# Patient Record
Sex: FEMALE | Race: WHITE | NOT HISPANIC OR LATINO | Employment: FULL TIME | URBAN - NONMETROPOLITAN AREA
[De-identification: names, ages, dates, MRNs, and addresses within clinical notes are randomized per-mention and may not be internally consistent; named-entity substitution may affect disease eponyms.]

---

## 2017-02-02 PROBLEM — R74.01 TRANSAMINITIS: Status: ACTIVE | Noted: 2017-02-02

## 2017-03-01 PROBLEM — R10.13 EPIGASTRIC PAIN: Status: ACTIVE | Noted: 2017-03-01

## 2017-03-31 PROBLEM — R10.9 ABDOMINAL PAIN: Status: ACTIVE | Noted: 2017-03-31

## 2018-03-20 PROBLEM — K62.5 BRBPR (BRIGHT RED BLOOD PER RECTUM): Status: ACTIVE | Noted: 2018-03-20

## 2020-09-24 PROBLEM — Z23 NEED FOR PROPHYLACTIC VACCINATION AND INOCULATION AGAINST INFLUENZA: Status: ACTIVE | Noted: 2020-09-24

## 2020-10-02 ENCOUNTER — HOSPITAL ENCOUNTER (OUTPATIENT)
Dept: RADIOLOGY | Facility: HOSPITAL | Age: 40
Discharge: HOME | End: 2020-10-02
Attending: NURSE PRACTITIONER
Payer: MEDICAID

## 2020-10-02 DIAGNOSIS — R05.9 COUGH: Primary | ICD-10-CM

## 2020-10-02 DIAGNOSIS — Z11.59 SCREENING EXAMINATION FOR POLIOMYELITIS: ICD-10-CM

## 2020-10-02 DIAGNOSIS — R05.9 COUGH: ICD-10-CM

## 2020-10-02 PROCEDURE — 71046 X-RAY EXAM CHEST 2 VIEWS: CPT | Mod: TC

## 2020-10-14 PROBLEM — R12 HEARTBURN: Status: ACTIVE | Noted: 2020-10-14

## 2021-02-15 DIAGNOSIS — Z12.31 ENCOUNTER FOR SCREENING MAMMOGRAM FOR BREAST CANCER: Primary | ICD-10-CM

## 2021-03-17 ENCOUNTER — HOSPITAL ENCOUNTER (OUTPATIENT)
Dept: RADIOLOGY | Facility: HOSPITAL | Age: 41
Discharge: HOME | End: 2021-03-17
Attending: NURSE PRACTITIONER
Payer: MEDICAID

## 2021-03-17 VITALS — WEIGHT: 175 LBS | BODY MASS INDEX: 29.16 KG/M2 | HEIGHT: 65 IN

## 2021-03-17 DIAGNOSIS — Z12.31 ENCOUNTER FOR SCREENING MAMMOGRAM FOR BREAST CANCER: ICD-10-CM

## 2021-03-17 PROCEDURE — 77067 SCR MAMMO BI INCL CAD: CPT | Mod: TC

## 2021-03-18 DIAGNOSIS — R60.0 LOCALIZED EDEMA: Primary | ICD-10-CM

## 2021-03-29 ENCOUNTER — HOSPITAL ENCOUNTER (OUTPATIENT)
Dept: RADIOLOGY | Facility: HOSPITAL | Age: 41
Discharge: HOME | End: 2021-03-29
Attending: NURSE PRACTITIONER
Payer: MEDICAID

## 2021-03-29 DIAGNOSIS — R60.0 LOCALIZED EDEMA: ICD-10-CM

## 2021-03-29 PROCEDURE — 93970 EXTREMITY STUDY: CPT | Mod: TC

## 2021-04-09 ENCOUNTER — HOSPITAL ENCOUNTER (EMERGENCY)
Facility: HOSPITAL | Age: 41
Discharge: HOME | End: 2021-04-10
Attending: FAMILY MEDICINE
Payer: MEDICAID

## 2021-04-09 DIAGNOSIS — D64.9 ANEMIA, UNSPECIFIED TYPE: Primary | ICD-10-CM

## 2021-04-09 LAB
ABO + RH BLD: NORMAL
ALBUMIN SERPL BCP-MCNC: 3.2 G/DL (ref 3.5–5.2)
ALP SERPL-CCNC: 207 U/L (ref 55–135)
ALT SERPL W/O P-5'-P-CCNC: 139 U/L (ref 10–44)
AMYLASE SERPL-CCNC: 40 U/L (ref 20–110)
ANION GAP SERPL CALC-SCNC: 11 MMOL/L (ref 8–16)
AST SERPL-CCNC: 337 U/L (ref 10–40)
BASOPHILS # BLD AUTO: 0.05 K/UL (ref 0–0.2)
BASOPHILS NFR BLD: 0.7 % (ref 0–1.9)
BILIRUB SERPL-MCNC: 0.2 MG/DL (ref 0.1–1)
BLD GP AB SCN CELLS X3 SERPL QL: NORMAL
BLD PROD TYP BPU: NORMAL
BLOOD UNIT EXPIRATION DATE: NORMAL
BLOOD UNIT TYPE CODE: 6200
BLOOD UNIT TYPE: NORMAL
BUN SERPL-MCNC: 8 MG/DL (ref 6–20)
CALCIUM SERPL-MCNC: 8.7 MG/DL (ref 8.7–10.5)
CHLORIDE SERPL-SCNC: 98 MMOL/L (ref 95–110)
CO2 SERPL-SCNC: 26 MMOL/L (ref 23–29)
CODING SYSTEM: NORMAL
CREAT SERPL-MCNC: 0.7 MG/DL (ref 0.5–1.4)
DIFFERENTIAL METHOD: ABNORMAL
DISPENSE STATUS: NORMAL
EOSINOPHIL # BLD AUTO: 0.2 K/UL (ref 0–0.5)
EOSINOPHIL NFR BLD: 3.2 % (ref 0–8)
ERYTHROCYTE [DISTWIDTH] IN BLOOD BY AUTOMATED COUNT: 17.8 % (ref 11.5–14.5)
EST. GFR  (AFRICAN AMERICAN): >60 ML/MIN/1.73 M^2
EST. GFR  (NON AFRICAN AMERICAN): >60 ML/MIN/1.73 M^2
GLUCOSE SERPL-MCNC: 98 MG/DL (ref 70–110)
HCT VFR BLD AUTO: 26.6 % (ref 37–48.5)
HGB BLD-MCNC: 7.8 G/DL (ref 12–16)
IMM GRANULOCYTES # BLD AUTO: 0.04 K/UL (ref 0–0.04)
IMM GRANULOCYTES NFR BLD AUTO: 0.5 % (ref 0–0.5)
LIPASE SERPL-CCNC: 123 U/L (ref 23–300)
LYMPHOCYTES # BLD AUTO: 2.5 K/UL (ref 1–4.8)
LYMPHOCYTES NFR BLD: 34.2 % (ref 18–48)
MCH RBC QN AUTO: 21 PG (ref 27–31)
MCHC RBC AUTO-ENTMCNC: 29.3 G/DL (ref 32–36)
MCV RBC AUTO: 72 FL (ref 82–98)
MONOCYTES # BLD AUTO: 0.9 K/UL (ref 0.3–1)
MONOCYTES NFR BLD: 12.2 % (ref 4–15)
NEUTROPHILS # BLD AUTO: 3.6 K/UL (ref 1.8–7.7)
NEUTROPHILS NFR BLD: 49.2 % (ref 38–73)
NRBC BLD-RTO: 0 /100 WBC
NUM UNITS TRANS PACKED RBC: NORMAL
OB PNL STL: NEGATIVE
PLATELET # BLD AUTO: 244 K/UL (ref 150–450)
PMV BLD AUTO: 10.8 FL (ref 9.2–12.9)
POTASSIUM SERPL-SCNC: 3.9 MMOL/L (ref 3.5–5.1)
PROT SERPL-MCNC: 7.8 G/DL (ref 6–8.4)
RBC # BLD AUTO: 3.71 M/UL (ref 4–5.4)
SODIUM SERPL-SCNC: 135 MMOL/L (ref 136–145)
WBC # BLD AUTO: 7.28 K/UL (ref 3.9–12.7)

## 2021-04-09 PROCEDURE — P9016 RBC LEUKOCYTES REDUCED: HCPCS | Performed by: NURSE PRACTITIONER

## 2021-04-09 PROCEDURE — 83690 ASSAY OF LIPASE: CPT | Performed by: NURSE PRACTITIONER

## 2021-04-09 PROCEDURE — 96374 THER/PROPH/DIAG INJ IV PUSH: CPT

## 2021-04-09 PROCEDURE — 36415 COLL VENOUS BLD VENIPUNCTURE: CPT | Performed by: NURSE PRACTITIONER

## 2021-04-09 PROCEDURE — C9113 INJ PANTOPRAZOLE SODIUM, VIA: HCPCS | Performed by: NURSE PRACTITIONER

## 2021-04-09 PROCEDURE — 96361 HYDRATE IV INFUSION ADD-ON: CPT

## 2021-04-09 PROCEDURE — 86920 COMPATIBILITY TEST SPIN: CPT | Performed by: NURSE PRACTITIONER

## 2021-04-09 PROCEDURE — 86900 BLOOD TYPING SEROLOGIC ABO: CPT | Performed by: NURSE PRACTITIONER

## 2021-04-09 PROCEDURE — 99291 CRITICAL CARE FIRST HOUR: CPT | Mod: 25

## 2021-04-09 PROCEDURE — 82150 ASSAY OF AMYLASE: CPT | Performed by: NURSE PRACTITIONER

## 2021-04-09 PROCEDURE — 80053 COMPREHEN METABOLIC PANEL: CPT | Performed by: NURSE PRACTITIONER

## 2021-04-09 PROCEDURE — 63600175 PHARM REV CODE 636 W HCPCS: Performed by: NURSE PRACTITIONER

## 2021-04-09 PROCEDURE — 85025 COMPLETE CBC W/AUTO DIFF WBC: CPT | Performed by: NURSE PRACTITIONER

## 2021-04-09 PROCEDURE — 25000003 PHARM REV CODE 250: Performed by: NURSE PRACTITIONER

## 2021-04-09 PROCEDURE — 36430 TRANSFUSION BLD/BLD COMPNT: CPT

## 2021-04-09 PROCEDURE — 82272 OCCULT BLD FECES 1-3 TESTS: CPT | Performed by: NURSE PRACTITIONER

## 2021-04-09 RX ORDER — HYDROCODONE BITARTRATE AND ACETAMINOPHEN 500; 5 MG/1; MG/1
TABLET ORAL
Status: DISCONTINUED | OUTPATIENT
Start: 2021-04-09 | End: 2021-04-10 | Stop reason: HOSPADM

## 2021-04-09 RX ORDER — PANTOPRAZOLE SODIUM 40 MG/10ML
40 INJECTION, POWDER, LYOPHILIZED, FOR SOLUTION INTRAVENOUS
Status: COMPLETED | OUTPATIENT
Start: 2021-04-09 | End: 2021-04-09

## 2021-04-09 RX ADMIN — PANTOPRAZOLE SODIUM 40 MG: 40 INJECTION, POWDER, LYOPHILIZED, FOR SOLUTION INTRAVENOUS at 04:04

## 2021-04-09 RX ADMIN — SODIUM CHLORIDE 1000 ML: 0.9 INJECTION, SOLUTION INTRAVENOUS at 04:04

## 2021-04-10 VITALS
DIASTOLIC BLOOD PRESSURE: 80 MMHG | WEIGHT: 180 LBS | BODY MASS INDEX: 29.99 KG/M2 | OXYGEN SATURATION: 100 % | SYSTOLIC BLOOD PRESSURE: 140 MMHG | HEIGHT: 65 IN | HEART RATE: 104 BPM | RESPIRATION RATE: 18 BRPM | TEMPERATURE: 98 F

## 2021-04-10 RX ORDER — PANTOPRAZOLE SODIUM 40 MG/1
40 TABLET, DELAYED RELEASE ORAL DAILY
Qty: 30 TABLET | Refills: 11 | Status: SHIPPED | OUTPATIENT
Start: 2021-04-10 | End: 2022-06-03

## 2021-04-10 RX ORDER — ONDANSETRON 4 MG/1
4 TABLET, ORALLY DISINTEGRATING ORAL EVERY 6 HOURS PRN
Qty: 20 TABLET | Refills: 0 | Status: SHIPPED | OUTPATIENT
Start: 2021-04-10 | End: 2021-10-26 | Stop reason: ALTCHOICE

## 2021-05-04 ENCOUNTER — PATIENT MESSAGE (OUTPATIENT)
Dept: RESEARCH | Facility: HOSPITAL | Age: 41
End: 2021-05-04

## 2021-05-11 PROBLEM — Z12.11 ENCOUNTER FOR COLORECTAL CANCER SCREENING: Status: ACTIVE | Noted: 2021-05-11

## 2021-05-11 PROBLEM — Z12.12 ENCOUNTER FOR COLORECTAL CANCER SCREENING: Status: ACTIVE | Noted: 2021-05-11

## 2021-08-09 ENCOUNTER — HOSPITAL ENCOUNTER (OUTPATIENT)
Dept: RADIOLOGY | Facility: HOSPITAL | Age: 41
Discharge: HOME | End: 2021-08-09
Attending: NURSE PRACTITIONER
Payer: MEDICAID

## 2021-08-09 DIAGNOSIS — R05.9 COUGH: ICD-10-CM

## 2021-08-09 DIAGNOSIS — R05.9 COUGH: Primary | ICD-10-CM

## 2021-08-09 PROCEDURE — 71046 X-RAY EXAM CHEST 2 VIEWS: CPT | Mod: TC

## 2021-08-23 ENCOUNTER — LAB VISIT (OUTPATIENT)
Dept: LAB | Facility: HOSPITAL | Age: 41
End: 2021-08-23
Attending: NURSE PRACTITIONER
Payer: MEDICAID

## 2021-08-23 DIAGNOSIS — R53.83 FATIGUE, UNSPECIFIED TYPE: ICD-10-CM

## 2021-08-23 LAB — TSH SERPL DL<=0.005 MIU/L-ACNC: 3.27 UIU/ML (ref 0.4–4)

## 2021-08-23 PROCEDURE — 36415 COLL VENOUS BLD VENIPUNCTURE: CPT | Performed by: NURSE PRACTITIONER

## 2021-08-23 PROCEDURE — 84443 ASSAY THYROID STIM HORMONE: CPT | Performed by: NURSE PRACTITIONER

## 2021-11-02 ENCOUNTER — LAB VISIT (OUTPATIENT)
Dept: LAB | Facility: HOSPITAL | Age: 41
End: 2021-11-02
Attending: INTERNAL MEDICINE
Payer: MEDICAID

## 2021-11-02 DIAGNOSIS — R74.01 TRANSAMINITIS: ICD-10-CM

## 2021-11-02 LAB
ALBUMIN SERPL BCP-MCNC: 3.3 G/DL (ref 3.5–5.2)
ALP SERPL-CCNC: 203 U/L (ref 55–135)
ALT SERPL W/O P-5'-P-CCNC: 119 U/L (ref 10–44)
AST SERPL-CCNC: 148 U/L (ref 10–40)
BILIRUB DIRECT SERPL-MCNC: 0.1 MG/DL (ref 0.1–0.3)
BILIRUB SERPL-MCNC: 0.3 MG/DL (ref 0.1–1)
PROT SERPL-MCNC: 8.4 G/DL (ref 6–8.4)

## 2021-11-02 PROCEDURE — 36415 COLL VENOUS BLD VENIPUNCTURE: CPT | Performed by: INTERNAL MEDICINE

## 2021-11-02 PROCEDURE — 80076 HEPATIC FUNCTION PANEL: CPT | Performed by: INTERNAL MEDICINE

## 2021-12-31 ENCOUNTER — HOSPITAL ENCOUNTER (EMERGENCY)
Facility: HOSPITAL | Age: 41
Discharge: HOME | End: 2021-12-31
Attending: EMERGENCY MEDICINE
Payer: MEDICAID

## 2021-12-31 VITALS
RESPIRATION RATE: 16 BRPM | OXYGEN SATURATION: 98 % | WEIGHT: 180 LBS | SYSTOLIC BLOOD PRESSURE: 141 MMHG | DIASTOLIC BLOOD PRESSURE: 105 MMHG | TEMPERATURE: 98 F | HEIGHT: 65 IN | HEART RATE: 84 BPM | BODY MASS INDEX: 29.99 KG/M2

## 2021-12-31 DIAGNOSIS — R06.02 SOB (SHORTNESS OF BREATH): ICD-10-CM

## 2021-12-31 DIAGNOSIS — Z11.52 ENCOUNTER FOR SCREENING FOR COVID-19: Primary | ICD-10-CM

## 2021-12-31 LAB — SARS-COV-2 RNA RESP QL NAA+PROBE: NOT DETECTED

## 2021-12-31 PROCEDURE — U0002 COVID-19 LAB TEST NON-CDC: HCPCS | Performed by: CLINICAL NURSE SPECIALIST

## 2021-12-31 PROCEDURE — 99284 EMERGENCY DEPT VISIT MOD MDM: CPT | Mod: 25

## 2021-12-31 RX ORDER — BENZONATATE 100 MG/1
100 CAPSULE ORAL 3 TIMES DAILY PRN
Qty: 20 CAPSULE | Refills: 0 | Status: SHIPPED | OUTPATIENT
Start: 2021-12-31 | End: 2022-01-10

## 2021-12-31 RX ORDER — ALBUTEROL SULFATE 90 UG/1
1-2 AEROSOL, METERED RESPIRATORY (INHALATION) EVERY 6 HOURS PRN
Qty: 18 G | Refills: 0 | Status: SHIPPED | OUTPATIENT
Start: 2021-12-31 | End: 2023-07-28

## 2021-12-31 NOTE — Clinical Note
"Rohini "Mariya Dang was seen and treated in our emergency department on 12/31/2021.     COVID-19 is present in our communities across the state. There is limited testing for COVID at this time, so not all patients can be tested. In this situation, your employee meets the following criteria:    Rohini Dang has met the criteria for COVID-19 testing based upon symptoms, travel, and/or potential exposure. The test has been completed and is pending results at this time. During this time the employee is not able to work and should be quarantined per the Centers for Disease Control timelines.     If you have any questions or concerns, or if I can be of further assistance, please do not hesitate to contact me.    Sincerely,             Trent Singh MD"

## 2021-12-31 NOTE — ED PROVIDER NOTES
Encounter Date: 12/31/2021       History     Chief Complaint   Patient presents with    Headache     Pt stated that she had the COVID booster 2 days ago and she is c/o headache, fever, and SOB. Pt stated she can not take OTC medications due to liver problems.      Rohini Dang is an 41 y.o. female who complains of headache, shortness of breath, fever for the last few days.  Had a booster shot 2 days ago.  Patient states her work is concerned that she might have COVID needs a COVID test.  Patient states she cannot take any over-the-counter medication for her symptoms.        Review of patient's allergies indicates:   Allergen Reactions    Amoxicillin Hives    Avelox [moxifloxacin] Hives    Clarinex [desloratadine] Hives    Pcn [penicillins] Hives    Claritin [loratadine] Palpitations    Latex, natural rubber Rash     Past Medical History:   Diagnosis Date    Anemia     Borderline personality disorder     Chronic bilateral low back pain without sciatica     Depression with anxiety     Diabetes mellitus     Elevated LFTs     Esophageal stenosis     Fatty liver     Fibromyalgia     Fibromyalgia     High cholesterol     Hypoglycemia     Intermittent explosive disorder     Liver disease     JAMESON (nonalcoholic steatohepatitis)      Past Surgical History:   Procedure Laterality Date    COLONOSCOPY N/A 3/20/2018    Procedure: COLONOSCOPY;  Surgeon: Janelle Meade MD;  Location: [facility];  Service: Endoscopy;  Laterality: N/A;    COLONOSCOPY N/A 5/11/2021    Procedure: COLONOSCOPY;  Surgeon: Janelle Meade MD;  Location: [facility];  Service: Endoscopy;  Laterality: N/A;    ESOPHAGEAL DILATION      x2    ESOPHAGOGASTRODUODENOSCOPY N/A 5/7/2019    Procedure: EGD (ESOPHAGOGASTRODUODENOSCOPY);  Surgeon: Janelle Meade MD;  Location: [facility];  Service: Endoscopy;  Laterality: N/A;    ESOPHAGOGASTRODUODENOSCOPY N/A 5/11/2021    Procedure: EGD  "(ESOPHAGOGASTRODUODENOSCOPY);  Surgeon: Janelle Meade MD;  Location: [facility];  Service: Endoscopy;  Laterality: N/A;  Rapid on arrival    HIATAL HERNIA REPAIR      HYSTERECTOMY      LIVER BIOPSY  2018    fibrosis stage 3 JAMESON    OOPHORECTOMY      TUBAL LIGATION      UPPER GASTROINTESTINAL ENDOSCOPY      2013? unable to obtain records     Family History   Problem Relation Age of Onset    Hypertension Mother     Clotting disorder Father     Ulcers Father     Clotting disorder Sister     Cancer Maternal Grandmother         "female Cancer"    Lung cancer Paternal Grandmother     Diabetes Maternal Aunt     No Known Problems Daughter     No Known Problems Maternal Uncle     No Known Problems Paternal Aunt     No Known Problems Paternal Uncle     No Known Problems Maternal Grandfather     No Known Problems Paternal Grandfather     Breast cancer Neg Hx     Ovarian cancer Neg Hx     BRCA 1/2 Neg Hx      Social History     Tobacco Use    Smoking status: Never Smoker    Smokeless tobacco: Never Used   Substance Use Topics    Alcohol use: No     Alcohol/week: 0.0 standard drinks    Drug use: No     Review of Systems   Constitutional: Positive for fever.   HENT: Negative for sore throat.    Respiratory: Positive for shortness of breath.    Cardiovascular: Negative for chest pain.   Gastrointestinal: Negative for nausea.   Genitourinary: Negative for dysuria.   Musculoskeletal: Negative for back pain.   Skin: Negative for rash.   Neurological: Positive for headaches. Negative for weakness.   Hematological: Does not bruise/bleed easily.   All other systems reviewed and are negative.      Physical Exam     Initial Vitals [12/31/21 1236]   BP Pulse Resp Temp SpO2   (!) 141/105 84 16 98.4 °F (36.9 °C) 98 %      MAP       --         Physical Exam    Nursing note and vitals reviewed.  Constitutional: She appears well-developed and well-nourished.   HENT:   Head: Normocephalic and atraumatic.   Eyes: " Pupils are equal, round, and reactive to light.   Neck:   Normal range of motion.  Cardiovascular: Normal rate and regular rhythm.   Pulmonary/Chest: Breath sounds normal.   Abdominal: Abdomen is soft. Bowel sounds are normal.   Musculoskeletal:         General: Normal range of motion.      Cervical back: Normal range of motion.     Neurological: She is alert and oriented to person, place, and time.   Skin: Skin is warm and dry.   Psychiatric: She has a normal mood and affect.         ED Course   Procedures  Labs Reviewed   SARS-COV-2 (COVID-19) QUALITATIVE PCR    Narrative:     Is the patient symptomatic?->Yes  Is testing needed for patient travel?->No  Is this needed for pre-procedure or pre-op testing?->No          Imaging Results          X-Ray Chest AP Portable (Final result)  Result time 12/31/21 13:09:19    Final result by Ubaldo Pardo MD (12/31/21 13:09:19)                 Impression:      No acute findings.      Electronically signed by: Ubaldo Pardo MD  Date:    12/31/2021  Time:    13:09             Narrative:    EXAMINATION:  XR CHEST AP PORTABLE    CLINICAL HISTORY:  Shortness of breath    COMPARISON:  Chest x-ray 08/09/2021.    FINDINGS:  Unremarkable AP cardiac silhouette.  No focal airspace disease.  No pleural fluid.                                 Medications - No data to display  Medical Decision Making:   Differential Diagnosis:   COVID, medical screening  Clinical Tests:   Radiological Study: Ordered and Reviewed                      Clinical Impression:   Final diagnoses:  [R06.02] SOB (shortness of breath)  [Z11.52] Encounter for screening for COVID-19 (Primary)          ED Disposition Condition    Discharge Stable        ED Prescriptions     Medication Sig Dispense Start Date End Date Auth. Provider    benzonatate (TESSALON) 100 MG capsule Take 1 capsule (100 mg total) by mouth 3 (three) times daily as needed. 20 capsule 12/31/2021 1/10/2022 Ana Yo NP    albuterol  (PROVENTIL/VENTOLIN HFA) 90 mcg/actuation inhaler Inhale 1-2 puffs into the lungs every 6 (six) hours as needed. Rescue 18 g 12/31/2021 12/31/2022 Ana Yo NP        Follow-up Information     Follow up With Specialties Details Why Contact Info    Skip Celestin NP Emergency Medicine  As needed [address] [facility] [address] [facility] [address]  [phone]             Ana Yo NP  12/31/21 6916

## 2022-01-26 ENCOUNTER — HOSPITAL ENCOUNTER (EMERGENCY)
Facility: HOSPITAL | Age: 42
Discharge: HOME | End: 2022-01-26
Attending: EMERGENCY MEDICINE
Payer: MEDICAID

## 2022-01-26 VITALS
HEART RATE: 78 BPM | OXYGEN SATURATION: 98 % | SYSTOLIC BLOOD PRESSURE: 103 MMHG | TEMPERATURE: 99 F | WEIGHT: 180.88 LBS | BODY MASS INDEX: 30.1 KG/M2 | DIASTOLIC BLOOD PRESSURE: 81 MMHG | RESPIRATION RATE: 18 BRPM

## 2022-01-26 DIAGNOSIS — R10.10 PAIN OF UPPER ABDOMEN: Primary | ICD-10-CM

## 2022-01-26 LAB
ALBUMIN SERPL BCP-MCNC: 3.5 G/DL (ref 3.5–5.2)
ALP SERPL-CCNC: 232 U/L (ref 55–135)
ALT SERPL W/O P-5'-P-CCNC: 107 U/L (ref 10–44)
ANION GAP SERPL CALC-SCNC: 5 MMOL/L (ref 8–16)
AST SERPL-CCNC: 147 U/L (ref 10–40)
BASOPHILS # BLD AUTO: 0.05 K/UL (ref 0–0.2)
BASOPHILS NFR BLD: 0.5 % (ref 0–1.9)
BILIRUB SERPL-MCNC: 0.3 MG/DL (ref 0.1–1)
BILIRUB UR QL STRIP: NEGATIVE
BUN SERPL-MCNC: 11 MG/DL (ref 6–20)
CALCIUM SERPL-MCNC: 9.1 MG/DL (ref 8.7–10.5)
CHLORIDE SERPL-SCNC: 108 MMOL/L (ref 95–110)
CLARITY UR: CLEAR
CO2 SERPL-SCNC: 27 MMOL/L (ref 23–29)
COLOR UR: YELLOW
CREAT SERPL-MCNC: 0.7 MG/DL (ref 0.5–1.4)
DIFFERENTIAL METHOD: ABNORMAL
EOSINOPHIL # BLD AUTO: 0.2 K/UL (ref 0–0.5)
EOSINOPHIL NFR BLD: 1.9 % (ref 0–8)
ERYTHROCYTE [DISTWIDTH] IN BLOOD BY AUTOMATED COUNT: 16 % (ref 11.5–14.5)
EST. GFR  (AFRICAN AMERICAN): >60 ML/MIN/1.73 M^2
EST. GFR  (NON AFRICAN AMERICAN): >60 ML/MIN/1.73 M^2
GLUCOSE SERPL-MCNC: 122 MG/DL (ref 70–110)
GLUCOSE UR QL STRIP: NEGATIVE
HCT VFR BLD AUTO: 35.3 % (ref 37–48.5)
HGB BLD-MCNC: 11.2 G/DL (ref 12–16)
HGB UR QL STRIP: NEGATIVE
IMM GRANULOCYTES # BLD AUTO: 0.05 K/UL (ref 0–0.04)
IMM GRANULOCYTES NFR BLD AUTO: 0.5 % (ref 0–0.5)
KETONES UR QL STRIP: NEGATIVE
LEUKOCYTE ESTERASE UR QL STRIP: NEGATIVE
LIPASE SERPL-CCNC: 214 U/L (ref 23–300)
LYMPHOCYTES # BLD AUTO: 4.1 K/UL (ref 1–4.8)
LYMPHOCYTES NFR BLD: 37.2 % (ref 18–48)
MCH RBC QN AUTO: 25.7 PG (ref 27–31)
MCHC RBC AUTO-ENTMCNC: 31.7 G/DL (ref 32–36)
MCV RBC AUTO: 81 FL (ref 82–98)
MONOCYTES # BLD AUTO: 1.2 K/UL (ref 0.3–1)
MONOCYTES NFR BLD: 11.2 % (ref 4–15)
NEUTROPHILS # BLD AUTO: 5.4 K/UL (ref 1.8–7.7)
NEUTROPHILS NFR BLD: 48.7 % (ref 38–73)
NITRITE UR QL STRIP: NEGATIVE
NRBC BLD-RTO: 0 /100 WBC
PH UR STRIP: 6 [PH] (ref 5–8)
PLATELET # BLD AUTO: 211 K/UL (ref 150–450)
PMV BLD AUTO: 10.9 FL (ref 9.2–12.9)
POTASSIUM SERPL-SCNC: 3.8 MMOL/L (ref 3.5–5.1)
PROT SERPL-MCNC: 8.4 G/DL (ref 6–8.4)
PROT UR QL STRIP: NEGATIVE
RBC # BLD AUTO: 4.36 M/UL (ref 4–5.4)
SODIUM SERPL-SCNC: 140 MMOL/L (ref 136–145)
SP GR UR STRIP: <=1.005 (ref 1–1.03)
URN SPEC COLLECT METH UR: ABNORMAL
UROBILINOGEN UR STRIP-ACNC: NEGATIVE EU/DL
WBC # BLD AUTO: 11.03 K/UL (ref 3.9–12.7)

## 2022-01-26 PROCEDURE — 99283 EMERGENCY DEPT VISIT LOW MDM: CPT

## 2022-01-26 PROCEDURE — 83690 ASSAY OF LIPASE: CPT | Performed by: NURSE PRACTITIONER

## 2022-01-26 PROCEDURE — 81003 URINALYSIS AUTO W/O SCOPE: CPT | Performed by: NURSE PRACTITIONER

## 2022-01-26 PROCEDURE — 85025 COMPLETE CBC W/AUTO DIFF WBC: CPT | Performed by: NURSE PRACTITIONER

## 2022-01-26 PROCEDURE — 80053 COMPREHEN METABOLIC PANEL: CPT | Performed by: NURSE PRACTITIONER

## 2022-01-26 PROCEDURE — 36415 COLL VENOUS BLD VENIPUNCTURE: CPT | Performed by: NURSE PRACTITIONER

## 2022-01-26 PROCEDURE — 25000003 PHARM REV CODE 250: Performed by: NURSE PRACTITIONER

## 2022-01-26 RX ORDER — SUCRALFATE 1 G/1
1 TABLET ORAL
Qty: 20 TABLET | Refills: 0 | Status: SHIPPED | OUTPATIENT
Start: 2022-01-26 | End: 2022-01-31

## 2022-01-26 RX ADMIN — LIDOCAINE HYDROCHLORIDE: 20 SOLUTION ORAL; TOPICAL at 04:01

## 2022-01-26 NOTE — DISCHARGE INSTRUCTIONS
It is important that you take medication as directed and follow-up with your gastroenterologist as scheduled.  Return to the emergency room for worsening condition.  Be sure to follow-up bland diet while experiencing symptoms of abdominal pain.

## 2022-01-26 NOTE — Clinical Note
"Rohini"Mariya Dang was seen and treated in our emergency department on 1/26/2022.  She may return to work on 01/27/2022.       If you have any questions or concerns, please don't hesitate to call.      Krista Vences NP"

## 2022-01-26 NOTE — ED PROVIDER NOTES
Encounter Date: 1/26/2022       History     Chief Complaint   Patient presents with    Abdominal Pain     Abdominal pain x 2 weeks, states worse anytime she eats or drinks. Denies nausea and vomiting. Reports diarrhea approx 1 x per day. Has upcoming appointment with GI.     This is a 41-year-old white female with a history of anemia, JAMESON, AND TYPE 2 DIABETES MELLITUS WHO PRESENTS TO THE EMERGENCY DEPARTMENT WITH COMPLAINTS OF UPPER ABDOMINAL PAIN FOR 2 WEEKS.  PATIENT REPORTS CONSTANT ACHING PAIN TO UPPER ABDOMEN EXACERBATED BY EATING AND DRINKING.  SHE ALSO ENDORSES EPISODES OF DIARRHEA APPROXIMATELY ONCE DAILY.  THE PATIENT RELATES THAT SHE IS UNABLE TO SEE HER GASTROENTEROLOGIST UNTIL ANOTHER 2 WEEKS.  THE PATIENT DENIES FEVER, CHILLS, URI SIGNS AND SYMPTOMS, CHEST PAIN, SHORTNESS OF BREATH, COUGH, URINARY DYSFUNCTION, OR BLACK/BLOODY BOWEL MOVEMENTS.        Review of patient's allergies indicates:   Allergen Reactions    Amoxicillin Hives    Avelox [moxifloxacin] Hives    Clarinex [desloratadine] Hives    Pcn [penicillins] Hives    Claritin [loratadine] Palpitations    Latex, natural rubber Rash     Past Medical History:   Diagnosis Date    Anemia     Borderline personality disorder     Chronic bilateral low back pain without sciatica     Depression with anxiety     Diabetes mellitus     Elevated LFTs     Esophageal stenosis     Fatty liver     Fibromyalgia     Fibromyalgia     High cholesterol     Hypoglycemia     Intermittent explosive disorder     Liver disease     JAMESON (nonalcoholic steatohepatitis)      Past Surgical History:   Procedure Laterality Date    COLONOSCOPY N/A 3/20/2018    Procedure: COLONOSCOPY;  Surgeon: Janelle Meade MD;  Location: [facility];  Service: Endoscopy;  Laterality: N/A;    COLONOSCOPY N/A 5/11/2021    Procedure: COLONOSCOPY;  Surgeon: Janelle Meade MD;  Location: [facility];  Service: Endoscopy;  Laterality: N/A;    ESOPHAGEAL DILATION    "   x2    ESOPHAGOGASTRODUODENOSCOPY N/A 5/7/2019    Procedure: EGD (ESOPHAGOGASTRODUODENOSCOPY);  Surgeon: Janelle Meade MD;  Location: [facility];  Service: Endoscopy;  Laterality: N/A;    ESOPHAGOGASTRODUODENOSCOPY N/A 5/11/2021    Procedure: EGD (ESOPHAGOGASTRODUODENOSCOPY);  Surgeon: Janelle Meade MD;  Location: [facility];  Service: Endoscopy;  Laterality: N/A;  Rapid on arrival    HIATAL HERNIA REPAIR      HYSTERECTOMY      LIVER BIOPSY  2018    fibrosis stage 3 JAMESON    OOPHORECTOMY      TUBAL LIGATION      UPPER GASTROINTESTINAL ENDOSCOPY      2013? unable to obtain records     Family History   Problem Relation Age of Onset    Hypertension Mother     Clotting disorder Father     Ulcers Father     Clotting disorder Sister     Cancer Maternal Grandmother         "female Cancer"    Lung cancer Paternal Grandmother     Diabetes Maternal Aunt     No Known Problems Daughter     No Known Problems Maternal Uncle     No Known Problems Paternal Aunt     No Known Problems Paternal Uncle     No Known Problems Maternal Grandfather     No Known Problems Paternal Grandfather     Breast cancer Neg Hx     Ovarian cancer Neg Hx     BRCA 1/2 Neg Hx      Social History     Tobacco Use    Smoking status: Never Smoker    Smokeless tobacco: Never Used   Substance Use Topics    Alcohol use: No     Alcohol/week: 0.0 standard drinks    Drug use: No     Review of Systems   Constitutional: Negative.    HENT: Negative.    Respiratory: Negative.    Cardiovascular: Negative.    Gastrointestinal: Positive for abdominal distention, abdominal pain and diarrhea. Negative for blood in stool, constipation, nausea and vomiting.   Genitourinary: Negative.    Musculoskeletal: Negative.    Neurological: Negative.        Physical Exam     Initial Vitals [01/26/22 1537]   BP Pulse Resp Temp SpO2   103/81 78 18 98.5 °F (36.9 °C) 98 %      MAP       --         Physical Exam    Nursing note and vitals " reviewed.  Constitutional: She appears well-developed and well-nourished. She is active. No distress.   HENT:   Head: Normocephalic and atraumatic.   Mouth/Throat: Oropharynx is clear and moist. No oropharyngeal exudate.   Eyes: EOM are normal. Pupils are equal, round, and reactive to light.   Neck: Neck supple.   Normal range of motion.  Cardiovascular: Normal rate, regular rhythm and normal heart sounds.   Pulmonary/Chest: Breath sounds normal. No respiratory distress.   Abdominal: Abdomen is soft. Bowel sounds are normal. She exhibits distension (Mild). She exhibits no mass. There is abdominal tenderness ( all quadrants). There is no rebound and no guarding.   Musculoskeletal:         General: Normal range of motion.      Cervical back: Normal range of motion and neck supple.     Neurological: She is alert and oriented to person, place, and time. GCS score is 15. GCS eye subscore is 4. GCS verbal subscore is 5. GCS motor subscore is 6.   Skin: Skin is warm and dry. Capillary refill takes less than 2 seconds.   Psychiatric: She has a normal mood and affect. Her behavior is normal. Thought content normal.         ED Course   Procedures  Labs Reviewed   CBC W/ AUTO DIFFERENTIAL - Abnormal; Notable for the following components:       Result Value    Hemoglobin 11.2 (*)     Hematocrit 35.3 (*)     MCV 81 (*)     MCH 25.7 (*)     MCHC 31.7 (*)     RDW 16.0 (*)     Immature Grans (Abs) 0.05 (*)     Mono # 1.2 (*)     All other components within normal limits   COMPREHENSIVE METABOLIC PANEL - Abnormal; Notable for the following components:    Glucose 122 (*)     Alkaline Phosphatase 232 (*)      (*)      (*)     Anion Gap 5 (*)     All other components within normal limits   URINALYSIS, REFLEX TO URINE CULTURE - Abnormal; Notable for the following components:    Specific Gravity, UA <=1.005 (*)     All other components within normal limits    Narrative:     Preferred Collection Type->Urine, Clean  Catch  Specimen Source->Urine   LIPASE          Imaging Results    None          Medications   (pyxis) gi cocktail (mylanta 30 mL, LIDOcaine 2 % viscous 10 mL, dicyclomine 10 mL) 50 mL ( Oral Given 1/26/22 1625)                 ED Course as of 01/26/22 1650   Wed Jan 26, 2022   1647 White blood cells normal, H&H stable, urinalysis normal, CMP reveals liver enzymes stable and consistent with previous.  Will prescribe Carafate for patient to take until follow-up with GI.  Patient understands to return for worsening condition. [CB]      ED Course User Index  [CB] Krista Vences NP             Clinical Impression:   Final diagnoses:  [R10.10] Pain of upper abdomen (Primary)          ED Disposition Condition    Discharge Stable        ED Prescriptions     Medication Sig Dispense Start Date End Date Auth. Provider    sucralfate (CARAFATE) 1 gram tablet Take 1 tablet (1 g total) by mouth 4 (four) times daily before meals and nightly. for 5 days 20 tablet 1/26/2022 1/31/2022 Krista Vences NP        Follow-up Information     Follow up With Specialties Details Why Contact Info    PCP Follow UP  Call in 2 days for follow-up, for re-evaluation of today's complaint            Krista Vences NP  01/26/22 1650

## 2022-02-10 ENCOUNTER — HOSPITAL ENCOUNTER (EMERGENCY)
Facility: HOSPITAL | Age: 42
Discharge: HOME | End: 2022-02-10
Attending: EMERGENCY MEDICINE
Payer: MEDICAID

## 2022-02-10 VITALS
TEMPERATURE: 99 F | DIASTOLIC BLOOD PRESSURE: 84 MMHG | OXYGEN SATURATION: 99 % | HEART RATE: 85 BPM | SYSTOLIC BLOOD PRESSURE: 127 MMHG | RESPIRATION RATE: 18 BRPM | WEIGHT: 182 LBS | BODY MASS INDEX: 30.32 KG/M2 | HEIGHT: 65 IN

## 2022-02-10 DIAGNOSIS — R20.2 PARESTHESIA AND PAIN OF LEFT EXTREMITY: ICD-10-CM

## 2022-02-10 DIAGNOSIS — R07.89 CHEST WALL PAIN: Primary | ICD-10-CM

## 2022-02-10 DIAGNOSIS — M79.609 PARESTHESIA AND PAIN OF LEFT EXTREMITY: ICD-10-CM

## 2022-02-10 LAB
CTP QC/QA: YES
SARS-COV-2 RDRP RESP QL NAA+PROBE: NEGATIVE

## 2022-02-10 PROCEDURE — 93010 ELECTROCARDIOGRAM REPORT: CPT | Mod: ,,, | Performed by: INTERNAL MEDICINE

## 2022-02-10 PROCEDURE — 93005 ELECTROCARDIOGRAM TRACING: CPT

## 2022-02-10 PROCEDURE — 63600175 PHARM REV CODE 636 W HCPCS: Performed by: EMERGENCY MEDICINE

## 2022-02-10 PROCEDURE — 96372 THER/PROPH/DIAG INJ SC/IM: CPT

## 2022-02-10 PROCEDURE — U0002 COVID-19 LAB TEST NON-CDC: HCPCS | Performed by: EMERGENCY MEDICINE

## 2022-02-10 PROCEDURE — 93010 EKG 12-LEAD: ICD-10-PCS | Mod: ,,, | Performed by: INTERNAL MEDICINE

## 2022-02-10 PROCEDURE — 99284 EMERGENCY DEPT VISIT MOD MDM: CPT | Mod: 25

## 2022-02-10 RX ORDER — KETOROLAC TROMETHAMINE 30 MG/ML
30 INJECTION, SOLUTION INTRAMUSCULAR; INTRAVENOUS
Status: COMPLETED | OUTPATIENT
Start: 2022-02-10 | End: 2022-02-10

## 2022-02-10 RX ORDER — DICLOFENAC SODIUM 75 MG/1
75 TABLET, DELAYED RELEASE ORAL 2 TIMES DAILY PRN
Qty: 10 TABLET | Refills: 0 | Status: SHIPPED | OUTPATIENT
Start: 2022-02-10 | End: 2022-06-03

## 2022-02-10 RX ADMIN — KETOROLAC TROMETHAMINE 30 MG: 30 INJECTION, SOLUTION INTRAMUSCULAR at 01:02

## 2022-02-10 NOTE — ED PROVIDER NOTES
Encounter Date: 2/10/2022       History     Chief Complaint   Patient presents with    Shortness of Breath     Pt c/o numbness to left arm, midsternal and left anterior chest pain, and sob x2days. Pt states its worsening today.     41 yo female with fibromyalgia here via POV with midchest wall pain x 2 days. Associated with tingling and pain to LUE in a stocking/glove distribution to the entire arm. No trauma. No weakness. No fever. Pain is replicated exactly by palpation. No alleviating factors. Similar to previous. Seen at another ED yesterday for same.         Review of patient's allergies indicates:   Allergen Reactions    Amoxicillin Hives    Avelox [moxifloxacin] Hives    Clarinex [desloratadine] Hives    Pcn [penicillins] Hives    Claritin [loratadine] Palpitations    Latex, natural rubber Rash     Past Medical History:   Diagnosis Date    Anemia     Borderline personality disorder     Chronic bilateral low back pain without sciatica     Depression with anxiety     Diabetes mellitus     Elevated LFTs     Esophageal stenosis     Fatty liver     Fibromyalgia     Fibromyalgia     High cholesterol     Hypoglycemia     Intermittent explosive disorder     Liver disease     JAMESON (nonalcoholic steatohepatitis)      Past Surgical History:   Procedure Laterality Date    COLONOSCOPY N/A 3/20/2018    Procedure: COLONOSCOPY;  Surgeon: Janelle Meade MD;  Location: [facility];  Service: Endoscopy;  Laterality: N/A;    COLONOSCOPY N/A 5/11/2021    Procedure: COLONOSCOPY;  Surgeon: Janelle Meade MD;  Location: [facility];  Service: Endoscopy;  Laterality: N/A;    ESOPHAGEAL DILATION      x2    ESOPHAGOGASTRODUODENOSCOPY N/A 5/7/2019    Procedure: EGD (ESOPHAGOGASTRODUODENOSCOPY);  Surgeon: Janelle Meade MD;  Location: [facility];  Service: Endoscopy;  Laterality: N/A;    ESOPHAGOGASTRODUODENOSCOPY N/A 5/11/2021    Procedure: EGD (ESOPHAGOGASTRODUODENOSCOPY);  Surgeon:  "Janelle Meade MD;  Location: [facility];  Service: Endoscopy;  Laterality: N/A;  Rapid on arrival    HIATAL HERNIA REPAIR      HYSTERECTOMY      LIVER BIOPSY  2018    fibrosis stage 3 JAMESON    OOPHORECTOMY      TUBAL LIGATION      UPPER GASTROINTESTINAL ENDOSCOPY      2013? unable to obtain records     Family History   Problem Relation Age of Onset    Hypertension Mother     Clotting disorder Father     Ulcers Father     Clotting disorder Sister     Cancer Maternal Grandmother         "female Cancer"    Lung cancer Paternal Grandmother     Diabetes Maternal Aunt     No Known Problems Daughter     No Known Problems Maternal Uncle     No Known Problems Paternal Aunt     No Known Problems Paternal Uncle     No Known Problems Maternal Grandfather     No Known Problems Paternal Grandfather     Breast cancer Neg Hx     Ovarian cancer Neg Hx     BRCA 1/2 Neg Hx      Social History     Tobacco Use    Smoking status: Never Smoker    Smokeless tobacco: Never Used   Substance Use Topics    Alcohol use: No     Alcohol/week: 0.0 standard drinks    Drug use: No     Review of Systems   Constitutional: Negative.    Respiratory: Negative.    Cardiovascular: Positive for chest pain.   All other systems reviewed and are negative.      Physical Exam     Initial Vitals [02/10/22 1244]   BP Pulse Resp Temp SpO2   (!) 171/107 97 20 99.4 °F (37.4 °C) 100 %      MAP       --         Physical Exam    Nursing note and vitals reviewed.  Constitutional: She appears well-developed and well-nourished. She is not diaphoretic. No distress.   HENT:   Head: Normocephalic and atraumatic.   Eyes: EOM are normal. Pupils are equal, round, and reactive to light.   Neck: Neck supple.   Normal range of motion.  Cardiovascular: Normal rate, regular rhythm and normal heart sounds.   Pulmonary/Chest: Breath sounds normal. No respiratory distress. She has no wheezes. She has no rales. She exhibits tenderness.   Abdominal: " Abdomen is soft. Bowel sounds are normal. She exhibits no distension. There is no abdominal tenderness. There is no rebound.   Musculoskeletal:         General: No tenderness or edema. Normal range of motion.      Cervical back: Normal range of motion and neck supple.     Neurological: She is alert and oriented to person, place, and time. She has normal strength and normal reflexes.   Skin: Skin is warm and dry. Capillary refill takes less than 2 seconds. No rash noted.         ED Course   Procedures  Labs Reviewed   SARS-COV-2 RDRP GENE    Narrative:     .This test utilizes isothermal nucleic acid amplification   technology to detect the SARS-CoV-2 RdRp nucleic acid segment.   The analytical sensitivity (limit of detection) is 125 genome   equivalents/mL.   A POSITIVE result implies infection with the SARS-CoV-2 virus;   the patient is presumed to be contagious.     A NEGATIVE result means that SARS-CoV-2 nucleic acids are not   present above the limit of detection. A NEGATIVE result should be   treated as presumptive. It does not rule out the possibility of   COVID-19 and should not be the sole basis for treatment decisions.   If COVID-19 is strongly suspected based on clinical and exposure   history, re-testing using an alternate molecular assay should be   considered.   This test is only for use under the Food and Drug   Administration s Emergency Use Authorization (EUA).   Commercial kits are provided by [employer].   Performance characteristics of the EUA have been independently   verified by [facility] Department of   Pathology and Laboratory Medicine.   _________________________________________________________________   The authorized Fact Sheet for Healthcare Providers and the authorized Fact   Sheet for Patients of the ID NOW COVID-19 are available on the FDA   website:     https://www.fda.gov/media/153072/download  https://www.fda.gov/media/198761/download         EKG Readings:  (Independently Interpreted)   Initial Reading: No STEMI. Rhythm: Normal Sinus Rhythm. Heart Rate: 95. Ectopy: No Ectopy. Clinical Impression: Normal Sinus Rhythm       Imaging Results          X-Ray Chest AP Portable (Final result)  Result time 02/10/22 13:48:27    Final result by Werner Lopez MD (02/10/22 13:48:27)                 Impression:      No acute finding detected.      Electronically signed by: Werner Lopez MD  Date:    02/10/2022  Time:    13:48             Narrative:    EXAMINATION:  XR CHEST AP PORTABLE    CLINICAL HISTORY:  Chest pain, unspecified    TECHNIQUE:  Portable AP chest    COMPARISON:  12/31/2021    FINDINGS:  No cardiac silhouette enlargement. Pulmonary vasculature unremarkable. No consolidation or evidence of pneumothorax. No acute osseous change.                                 Medications   ketorolac injection 30 mg (30 mg Intramuscular Given 2/10/22 1322)     Medical Decision Making:   Clinical Tests:   Radiological Study: Ordered and Reviewed  Medical Tests: Reviewed and Ordered                      Clinical Impression:   Final diagnoses:  [R07.89] Chest wall pain (Primary)  [M79.609, R20.2] Paresthesia and pain of left extremity          ED Disposition Condition    Discharge Stable        ED Prescriptions     Medication Sig Dispense Start Date End Date Auth. Provider    diclofenac (VOLTAREN) 75 MG EC tablet Take 1 tablet (75 mg total) by mouth 2 (two) times daily as needed (pain). 10 tablet 2/10/2022  Kale Rodrgiuez MD        Follow-up Information     Follow up With Specialties Details Why Contact Info    Skip Celestin NP Emergency Medicine Schedule an appointment as soon as possible for a visit   [address] [place] [address]  [place]TO[place] [address]  [phone]             Kale Rodriguez MD  02/10/22 8646

## 2022-02-10 NOTE — Clinical Note
"Rohini Mcnally"Laurence was seen and treated in our emergency department on 2/10/2022.  She may return to work on 02/12/2022.       If you have any questions or concerns, please don't hesitate to call.      Mary DUNN    "

## 2022-02-14 ENCOUNTER — LAB VISIT (OUTPATIENT)
Dept: LAB | Facility: HOSPITAL | Age: 42
End: 2022-02-14
Attending: NURSE PRACTITIONER
Payer: MEDICAID

## 2022-02-14 DIAGNOSIS — Z79.899 LONG TERM CURRENT USE OF THERAPEUTIC DRUG: ICD-10-CM

## 2022-02-14 DIAGNOSIS — E11.42 TYPE 2 DIABETES, CONTROLLED, WITH PERIPHERAL NEUROPATHY: ICD-10-CM

## 2022-02-14 DIAGNOSIS — R53.83 FATIGUE, UNSPECIFIED TYPE: ICD-10-CM

## 2022-02-14 DIAGNOSIS — E88.810 METABOLIC SYNDROME: ICD-10-CM

## 2022-02-14 LAB
ALBUMIN SERPL BCP-MCNC: 3.5 G/DL (ref 3.5–5.2)
ALP SERPL-CCNC: 217 U/L (ref 55–135)
ALT SERPL W/O P-5'-P-CCNC: 103 U/L (ref 10–44)
ANION GAP SERPL CALC-SCNC: 5 MMOL/L (ref 8–16)
AST SERPL-CCNC: 106 U/L (ref 10–40)
BILIRUB SERPL-MCNC: 0.2 MG/DL (ref 0.1–1)
BUN SERPL-MCNC: 16 MG/DL (ref 6–20)
CALCIUM SERPL-MCNC: 9.4 MG/DL (ref 8.7–10.5)
CHLORIDE SERPL-SCNC: 108 MMOL/L (ref 95–110)
CO2 SERPL-SCNC: 25 MMOL/L (ref 23–29)
CREAT SERPL-MCNC: 0.8 MG/DL (ref 0.5–1.4)
EST. GFR  (AFRICAN AMERICAN): >60 ML/MIN/1.73 M^2
EST. GFR  (NON AFRICAN AMERICAN): >60 ML/MIN/1.73 M^2
ESTIMATED AVG GLUCOSE: 160 MG/DL (ref 68–131)
GLUCOSE SERPL-MCNC: 179 MG/DL (ref 70–110)
HBA1C MFR BLD: 7.2 % (ref 4–5.6)
POTASSIUM SERPL-SCNC: 4.2 MMOL/L (ref 3.5–5.1)
PROT SERPL-MCNC: 8.2 G/DL (ref 6–8.4)
SODIUM SERPL-SCNC: 138 MMOL/L (ref 136–145)
TSH SERPL DL<=0.005 MIU/L-ACNC: 2.71 UIU/ML (ref 0.4–4)
VIT B12 SERPL-MCNC: 509 PG/ML (ref 210–950)

## 2022-02-14 PROCEDURE — 82607 VITAMIN B-12: CPT | Performed by: NURSE PRACTITIONER

## 2022-02-14 PROCEDURE — 80053 COMPREHEN METABOLIC PANEL: CPT | Performed by: NURSE PRACTITIONER

## 2022-02-14 PROCEDURE — 36415 COLL VENOUS BLD VENIPUNCTURE: CPT | Performed by: NURSE PRACTITIONER

## 2022-02-14 PROCEDURE — 84443 ASSAY THYROID STIM HORMONE: CPT | Performed by: NURSE PRACTITIONER

## 2022-02-14 PROCEDURE — 83036 HEMOGLOBIN GLYCOSYLATED A1C: CPT | Performed by: NURSE PRACTITIONER

## 2022-02-16 ENCOUNTER — PATIENT OUTREACH (OUTPATIENT)
Dept: EMERGENCY MEDICINE | Facility: HOSPITAL | Age: 42
End: 2022-02-16
Payer: MEDICAID

## 2022-02-21 NOTE — PROGRESS NOTES
Patient stated she already made appointments and seen the doctors. Patient declined ED navigation assessment and denied any needs/resources at this time.     Bertha Ly  ED Navigator- [place]

## 2022-03-16 DIAGNOSIS — Z12.31 ENCOUNTER FOR SCREENING MAMMOGRAM FOR MALIGNANT NEOPLASM OF BREAST: Primary | ICD-10-CM

## 2022-03-21 ENCOUNTER — HOSPITAL ENCOUNTER (OUTPATIENT)
Dept: RADIOLOGY | Facility: HOSPITAL | Age: 42
Discharge: HOME | End: 2022-03-21
Attending: NURSE PRACTITIONER
Payer: MEDICAID

## 2022-03-21 VITALS — HEIGHT: 65 IN | WEIGHT: 178 LBS | BODY MASS INDEX: 29.66 KG/M2

## 2022-03-21 DIAGNOSIS — Z12.31 ENCOUNTER FOR SCREENING MAMMOGRAM FOR MALIGNANT NEOPLASM OF BREAST: ICD-10-CM

## 2022-03-21 PROCEDURE — 77067 SCR MAMMO BI INCL CAD: CPT | Mod: TC

## 2022-04-18 DIAGNOSIS — M54.2 CERVICALGIA: Primary | ICD-10-CM

## 2022-04-18 DIAGNOSIS — M60.9 MYOSITIS: ICD-10-CM

## 2022-05-12 ENCOUNTER — CLINICAL SUPPORT (OUTPATIENT)
Dept: REHABILITATION | Facility: HOSPITAL | Age: 42
End: 2022-05-12
Payer: MEDICAID

## 2022-05-12 DIAGNOSIS — M60.9 MYOSITIS: Primary | ICD-10-CM

## 2022-05-12 DIAGNOSIS — M54.50 LUMBAR PAIN: ICD-10-CM

## 2022-05-12 DIAGNOSIS — M54.2 CERVICALGIA: ICD-10-CM

## 2022-05-12 DIAGNOSIS — M60.9 MYOSITIS, UNSPECIFIED MYOSITIS TYPE, UNSPECIFIED SITE: ICD-10-CM

## 2022-05-12 PROCEDURE — 97162 PT EVAL MOD COMPLEX 30 MIN: CPT
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
## 2022-05-16 ENCOUNTER — TELEPHONE (OUTPATIENT)
Dept: REHABILITATION | Facility: HOSPITAL | Age: 42
End: 2022-05-16
Payer: MEDICAID

## 2022-05-24 ENCOUNTER — TELEPHONE (OUTPATIENT)
Dept: REHABILITATION | Facility: HOSPITAL | Age: 42
End: 2022-05-24
Payer: MEDICAID

## 2022-05-25 ENCOUNTER — CLINICAL SUPPORT (OUTPATIENT)
Dept: REHABILITATION | Facility: HOSPITAL | Age: 42
End: 2022-05-25
Payer: MEDICAID

## 2022-05-25 DIAGNOSIS — M60.9 MYOSITIS, UNSPECIFIED MYOSITIS TYPE, UNSPECIFIED SITE: Primary | ICD-10-CM

## 2022-05-25 DIAGNOSIS — M54.2 CERVICALGIA: ICD-10-CM

## 2022-05-25 DIAGNOSIS — M54.50 LUMBAR PAIN: ICD-10-CM

## 2022-05-25 PROCEDURE — 97110 THERAPEUTIC EXERCISES: CPT

## 2022-05-25 NOTE — PROGRESS NOTES
"                                                    Physical Therapy Daily Note     Name: Rohini Dang  Clinic Number: [id number]  Diagnosis:   Encounter Diagnoses   Name Primary?    Myositis, unspecified myositis type, unspecified site Yes    Lumbar pain     Cervicalgia      Physician: Gi Freeman FNP  Precautions: body mechanics  Visit #: 1 of 12  PTA Visit #: 0  Time In: 1025  Time Out: 1125    Subjective     Pt reports: Patient complains of neck/upper back pain and low back pain. 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 Decreased neck pain to 4/10 after treatment and no change in back pain.     Objective     Rohini received individual therapeutic exercises to develop strength, endurance, ROM, flexibility, posture and core stabilization for 45 minutes including:  2x10 TA bracing with 5" holds, LTR, supine marching, SKTC 3x15", HSS 3x15", seated exercises 2x10  cervical flexion/extension (cues to stop before point of dizziness), cervical rotation, lateral flexion, scapular retraction, posterior shoulder rolls.    The patient received the following supervised modalities after being cleared for contradictions: MHP to neck and back with patient supine and knees over bolster x15 minutes for pain relief.     Written Home Exercises Provided: will be provided at future appt.   Education provided re: exercise technique, modalities  Rohini verbalized good understanding of education provided.   No spiritual or educational barriers to learning provided    Assessment     Patient tolerated treatment fair. Will continue to progress per POC as appropriate.   This is a 42 y.o. female referred to outpatient physical therapy and presents with a medical diagnosis of   Encounter Diagnoses   Name Primary?    Myositis, unspecified myositis type, unspecified site Yes    Lumbar pain     Cervicalgia     " and demonstrates limitations as described in the problem list. Pt prognosis is Good. Pt will continue to benefit from skilled outpatient physical therapy to address the deficits listed in the problem list, provide pt/family education and to maximize pt's level of independence in the home and community environment.     Goals as follows:  Short Term GOALS: 3 weeks. Pt agrees with goals set.  1. Patient will be independent with HEP.  2. Patient will demonstrate postural awareness with all activities.  3. Patient will perform daily activities with fewer complaints.      Long Term GOALS: 6  weeks. Pt agrees with goals set.  1. Patient will demonstrate LEFS to 50 or greater.  2. Patient will demonstrate modified oswestry score 25% or less.   3. Patient will demonstrate neck disability index 25% or less.   4. Patient will reports decreased neck pain to 2/10 or less.  5. Patient will report decreased LBP to 2/10 or less.  6. Patient will demonstrate improvement in lumbar ROM.  7. Patient will demonstrate improvement in cervical ROM.     Plan     Continue with established Plan of Care towards PT goals.    Therapist: Rohini Zhou, PT  5/25/2022

## 2022-05-27 ENCOUNTER — CLINICAL SUPPORT (OUTPATIENT)
Dept: REHABILITATION | Facility: HOSPITAL | Age: 42
End: 2022-05-27
Payer: MEDICAID

## 2022-05-27 DIAGNOSIS — M54.50 LUMBAR PAIN: ICD-10-CM

## 2022-05-27 DIAGNOSIS — M54.2 CERVICALGIA: ICD-10-CM

## 2022-05-27 DIAGNOSIS — M60.9 MYOSITIS, UNSPECIFIED MYOSITIS TYPE, UNSPECIFIED SITE: Primary | ICD-10-CM

## 2022-05-27 PROCEDURE — 97110 THERAPEUTIC EXERCISES: CPT

## 2022-05-27 NOTE — PROGRESS NOTES
"                                                    Physical Therapy Daily Note     Name: Rohini Dang  Clinic Number: [id number]  Diagnosis:   Encounter Diagnoses   Name Primary?    Myositis, unspecified myositis type, unspecified site Yes    Lumbar pain     Cervicalgia      Physician: Gi Freeman FNP  Precautions: body mechanics  Visit #: 2 of 12  PTA Visit #: 0  Time In: 0818  Time Out: 0909    Subjective     Pt reports: Patient complains of neck/upper back pain and low back pain.   Pain Scale: Rohini rates pain on a scale of 0-10 to be 5-6 currently low back and 6-7 neck and upper back. After treatment LBP 7/10 and neck and upper back pain 4/10.     Objective     Rohini received individual therapeutic exercises to develop strength, endurance, ROM, flexibility, posture and core stabilization for 51 minutes including:  2x10 TA bracing with 5" holds, LTR, supine marching, SKTC 3x15", HSS 3x15", seated dowel raises with TA bracing 2x10,  seated exercises 2x10 scapular retraction, posterior shoulder rolls 2x10.    Written Home Exercises Provided: will be provided at future appt.   Education provided re: exercise technique, modalities  Rohini verbalized good understanding of education provided.   No spiritual or educational barriers to learning provided    Assessment     Patient did not receive complete treatment due to being late for appt. Patient tolerated treatment fair. Will continue to progress per POC as appropriate.     This is a 42 y.o. female referred to outpatient physical therapy and presents with a medical diagnosis of   Encounter Diagnoses   Name Primary?    Myositis, unspecified myositis type, unspecified site Yes    Lumbar pain     Cervicalgia     and demonstrates limitations as described in the problem list. Pt prognosis is Good. Pt will continue to benefit from skilled outpatient physical therapy to address the deficits listed in the problem list, provide pt/family education " and to maximize pt's level of independence in the home and community environment.     Goals as follows:  Short Term GOALS: 3 weeks. Pt agrees with goals set.  1. Patient will be independent with HEP.  2. Patient will demonstrate postural awareness with all activities.  3. Patient will perform daily activities with fewer complaints.      Long Term GOALS: 6  weeks. Pt agrees with goals set.  1. Patient will demonstrate LEFS to 50 or greater.  2. Patient will demonstrate modified oswestry score 25% or less.   3. Patient will demonstrate neck disability index 25% or less.   4. Patient will reports decreased neck pain to 2/10 or less.  5. Patient will report decreased LBP to 2/10 or less.  6. Patient will demonstrate improvement in lumbar ROM.  7. Patient will demonstrate improvement in cervical ROM.     Plan     Continue with established Plan of Care towards PT goals.    Therapist: Rohini Zhou, PT  5/27/2022

## 2022-05-30 ENCOUNTER — CLINICAL SUPPORT (OUTPATIENT)
Dept: REHABILITATION | Facility: HOSPITAL | Age: 42
End: 2022-05-30
Payer: MEDICAID

## 2022-05-30 DIAGNOSIS — M54.2 CERVICALGIA: ICD-10-CM

## 2022-05-30 DIAGNOSIS — M60.9 MYOSITIS, UNSPECIFIED MYOSITIS TYPE, UNSPECIFIED SITE: Primary | ICD-10-CM

## 2022-05-30 DIAGNOSIS — M54.50 LUMBAR PAIN: ICD-10-CM

## 2022-05-30 PROCEDURE — 97110 THERAPEUTIC EXERCISES: CPT

## 2022-05-30 NOTE — PROGRESS NOTES
"                                                    Physical Therapy Daily Note     Name: Rohini Dang  Clinic Number: [id number]  Diagnosis:   Encounter Diagnoses   Name Primary?    Myositis, unspecified myositis type, unspecified site Yes    Lumbar pain     Cervicalgia      Physician: Gi Freeman FNP  Precautions: body mechanics  Visit #: 3 of 12  PTA Visit #: 0  Time In: 1000  Time Out: 1100    Subjective     Pt reports: Patient complains of upper back pain and low back pain into (B) hips and LE. Patient reports having to perform extra duties at work yesterday.   Pain Scale: Rohini rates pain on a scale of 0-10 to be 8-9 currently low back (B) hips and LE and upper back. After treatment upper back and low back pain rated 6/10.     Objective     Rohini received individual therapeutic exercises to develop strength, endurance, ROM, flexibility, posture and core stabilization for 50 minutes including:  2x10 TA bracing with 5" holds, LTR, supine marching, SKTC 3x15", HSS 3x15", seated dowel raises with TA bracing 2x10,  seated exercises 2x10 scapular retraction, posterior shoulder rolls 2x10.  MHP to full back x10 minutes to impact pain relief with patient supine with LEs extended per patient request.   Written Home Exercises Provided: see  for details.   Education provided re: exercise technique, modalities  Rohini verbalized good understanding of education provided.   No spiritual or educational barriers to learning provided    Assessment     Patient did not receive complete treatment due to being late for appt. Patient tolerated treatment fair. Will continue to progress per POC as appropriate.     This is a 42 y.o. female referred to outpatient physical therapy and presents with a medical diagnosis of   Encounter Diagnoses   Name Primary?    Myositis, unspecified myositis type, unspecified site Yes    Lumbar pain     Cervicalgia     and demonstrates limitations as described in " the problem list. Pt prognosis is Good. Pt will continue to benefit from skilled outpatient physical therapy to address the deficits listed in the problem list, provide pt/family education and to maximize pt's level of independence in the home and community environment.     Goals as follows:  Short Term GOALS: 3 weeks. Pt agrees with goals set.  1. Patient will be independent with HEP.  2. Patient will demonstrate postural awareness with all activities.  3. Patient will perform daily activities with fewer complaints.      Long Term GOALS: 6  weeks. Pt agrees with goals set.  1. Patient will demonstrate LEFS to 50 or greater.  2. Patient will demonstrate modified oswestry score 25% or less.   3. Patient will demonstrate neck disability index 25% or less.   4. Patient will reports decreased neck pain to 2/10 or less.  5. Patient will report decreased LBP to 2/10 or less.  6. Patient will demonstrate improvement in lumbar ROM.  7. Patient will demonstrate improvement in cervical ROM.     Plan     Continue with established Plan of Care towards PT goals.    Therapist: Rohini Zhou, PT  5/30/2022

## 2022-05-31 ENCOUNTER — TELEPHONE (OUTPATIENT)
Dept: REHABILITATION | Facility: HOSPITAL | Age: 42
End: 2022-05-31
Payer: MEDICAID

## 2022-06-01 ENCOUNTER — CLINICAL SUPPORT (OUTPATIENT)
Dept: REHABILITATION | Facility: HOSPITAL | Age: 42
End: 2022-06-01
Payer: MEDICAID

## 2022-06-01 DIAGNOSIS — M60.9 MYOSITIS, UNSPECIFIED MYOSITIS TYPE, UNSPECIFIED SITE: Primary | ICD-10-CM

## 2022-06-01 PROCEDURE — 97110 THERAPEUTIC EXERCISES: CPT | Mod: CQ

## 2022-06-01 NOTE — PROGRESS NOTES
"                                                    Physical Therapy Daily Note     Name: Rohini Dang  Clinic Number: [id number]  Diagnosis:    Cervicalgia      Myositis     Lumbar pain      Myositis, unspecified myositis type, unspecified site      Physician: Gi Freeman FNP  Precautions: body mechanics  Visit #: 4 of 12  PTA Visit #: 1  Time In: 0807  Time Out: 0900    Subjective     Pt reports: "sore".  Pain Scale: Rohini rates pain on a scale of 0-10 to be 8 currently low back (B) hips and LE and upper back.    Objective     Rohini received individual therapeutic exercises to develop strength, endurance, ROM, flexibility, posture and core stabilization for 43 minutes including:  2x10 TA bracing with 5" holds, LTR 2x10 w/ TA brace for lumbar stabilization, supine marching 2x10 cueing for abdominal activation, SKTC 3x15", & HSS 3x15" instructing pt w/ use of belt assist.    MHP to full back x10 minutes to impact pain relief with patient supine with LEs on bolster.   Written Home Exercises Provided: see  for details.   Education provided re: exercise technique, modalities  Rohini verbalized good understanding of education provided.   No spiritual or educational barriers to learning provided    Assessment     After treatment, upper back and low back pain rated 5-6/10.     This is a 42 y.o. female referred to outpatient physical therapy and presents with a medical diagnosis of   Encounter Diagnoses   Name Primary?    Myositis, unspecified myositis type, unspecified site Yes    Lumbar pain     Cervicalgia     and demonstrates limitations as described in the problem list. Pt will continue to benefit from skilled outpatient physical therapy to address the deficits listed in the problem list, provide pt/family education and maximize pt's level of independence in the home and community environment.     Goals as follows:  Short Term GOALS: 3 weeks. Pt agrees with goals set.  1. " Patient will be independent with HEP.  2. Patient will demonstrate postural awareness with all activities.  3. Patient will perform daily activities with fewer complaints.      Long Term GOALS: 6  weeks. Pt agrees with goals set.  1. Patient will demonstrate LEFS to 50 or greater.  2. Patient will demonstrate modified oswestry score 25% or less.   3. Patient will demonstrate neck disability index 25% or less.   4. Patient will reports decreased neck pain to 2/10 or less.  5. Patient will report decreased LBP to 2/10 or less.  6. Patient will demonstrate improvement in lumbar ROM.  7. Patient will demonstrate improvement in cervical ROM.     Plan     Continue with established Plan of Care towards PT goals.    Therapist: Argelia Herrera, OC  6/1/2022

## 2022-06-06 ENCOUNTER — CLINICAL SUPPORT (OUTPATIENT)
Dept: REHABILITATION | Facility: HOSPITAL | Age: 42
End: 2022-06-06
Payer: MEDICAID

## 2022-06-06 DIAGNOSIS — M60.9 MYOSITIS, UNSPECIFIED MYOSITIS TYPE, UNSPECIFIED SITE: Primary | ICD-10-CM

## 2022-06-06 DIAGNOSIS — M54.50 LUMBAR PAIN: ICD-10-CM

## 2022-06-06 DIAGNOSIS — M54.2 CERVICALGIA: ICD-10-CM

## 2022-06-06 PROCEDURE — 97110 THERAPEUTIC EXERCISES: CPT

## 2022-06-06 NOTE — PROGRESS NOTES
"                                                    Physical Therapy Daily Note     Name: Rohini Dang  Clinic Number: [id number]  Diagnosis:    Cervicalgia      Myositis     Lumbar pain      Myositis, unspecified myositis type, unspecified site      Physician: Gi Freeman FNP  Precautions: body mechanics  Visit #: 5 of 12  PTA Visit #: 1  Time In: 0810  Time Out: 0903    Subjective     Pt reports: Patient reports centralized LBP today and some upper back pain. Patient reports a lot of sitting and driving over the weekend. Patient reports not doing exercises at home. Patient reports today starts her work week where she will work 1 week straight without any days off.   Pain Scale: Rohini rates pain on a scale of 0-10 to be 8 currently low back  and 5 currently upper back. After treatment LBP rated 7/10 and upper back pain rated 3-4/10.     Objective     Rohini received individual therapeutic exercises to develop strength, endurance, ROM, flexibility, posture and core stabilization for 53 minutes including:  2x10 TA bracing with 5" holds, LTR 2x10 w/ TA brace for lumbar stabilization, supine marching 2x10 cueing for abdominal activation, SKTC 3x15", & HSS 3x15" w/ belt assist.    MHP to full back x10 minutes to impact pain relief with patient supine with LEs flat per patient comfort.     Written Home Exercises Provided: see  for details.   Education provided re: exercise technique, modalities  Rohini verbalized good understanding of education provided.   No spiritual or educational barriers to learning provided    Assessment      Patient encouraged to perform exercise program. Patient educated on centralization of back pain symptoms. Will continue to progress treatment as appropriate.     This is a 42 y.o. female referred to outpatient physical therapy and presents with a medical diagnosis of   Encounter Diagnoses   Name Primary?    Myositis, unspecified myositis type, unspecified " site Yes    Lumbar pain     Cervicalgia     and demonstrates limitations as described in the problem list. Pt will continue to benefit from skilled outpatient physical therapy to address the deficits listed in the problem list, provide pt/family education and maximize pt's level of independence in the home and community environment.     Goals as follows:  Short Term GOALS: 3 weeks. Pt agrees with goals set.  1. Patient will be independent with HEP.  2. Patient will demonstrate postural awareness with all activities.  3. Patient will perform daily activities with fewer complaints.      Long Term GOALS: 6  weeks. Pt agrees with goals set.  1. Patient will demonstrate LEFS to 50 or greater.  2. Patient will demonstrate modified oswestry score 25% or less.   3. Patient will demonstrate neck disability index 25% or less.   4. Patient will reports decreased neck pain to 2/10 or less.  5. Patient will report decreased LBP to 2/10 or less.  6. Patient will demonstrate improvement in lumbar ROM.  7. Patient will demonstrate improvement in cervical ROM.     Plan     Continue with established Plan of Care towards PT goals.    Therapist: Rohini Zhou, PT  6/6/2022

## 2022-06-15 ENCOUNTER — LAB VISIT (OUTPATIENT)
Dept: LAB | Facility: HOSPITAL | Age: 42
End: 2022-06-15
Attending: NURSE PRACTITIONER
Payer: MEDICAID

## 2022-06-15 DIAGNOSIS — E88.810 METABOLIC SYNDROME: ICD-10-CM

## 2022-06-15 DIAGNOSIS — E11.42 TYPE 2 DIABETES, CONTROLLED, WITH PERIPHERAL NEUROPATHY: ICD-10-CM

## 2022-06-15 DIAGNOSIS — K75.81 NASH (NONALCOHOLIC STEATOHEPATITIS): ICD-10-CM

## 2022-06-15 LAB
25(OH)D3+25(OH)D2 SERPL-MCNC: 35 NG/ML (ref 30–96)
ALBUMIN SERPL BCP-MCNC: 3.8 G/DL (ref 3.5–5.2)
ALP SERPL-CCNC: 246 U/L (ref 55–135)
ALT SERPL W/O P-5'-P-CCNC: 98 U/L (ref 10–44)
ANION GAP SERPL CALC-SCNC: 6 MMOL/L (ref 8–16)
AST SERPL-CCNC: 113 U/L (ref 10–40)
BILIRUB SERPL-MCNC: 0.2 MG/DL (ref 0.1–1)
BUN SERPL-MCNC: 14 MG/DL (ref 6–20)
CALCIUM SERPL-MCNC: 9.1 MG/DL (ref 8.7–10.5)
CHLORIDE SERPL-SCNC: 109 MMOL/L (ref 95–110)
CO2 SERPL-SCNC: 25 MMOL/L (ref 23–29)
CREAT SERPL-MCNC: 1 MG/DL (ref 0.5–1.4)
EST. GFR  (AFRICAN AMERICAN): >60 ML/MIN/1.73 M^2
EST. GFR  (NON AFRICAN AMERICAN): >60 ML/MIN/1.73 M^2
ESTIMATED AVG GLUCOSE: 154 MG/DL (ref 68–131)
GLUCOSE SERPL-MCNC: 123 MG/DL (ref 70–110)
HBA1C MFR BLD: 7 % (ref 4–5.6)
POTASSIUM SERPL-SCNC: 4.7 MMOL/L (ref 3.5–5.1)
PROT SERPL-MCNC: 8 G/DL (ref 6–8.4)
SODIUM SERPL-SCNC: 140 MMOL/L (ref 136–145)

## 2022-06-15 PROCEDURE — 83036 HEMOGLOBIN GLYCOSYLATED A1C: CPT | Performed by: NURSE PRACTITIONER

## 2022-06-15 PROCEDURE — 82306 VITAMIN D 25 HYDROXY: CPT | Performed by: NURSE PRACTITIONER

## 2022-06-15 PROCEDURE — 36415 COLL VENOUS BLD VENIPUNCTURE: CPT | Performed by: NURSE PRACTITIONER

## 2022-06-15 PROCEDURE — 80053 COMPREHEN METABOLIC PANEL: CPT | Performed by: NURSE PRACTITIONER

## 2022-06-16 ENCOUNTER — HOSPITAL ENCOUNTER (EMERGENCY)
Facility: HOSPITAL | Age: 42
Discharge: HOME | End: 2022-06-16
Attending: STUDENT IN AN ORGANIZED HEALTH CARE EDUCATION/TRAINING PROGRAM
Payer: MEDICAID

## 2022-06-16 VITALS
HEART RATE: 77 BPM | BODY MASS INDEX: 29.95 KG/M2 | OXYGEN SATURATION: 99 % | RESPIRATION RATE: 16 BRPM | DIASTOLIC BLOOD PRESSURE: 80 MMHG | WEIGHT: 180 LBS | SYSTOLIC BLOOD PRESSURE: 122 MMHG | TEMPERATURE: 98 F

## 2022-06-16 DIAGNOSIS — R10.84 GENERALIZED ABDOMINAL PAIN: ICD-10-CM

## 2022-06-16 DIAGNOSIS — G89.29 CHRONIC MIDLINE LOW BACK PAIN WITH BILATERAL SCIATICA: Primary | ICD-10-CM

## 2022-06-16 DIAGNOSIS — M54.42 CHRONIC MIDLINE LOW BACK PAIN WITH BILATERAL SCIATICA: Primary | ICD-10-CM

## 2022-06-16 DIAGNOSIS — M54.41 CHRONIC MIDLINE LOW BACK PAIN WITH BILATERAL SCIATICA: Primary | ICD-10-CM

## 2022-06-16 LAB
ALBUMIN SERPL BCP-MCNC: 4.1 G/DL (ref 3.5–5.2)
ALP SERPL-CCNC: 249 U/L (ref 55–135)
ALT SERPL W/O P-5'-P-CCNC: 86 U/L (ref 10–44)
ANION GAP SERPL CALC-SCNC: 7 MMOL/L (ref 8–16)
AST SERPL-CCNC: 95 U/L (ref 10–40)
BACTERIA #/AREA URNS HPF: NEGATIVE /HPF
BASOPHILS # BLD AUTO: 0.06 K/UL (ref 0–0.2)
BASOPHILS NFR BLD: 0.7 % (ref 0–1.9)
BILIRUB SERPL-MCNC: 0.3 MG/DL (ref 0.1–1)
BILIRUB UR QL STRIP: NEGATIVE
BUN SERPL-MCNC: 12 MG/DL (ref 6–20)
CALCIUM SERPL-MCNC: 9.3 MG/DL (ref 8.7–10.5)
CHLORIDE SERPL-SCNC: 103 MMOL/L (ref 95–110)
CLARITY UR: CLEAR
CO2 SERPL-SCNC: 26 MMOL/L (ref 23–29)
COLOR UR: YELLOW
CREAT SERPL-MCNC: 1.2 MG/DL (ref 0.5–1.4)
DIFFERENTIAL METHOD: ABNORMAL
EOSINOPHIL # BLD AUTO: 0.1 K/UL (ref 0–0.5)
EOSINOPHIL NFR BLD: 1.6 % (ref 0–8)
ERYTHROCYTE [DISTWIDTH] IN BLOOD BY AUTOMATED COUNT: 16.5 % (ref 11.5–14.5)
EST. GFR  (AFRICAN AMERICAN): >60 ML/MIN/1.73 M^2
EST. GFR  (NON AFRICAN AMERICAN): 55.1 ML/MIN/1.73 M^2
GLUCOSE SERPL-MCNC: 237 MG/DL (ref 70–110)
GLUCOSE UR QL STRIP: ABNORMAL
HCT VFR BLD AUTO: 35.2 % (ref 37–48.5)
HGB BLD-MCNC: 10.9 G/DL (ref 12–16)
HGB UR QL STRIP: NEGATIVE
HYALINE CASTS #/AREA URNS LPF: 0 /LPF
IMM GRANULOCYTES # BLD AUTO: 0.03 K/UL (ref 0–0.04)
IMM GRANULOCYTES NFR BLD AUTO: 0.4 % (ref 0–0.5)
KETONES UR QL STRIP: NEGATIVE
LEUKOCYTE ESTERASE UR QL STRIP: NEGATIVE
LYMPHOCYTES # BLD AUTO: 3.2 K/UL (ref 1–4.8)
LYMPHOCYTES NFR BLD: 38.5 % (ref 18–48)
MCH RBC QN AUTO: 25.2 PG (ref 27–31)
MCHC RBC AUTO-ENTMCNC: 31 G/DL (ref 32–36)
MCV RBC AUTO: 81 FL (ref 82–98)
MICROSCOPIC COMMENT: ABNORMAL
MONOCYTES # BLD AUTO: 0.6 K/UL (ref 0.3–1)
MONOCYTES NFR BLD: 7.7 % (ref 4–15)
NEUTROPHILS # BLD AUTO: 4.2 K/UL (ref 1.8–7.7)
NEUTROPHILS NFR BLD: 51.1 % (ref 38–73)
NITRITE UR QL STRIP: NEGATIVE
NRBC BLD-RTO: 0 /100 WBC
PH UR STRIP: 6 [PH] (ref 5–8)
PLATELET # BLD AUTO: 221 K/UL (ref 150–450)
PMV BLD AUTO: 11.5 FL (ref 9.2–12.9)
POTASSIUM SERPL-SCNC: 4.2 MMOL/L (ref 3.5–5.1)
PROT SERPL-MCNC: 8.5 G/DL (ref 6–8.4)
PROT UR QL STRIP: NEGATIVE
RBC # BLD AUTO: 4.33 M/UL (ref 4–5.4)
RBC #/AREA URNS HPF: 1 /HPF (ref 0–4)
SODIUM SERPL-SCNC: 136 MMOL/L (ref 136–145)
SP GR UR STRIP: 1.01 (ref 1–1.03)
SQUAMOUS #/AREA URNS HPF: 1 /HPF
URN SPEC COLLECT METH UR: ABNORMAL
UROBILINOGEN UR STRIP-ACNC: NEGATIVE EU/DL
WBC # BLD AUTO: 8.27 K/UL (ref 3.9–12.7)
WBC #/AREA URNS HPF: 1 /HPF (ref 0–5)
YEAST URNS QL MICRO: ABNORMAL

## 2022-06-16 PROCEDURE — 99284 EMERGENCY DEPT VISIT MOD MDM: CPT | Mod: 25

## 2022-06-16 PROCEDURE — 36415 COLL VENOUS BLD VENIPUNCTURE: CPT

## 2022-06-16 PROCEDURE — 85025 COMPLETE CBC W/AUTO DIFF WBC: CPT

## 2022-06-16 PROCEDURE — 63600175 PHARM REV CODE 636 W HCPCS

## 2022-06-16 PROCEDURE — 80053 COMPREHEN METABOLIC PANEL: CPT

## 2022-06-16 PROCEDURE — 81000 URINALYSIS NONAUTO W/SCOPE: CPT

## 2022-06-16 PROCEDURE — 96372 THER/PROPH/DIAG INJ SC/IM: CPT

## 2022-06-16 RX ORDER — MORPHINE SULFATE 4 MG/ML
4 INJECTION, SOLUTION INTRAMUSCULAR; INTRAVENOUS ONCE
Status: COMPLETED | OUTPATIENT
Start: 2022-06-16 | End: 2022-06-16

## 2022-06-16 RX ORDER — KETOROLAC TROMETHAMINE 10 MG/1
10 TABLET, FILM COATED ORAL EVERY 6 HOURS
Qty: 20 TABLET | Refills: 0 | Status: SHIPPED | OUTPATIENT
Start: 2022-06-16 | End: 2022-06-21

## 2022-06-16 RX ADMIN — MORPHINE SULFATE 4 MG: 4 INJECTION INTRAVENOUS at 01:06

## 2022-06-16 NOTE — ED PROVIDER NOTES
"Encounter Date: 6/16/2022      This note is dictated on M*Modal word recognition program.  There are word recognition mistakes and grammatical errors that are occasionally missed on review.    History     Chief Complaint   Patient presents with    Back Pain     Complains of lower back pain x 3 days. Does not recall injury. 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 Denies urinary symptoms.      Rohini Dang is a 42 y.o. female and has a past medical history of anemia , depression, fatty liver, hypoglycemia, fibromyalgia, JAMESON. Presents to Er for lower back pain for the last 3-4 days. Pt denies trauma. 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 Pt reports a hx of back pain and arthritis in her hip. Pt also reports lower abd pain and reports she feels like "everything is trying to fall out my vagina." Pt reports she had bladder lift 6 years ago.     This is the extent of patient's complaints for this ER encounter.             Review of patient's allergies indicates:   Allergen Reactions    Amoxicillin Hives    Avelox [moxifloxacin] Hives    Clarinex [desloratadine] Hives    Pcn [penicillins] Hives    Claritin [loratadine] Palpitations    Latex, natural rubber Rash     Past Medical History:   Diagnosis Date    Anemia     Borderline personality disorder     Chronic bilateral low back pain without sciatica     Depression with anxiety     Diabetes mellitus     Elevated LFTs     Esophageal stenosis     Fatty liver     Fibromyalgia     Fibromyalgia     High cholesterol     Hypoglycemia     Intermittent explosive disorder     Liver disease     JAMESON (nonalcoholic steatohepatitis)      Past Surgical History:   Procedure Laterality Date    COLONOSCOPY N/A 3/20/2018    Procedure: COLONOSCOPY;  Surgeon: Janelle Meade MD;  Location: [facility];  Service: Endoscopy;  Laterality: N/A;    COLONOSCOPY N/A 5/11/2021    Procedure: " "COLONOSCOPY;  Surgeon: Janelle Meade MD;  [facility];  Service: Endoscopy;  Laterality: N/A;    ESOPHAGEAL DILATION      x2    ESOPHAGOGASTRODUODENOSCOPY N/A 5/7/2019    Procedure: EGD (ESOPHAGOGASTRODUODENOSCOPY);  Surgeon: Janelle Meade MD;  [facility];  Service: Endoscopy;  Laterality: N/A;    ESOPHAGOGASTRODUODENOSCOPY N/A 5/11/2021    Procedure: EGD (ESOPHAGOGASTRODUODENOSCOPY);  Surgeon: Janelle Meade MD;  [facility];  Service: Endoscopy;  Laterality: N/A;  Rapid on arrival    HIATAL HERNIA REPAIR      HYSTERECTOMY      LIVER BIOPSY  2018    fibrosis stage 3 JAMESON    OOPHORECTOMY      TUBAL LIGATION      UPPER GASTROINTESTINAL ENDOSCOPY      2013? unable to obtain records     Family History   Problem Relation Age of Onset    Hypertension Mother     Clotting disorder Father     Ulcers Father     Clotting disorder Sister     Cancer Maternal Grandmother         "female Cancer"    Lung cancer Paternal Grandmother     Diabetes Maternal Aunt     No Known Problems Daughter     No Known Problems Maternal Uncle     No Known Problems Paternal Aunt     No Known Problems Paternal Uncle     No Known Problems Maternal Grandfather     No Known Problems Paternal Grandfather     Breast cancer Neg Hx     Ovarian cancer Neg Hx     BRCA 1/2 Neg Hx      Social History     Tobacco Use    Smoking status: Never Smoker    Smokeless tobacco: Never Used   Substance Use Topics    Alcohol use: No     Alcohol/week: 0.0 standard drinks    Drug use: No     Review of Systems   Constitutional: Negative.    HENT: Negative.    Eyes: Negative.    Respiratory: Negative.    Cardiovascular: Negative.    Gastrointestinal: Positive for abdominal pain (pt reports lower abd pain for the lat 3-4days. ). Negative for diarrhea, nausea, rectal pain and vomiting.   Endocrine: Negative.    Genitourinary: Positive for pelvic pain (pt reports when she stands up she feels like " something is going to fall out her vagina. pt denies vaginal prolasp at this time. ). Negative for decreased urine volume, difficulty urinating, dyspareunia, dysuria, enuresis, flank pain, frequency, hematuria, urgency, vaginal bleeding, vaginal discharge and vaginal pain.   Musculoskeletal: Positive for back pain. Negative for gait problem, joint swelling, myalgias and neck stiffness.   Neurological: Negative.    Hematological: Negative.    Psychiatric/Behavioral: Negative.  Negative for suicidal ideas.       Physical Exam     Initial Vitals [06/16/22 1244]   BP Pulse Resp Temp SpO2   120/80 78 16 98 °F (36.7 °C) 98 %      MAP       --         Physical Exam    Constitutional: She appears well-developed and well-nourished. She is diaphoretic. No distress.   HENT:   Head: Normocephalic.   Eyes: Pupils are equal, round, and reactive to light.   Neck: Neck supple.   Normal range of motion.  Cardiovascular: Normal rate.   Pulmonary/Chest: Breath sounds normal.   Abdominal: Abdomen is soft.   Genitourinary:    Genitourinary Comments: Pt has no bowel or bladder dysfunction.      Musculoskeletal:         General: Tenderness present.      Cervical back: Normal range of motion and neck supple.      Lumbar back: Tenderness and bony tenderness present. Positive right straight leg raise test and positive left straight leg raise test.     Neurological: She is alert and oriented to person, place, and time. She has normal strength and normal reflexes. GCS score is 15. GCS eye subscore is 4. GCS verbal subscore is 5. GCS motor subscore is 6.   Skin: Capillary refill takes less than 2 seconds.   Psychiatric: She has a normal mood and affect. Her behavior is normal. Judgment and thought content normal.         ED Course   Procedures  Labs Reviewed   URINALYSIS, REFLEX TO URINE CULTURE - Abnormal; Notable for the following components:       Result Value    Glucose, UA 4+ (*)     All other components within normal limits    Narrative:      Preferred Collection Type->Urine, Clean Catch  Specimen Source->Urine   CBC W/ AUTO DIFFERENTIAL - Abnormal; Notable for the following components:    Hemoglobin 10.9 (*)     Hematocrit 35.2 (*)     MCV 81 (*)     MCH 25.2 (*)     MCHC 31.0 (*)     RDW 16.5 (*)     All other components within normal limits   COMPREHENSIVE METABOLIC PANEL - Abnormal; Notable for the following components:    Glucose 237 (*)     Total Protein 8.5 (*)     Alkaline Phosphatase 249 (*)     AST 95 (*)     ALT 86 (*)     Anion Gap 7 (*)     eGFR if non  55.1 (*)     All other components within normal limits   URINALYSIS MICROSCOPIC - Abnormal; Notable for the following components:    Hyaline Casts, UA 0.0 (*)     All other components within normal limits    Narrative:     Preferred Collection Type->Urine, Clean Catch  Specimen Source->Urine          Imaging Results    None          Medications   morphine injection 4 mg (4 mg Intramuscular Given 6/16/22 1323)     Medical Decision Making:   Differential Diagnosis:   UTI, lumbar strain, sciatica   Clinical Tests:   Lab Tests: Ordered and Reviewed  ED Management:  UA, and CBC/CMP unremarkable today in ER. No signs of acute abdomen or UTI noted.   Physical exam revealed lumbar tenderness and palmira + straight leg raise.   Pt has hx of back issues and most likely is experiencing sciatica.  Pt instructed to follow up with OBGYN/PCP for follow up and reevaluation of symptoms.    The patient acknowledges that close follow up with medical provider is required. Instructed to follow up with PCP within 2 days. Patient was given specific return precautions. The patient agrees to comply with all instruction and directions given in the ER.                         Clinical Impression:   Final diagnoses:  [M54.41, M54.42, G89.29] Chronic midline low back pain with bilateral sciatica (Primary)  [R10.84] Generalized abdominal pain          ED Disposition Condition    Discharge Stable        ED  Prescriptions     Medication Sig Dispense Start Date End Date Auth. Provider    ketorolac (TORADOL) 10 mg tablet Take 1 tablet (10 mg total) by mouth every 6 (six) hours. for 5 days 20 tablet 6/16/2022 6/21/2022 Issa Fitzgerald NP        Follow-up Information     Follow up With Specialties Details Why Contact Info    [facility] Schedule an appointment as soon as possible for a visit in 1 day  [address] [place] [id number] [phone]           Issa Fitzgerald NP  06/16/22 6501

## 2022-06-16 NOTE — Clinical Note
"Rohini"Mariya Dang was seen and treated in our emergency department on 6/16/2022.  She may return to work on 06/20/2022.       If you have any questions or concerns, please don't hesitate to call.      Issa Fitzgerald NP"

## 2022-06-19 ENCOUNTER — HOSPITAL ENCOUNTER (EMERGENCY)
Facility: HOSPITAL | Age: 42
Discharge: HOME | End: 2022-06-19
Attending: STUDENT IN AN ORGANIZED HEALTH CARE EDUCATION/TRAINING PROGRAM
Payer: MEDICAID

## 2022-06-19 VITALS
HEART RATE: 82 BPM | RESPIRATION RATE: 18 BRPM | OXYGEN SATURATION: 98 % | SYSTOLIC BLOOD PRESSURE: 97 MMHG | DIASTOLIC BLOOD PRESSURE: 62 MMHG | WEIGHT: 179 LBS | BODY MASS INDEX: 29.79 KG/M2 | TEMPERATURE: 98 F

## 2022-06-19 DIAGNOSIS — R10.9 ABDOMINAL PAIN, UNSPECIFIED ABDOMINAL LOCATION: Primary | ICD-10-CM

## 2022-06-19 LAB
ALBUMIN SERPL BCP-MCNC: 3.7 G/DL (ref 3.5–5.2)
ALP SERPL-CCNC: 261 U/L (ref 55–135)
ALT SERPL W/O P-5'-P-CCNC: 85 U/L (ref 10–44)
ANION GAP SERPL CALC-SCNC: 7 MMOL/L (ref 8–16)
AST SERPL-CCNC: 119 U/L (ref 10–40)
BACTERIA #/AREA URNS HPF: NEGATIVE /HPF
BASOPHILS # BLD AUTO: 0.03 K/UL (ref 0–0.2)
BASOPHILS NFR BLD: 0.4 % (ref 0–1.9)
BILIRUB SERPL-MCNC: 0.2 MG/DL (ref 0.1–1)
BILIRUB UR QL STRIP: NEGATIVE
BUN SERPL-MCNC: 9 MG/DL (ref 6–20)
CALCIUM SERPL-MCNC: 8.7 MG/DL (ref 8.7–10.5)
CHLORIDE SERPL-SCNC: 107 MMOL/L (ref 95–110)
CLARITY UR: CLEAR
CO2 SERPL-SCNC: 21 MMOL/L (ref 23–29)
COLOR UR: YELLOW
CREAT SERPL-MCNC: 1.1 MG/DL (ref 0.5–1.4)
DIFFERENTIAL METHOD: ABNORMAL
EOSINOPHIL # BLD AUTO: 0.1 K/UL (ref 0–0.5)
EOSINOPHIL NFR BLD: 1.5 % (ref 0–8)
ERYTHROCYTE [DISTWIDTH] IN BLOOD BY AUTOMATED COUNT: 16.5 % (ref 11.5–14.5)
EST. GFR  (AFRICAN AMERICAN): >60 ML/MIN/1.73 M^2
EST. GFR  (NON AFRICAN AMERICAN): >60 ML/MIN/1.73 M^2
GLUCOSE SERPL-MCNC: 328 MG/DL (ref 70–110)
GLUCOSE UR QL STRIP: ABNORMAL
HCT VFR BLD AUTO: 34.6 % (ref 37–48.5)
HGB BLD-MCNC: 10.8 G/DL (ref 12–16)
HGB UR QL STRIP: NEGATIVE
HYALINE CASTS #/AREA URNS LPF: 0 /LPF
IMM GRANULOCYTES # BLD AUTO: 0.01 K/UL (ref 0–0.04)
IMM GRANULOCYTES NFR BLD AUTO: 0.1 % (ref 0–0.5)
KETONES UR QL STRIP: NEGATIVE
LEUKOCYTE ESTERASE UR QL STRIP: NEGATIVE
LIPASE SERPL-CCNC: 196 U/L (ref 23–300)
LYMPHOCYTES # BLD AUTO: 2.8 K/UL (ref 1–4.8)
LYMPHOCYTES NFR BLD: 38.5 % (ref 18–48)
MCH RBC QN AUTO: 25.5 PG (ref 27–31)
MCHC RBC AUTO-ENTMCNC: 31.2 G/DL (ref 32–36)
MCV RBC AUTO: 82 FL (ref 82–98)
MICROSCOPIC COMMENT: NORMAL
MONOCYTES # BLD AUTO: 0.7 K/UL (ref 0.3–1)
MONOCYTES NFR BLD: 9.4 % (ref 4–15)
NEUTROPHILS # BLD AUTO: 3.6 K/UL (ref 1.8–7.7)
NEUTROPHILS NFR BLD: 50.1 % (ref 38–73)
NITRITE UR QL STRIP: NEGATIVE
NRBC BLD-RTO: 0 /100 WBC
PH UR STRIP: 7 [PH] (ref 5–8)
PLATELET # BLD AUTO: 192 K/UL (ref 150–450)
PMV BLD AUTO: 11.1 FL (ref 9.2–12.9)
POCT GLUCOSE: 142 MG/DL (ref 70–110)
POTASSIUM SERPL-SCNC: 4.1 MMOL/L (ref 3.5–5.1)
PROT SERPL-MCNC: 8.3 G/DL (ref 6–8.4)
PROT UR QL STRIP: ABNORMAL
RBC # BLD AUTO: 4.24 M/UL (ref 4–5.4)
RBC #/AREA URNS HPF: 3 /HPF (ref 0–4)
SODIUM SERPL-SCNC: 135 MMOL/L (ref 136–145)
SP GR UR STRIP: 1.02 (ref 1–1.03)
SQUAMOUS #/AREA URNS HPF: 8 /HPF
URN SPEC COLLECT METH UR: ABNORMAL
UROBILINOGEN UR STRIP-ACNC: NEGATIVE EU/DL
WBC # BLD AUTO: 7.16 K/UL (ref 3.9–12.7)
WBC #/AREA URNS HPF: 2 /HPF (ref 0–5)
YEAST URNS QL MICRO: NORMAL

## 2022-06-19 PROCEDURE — 99285 EMERGENCY DEPT VISIT HI MDM: CPT | Mod: 25

## 2022-06-19 PROCEDURE — 63600175 PHARM REV CODE 636 W HCPCS: Performed by: CLINICAL NURSE SPECIALIST

## 2022-06-19 PROCEDURE — 96374 THER/PROPH/DIAG INJ IV PUSH: CPT

## 2022-06-19 PROCEDURE — 83690 ASSAY OF LIPASE: CPT | Performed by: CLINICAL NURSE SPECIALIST

## 2022-06-19 PROCEDURE — 36415 COLL VENOUS BLD VENIPUNCTURE: CPT | Performed by: CLINICAL NURSE SPECIALIST

## 2022-06-19 PROCEDURE — 80053 COMPREHEN METABOLIC PANEL: CPT | Performed by: CLINICAL NURSE SPECIALIST

## 2022-06-19 PROCEDURE — 81000 URINALYSIS NONAUTO W/SCOPE: CPT | Performed by: CLINICAL NURSE SPECIALIST

## 2022-06-19 PROCEDURE — 85025 COMPLETE CBC W/AUTO DIFF WBC: CPT | Performed by: CLINICAL NURSE SPECIALIST

## 2022-06-19 RX ORDER — KETOROLAC TROMETHAMINE 30 MG/ML
15 INJECTION, SOLUTION INTRAMUSCULAR; INTRAVENOUS ONCE
Status: COMPLETED | OUTPATIENT
Start: 2022-06-19 | End: 2022-06-19

## 2022-06-19 RX ADMIN — KETOROLAC TROMETHAMINE 15 MG: 30 INJECTION, SOLUTION INTRAMUSCULAR at 01:06

## 2022-06-19 NOTE — ED PROVIDER NOTES
Encounter Date: 6/19/2022       History     Chief Complaint   Patient presents with    Vaginal Pain     Pt c/o pain and pressure. Pt stated she feels like her insides are about to fall out. Pt had a hysterectomy in 2010. Pt c/o pain in her lower ABD.     Rohini Dang is an 42 y.o. female who complains of abdominal pain, vaginal bleeding and pressure the last few days.  History of hysterectomy, anemia, borderline personality disorder, depression, elevated liver function tests.  Patient states she has an appointment with a doctor soon to check her bladder sling that she had years ago        Review of patient's allergies indicates:   Allergen Reactions    Amoxicillin Hives    Avelox [moxifloxacin] Hives    Clarinex [desloratadine] Hives    Pcn [penicillins] Hives    Claritin [loratadine] Palpitations    Latex, natural rubber Rash     Past Medical History:   Diagnosis Date    Anemia     Borderline personality disorder     Chronic bilateral low back pain without sciatica     Depression with anxiety     Diabetes mellitus     Elevated LFTs     Esophageal stenosis     Fatty liver     Fibromyalgia     Fibromyalgia     High cholesterol     Hypoglycemia     Intermittent explosive disorder     Liver disease     JAMESON (nonalcoholic steatohepatitis)      Past Surgical History:   Procedure Laterality Date    COLONOSCOPY N/A 3/20/2018    Procedure: COLONOSCOPY;  Surgeon: Janelle Meade MD;  Location: [facility];  Service: Endoscopy;  Laterality: N/A;    COLONOSCOPY N/A 5/11/2021    Procedure: COLONOSCOPY;  Surgeon: Janelle Meade MD;  Location: [facility];  Service: Endoscopy;  Laterality: N/A;    ESOPHAGEAL DILATION      x2    ESOPHAGOGASTRODUODENOSCOPY N/A 5/7/2019    Procedure: EGD (ESOPHAGOGASTRODUODENOSCOPY);  Surgeon: Janelle Meade MD;  Location: [facility];  Service: Endoscopy;  Laterality: N/A;    ESOPHAGOGASTRODUODENOSCOPY N/A 5/11/2021    Procedure: EGD  "(ESOPHAGOGASTRODUODENOSCOPY);  Surgeon: Janelle Meade MD;  Location: [facility];  Service: Endoscopy;  Laterality: N/A;  Rapid on arrival    HIATAL HERNIA REPAIR      HYSTERECTOMY      LIVER BIOPSY  2018    fibrosis stage 3 JAMESON    OOPHORECTOMY      TUBAL LIGATION      UPPER GASTROINTESTINAL ENDOSCOPY      2013? unable to obtain records     Family History   Problem Relation Age of Onset    Hypertension Mother     Clotting disorder Father     Ulcers Father     Clotting disorder Sister     Cancer Maternal Grandmother         "female Cancer"    Lung cancer Paternal Grandmother     Diabetes Maternal Aunt     No Known Problems Daughter     No Known Problems Maternal Uncle     No Known Problems Paternal Aunt     No Known Problems Paternal Uncle     No Known Problems Maternal Grandfather     No Known Problems Paternal Grandfather     Breast cancer Neg Hx     Ovarian cancer Neg Hx     BRCA 1/2 Neg Hx      Social History     Tobacco Use    Smoking status: Never Smoker    Smokeless tobacco: Never Used   Substance Use Topics    Alcohol use: No     Alcohol/week: 0.0 standard drinks    Drug use: No     Review of Systems   Constitutional: Negative for fever.   HENT: Negative for sore throat.    Respiratory: Negative for shortness of breath.    Cardiovascular: Negative for chest pain.   Gastrointestinal: Positive for abdominal pain. Negative for nausea.   Genitourinary: Positive for vaginal bleeding. Negative for dysuria.   Musculoskeletal: Negative for back pain.   Skin: Negative for rash.   Neurological: Negative for weakness.   Hematological: Does not bruise/bleed easily.   All other systems reviewed and are negative.      Physical Exam     Initial Vitals [06/19/22 1248]   BP Pulse Resp Temp SpO2   92/70 88 16 98.1 °F (36.7 °C) 99 %      MAP       --         Physical Exam    Nursing note and vitals reviewed.  Constitutional: She appears well-developed and well-nourished.   HENT:   Head: " Normocephalic and atraumatic.   Eyes: Pupils are equal, round, and reactive to light.   Neck:   Normal range of motion.  Cardiovascular: Normal rate and regular rhythm.   Pulmonary/Chest: Breath sounds normal.   Abdominal: Abdomen is soft. There is abdominal tenderness.   Musculoskeletal:         General: Normal range of motion.      Cervical back: Normal range of motion.     Neurological: She is alert and oriented to person, place, and time.   Skin: Skin is warm and dry.   Psychiatric: She has a normal mood and affect.         ED Course   Procedures  Labs Reviewed   CBC W/ AUTO DIFFERENTIAL - Abnormal; Notable for the following components:       Result Value    Hemoglobin 10.8 (*)     Hematocrit 34.6 (*)     MCH 25.5 (*)     MCHC 31.2 (*)     RDW 16.5 (*)     All other components within normal limits   COMPREHENSIVE METABOLIC PANEL - Abnormal; Notable for the following components:    Sodium 135 (*)     CO2 21 (*)     Glucose 328 (*)     Alkaline Phosphatase 261 (*)      (*)     ALT 85 (*)     Anion Gap 7 (*)     All other components within normal limits   URINALYSIS, REFLEX TO URINE CULTURE - Abnormal; Notable for the following components:    Protein, UA 1+ (*)     Glucose, UA 4+ (*)     All other components within normal limits    Narrative:     Preferred Collection Type->Urine, Clean Catch  Specimen Source->Urine   POCT GLUCOSE - Abnormal; Notable for the following components:    POCT Glucose 142 (*)     All other components within normal limits   LIPASE   URINALYSIS MICROSCOPIC    Narrative:     Preferred Collection Type->Urine, Clean Catch  Specimen Source->Urine   POCT GLUCOSE MONITORING CONTINUOUS          Imaging Results          CT Abdomen Pelvis  Without Contrast (Final result)  Result time 06/19/22 14:37:05    Final result by Linda Hooper MD (06/19/22 14:37:05)                 Impression:      1. No acute abnormality of abdomen or pelvis  2. Spleen upper limits of normal  3. Diverticulosis but  no diverticulitis.      Electronically signed by: Linda Hooper MD  Date:    06/19/2022  Time:    14:37             Narrative:    EXAMINATION:  CT ABDOMEN PELVIS WITHOUT CONTRAST    CLINICAL HISTORY:  Abdominal pain, acute, nonlocalized; hysterectomy 12 years ago lower abdominal and pelvic pain feels like bladder is coming out    TECHNIQUE:  Iterative reconstruction technique was used.    CT/Cardiac Nuclear exams in prior 12 months: 1    COMPARISON:  03/08/2022.    FINDINGS:  Lung bases are clear.  Fatty liver.  The  pancreas, adrenals, and kidneys are normal with no evidence for renal stones or ureteral obstruction.  The spleen is 13 cm which is upper limits of normal.  Diverticulosis but no diverticulitis.  No bowel obstruction.  No free air.  No significant vesicocoele.                                 Medications   ketorolac injection 15 mg (15 mg Intravenous Given 6/19/22 1310)     Medical Decision Making:   Differential Diagnosis:   UTI, hematuria, diverticulitis  Clinical Tests:   Lab Tests: Ordered and Reviewed  Radiological Study: Ordered and Reviewed             ED Course as of 06/19/22 1451   Sun Jun 19, 2022   1354 Discussed labs and urine with patient.  Patient continues to have abdominal pain.  Will perform CT of the abdomen and pelvis [FRANK]      ED Course User Index  [FRANK] Ana Yo NP             Clinical Impression:   Final diagnoses:  [R10.9] Abdominal pain, unspecified abdominal location (Primary)          ED Disposition Condition    Discharge Stable        ED Prescriptions     None        Follow-up Information     Follow up With Specialties Details Why Contact Info    Skip Celestin NP Emergency Medicine  As needed [address] [facility] [address]  [place] [address]  [phone]             Ana Yo NP  06/19/22 7852

## 2022-06-19 NOTE — Clinical Note
"Rohini Mcnally"Laurence was seen and treated in our emergency department on 6/19/2022.  She may return to work on 06/22/2022.       If you have any questions or concerns, please don't hesitate to call.           "

## 2022-06-20 ENCOUNTER — CLINICAL SUPPORT (OUTPATIENT)
Dept: REHABILITATION | Facility: HOSPITAL | Age: 42
End: 2022-06-20
Payer: MEDICAID

## 2022-06-20 DIAGNOSIS — M54.50 LUMBAR PAIN: ICD-10-CM

## 2022-06-20 DIAGNOSIS — M60.9 MYOSITIS, UNSPECIFIED MYOSITIS TYPE, UNSPECIFIED SITE: Primary | ICD-10-CM

## 2022-06-20 DIAGNOSIS — M54.2 CERVICALGIA: ICD-10-CM

## 2022-06-20 PROCEDURE — 97110 THERAPEUTIC EXERCISES: CPT

## 2022-06-20 NOTE — PROGRESS NOTES
Physical Therapy Daily Note     Name: Rohini Dang  Clinic Number: [id number]  Diagnosis:    Cervicalgia      Myositis     Lumbar pain      Myositis, unspecified myositis type, unspecified site      Physician: Gi Freeman FNP  Precautions: body mechanics  Visit #: 6 of 12  PTA Visit #: 0  Time In: 1100  Time Out: 1110    Subjective     Pt reports: Patient reports she was seen in ER on 6/16/2022 for LBP. Patient also reports that she was told to get MRI she had to complete therapy intervention or if therapy is not helping pain symptoms. Patient also with complaints of abdominal pain and has been seen in ER and has appt with a physician for that diagnosis.   Pain Scale: Rohini rates pain on a scale of 0-10 to be 9-10 currently low back  and abdominal area.     Objective     Upon PT chart review patient seen in ER on 6/16/2022 for LBP. It was recommended by ER doctor for patient to follow up with PCP. Patient informed that policy is for patient to get new prescription from referring provider if referring provider wants patient to continue with PT intervention since ER visit for diagnosis therapy treating her for constitutes a change in status. Patient verbalizes understanding. Patient educated to perform activities with caution. Patient with significant decrease in gait pattern with guarding noted with gait and with all transitional movements especially sit to  clinic. Patient demonstrates antalgic gait pattern at this time and decreased speed.     Written Home Exercises Provided: see  for details.   Education provided re: follow up with referring provider.   Rohini verbalized good understanding of education provided.   No spiritual or educational barriers to learning provided    Assessment      Patient encouraged to follow up with referring provider to assess needs to continue with PT treatment at this time or be discharged.      This is a 42 y.o. female referred to outpatient physical therapy and presents with a medical diagnosis of   Encounter Diagnoses   Name Primary?    Myositis, unspecified myositis type, unspecified site Yes    Lumbar pain     Cervicalgia     and demonstrates limitations as described in the problem list. Pt will continue to benefit from skilled outpatient physical therapy to address the deficits listed in the problem list, provide pt/family education and maximize pt's level of independence in the home and community environment.     Goals as follows:  Short Term GOALS: 3 weeks. Pt agrees with goals set.  1. Patient will be independent with HEP.  2. Patient will demonstrate postural awareness with all activities.  3. Patient will perform daily activities with fewer complaints.      Long Term GOALS: 6  weeks. Pt agrees with goals set.  1. Patient will demonstrate LEFS to 50 or greater.  2. Patient will demonstrate modified oswestry score 25% or less.   3. Patient will demonstrate neck disability index 25% or less.   4. Patient will reports decreased neck pain to 2/10 or less.  5. Patient will report decreased LBP to 2/10 or less.  6. Patient will demonstrate improvement in lumbar ROM.  7. Patient will demonstrate improvement in cervical ROM.     Plan     Patient on hold for PT treatment up to 2 weeks and will need new prescription to continue.     Therapist: Rohini Zhou, PT  6/20/2022

## 2022-06-27 ENCOUNTER — TELEPHONE (OUTPATIENT)
Dept: REHABILITATION | Facility: HOSPITAL | Age: 42
End: 2022-06-27
Payer: MEDICAID

## 2022-06-27 ENCOUNTER — OFFICE VISIT (OUTPATIENT)
Dept: OBSTETRICS AND GYNECOLOGY | Facility: CLINIC | Age: 42
End: 2022-06-27
Payer: MEDICAID

## 2022-06-27 VITALS
BODY MASS INDEX: 30.43 KG/M2 | DIASTOLIC BLOOD PRESSURE: 66 MMHG | HEIGHT: 65 IN | WEIGHT: 182.63 LBS | SYSTOLIC BLOOD PRESSURE: 114 MMHG

## 2022-06-27 DIAGNOSIS — N95.0 POSTMENOPAUSAL VAGINAL BLEEDING: ICD-10-CM

## 2022-06-27 DIAGNOSIS — R10.2 PELVIC PRESSURE IN FEMALE: Primary | ICD-10-CM

## 2022-06-27 PROCEDURE — 3078F DIAST BP <80 MM HG: CPT | Mod: CPTII,,, | Performed by: OBSTETRICS & GYNECOLOGY

## 2022-06-27 PROCEDURE — 99999 PR PBB SHADOW E&M-EST. PATIENT-LVL IV: ICD-10-PCS | Mod: PBBFAC,,, | Performed by: OBSTETRICS & GYNECOLOGY

## 2022-06-27 PROCEDURE — 99203 OFFICE O/P NEW LOW 30 MIN: CPT | Mod: S$PBB,,, | Performed by: OBSTETRICS & GYNECOLOGY

## 2022-06-27 PROCEDURE — 1159F PR MEDICATION LIST DOCUMENTED IN MEDICAL RECORD: ICD-10-PCS | Mod: CPTII,,, | Performed by: OBSTETRICS & GYNECOLOGY

## 2022-06-27 PROCEDURE — 3051F HG A1C>EQUAL 7.0%<8.0%: CPT | Mod: CPTII,,, | Performed by: OBSTETRICS & GYNECOLOGY

## 2022-06-27 PROCEDURE — 3074F SYST BP LT 130 MM HG: CPT | Mod: CPTII,,, | Performed by: OBSTETRICS & GYNECOLOGY

## 2022-06-27 PROCEDURE — 3051F PR MOST RECENT HEMOGLOBIN A1C LEVEL 7.0 - < 8.0%: ICD-10-PCS | Mod: CPTII,,, | Performed by: OBSTETRICS & GYNECOLOGY

## 2022-06-27 PROCEDURE — 99999 PR PBB SHADOW E&M-EST. PATIENT-LVL IV: CPT | Mod: PBBFAC,,, | Performed by: OBSTETRICS & GYNECOLOGY

## 2022-06-27 PROCEDURE — 3008F PR BODY MASS INDEX (BMI) DOCUMENTED: ICD-10-PCS | Mod: CPTII,,, | Performed by: OBSTETRICS & GYNECOLOGY

## 2022-06-27 PROCEDURE — 1160F PR REVIEW ALL MEDS BY PRESCRIBER/CLIN PHARMACIST DOCUMENTED: ICD-10-PCS | Mod: CPTII,,, | Performed by: OBSTETRICS & GYNECOLOGY

## 2022-06-27 PROCEDURE — 1159F MED LIST DOCD IN RCRD: CPT | Mod: CPTII,,, | Performed by: OBSTETRICS & GYNECOLOGY

## 2022-06-27 PROCEDURE — 99203 PR OFFICE/OUTPT VISIT, NEW, LEVL III, 30-44 MIN: ICD-10-PCS | Mod: S$PBB,,, | Performed by: OBSTETRICS & GYNECOLOGY

## 2022-06-27 PROCEDURE — 3008F BODY MASS INDEX DOCD: CPT | Mod: CPTII,,, | Performed by: OBSTETRICS & GYNECOLOGY

## 2022-06-27 PROCEDURE — 3078F PR MOST RECENT DIASTOLIC BLOOD PRESSURE < 80 MM HG: ICD-10-PCS | Mod: CPTII,,, | Performed by: OBSTETRICS & GYNECOLOGY

## 2022-06-27 PROCEDURE — 99214 OFFICE O/P EST MOD 30 MIN: CPT | Mod: PBBFAC | Performed by: OBSTETRICS & GYNECOLOGY

## 2022-06-27 PROCEDURE — 3074F PR MOST RECENT SYSTOLIC BLOOD PRESSURE < 130 MM HG: ICD-10-PCS | Mod: CPTII,,, | Performed by: OBSTETRICS & GYNECOLOGY

## 2022-06-27 PROCEDURE — 1160F RVW MEDS BY RX/DR IN RCRD: CPT | Mod: CPTII,,, | Performed by: OBSTETRICS & GYNECOLOGY

## 2022-06-27 RX ORDER — VALSARTAN AND HYDROCHLOROTHIAZIDE 80; 12.5 MG/1; MG/1
1 TABLET, FILM COATED ORAL DAILY
Status: ON HOLD | COMMUNITY
End: 2022-07-20 | Stop reason: HOSPADM

## 2022-06-27 NOTE — PROGRESS NOTES
"  Chief Complaint      Chief Complaint   Patient presents with    Abdominal Pain     States when she stands up she has a hollow feeling like her organs are falling out.  C/o pain in vaginal area constant pain.       History of Present Illness      Rohini Dang is a 42 y.o. female who presents for vaginal spotting and an "empty sensation after every bowel movement" along with pressure. She had a hysterectomy years ago with both ovaries removed and also had a "bladder lift" 6 years ago. We requested records but haven't received any yet. She is not sexually active and says she has seen her PCP to rule out a UTI.    LMP:  No LMP recorded. Patient has had a hysterectomy.    PAP:  No result found    Vital Signs:     Vital Signs  BP: 114/66  Pain Score:   7  Height and Weight  Height: 5' 5" (165.1 cm)  Weight: 82.8 kg (182 lb 9.6 oz)  BSA (Calculated - sq m): 1.95 sq meters  BMI (Calculated): 30.4  Weight in (lb) to have BMI = 25: 149.9]    Past Medical History:  Past Medical History:   Diagnosis Date    Anemia     Anxiety disorder     Bipolar 2 disorder     Borderline personality disorder     Chronic bilateral low back pain without sciatica     Depression with anxiety     Diabetes mellitus     Elevated LFTs     Esophageal stenosis     Fatty liver     Fibromyalgia     Fibromyalgia     High cholesterol     Hypoglycemia     Intermittent explosive disorder     Liver disease     JAMESON (nonalcoholic steatohepatitis)        Past Surgical History:   has a past surgical history that includes Hysterectomy; Hiatal hernia repair; Esophageal dilation; Tubal ligation; Upper gastrointestinal endoscopy; Colonoscopy (N/A, 3/20/2018); Liver biopsy (2018); Esophagogastroduodenoscopy (N/A, 5/7/2019); Oophorectomy; Esophagogastroduodenoscopy (N/A, 5/11/2021); and Colonoscopy (N/A, 5/11/2021).    Family History:  family history includes Cancer in her maternal grandmother; Clotting disorder in her father and sister; " Diabetes in her maternal aunt; Hypertension in her mother; Lung cancer in her paternal grandmother; No Known Problems in her daughter, maternal grandfather, maternal uncle, paternal aunt, paternal grandfather, and paternal uncle; Ulcers in her father.     Social History:  Social History     Tobacco Use    Smoking status: Never Smoker    Smokeless tobacco: Never Used   Substance Use Topics    Alcohol use: No     Alcohol/week: 0.0 standard drinks    Drug use: No       I personally reviewed all past medical, surgical, social and family history.    Review of Systems   Constitutional: Negative for chills and fever.   HENT: Negative for sore throat.    Respiratory: Negative for cough and shortness of breath.    Cardiovascular: Negative for chest pain.   Gastrointestinal: Negative for constipation, diarrhea, nausea and vomiting.   Endocrine: Negative for cold intolerance and heat intolerance.   Genitourinary: Negative for dysuria, pelvic pain, urgency, vaginal bleeding and vaginal discharge.   Musculoskeletal: Negative for arthralgias.   Neurological: Negative for syncope and headaches.   Psychiatric/Behavioral: Negative for suicidal ideas.      Physical Exam  Vitals and nursing note reviewed. Exam conducted with a chaperone present.   Constitutional:       Appearance: Normal appearance.   HENT:      Head: Normocephalic and atraumatic.   Pulmonary:      Effort: Pulmonary effort is normal.   Genitourinary:     Comments: No evidence of prolapse anteriorly but she has Grade 1-2 rectocele that doesn't worsen with valsalva and no evidence of erosion or any foreign body. Mucosa is pale and atrophic.  Musculoskeletal:      Cervical back: Normal range of motion.   Skin:     General: Skin is warm and dry.   Neurological:      General: No focal deficit present.      Mental Status: She is alert and oriented to person, place, and time.      Gait: Gait normal.   Psychiatric:         Mood and Affect: Mood normal.         Behavior:  Behavior normal.         Judgment: Judgment normal.         Medications:  Current Outpatient Medications   Medication Sig Dispense Refill    ACCU-CHEK KERRY PLUS TEST STRP Strp USE 1 STRIP TO CHECK GLUCOSE TWICE DAILY      busPIRone (BUSPAR) 30 MG Tab Take 30 mg by mouth 2 (two) times daily.      dulaglutide (TRULICITY) 0.75 mg/0.5 mL pen injector Inject 0.75 mg into the skin every 7 days. 4 pen 11    empagliflozin (JARDIANCE) 10 mg tablet Take 1 tablet (10 mg total) by mouth once daily. 30 tablet 6    LATUDA 120 mg Tab 80 mg.      LIDOcaine (LIDODERM) 5 % UNWRAP AND APPLY 1 PATCH TO SKIN ONCE A DAY AS NEEDED FOR PAIN. APPLY 12 HOURS THEN REMOVE FOR 12 HOURS      linaCLOtide (LINZESS) 72 mcg Cap capsule Take 1 capsule (72 mcg total) by mouth before breakfast. 30 capsule 11    metFORMIN (GLUCOPHAGE) 1000 MG tablet Take 1 tablet (1,000 mg total) by mouth 2 (two) times daily with meals. 180 tablet 1    metoprolol tartrate (LOPRESSOR) 100 MG tablet Take 100 mg by mouth 2 (two) times daily. Takes 1 1/2 pills BID.      mirtazapine (REMERON) 30 MG tablet Take 30 mg by mouth every evening.       montelukast (SINGULAIR) 10 mg tablet Take 10 mg by mouth every evening.      nortriptyline (PAMELOR) 50 MG capsule TAKE 1 CAPSULE BY MOUTH IN THE EVENING 90 capsule 3    pregabalin (LYRICA) 100 MG capsule TAKE 1 CAPSULE(100 MG) BY MOUTH TWICE DAILY 60 capsule 11    promethazine (PHENERGAN) 25 MG tablet Take 1 tablet (25 mg total) by mouth every 4 to 6 hours as needed. 60 tablet 1    rosuvastatin (CRESTOR) 40 MG Tab Take 40 mg by mouth once daily.      sertraline (ZOLOFT) 100 MG tablet Take 100 mg by mouth once daily.      tiZANidine (ZANAFLEX) 2 MG tablet Take 2 mg by mouth 3 (three) times daily as needed.      topiramate (TOPAMAX) 200 MG Tab Take 200 mg by mouth nightly.      valsartan-hydrochlorothiazide (DIOVAN-HCT) 80-12.5 mg per tablet Take 1 tablet by mouth once daily.      albuterol (PROVENTIL/VENTOLIN  HFA) 90 mcg/actuation inhaler Inhale 1-2 puffs into the lungs every 6 (six) hours as needed. Rescue (Patient not taking: Reported on 6/27/2022) 18 g 0    valsartan-hydrochlorothiazide (DIOVAN-HCT) 160-25 mg per tablet Take 1 tablet by mouth once daily. For 30 days       No current facility-administered medications for this visit.       Allergies:  Review of patient's allergies indicates:   Allergen Reactions    Amoxicillin Hives    Avelox [moxifloxacin] Hives    Clarinex [desloratadine] Hives    Pcn [penicillins] Hives    Claritin [loratadine] Palpitations    Latex, natural rubber Rash       Health Maintenance:  Immunization History   Administered Date(s) Administered    Influenza - Quadrivalent - PF *Preferred* (6 months and older) 09/24/2020      Health Maintenance   Topic Date Due    TETANUS VACCINE  Never done    Lipid Panel  08/23/2022    Eye Exam  09/14/2022    Hemoglobin A1c  12/21/2022    Mammogram  03/21/2023    Foot Exam  03/22/2023    Colonoscopy  05/11/2026    Hepatitis C Screening  Completed        Physical Exam       Assessment/Plan     Rohini Dang is a 42 y.o.female with:    1. Pelvic pressure in female    2. Postmenopausal vaginal bleeding     - Will get pelvic US to rule out any pelvic cavity masses  - Discussed that she has a mild rectocele, but her main complaint of emptiness after a BM isn't severe enough to warrant another surgery. I will, however, consider a posterior colporrhaphy if no other etiology for her pressure can be found    Elpidio Rasmussen MD

## 2022-07-06 ENCOUNTER — DOCUMENTATION ONLY (OUTPATIENT)
Dept: REHABILITATION | Facility: HOSPITAL | Age: 42
End: 2022-07-06
Payer: MEDICAID

## 2022-07-06 NOTE — PROGRESS NOTES
PHYSICAL THERAPY DISCHARGE:    This patient was originally evaluated at our facility on: 5/12/2022         Pt attended PT for a total of 6 Visits receiving: Manual Therapy, Therex, Postural Education, Body Mechanics Training, Home Exercise Instruction     This patient's last visit occurred on:                                                           Physical Therapy Daily Note      Name: Rohini Dang  Clinic Number: [id number]  Diagnosis:     Cervicalgia      Myositis      Lumbar pain      Myositis, unspecified myositis type, unspecified site        Physician: Gi Freeman FNP  Precautions: body mechanics  Visit #: 6 of 12  PTA Visit #: 0  Time In: 1100  Time Out: 1110     Subjective      Pt reports: Patient reports she was seen in ER on 6/16/2022 for LBP. Patient also reports that she was told to get MRI she had to complete therapy intervention or if therapy is not helping pain symptoms. Patient also with complaints of abdominal pain and has been seen in ER and has appt with a physician for that diagnosis.   Pain Scale: Rohini rates pain on a scale of 0-10 to be 9-10 currently low back  and abdominal area.      Objective      Upon PT chart review patient seen in ER on 6/16/2022 for LBP. It was recommended by ER doctor for patient to follow up with PCP. Patient informed that policy is for patient to get new prescription from referring provider if referring provider wants patient to continue with PT intervention since ER visit for diagnosis therapy treating her for constitutes a change in status. Patient verbalizes understanding. Patient educated to perform activities with caution. Patient with significant decrease in gait pattern with guarding noted with gait and with all transitional movements especially sit to  clinic. Patient demonstrates antalgic gait pattern at this time and decreased speed.      Written Home Exercises Provided: see  for details.   Education provided re:  follow up with referring provider.   Rohini verbalized good understanding of education provided.   No spiritual or educational barriers to learning provided     Assessment       Patient encouraged to follow up with referring provider to assess needs to continue with PT treatment at this time or be discharged.      This is a 42 y.o. female referred to outpatient physical therapy and presents with a medical diagnosis of        Encounter Diagnoses   Name Primary?    Myositis, unspecified myositis type, unspecified site Yes    Lumbar pain      Cervicalgia      and demonstrates limitations as described in the problem list. Pt will continue to benefit from skilled outpatient physical therapy to address the deficits listed in the problem list, provide pt/family education and maximize pt's level of independence in the home and community environment.      Goals as follows:  Short Term GOALS: 3 weeks. Pt agrees with goals set.  1. Patient will be independent with HEP.  2. Patient will demonstrate postural awareness with all activities.  3. Patient will perform daily activities with fewer complaints.      Long Term GOALS: 6  weeks. Pt agrees with goals set.  1. Patient will demonstrate LEFS to 50 or greater.  2. Patient will demonstrate modified oswestry score 25% or less.   3. Patient will demonstrate neck disability index 25% or less.   4. Patient will reports decreased neck pain to 2/10 or less.  5. Patient will report decreased LBP to 2/10 or less.  6. Patient will demonstrate improvement in lumbar ROM.  7. Patient will demonstrate improvement in cervical ROM.     Plan      Patient on hold for PT treatment up to 2 weeks and will need new prescription to continue.      Therapist: Rohini Zhou, PT  2022    Pt was DC'd from our care secondary to: Patient POC has  and PT has not received new prescription to continue with PT intervention. PT available for reconsult if appropriate.        Therapist: Rohini  Abelardo, PT  7/6/2022

## 2022-07-13 ENCOUNTER — PROCEDURE VISIT (OUTPATIENT)
Dept: OBSTETRICS AND GYNECOLOGY | Facility: CLINIC | Age: 42
End: 2022-07-13
Payer: MEDICAID

## 2022-07-13 ENCOUNTER — HOSPITAL ENCOUNTER (INPATIENT)
Facility: HOSPITAL | Age: 42
LOS: 2 days | Discharge: ANOTHER HOSPITAL | DRG: 683 | End: 2022-07-15
Attending: EMERGENCY MEDICINE | Admitting: INTERNAL MEDICINE
Payer: MEDICAID

## 2022-07-13 ENCOUNTER — OFFICE VISIT (OUTPATIENT)
Dept: OBSTETRICS AND GYNECOLOGY | Facility: CLINIC | Age: 42
DRG: 683 | End: 2022-07-13
Payer: MEDICAID

## 2022-07-13 VITALS — SYSTOLIC BLOOD PRESSURE: 135 MMHG | WEIGHT: 180 LBS | BODY MASS INDEX: 29.95 KG/M2 | DIASTOLIC BLOOD PRESSURE: 78 MMHG

## 2022-07-13 DIAGNOSIS — R10.2 PELVIC PAIN: Primary | ICD-10-CM

## 2022-07-13 DIAGNOSIS — E86.0 DEHYDRATION: ICD-10-CM

## 2022-07-13 DIAGNOSIS — N81.6 RECTOCELE: Chronic | ICD-10-CM

## 2022-07-13 DIAGNOSIS — R39.11 URINARY HESITANCY: Primary | ICD-10-CM

## 2022-07-13 DIAGNOSIS — R10.2 PELVIC PRESSURE IN FEMALE: ICD-10-CM

## 2022-07-13 DIAGNOSIS — N17.9 ACUTE RENAL FAILURE: ICD-10-CM

## 2022-07-13 DIAGNOSIS — N17.9 AKI (ACUTE KIDNEY INJURY): Primary | ICD-10-CM

## 2022-07-13 LAB
ALBUMIN SERPL BCP-MCNC: 3.7 G/DL (ref 3.5–5.2)
ALP SERPL-CCNC: 216 U/L (ref 55–135)
ALT SERPL W/O P-5'-P-CCNC: 83 U/L (ref 10–44)
AMPHET+METHAMPHET UR QL: NEGATIVE
ANION GAP SERPL CALC-SCNC: 13 MMOL/L (ref 8–16)
AST SERPL-CCNC: 79 U/L (ref 10–40)
BACTERIA #/AREA URNS HPF: NEGATIVE /HPF
BARBITURATES UR QL SCN>200 NG/ML: NEGATIVE
BASOPHILS # BLD AUTO: 0.06 K/UL (ref 0–0.2)
BASOPHILS NFR BLD: 0.5 % (ref 0–1.9)
BENZODIAZ UR QL SCN>200 NG/ML: NEGATIVE
BILIRUB SERPL-MCNC: 0.3 MG/DL (ref 0.1–1)
BILIRUB UR QL STRIP: NEGATIVE
BUN SERPL-MCNC: 42 MG/DL (ref 6–20)
BZE UR QL SCN: NEGATIVE
CALCIUM SERPL-MCNC: 9.8 MG/DL (ref 8.7–10.5)
CANNABINOIDS UR QL SCN: NEGATIVE
CHLORIDE SERPL-SCNC: 99 MMOL/L (ref 95–110)
CK SERPL-CCNC: 72 U/L (ref 20–180)
CLARITY UR: ABNORMAL
CO2 SERPL-SCNC: 19 MMOL/L (ref 23–29)
COLOR UR: YELLOW
CREAT SERPL-MCNC: 4.7 MG/DL (ref 0.5–1.4)
CREAT UR-MCNC: 88.2 MG/DL (ref 15–325)
CTP QC/QA: YES
DIFFERENTIAL METHOD: ABNORMAL
EOSINOPHIL # BLD AUTO: 0.2 K/UL (ref 0–0.5)
EOSINOPHIL NFR BLD: 1.5 % (ref 0–8)
ERYTHROCYTE [DISTWIDTH] IN BLOOD BY AUTOMATED COUNT: 15.6 % (ref 11.5–14.5)
EST. GFR  (AFRICAN AMERICAN): 12.2 ML/MIN/1.73 M^2
EST. GFR  (NON AFRICAN AMERICAN): 10.6 ML/MIN/1.73 M^2
GLUCOSE SERPL-MCNC: 86 MG/DL (ref 70–110)
GLUCOSE UR QL STRIP: ABNORMAL
GRAN CASTS #/AREA URNS LPF: 4 /LPF
HCT VFR BLD AUTO: 32.3 % (ref 37–48.5)
HGB BLD-MCNC: 10.2 G/DL (ref 12–16)
HGB UR QL STRIP: NEGATIVE
HYALINE CASTS #/AREA URNS LPF: 28.2 /LPF
IMM GRANULOCYTES # BLD AUTO: 0.04 K/UL (ref 0–0.04)
IMM GRANULOCYTES NFR BLD AUTO: 0.3 % (ref 0–0.5)
KETONES UR QL STRIP: NEGATIVE
LEUKOCYTE ESTERASE UR QL STRIP: NEGATIVE
LYMPHOCYTES # BLD AUTO: 3.7 K/UL (ref 1–4.8)
LYMPHOCYTES NFR BLD: 31 % (ref 18–48)
MCH RBC QN AUTO: 25.1 PG (ref 27–31)
MCHC RBC AUTO-ENTMCNC: 31.6 G/DL (ref 32–36)
MCV RBC AUTO: 80 FL (ref 82–98)
METHADONE UR QL SCN>300 NG/ML: NEGATIVE
MICROSCOPIC COMMENT: ABNORMAL
MONOCYTES # BLD AUTO: 1.4 K/UL (ref 0.3–1)
MONOCYTES NFR BLD: 11.5 % (ref 4–15)
NEUTROPHILS # BLD AUTO: 6.6 K/UL (ref 1.8–7.7)
NEUTROPHILS NFR BLD: 55.2 % (ref 38–73)
NITRITE UR QL STRIP: NEGATIVE
NRBC BLD-RTO: 0 /100 WBC
OPIATES UR QL SCN: NEGATIVE
PCP UR QL SCN>25 NG/ML: NEGATIVE
PH UR STRIP: 5 [PH] (ref 5–8)
PLATELET # BLD AUTO: 176 K/UL (ref 150–450)
PMV BLD AUTO: 11.2 FL (ref 9.2–12.9)
POCT GLUCOSE: 82 MG/DL (ref 70–110)
POTASSIUM SERPL-SCNC: 4 MMOL/L (ref 3.5–5.1)
PROT SERPL-MCNC: 8 G/DL (ref 6–8.4)
PROT UR QL STRIP: ABNORMAL
RBC # BLD AUTO: 4.06 M/UL (ref 4–5.4)
RBC #/AREA URNS HPF: 1 /HPF (ref 0–4)
SARS-COV-2 RDRP RESP QL NAA+PROBE: NEGATIVE
SODIUM SERPL-SCNC: 131 MMOL/L (ref 136–145)
SP GR UR STRIP: 1.01 (ref 1–1.03)
SQUAMOUS #/AREA URNS HPF: 28 /HPF
TOXICOLOGY INFORMATION: NORMAL
URN SPEC COLLECT METH UR: ABNORMAL
UROBILINOGEN UR STRIP-ACNC: 1 EU/DL
WBC # BLD AUTO: 12.03 K/UL (ref 3.9–12.7)
WBC #/AREA URNS HPF: 22 /HPF (ref 0–5)

## 2022-07-13 PROCEDURE — 76856 US EXAM PELVIC COMPLETE: CPT | Mod: PBBFAC | Performed by: OBSTETRICS & GYNECOLOGY

## 2022-07-13 PROCEDURE — 3078F DIAST BP <80 MM HG: CPT | Mod: CPTII,,, | Performed by: OBSTETRICS & GYNECOLOGY

## 2022-07-13 PROCEDURE — 99900031 HC PATIENT EDUCATION (STAT)

## 2022-07-13 PROCEDURE — 63600175 PHARM REV CODE 636 W HCPCS: Performed by: NURSE PRACTITIONER

## 2022-07-13 PROCEDURE — 81000 URINALYSIS NONAUTO W/SCOPE: CPT | Mod: 59 | Performed by: CLINICAL NURSE SPECIALIST

## 2022-07-13 PROCEDURE — 82550 ASSAY OF CK (CPK): CPT | Performed by: CLINICAL NURSE SPECIALIST

## 2022-07-13 PROCEDURE — 3051F HG A1C>EQUAL 7.0%<8.0%: CPT | Mod: CPTII,,, | Performed by: OBSTETRICS & GYNECOLOGY

## 2022-07-13 PROCEDURE — 99999 PR PBB SHADOW E&M-EST. PATIENT-LVL IV: ICD-10-PCS | Mod: PBBFAC,,, | Performed by: OBSTETRICS & GYNECOLOGY

## 2022-07-13 PROCEDURE — 96360 HYDRATION IV INFUSION INIT: CPT

## 2022-07-13 PROCEDURE — 1160F RVW MEDS BY RX/DR IN RCRD: CPT | Mod: CPTII,,, | Performed by: OBSTETRICS & GYNECOLOGY

## 2022-07-13 PROCEDURE — 1159F PR MEDICATION LIST DOCUMENTED IN MEDICAL RECORD: ICD-10-PCS | Mod: CPTII,,, | Performed by: OBSTETRICS & GYNECOLOGY

## 2022-07-13 PROCEDURE — 80053 COMPREHEN METABOLIC PANEL: CPT | Performed by: CLINICAL NURSE SPECIALIST

## 2022-07-13 PROCEDURE — 27000221 HC OXYGEN, UP TO 24 HOURS

## 2022-07-13 PROCEDURE — 99214 OFFICE O/P EST MOD 30 MIN: CPT | Mod: PBBFAC | Performed by: OBSTETRICS & GYNECOLOGY

## 2022-07-13 PROCEDURE — 85025 COMPLETE CBC W/AUTO DIFF WBC: CPT | Performed by: CLINICAL NURSE SPECIALIST

## 2022-07-13 PROCEDURE — 3075F PR MOST RECENT SYSTOLIC BLOOD PRESS GE 130-139MM HG: ICD-10-PCS | Mod: CPTII,,, | Performed by: OBSTETRICS & GYNECOLOGY

## 2022-07-13 PROCEDURE — 87077 CULTURE AEROBIC IDENTIFY: CPT | Performed by: CLINICAL NURSE SPECIALIST

## 2022-07-13 PROCEDURE — 25000003 PHARM REV CODE 250: Performed by: NURSE PRACTITIONER

## 2022-07-13 PROCEDURE — 80307 DRUG TEST PRSMV CHEM ANLYZR: CPT | Performed by: EMERGENCY MEDICINE

## 2022-07-13 PROCEDURE — 87186 SC STD MICRODIL/AGAR DIL: CPT | Performed by: CLINICAL NURSE SPECIALIST

## 2022-07-13 PROCEDURE — 99214 PR OFFICE/OUTPT VISIT, EST, LEVL IV, 30-39 MIN: ICD-10-PCS | Mod: 25,S$PBB,, | Performed by: OBSTETRICS & GYNECOLOGY

## 2022-07-13 PROCEDURE — 3078F PR MOST RECENT DIASTOLIC BLOOD PRESSURE < 80 MM HG: ICD-10-PCS | Mod: CPTII,,, | Performed by: OBSTETRICS & GYNECOLOGY

## 2022-07-13 PROCEDURE — 3051F PR MOST RECENT HEMOGLOBIN A1C LEVEL 7.0 - < 8.0%: ICD-10-PCS | Mod: CPTII,,, | Performed by: OBSTETRICS & GYNECOLOGY

## 2022-07-13 PROCEDURE — 87086 URINE CULTURE/COLONY COUNT: CPT | Performed by: CLINICAL NURSE SPECIALIST

## 2022-07-13 PROCEDURE — 3008F PR BODY MASS INDEX (BMI) DOCUMENTED: ICD-10-PCS | Mod: CPTII,,, | Performed by: OBSTETRICS & GYNECOLOGY

## 2022-07-13 PROCEDURE — 99900035 HC TECH TIME PER 15 MIN (STAT)

## 2022-07-13 PROCEDURE — 76856 US OB/GYN EXTENDED PROCEDURE (VIEWPOINT): ICD-10-PCS | Mod: 26,S$PBB,, | Performed by: OBSTETRICS & GYNECOLOGY

## 2022-07-13 PROCEDURE — 11000001 HC ACUTE MED/SURG PRIVATE ROOM

## 2022-07-13 PROCEDURE — 25000003 PHARM REV CODE 250: Performed by: EMERGENCY MEDICINE

## 2022-07-13 PROCEDURE — 25000003 PHARM REV CODE 250: Performed by: CLINICAL NURSE SPECIALIST

## 2022-07-13 PROCEDURE — 87088 URINE BACTERIA CULTURE: CPT | Performed by: CLINICAL NURSE SPECIALIST

## 2022-07-13 PROCEDURE — 3075F SYST BP GE 130 - 139MM HG: CPT | Mod: CPTII,,, | Performed by: OBSTETRICS & GYNECOLOGY

## 2022-07-13 PROCEDURE — 1160F PR REVIEW ALL MEDS BY PRESCRIBER/CLIN PHARMACIST DOCUMENTED: ICD-10-PCS | Mod: CPTII,,, | Performed by: OBSTETRICS & GYNECOLOGY

## 2022-07-13 PROCEDURE — 1159F MED LIST DOCD IN RCRD: CPT | Mod: CPTII,,, | Performed by: OBSTETRICS & GYNECOLOGY

## 2022-07-13 PROCEDURE — 96372 THER/PROPH/DIAG INJ SC/IM: CPT | Performed by: NURSE PRACTITIONER

## 2022-07-13 PROCEDURE — 99999 PR PBB SHADOW E&M-EST. PATIENT-LVL IV: CPT | Mod: PBBFAC,,, | Performed by: OBSTETRICS & GYNECOLOGY

## 2022-07-13 PROCEDURE — 36415 COLL VENOUS BLD VENIPUNCTURE: CPT | Performed by: CLINICAL NURSE SPECIALIST

## 2022-07-13 PROCEDURE — 99285 EMERGENCY DEPT VISIT HI MDM: CPT | Mod: 25,27

## 2022-07-13 PROCEDURE — U0002 COVID-19 LAB TEST NON-CDC: HCPCS | Performed by: EMERGENCY MEDICINE

## 2022-07-13 PROCEDURE — 99214 OFFICE O/P EST MOD 30 MIN: CPT | Mod: 25,S$PBB,, | Performed by: OBSTETRICS & GYNECOLOGY

## 2022-07-13 PROCEDURE — 3008F BODY MASS INDEX DOCD: CPT | Mod: CPTII,,, | Performed by: OBSTETRICS & GYNECOLOGY

## 2022-07-13 PROCEDURE — G0378 HOSPITAL OBSERVATION PER HR: HCPCS

## 2022-07-13 RX ORDER — TALC
6 POWDER (GRAM) TOPICAL NIGHTLY PRN
Status: DISCONTINUED | OUTPATIENT
Start: 2022-07-13 | End: 2022-07-15 | Stop reason: HOSPADM

## 2022-07-13 RX ORDER — ONDANSETRON 2 MG/ML
4 INJECTION INTRAMUSCULAR; INTRAVENOUS EVERY 8 HOURS PRN
Status: DISCONTINUED | OUTPATIENT
Start: 2022-07-13 | End: 2022-07-15 | Stop reason: HOSPADM

## 2022-07-13 RX ORDER — IBUPROFEN 200 MG
24 TABLET ORAL
Status: DISCONTINUED | OUTPATIENT
Start: 2022-07-13 | End: 2022-07-15 | Stop reason: HOSPADM

## 2022-07-13 RX ORDER — FAMOTIDINE 20 MG/1
20 TABLET, FILM COATED ORAL DAILY
Status: DISCONTINUED | OUTPATIENT
Start: 2022-07-14 | End: 2022-07-15 | Stop reason: HOSPADM

## 2022-07-13 RX ORDER — TRAMADOL HYDROCHLORIDE 50 MG/1
50 TABLET ORAL EVERY 8 HOURS PRN
Status: DISCONTINUED | OUTPATIENT
Start: 2022-07-13 | End: 2022-07-14

## 2022-07-13 RX ORDER — INSULIN ASPART 100 [IU]/ML
0-5 INJECTION, SOLUTION INTRAVENOUS; SUBCUTANEOUS
Status: DISCONTINUED | OUTPATIENT
Start: 2022-07-13 | End: 2022-07-15 | Stop reason: HOSPADM

## 2022-07-13 RX ORDER — SODIUM CHLORIDE 9 MG/ML
INJECTION, SOLUTION INTRAVENOUS CONTINUOUS
Status: DISCONTINUED | OUTPATIENT
Start: 2022-07-13 | End: 2022-07-14

## 2022-07-13 RX ORDER — ACETAMINOPHEN 325 MG/1
650 TABLET ORAL EVERY 8 HOURS PRN
Status: DISCONTINUED | OUTPATIENT
Start: 2022-07-13 | End: 2022-07-15 | Stop reason: HOSPADM

## 2022-07-13 RX ORDER — ENOXAPARIN SODIUM 100 MG/ML
30 INJECTION SUBCUTANEOUS EVERY 24 HOURS
Status: DISCONTINUED | OUTPATIENT
Start: 2022-07-13 | End: 2022-07-15 | Stop reason: HOSPADM

## 2022-07-13 RX ORDER — IBUPROFEN 200 MG
16 TABLET ORAL
Status: DISCONTINUED | OUTPATIENT
Start: 2022-07-13 | End: 2022-07-15 | Stop reason: HOSPADM

## 2022-07-13 RX ORDER — SERTRALINE HYDROCHLORIDE 100 MG/1
100 TABLET, FILM COATED ORAL DAILY
Status: DISCONTINUED | OUTPATIENT
Start: 2022-07-14 | End: 2022-07-15 | Stop reason: HOSPADM

## 2022-07-13 RX ORDER — SODIUM CHLORIDE 0.9 % (FLUSH) 0.9 %
10 SYRINGE (ML) INJECTION
Status: DISCONTINUED | OUTPATIENT
Start: 2022-07-13 | End: 2022-07-15 | Stop reason: HOSPADM

## 2022-07-13 RX ORDER — TOPIRAMATE 25 MG/1
200 TABLET ORAL NIGHTLY
Status: DISCONTINUED | OUTPATIENT
Start: 2022-07-13 | End: 2022-07-15 | Stop reason: HOSPADM

## 2022-07-13 RX ORDER — GLUCAGON 1 MG
1 KIT INJECTION
Status: DISCONTINUED | OUTPATIENT
Start: 2022-07-13 | End: 2022-07-15 | Stop reason: HOSPADM

## 2022-07-13 RX ADMIN — SODIUM CHLORIDE: 0.9 INJECTION, SOLUTION INTRAVENOUS at 09:07

## 2022-07-13 RX ADMIN — TRAMADOL HYDROCHLORIDE 50 MG: 50 TABLET ORAL at 08:07

## 2022-07-13 RX ADMIN — SODIUM CHLORIDE 1000 ML: 9 INJECTION, SOLUTION INTRAVENOUS at 08:07

## 2022-07-13 RX ADMIN — TOPIRAMATE 200 MG: 25 TABLET, FILM COATED ORAL at 08:07

## 2022-07-13 RX ADMIN — SODIUM CHLORIDE: 0.9 INJECTION, SOLUTION INTRAVENOUS at 07:07

## 2022-07-13 RX ADMIN — SODIUM CHLORIDE 1000 ML: 0.9 INJECTION, SOLUTION INTRAVENOUS at 02:07

## 2022-07-13 RX ADMIN — ENOXAPARIN SODIUM 30 MG: 30 INJECTION SUBCUTANEOUS at 08:07

## 2022-07-13 NOTE — PROGRESS NOTES
Subjective:       Patient ID: Rohini Dang is a 42 y.o. female.    Chief Complaint:  No chief complaint on file.      History of Present Illness  Patient here for pelvic US due to vaginal/pelvic pressure and now reports that she hasn't voided since last night and feels the need to void but can't. She says she has had this several times in the past year. She denies any dysuria, fever or flank pain.    Menstrual History:  OB History        3    Para   3    Term   3            AB        Living           SAB        IAB        Ectopic        Multiple        Live Births                    Menarche age:   No LMP recorded. Patient has had a hysterectomy.         Review of Systems  Review of Systems   Constitutional: Negative for chills and fever.   HENT: Negative for sore throat.    Respiratory: Negative for cough and shortness of breath.    Cardiovascular: Negative for chest pain.   Gastrointestinal: Negative for constipation, diarrhea, nausea and vomiting.   Endocrine: Negative for cold intolerance and heat intolerance.   Genitourinary: Positive for difficulty urinating. Negative for dysuria, pelvic pain, urgency, vaginal bleeding and vaginal discharge.   Musculoskeletal: Negative for arthralgias.   Neurological: Negative for syncope and headaches.   Psychiatric/Behavioral: Negative for suicidal ideas.           Objective:      Physical Exam  Vitals and nursing note reviewed.   Constitutional:       Appearance: Normal appearance.   HENT:      Head: Normocephalic and atraumatic.   Pulmonary:      Effort: Pulmonary effort is normal.   Musculoskeletal:      Cervical back: Normal range of motion.   Skin:     General: Skin is warm and dry.   Neurological:      General: No focal deficit present.      Mental Status: She is alert and oriented to person, place, and time.      Gait: Gait normal.   Psychiatric:         Mood and Affect: Mood normal.         Behavior: Behavior normal.         Judgment: Judgment  normal.             Assessment:        1. Urinary hesitancy    2. Pelvic pressure in female    3. Rectocele        - US reviewed with patient and was reported as normal and her bladder had very little urine in it.        Plan:         Diagnoses and all orders for this visit:    Urinary hesitancy    Pelvic pressure in female    Rectocele  Comments:  Grade 2    - Counseled to take her medication today as she says she hasn't taken any including her water pill (HCTZ) and to go to the ER if she hasn't voided by 1-2 o'clock this afternoon  - Will refer to Urology for her urinary issues since her bladder appears to be well supported and only has a mild rectocele

## 2022-07-13 NOTE — ED PROVIDER NOTES
Encounter Date: 7/13/2022       History     Chief Complaint   Patient presents with    Urinary Retention     Urinary retention, dysuria, onset x 24 hours.      Rohini Dang is an 42 y.o. female who complains of urinary retention, dysuria for the last 24 hours.  Patient states she is urinating very little in the last 24 hours.  Reports drinking plenty of fluids.  Denies any kidney insufficiency.  Denies any nausea, vomiting, diarrhea.  History of anemia, anxiety, bipolar, borderline personality disorder, fibromyalgia        Review of patient's allergies indicates:   Allergen Reactions    Amoxicillin Hives    Avelox [moxifloxacin] Hives    Clarinex [desloratadine] Hives    Pcn [penicillins] Hives    Claritin [loratadine] Palpitations    Latex, natural rubber Rash     Past Medical History:   Diagnosis Date    Anemia     Anxiety disorder     Bipolar 2 disorder     Borderline personality disorder     Chronic bilateral low back pain without sciatica     Depression with anxiety     Diabetes mellitus     Elevated LFTs     Esophageal stenosis     Fatty liver     Fibromyalgia     Fibromyalgia     High cholesterol     Hypoglycemia     Intermittent explosive disorder     Liver disease     JAMESON (nonalcoholic steatohepatitis)      Past Surgical History:   Procedure Laterality Date    COLONOSCOPY N/A 3/20/2018    Procedure: COLONOSCOPY;  Surgeon: Janelle Meade MD;  [facility];  Service: Endoscopy;  Laterality: N/A;    COLONOSCOPY N/A 5/11/2021    Procedure: COLONOSCOPY;  Surgeon: Janelle Meade MD;  Location[facility];  Service: Endoscopy;  Laterality: N/A;    ESOPHAGEAL DILATION      x2    ESOPHAGOGASTRODUODENOSCOPY N/A 5/7/2019    Procedure: EGD (ESOPHAGOGASTRODUODENOSCOPY);  Surgeon: Janelle Meade MD;  Location: [facility];  Service: Endoscopy;  Laterality: N/A;    ESOPHAGOGASTRODUODENOSCOPY N/A 5/11/2021    Procedure: EGD (ESOPHAGOGASTRODUODENOSCOPY);   "Surgeon: Janelle Meade MD;  [facility]: [facility];  Service: Endoscopy;  Laterality: N/A;  Rapid on arrival    HIATAL HERNIA REPAIR      HYSTERECTOMY      LIVER BIOPSY  2018    fibrosis stage 3 JAMESON    OOPHORECTOMY      TUBAL LIGATION      UPPER GASTROINTESTINAL ENDOSCOPY      2013? unable to obtain records     Family History   Problem Relation Age of Onset    Hypertension Mother     Clotting disorder Father     Ulcers Father     Clotting disorder Sister     Cancer Maternal Grandmother         "female Cancer"    Lung cancer Paternal Grandmother     Diabetes Maternal Aunt     No Known Problems Daughter     No Known Problems Maternal Uncle     No Known Problems Paternal Aunt     No Known Problems Paternal Uncle     No Known Problems Maternal Grandfather     No Known Problems Paternal Grandfather     Breast cancer Neg Hx     Ovarian cancer Neg Hx     BRCA 1/2 Neg Hx      Social History     Tobacco Use    Smoking status: Never Smoker    Smokeless tobacco: Never Used   Substance Use Topics    Alcohol use: No     Alcohol/week: 0.0 standard drinks    Drug use: No     Review of Systems   Constitutional: Negative for fever.   HENT: Negative for sore throat.    Respiratory: Negative for shortness of breath.    Cardiovascular: Negative for chest pain.   Gastrointestinal: Negative for nausea.   Genitourinary: Positive for difficulty urinating and dysuria.   Musculoskeletal: Negative for back pain.   Skin: Negative for rash.   Neurological: Negative for weakness.   Hematological: Does not bruise/bleed easily.   All other systems reviewed and are negative.      Physical Exam     Initial Vitals [07/13/22 1419]   BP Pulse Resp Temp SpO2   103/77 77 18 98 °F (36.7 °C) 97 %      MAP       --         Physical Exam    Nursing note and vitals reviewed.  Constitutional: She appears well-developed and well-nourished.   HENT:   Head: Normocephalic and atraumatic.   Eyes: Pupils are equal, round, and " reactive to light.   Neck:   Normal range of motion.  Cardiovascular: Normal rate and regular rhythm.   Pulmonary/Chest: Breath sounds normal.   Abdominal: Abdomen is soft. Bowel sounds are normal.   Musculoskeletal:         General: Normal range of motion.      Cervical back: Normal range of motion.     Neurological: She is alert and oriented to person, place, and time.   Skin: Skin is warm and dry.   Psychiatric: She has a normal mood and affect.         ED Course   Procedures  Labs Reviewed   CBC W/ AUTO DIFFERENTIAL - Abnormal; Notable for the following components:       Result Value    Hemoglobin 10.2 (*)     Hematocrit 32.3 (*)     MCV 80 (*)     MCH 25.1 (*)     MCHC 31.6 (*)     RDW 15.6 (*)     Mono # 1.4 (*)     All other components within normal limits   COMPREHENSIVE METABOLIC PANEL - Abnormal; Notable for the following components:    Sodium 131 (*)     CO2 19 (*)     BUN 42 (*)     Creatinine 4.7 (*)     Alkaline Phosphatase 216 (*)     AST 79 (*)     ALT 83 (*)     eGFR if  12.2 (*)     eGFR if non  10.6 (*)     All other components within normal limits   URINALYSIS, REFLEX TO URINE CULTURE - Abnormal; Notable for the following components:    Appearance, UA Cloudy (*)     Protein, UA 2+ (*)     Glucose, UA 2+ (*)     All other components within normal limits    Narrative:     Preferred Collection Type->Urine, Clean Catch  Specimen Source->Urine   URINALYSIS MICROSCOPIC - Abnormal; Notable for the following components:    WBC, UA 22 (*)     Hyaline Casts, UA 28.2 (*)     Granular Casts, UA 4 (*)     All other components within normal limits    Narrative:     Preferred Collection Type->Urine, Clean Catch  Specimen Source->Urine   CULTURE, URINE   CK   DRUG SCREEN PANEL, URINE EMERGENCY    Narrative:     Specimen Source->Urine   CK          Imaging Results    None          Medications   sodium chloride 0.9% bolus 1,000 mL (0 mLs Intravenous Stopped 7/13/22 7337)     Medical  Decision Making:   Differential Diagnosis:   UTI, urinary retention  Clinical Tests:   Lab Tests: Ordered and Reviewed                      Clinical Impression:   Final diagnoses:  [N17.9] HENRY (acute kidney injury) (Primary)  [E86.0] Dehydration          ED Disposition Condition    Observation               Ana Yo NP  07/13/22 0696

## 2022-07-14 PROBLEM — N17.9 ACUTE RENAL INJURY: Status: ACTIVE | Noted: 2022-07-14

## 2022-07-14 PROBLEM — E87.1 HYPONATREMIA: Status: ACTIVE | Noted: 2022-07-14

## 2022-07-14 PROBLEM — F60.3 BORDERLINE PERSONALITY DISORDER: Status: ACTIVE | Noted: 2022-07-14

## 2022-07-14 PROBLEM — Z79.899 DVT PROPHYLAXIS: Status: ACTIVE | Noted: 2021-05-11

## 2022-07-14 LAB
ALBUMIN SERPL BCP-MCNC: 2.8 G/DL (ref 3.5–5.2)
ALP SERPL-CCNC: 179 U/L (ref 55–135)
ALT SERPL W/O P-5'-P-CCNC: 59 U/L (ref 10–44)
ANION GAP SERPL CALC-SCNC: 9 MMOL/L (ref 8–16)
AST SERPL-CCNC: 53 U/L (ref 10–40)
BASOPHILS # BLD AUTO: 0.04 K/UL (ref 0–0.2)
BASOPHILS NFR BLD: 0.4 % (ref 0–1.9)
BILIRUB SERPL-MCNC: 0.3 MG/DL (ref 0.1–1)
BUN SERPL-MCNC: 43 MG/DL (ref 6–20)
CALCIUM SERPL-MCNC: 7.5 MG/DL (ref 8.7–10.5)
CHLORIDE SERPL-SCNC: 104 MMOL/L (ref 95–110)
CO2 SERPL-SCNC: 20 MMOL/L (ref 23–29)
CREAT SERPL-MCNC: 5.1 MG/DL (ref 0.5–1.4)
CREAT UR-MCNC: 35.5 MG/DL (ref 15–325)
DIFFERENTIAL METHOD: ABNORMAL
EOSINOPHIL # BLD AUTO: 0.1 K/UL (ref 0–0.5)
EOSINOPHIL NFR BLD: 1 % (ref 0–8)
ERYTHROCYTE [DISTWIDTH] IN BLOOD BY AUTOMATED COUNT: 15.5 % (ref 11.5–14.5)
EST. GFR  (AFRICAN AMERICAN): 11 ML/MIN/1.73 M^2
EST. GFR  (NON AFRICAN AMERICAN): 9.6 ML/MIN/1.73 M^2
GLUCOSE SERPL-MCNC: 112 MG/DL (ref 70–110)
HCT VFR BLD AUTO: 28.7 % (ref 37–48.5)
HGB BLD-MCNC: 9 G/DL (ref 12–16)
IMM GRANULOCYTES # BLD AUTO: 0.04 K/UL (ref 0–0.04)
IMM GRANULOCYTES NFR BLD AUTO: 0.4 % (ref 0–0.5)
LYMPHOCYTES # BLD AUTO: 2 K/UL (ref 1–4.8)
LYMPHOCYTES NFR BLD: 20.5 % (ref 18–48)
MAGNESIUM SERPL-MCNC: 1.9 MG/DL (ref 1.6–2.6)
MCH RBC QN AUTO: 25.1 PG (ref 27–31)
MCHC RBC AUTO-ENTMCNC: 31.4 G/DL (ref 32–36)
MCV RBC AUTO: 80 FL (ref 82–98)
MONOCYTES # BLD AUTO: 0.9 K/UL (ref 0.3–1)
MONOCYTES NFR BLD: 9.1 % (ref 4–15)
NEUTROPHILS # BLD AUTO: 6.8 K/UL (ref 1.8–7.7)
NEUTROPHILS NFR BLD: 68.6 % (ref 38–73)
NRBC BLD-RTO: 0 /100 WBC
PLATELET # BLD AUTO: 126 K/UL (ref 150–450)
PMV BLD AUTO: 11.5 FL (ref 9.2–12.9)
POCT GLUCOSE: 115 MG/DL (ref 70–110)
POTASSIUM SERPL-SCNC: 5 MMOL/L (ref 3.5–5.1)
PROT SERPL-MCNC: 6.5 G/DL (ref 6–8.4)
RBC # BLD AUTO: 3.59 M/UL (ref 4–5.4)
SODIUM SERPL-SCNC: 133 MMOL/L (ref 136–145)
SODIUM UR-SCNC: 72 MMOL/L (ref 20–250)
WBC # BLD AUTO: 9.93 K/UL (ref 3.9–12.7)

## 2022-07-14 PROCEDURE — 63600175 PHARM REV CODE 636 W HCPCS: Performed by: NURSE PRACTITIONER

## 2022-07-14 PROCEDURE — 25000003 PHARM REV CODE 250: Performed by: NURSE PRACTITIONER

## 2022-07-14 PROCEDURE — 99900031 HC PATIENT EDUCATION (STAT)

## 2022-07-14 PROCEDURE — 25000242 PHARM REV CODE 250 ALT 637 W/ HCPCS: Performed by: INTERNAL MEDICINE

## 2022-07-14 PROCEDURE — 36415 COLL VENOUS BLD VENIPUNCTURE: CPT | Performed by: NURSE PRACTITIONER

## 2022-07-14 PROCEDURE — 94640 AIRWAY INHALATION TREATMENT: CPT

## 2022-07-14 PROCEDURE — 99900035 HC TECH TIME PER 15 MIN (STAT)

## 2022-07-14 PROCEDURE — 83735 ASSAY OF MAGNESIUM: CPT | Performed by: NURSE PRACTITIONER

## 2022-07-14 PROCEDURE — 85025 COMPLETE CBC W/AUTO DIFF WBC: CPT | Performed by: NURSE PRACTITIONER

## 2022-07-14 PROCEDURE — 94761 N-INVAS EAR/PLS OXIMETRY MLT: CPT

## 2022-07-14 PROCEDURE — 84300 ASSAY OF URINE SODIUM: CPT | Performed by: NURSE PRACTITIONER

## 2022-07-14 PROCEDURE — 25000003 PHARM REV CODE 250: Performed by: INTERNAL MEDICINE

## 2022-07-14 PROCEDURE — 27000221 HC OXYGEN, UP TO 24 HOURS

## 2022-07-14 PROCEDURE — 80053 COMPREHEN METABOLIC PANEL: CPT | Performed by: NURSE PRACTITIONER

## 2022-07-14 PROCEDURE — 82570 ASSAY OF URINE CREATININE: CPT | Performed by: NURSE PRACTITIONER

## 2022-07-14 PROCEDURE — 25000003 PHARM REV CODE 250: Performed by: EMERGENCY MEDICINE

## 2022-07-14 PROCEDURE — 11000001 HC ACUTE MED/SURG PRIVATE ROOM

## 2022-07-14 PROCEDURE — 63600175 PHARM REV CODE 636 W HCPCS: Performed by: EMERGENCY MEDICINE

## 2022-07-14 RX ORDER — TRAMADOL HYDROCHLORIDE 50 MG/1
50 TABLET ORAL EVERY 8 HOURS PRN
Status: DISCONTINUED | OUTPATIENT
Start: 2022-07-14 | End: 2022-07-15 | Stop reason: HOSPADM

## 2022-07-14 RX ORDER — MUPIROCIN 20 MG/G
OINTMENT TOPICAL 2 TIMES DAILY
Status: DISCONTINUED | OUTPATIENT
Start: 2022-07-14 | End: 2022-07-15 | Stop reason: HOSPADM

## 2022-07-14 RX ORDER — IPRATROPIUM BROMIDE AND ALBUTEROL SULFATE 2.5; .5 MG/3ML; MG/3ML
3 SOLUTION RESPIRATORY (INHALATION)
Status: DISCONTINUED | OUTPATIENT
Start: 2022-07-14 | End: 2022-07-15 | Stop reason: HOSPADM

## 2022-07-14 RX ORDER — SODIUM CHLORIDE 9 MG/ML
INJECTION, SOLUTION INTRAVENOUS CONTINUOUS
Status: DISCONTINUED | OUTPATIENT
Start: 2022-07-14 | End: 2022-07-15

## 2022-07-14 RX ADMIN — Medication 6 MG: at 08:07

## 2022-07-14 RX ADMIN — TRAMADOL HYDROCHLORIDE 50 MG: 50 TABLET ORAL at 05:07

## 2022-07-14 RX ADMIN — MUPIROCIN: 20 OINTMENT TOPICAL at 11:07

## 2022-07-14 RX ADMIN — TOPIRAMATE 200 MG: 25 TABLET, FILM COATED ORAL at 08:07

## 2022-07-14 RX ADMIN — SODIUM CHLORIDE: 0.9 INJECTION, SOLUTION INTRAVENOUS at 07:07

## 2022-07-14 RX ADMIN — ONDANSETRON HYDROCHLORIDE 4 MG: 2 SOLUTION INTRAMUSCULAR; INTRAVENOUS at 08:07

## 2022-07-14 RX ADMIN — ENOXAPARIN SODIUM 30 MG: 30 INJECTION SUBCUTANEOUS at 04:07

## 2022-07-14 RX ADMIN — MUPIROCIN: 20 OINTMENT TOPICAL at 08:07

## 2022-07-14 RX ADMIN — SERTRALINE HYDROCHLORIDE 100 MG: 100 TABLET ORAL at 08:07

## 2022-07-14 RX ADMIN — IPRATROPIUM BROMIDE AND ALBUTEROL SULFATE 3 ML: .5; 3 SOLUTION RESPIRATORY (INHALATION) at 08:07

## 2022-07-14 RX ADMIN — TRAMADOL HYDROCHLORIDE 50 MG: 50 TABLET ORAL at 01:07

## 2022-07-14 RX ADMIN — FAMOTIDINE 20 MG: 20 TABLET ORAL at 08:07

## 2022-07-14 NOTE — SUBJECTIVE & OBJECTIVE
Past Medical History:   Diagnosis Date    Anemia     Anxiety disorder     Bipolar 2 disorder     Borderline personality disorder     Chronic bilateral low back pain without sciatica     Depression with anxiety     Diabetes mellitus     Elevated LFTs     Esophageal stenosis     Fatty liver     Fibromyalgia     Fibromyalgia     High cholesterol     Hypoglycemia     Intermittent explosive disorder     Liver disease     JAMESON (nonalcoholic steatohepatitis)        Past Surgical History:   Procedure Laterality Date    COLONOSCOPY N/A 3/20/2018    Procedure: COLONOSCOPY;  Surgeon: Janelle Meade MD;  Location: [facility];  Service: Endoscopy;  Laterality: N/A;    COLONOSCOPY N/A 5/11/2021    Procedure: COLONOSCOPY;  Surgeon: Janelle Meade MD;  Location: [facility];  Service: Endoscopy;  Laterality: N/A;    ESOPHAGEAL DILATION      x2    ESOPHAGOGASTRODUODENOSCOPY N/A 5/7/2019    Procedure: EGD (ESOPHAGOGASTRODUODENOSCOPY);  Surgeon: Janelle Meade MD;  Location: [facility];  Service: Endoscopy;  Laterality: N/A;    ESOPHAGOGASTRODUODENOSCOPY N/A 5/11/2021    Procedure: EGD (ESOPHAGOGASTRODUODENOSCOPY);  Surgeon: Janelle Meade MD;  Location: [facility];  Service: Endoscopy;  Laterality: N/A;  Rapid on arrival    HIATAL HERNIA REPAIR      HYSTERECTOMY      LIVER BIOPSY  2018    fibrosis stage 3 JAMESON    OOPHORECTOMY      TUBAL LIGATION      UPPER GASTROINTESTINAL ENDOSCOPY      2013? unable to obtain records       Review of patient's allergies indicates:   Allergen Reactions    Amoxicillin Hives    Avelox [moxifloxacin] Hives    Clarinex [desloratadine] Hives    Pcn [penicillins] Hives    Claritin [loratadine] Palpitations    Latex, natural rubber Rash       No current facility-administered medications on file prior to encounter.     Current Outpatient Medications on File Prior to Encounter   Medication Sig    ACCU-CHEK KERRY PLUS TEST STRP Strp USE 1 STRIP TO CHECK GLUCOSE TWICE DAILY     albuterol (PROVENTIL/VENTOLIN HFA) 90 mcg/actuation inhaler Inhale 1-2 puffs into the lungs every 6 (six) hours as needed. Rescue (Patient not taking: Reported on 6/27/2022)    busPIRone (BUSPAR) 30 MG Tab Take 30 mg by mouth 2 (two) times daily.    dulaglutide (TRULICITY) 0.75 mg/0.5 mL pen injector Inject 0.75 mg into the skin every 7 days.    empagliflozin (JARDIANCE) 10 mg tablet Take 1 tablet (10 mg total) by mouth once daily.    LIDOcaine (LIDODERM) 5 % UNWRAP AND APPLY 1 PATCH TO SKIN ONCE A DAY AS NEEDED FOR PAIN. APPLY 12 HOURS THEN REMOVE FOR 12 HOURS    linaCLOtide (LINZESS) 72 mcg Cap capsule Take 1 capsule (72 mcg total) by mouth before breakfast.    metFORMIN (GLUCOPHAGE) 1000 MG tablet Take 1 tablet (1,000 mg total) by mouth 2 (two) times daily with meals.    metoprolol tartrate (LOPRESSOR) 100 MG tablet Take 100 mg by mouth 2 (two) times daily. Takes 1 1/2 pills BID.    mirtazapine (REMERON) 30 MG tablet Take 30 mg by mouth every evening.     montelukast (SINGULAIR) 10 mg tablet Take 10 mg by mouth every evening.    nortriptyline (PAMELOR) 50 MG capsule TAKE 1 CAPSULE BY MOUTH IN THE EVENING    pregabalin (LYRICA) 100 MG capsule TAKE 1 CAPSULE(100 MG) BY MOUTH TWICE DAILY    promethazine (PHENERGAN) 25 MG tablet Take 1 tablet (25 mg total) by mouth every 4 to 6 hours as needed.    rosuvastatin (CRESTOR) 40 MG Tab Take 40 mg by mouth once daily.    sertraline (ZOLOFT) 100 MG tablet Take 100 mg by mouth once daily.    tiZANidine (ZANAFLEX) 2 MG tablet Take 2 mg by mouth 3 (three) times daily as needed.    topiramate (TOPAMAX) 200 MG Tab Take 200 mg by mouth nightly.    valsartan-hydrochlorothiazide (DIOVAN-HCT) 160-25 mg per tablet Take 1 tablet by mouth once daily. For 30 days    valsartan-hydrochlorothiazide (DIOVAN-HCT) 80-12.5 mg per tablet Take 1 tablet by mouth once daily.     Family History       Problem Relation (Age of Onset)    Cancer Maternal Grandmother    Clotting disorder Father, Sister     Diabetes Maternal Aunt    Hypertension Mother    Lung cancer Paternal Grandmother    No Known Problems Daughter, Maternal Uncle, Paternal Aunt, Paternal Uncle, Maternal Grandfather, Paternal Grandfather    Ulcers Father          Tobacco Use    Smoking status: Never Smoker    Smokeless tobacco: Never Used   Substance and Sexual Activity    Alcohol use: No     Alcohol/week: 0.0 standard drinks    Drug use: No    Sexual activity: Not Currently     Partners: Male     Review of Systems   Constitutional:  Positive for appetite change. Negative for chills, fatigue and fever.   HENT: Negative.     Eyes: Negative.    Respiratory:  Positive for cough. Negative for chest tightness and shortness of breath.    Cardiovascular: Negative.    Genitourinary:  Positive for difficulty urinating. Negative for dysuria and flank pain.        Pressure to her bladder area   Musculoskeletal: Negative.    Skin: Negative.    Neurological:  Negative for dizziness, weakness, light-headedness and headaches.   Psychiatric/Behavioral:  Negative for agitation, confusion and hallucinations. The patient is nervous/anxious.    Objective:     Vital Signs (Most Recent):  Temp: 98 °F (36.7 °C) (07/14/22 0927)  Pulse: 87 (07/14/22 0927)  Resp: 20 (07/14/22 0927)  BP: 121/78 (07/14/22 0927)  SpO2: 96 % (07/14/22 0927) Vital Signs (24h Range):  Temp:  [97.6 °F (36.4 °C)-98.5 °F (36.9 °C)] 98 °F (36.7 °C)  Pulse:  [70-91] 87  Resp:  [17-21] 20  SpO2:  [89 %-98 %] 96 %  BP: (102-139)/(65-87) 121/78     Weight: 82.1 kg (181 lb)  Body mass index is 30.12 kg/m².    Physical Exam  Vitals and nursing note reviewed.   Constitutional:       Appearance: She is obese.   HENT:      Head: Normocephalic.      Mouth/Throat:      Mouth: Mucous membranes are dry.      Pharynx: Oropharynx is clear.   Eyes:      General: No scleral icterus.     Extraocular Movements: Extraocular movements intact.      Pupils: Pupils are equal, round, and reactive to light.   Cardiovascular:       Rate and Rhythm: Normal rate and regular rhythm.      Pulses: Normal pulses.      Heart sounds: Normal heart sounds.   Pulmonary:      Effort: Pulmonary effort is normal.      Breath sounds: Normal breath sounds.   Abdominal:      General: Bowel sounds are normal. There is no distension.      Palpations: Abdomen is soft.      Tenderness: There is no abdominal tenderness. There is no guarding.   Musculoskeletal:         General: No swelling. Normal range of motion.      Cervical back: Normal range of motion. No rigidity.      Right lower leg: No edema.      Left lower leg: No edema.   Skin:     General: Skin is warm and dry.      Capillary Refill: Capillary refill takes less than 2 seconds.   Neurological:      General: No focal deficit present.      Mental Status: She is alert and oriented to person, place, and time.   Psychiatric:         Mood and Affect: Mood is anxious.         Behavior: Behavior normal.         CRANIAL NERVES     CN III, IV, VI   Pupils are equal, round, and reactive to light.     Significant Labs: All pertinent labs within the past 24 hours have been reviewed.    CBC:   Recent Labs   Lab 07/13/22  1446 07/14/22  0408   WBC 12.03 9.93   HGB 10.2* 9.0*   HCT 32.3* 28.7*    126*     CMP:   Recent Labs   Lab 07/13/22  1446 07/14/22  0408   * 133*   K 4.0 5.0   CL 99 104   CO2 19* 20*   GLU 86 112*   BUN 42* 43*   CREATININE 4.7* 5.1*   CALCIUM 9.8 7.5*   PROT 8.0 6.5   ALBUMIN 3.7 2.8*   BILITOT 0.3 0.3   ALKPHOS 216* 179*   AST 79* 53*   ALT 83* 59*   ANIONGAP 13 9   EGFRNONAA 10.6* 9.6*     Magnesium:   Recent Labs   Lab 07/14/22  0408   MG 1.9     POCT Glucose:   Recent Labs   Lab 07/13/22  2012   POCTGLUCOSE 82     Urine Studies:   Recent Labs   Lab 07/13/22  1600   COLORU Yellow   APPEARANCEUA Cloudy*   PHUR 5.0   SPECGRAV 1.015   PROTEINUA 2+*   GLUCUA 2+*   KETONESU Negative   BILIRUBINUA Negative   OCCULTUA Negative   NITRITE Negative   UROBILINOGEN 1.0   LEUKOCYTESUR  Negative   RBCUA 1   WBCUA 22*   BACTERIA Negative   SQUAMEPITHEL 28   HYALINECASTS 28.2*     Lab Results   Component Value Date    [id number] Negative 07/13/2022       Significant Imaging: I have reviewed all pertinent imaging results/findings within the past 24 hours.

## 2022-07-14 NOTE — RESPIRATORY THERAPY
07/14/22 0751   PRE-TX-O2   O2 Device (Oxygen Therapy) (S)  nasal cannula   $ Is the patient on Low Flow Oxygen? Yes   Flow (L/min) (S)  1   Oxygen Concentration (%) 24   SpO2 97 %   Pulse Oximetry Type Intermittent   $ Pulse Oximetry - Multiple Charge Pulse Oximetry - Multiple   Pulse 89   Resp 20   Positioning HOB elevated 30 degrees     Weaned patient's Oxygen from 2lpm nasal cannula to 1lpm nasal cannula at this time. No distress noted. Arlet CANO notified

## 2022-07-14 NOTE — PROGRESS NOTES
Pharmacist Renal Dose Adjustment Note    Rohini Dang is a 42 y.o. female being treated with the medication lovenox    Patient Data:    Vital Signs (Most Recent):  Temp: 98 °F (36.7 °C) (07/13/22 1942)  Pulse: 81 (07/13/22 1942)  Resp: 18 (07/13/22 1942)  BP: 137/87 (07/13/22 1942)  SpO2: (!) 94 % (07/13/22 1942) Vital Signs (72h Range):  Temp:  [98 °F (36.7 °C)]   Pulse:  [70-81]   Resp:  [18]   BP: (102-137)/(65-87)   SpO2:  [94 %-97 %]      Recent Labs   Lab 07/13/22  1446   CREATININE 4.7*     Serum creatinine: 4.7 mg/dL (H) 07/13/22 1446  Estimated creatinine clearance: 16.2 mL/min (A)    Medication:lovenox dose: 40mg frequency daily will be changed to medication:lovenox dose:30mg frequency:daily    Pharmacist's Name: Melody Hamilton  Pharmacist's Extension:

## 2022-07-14 NOTE — HPI
Pt is a 41 y/o female with Hx of anxiety, bipolar, JAMESON, and DM II, presented to her GYN yesterday morning with c/o vaginal/pelvic pressure and report that she hadn't voided since the previous night and felt the need to void but couldn't. Her GYN reported similar symptoms several times in the past year. The US reported as normal and her bladder had very little urine in it. She had not urinated by the afternoon despite drinking plenty of fluids, so she reported to the ED as instructed by her GYN. Dr. Rodriguez reported pt was found to have minimal urine in her bladder per bedside US. Her kidney function was elevated and patient appeared dry. She was admitted for acute renal failure, a yarbrough catheter was inserted for accurate I&O, and she was given IV fluids. This morning, her serum creatinine is trending up.

## 2022-07-14 NOTE — PLAN OF CARE
[facility]  Initial Discharge Assessment       Primary Care Provider: Skip Celestin NP    Admission Diagnosis: Dehydration [E86.0]  HENRY (acute kidney injury) [N17.9]    Admission Date: 7/13/2022  Expected Discharge Date:     Discharge Barriers Identified: None    Payor: MEDICAID / Plan: [employer] [facility] ([employer]) / Product Type: Managed Medicaid /     Extended Emergency Contact Information  Primary Emergency Contact: Yuliya Hidalgo   [facility]  Home Phone: [phone]  Mobile Phone: [phone]  Relation: Sister  Secondary Emergency Contact: Arnulfo Dang   [place]  Mobile Phone: [phone]  Relation: Spouse    Discharge Plan A: Home, Home with family  Discharge Plan B: Home, Home Health      [id number] - [place], [place] - [address] AT [facility] [address] [place] [address] & [facility]  [address] [address]  Phone: [phone] Fax: [phone]    [facility] [address] [place] [id number]  Phone: [phone] Fax: [phone]      Initial Assessment (most recent)     Adult Discharge Assessment - 07/14/22 1016        Discharge Assessment    Assessment Type Discharge Planning Assessment     Source of Information patient     When was your last doctors appointment? --   The patient stated that she saw her NP on Tuesday.    Reason For Admission Acute renal failure     Lives With spouse;sibling(s);child(mahesh), dependent;other (see comments)   The patient lives with her spouse, son, and brother-in-law.    Do you expect to return to your current living situation? Yes     Do you have help at home or someone to help you manage your care at home? --   The patient is independent.    Prior to hospitilization cognitive status: Alert/Oriented     Current cognitive status: Alert/Oriented     Walking or Climbing Stairs Difficulty none     Dressing/Bathing Difficulty none      Home Accessibility stairs to enter home     Number of Stairs, Main Entrance three     Home Layout Able to live on 1st floor     Equipment Currently Used at Home none     Do you take prescription medications? Yes     Do you have prescription coverage? Yes     Coverage MEDICAID - [facility] ([place])     Do you have any problems affording any of your prescribed medications? No     Is the patient taking medications as prescribed? yes     Who is going to help you get home at discharge? Anitra (daughter)     How do you get to doctors appointments? car, drives self     Are you on dialysis? No     Discharge Plan A Home;Home with family     Discharge Plan B Home;Home Health     DME Needed Upon Discharge  other (see comments)   TBD    Discharge Plan discussed with: Patient     Discharge Barriers Identified None             Initial discharge assessment is completed.  Contact information was left in the patient's room if she had any questions or concerns. At this time, the patient does not require any assistance from case management.     SW will continue to monitor.

## 2022-07-14 NOTE — H&P
[facility]  History & Physical    Patient Name: Rohini Dang  MRN: [id number]  Patient Class: IP- Inpatient  Admission Date: 7/13/2022  Attending Physician: Nikhil Karimi III, MD   Primary Care Provider: Skip Celestin NP         Patient information was obtained from patient and ER records.     Subjective:     Principal Problem:Acute renal failure    Chief Complaint:   Chief Complaint   Patient presents with    Urinary Retention     Urinary retention, dysuria, onset x 24 hours.         HPI: Pt is a 41 y/o female with Hx of anxiety, bipolar, JAMESON, and DM II, presented to her GYN yesterday morning with c/o vaginal/pelvic pressure and report that she hadn't voided since the previous night and felt the need to void but couldn't. Her GYN reported similar symptoms several times in the past year. The US reported as normal and her bladder had very little urine in it. She had not urinated by the afternoon despite drinking plenty of fluids, so she reported to the ED as instructed by her GYN. Dr. Rodriguez reported pt was found to have minimal urine in her bladder per bedside US. Her kidney function was elevated and patient appeared dry. She was admitted for acute renal failure, a yarbrough catheter was inserted for accurate I&O, and she was given IV fluids. This morning, her serum creatinine is trending up.      Past Medical History:   Diagnosis Date    Anemia     Anxiety disorder     Bipolar 2 disorder     Borderline personality disorder     Chronic bilateral low back pain without sciatica     Depression with anxiety     Diabetes mellitus     Elevated LFTs     Esophageal stenosis     Fatty liver     Fibromyalgia     Fibromyalgia     High cholesterol     Hypoglycemia     Intermittent explosive disorder     Liver disease     JAMESON (nonalcoholic steatohepatitis)        Past Surgical History:   Procedure Laterality Date    COLONOSCOPY N/A 3/20/2018    Procedure: COLONOSCOPY;   Surgeon: Janelle Meade MD;  Location: [facility];  Service: Endoscopy;  Laterality: N/A;    COLONOSCOPY N/A 5/11/2021    Procedure: COLONOSCOPY;  Surgeon: Janelle Meade MD;  Location: [facility];  Service: Endoscopy;  Laterality: N/A;    ESOPHAGEAL DILATION      x2    ESOPHAGOGASTRODUODENOSCOPY N/A 5/7/2019    Procedure: EGD (ESOPHAGOGASTRODUODENOSCOPY);  Surgeon: Janelle Meade MD;  Location: [facility];  Service: Endoscopy;  Laterality: N/A;    ESOPHAGOGASTRODUODENOSCOPY N/A 5/11/2021    Procedure: EGD (ESOPHAGOGASTRODUODENOSCOPY);  Surgeon: Janelle Meade MD;  Location: [facility];  Service: Endoscopy;  Laterality: N/A;  Rapid on arrival    HIATAL HERNIA REPAIR      HYSTERECTOMY      LIVER BIOPSY  2018    fibrosis stage 3 JAMESON    OOPHORECTOMY      TUBAL LIGATION      UPPER GASTROINTESTINAL ENDOSCOPY      2013? unable to obtain records       Review of patient's allergies indicates:   Allergen Reactions    Amoxicillin Hives    Avelox [moxifloxacin] Hives    Clarinex [desloratadine] Hives    Pcn [penicillins] Hives    Claritin [loratadine] Palpitations    Latex, natural rubber Rash       No current facility-administered medications on file prior to encounter.     Current Outpatient Medications on File Prior to Encounter   Medication Sig    ACCU-CHEK KERRY PLUS TEST STRP Strp USE 1 STRIP TO CHECK GLUCOSE TWICE DAILY    albuterol (PROVENTIL/VENTOLIN HFA) 90 mcg/actuation inhaler Inhale 1-2 puffs into the lungs every 6 (six) hours as needed. Rescue (Patient not taking: Reported on 6/27/2022)    busPIRone (BUSPAR) 30 MG Tab Take 30 mg by mouth 2 (two) times daily.    dulaglutide (TRULICITY) 0.75 mg/0.5 mL pen injector Inject 0.75 mg into the skin every 7 days.    empagliflozin (JARDIANCE) 10 mg tablet Take 1 tablet (10 mg total) by mouth once daily.    LIDOcaine (LIDODERM) 5 % UNWRAP AND APPLY 1 PATCH TO SKIN ONCE A DAY AS NEEDED FOR PAIN. APPLY 12 HOURS THEN REMOVE  FOR 12 HOURS    linaCLOtide (LINZESS) 72 mcg Cap capsule Take 1 capsule (72 mcg total) by mouth before breakfast.    metFORMIN (GLUCOPHAGE) 1000 MG tablet Take 1 tablet (1,000 mg total) by mouth 2 (two) times daily with meals.    metoprolol tartrate (LOPRESSOR) 100 MG tablet Take 100 mg by mouth 2 (two) times daily. Takes 1 1/2 pills BID.    mirtazapine (REMERON) 30 MG tablet Take 30 mg by mouth every evening.     montelukast (SINGULAIR) 10 mg tablet Take 10 mg by mouth every evening.    nortriptyline (PAMELOR) 50 MG capsule TAKE 1 CAPSULE BY MOUTH IN THE EVENING    pregabalin (LYRICA) 100 MG capsule TAKE 1 CAPSULE(100 MG) BY MOUTH TWICE DAILY    promethazine (PHENERGAN) 25 MG tablet Take 1 tablet (25 mg total) by mouth every 4 to 6 hours as needed.    rosuvastatin (CRESTOR) 40 MG Tab Take 40 mg by mouth once daily.    sertraline (ZOLOFT) 100 MG tablet Take 100 mg by mouth once daily.    tiZANidine (ZANAFLEX) 2 MG tablet Take 2 mg by mouth 3 (three) times daily as needed.    topiramate (TOPAMAX) 200 MG Tab Take 200 mg by mouth nightly.    valsartan-hydrochlorothiazide (DIOVAN-HCT) 160-25 mg per tablet Take 1 tablet by mouth once daily. For 30 days    valsartan-hydrochlorothiazide (DIOVAN-HCT) 80-12.5 mg per tablet Take 1 tablet by mouth once daily.     Family History       Problem Relation (Age of Onset)    Cancer Maternal Grandmother    Clotting disorder Father, Sister    Diabetes Maternal Aunt    Hypertension Mother    Lung cancer Paternal Grandmother    No Known Problems Daughter, Maternal Uncle, Paternal Aunt, Paternal Uncle, Maternal Grandfather, Paternal Grandfather    Ulcers Father          Tobacco Use    Smoking status: Never Smoker    Smokeless tobacco: Never Used   Substance and Sexual Activity    Alcohol use: No     Alcohol/week: 0.0 standard drinks    Drug use: No    Sexual activity: Not Currently     Partners: Male     Review of Systems   Constitutional:  Positive for appetite change.  Negative for chills, fatigue and fever.   HENT: Negative.     Eyes: Negative.    Respiratory:  Positive for cough. Negative for chest tightness and shortness of breath.    Cardiovascular: Negative.    Genitourinary:  Positive for difficulty urinating. Negative for dysuria and flank pain.        Pressure to her bladder area   Musculoskeletal: Negative.    Skin: Negative.    Neurological:  Negative for dizziness, weakness, light-headedness and headaches.   Psychiatric/Behavioral:  Negative for agitation, confusion and hallucinations. The patient is nervous/anxious.    Objective:     Vital Signs (Most Recent):  Temp: 98 °F (36.7 °C) (07/14/22 0927)  Pulse: 87 (07/14/22 0927)  Resp: 20 (07/14/22 0927)  BP: 121/78 (07/14/22 0927)  SpO2: 96 % (07/14/22 0927) Vital Signs (24h Range):  Temp:  [97.6 °F (36.4 °C)-98.5 °F (36.9 °C)] 98 °F (36.7 °C)  Pulse:  [70-91] 87  Resp:  [17-21] 20  SpO2:  [89 %-98 %] 96 %  BP: (102-139)/(65-87) 121/78     Weight: 82.1 kg (181 lb)  Body mass index is 30.12 kg/m².    Physical Exam  Vitals and nursing note reviewed.   Constitutional:       Appearance: She is obese.   HENT:      Head: Normocephalic.      Mouth/Throat:      Mouth: Mucous membranes are dry.      Pharynx: Oropharynx is clear.   Eyes:      General: No scleral icterus.     Extraocular Movements: Extraocular movements intact.      Pupils: Pupils are equal, round, and reactive to light.   Cardiovascular:      Rate and Rhythm: Normal rate and regular rhythm.      Pulses: Normal pulses.      Heart sounds: Normal heart sounds.   Pulmonary:      Effort: Pulmonary effort is normal.      Breath sounds: Normal breath sounds.   Abdominal:      General: Bowel sounds are normal. There is no distension.      Palpations: Abdomen is soft.      Tenderness: There is no abdominal tenderness. There is no guarding.   Musculoskeletal:         General: No swelling. Normal range of motion.      Cervical back: Normal range of motion. No rigidity.       Right lower leg: No edema.      Left lower leg: No edema.   Skin:     General: Skin is warm and dry.      Capillary Refill: Capillary refill takes less than 2 seconds.   Neurological:      General: No focal deficit present.      Mental Status: She is alert and oriented to person, place, and time.   Psychiatric:         Mood and Affect: Mood is anxious.         Behavior: Behavior normal.         CRANIAL NERVES     CN III, IV, VI   Pupils are equal, round, and reactive to light.     Significant Labs: All pertinent labs within the past 24 hours have been reviewed.    CBC:   Recent Labs   Lab 07/13/22  1446 07/14/22  0408   WBC 12.03 9.93   HGB 10.2* 9.0*   HCT 32.3* 28.7*    126*     CMP:   Recent Labs   Lab 07/13/22  1446 07/14/22  0408   * 133*   K 4.0 5.0   CL 99 104   CO2 19* 20*   GLU 86 112*   BUN 42* 43*   CREATININE 4.7* 5.1*   CALCIUM 9.8 7.5*   PROT 8.0 6.5   ALBUMIN 3.7 2.8*   BILITOT 0.3 0.3   ALKPHOS 216* 179*   AST 79* 53*   ALT 83* 59*   ANIONGAP 13 9   EGFRNONAA 10.6* 9.6*     Magnesium:   Recent Labs   Lab 07/14/22  0408   MG 1.9     POCT Glucose:   Recent Labs   Lab 07/13/22 2012   POCTGLUCOSE 82     Urine Studies:   Recent Labs   Lab 07/13/22  1600   COLORU Yellow   APPEARANCEUA Cloudy*   PHUR 5.0   SPECGRAV 1.015   PROTEINUA 2+*   GLUCUA 2+*   KETONESU Negative   BILIRUBINUA Negative   OCCULTUA Negative   NITRITE Negative   UROBILINOGEN 1.0   LEUKOCYTESUR Negative   RBCUA 1   WBCUA 22*   BACTERIA Negative   SQUAMEPITHEL 28   HYALINECASTS 28.2*     Lab Results   Component Value Date    [id number] Negative 07/13/2022       Significant Imaging: I have reviewed all pertinent imaging results/findings within the past 24 hours.    Assessment/Plan:     * Acute renal failure  Continuous IV fluids. Renal US. Check urine sodium and creatinine. Monitor daily labs.      Hyponatremia  Continuous IV fluids. Monitor daily labs.      JAMESON (nonalcoholic steatohepatitis)  Avoid hepatotoxic meds.  Monitor daily labs.      Borderline personality disorder  Mood stable. Continue home meds.      DVT prophylaxis  Lovenox        VTE Risk Mitigation (From admission, onward)         Ordered     enoxaparin injection 30 mg  Daily         07/13/22 2019     IP VTE HIGH RISK PATIENT  Once         07/13/22 1939     Place sequential compression device  Until discontinued         07/13/22 1939                   Argelia Andrade NP  Department of Hospital Medicine   [facility]

## 2022-07-14 NOTE — NURSING
Pt arrived to floor via wheelchair accompanied by MONA Baldwin and daughter. No acute distress noted. C/o pain to abdominal area rated 8/10. No respiratory distress noted. Quick cath patent draining minimal amount of clear yellow urine to  bag. Continent of bowel. Oriented patient to room and call light and instructed to call for any needs. Will continue to monitor.

## 2022-07-15 ENCOUNTER — HOSPITAL ENCOUNTER (INPATIENT)
Facility: HOSPITAL | Age: 42
LOS: 6 days | Discharge: HOME | DRG: 683 | End: 2022-07-21
Attending: FAMILY MEDICINE | Admitting: FAMILY MEDICINE
Payer: MEDICAID

## 2022-07-15 VITALS
DIASTOLIC BLOOD PRESSURE: 53 MMHG | TEMPERATURE: 98 F | HEART RATE: 106 BPM | BODY MASS INDEX: 30.16 KG/M2 | SYSTOLIC BLOOD PRESSURE: 116 MMHG | RESPIRATION RATE: 20 BRPM | HEIGHT: 65 IN | OXYGEN SATURATION: 95 % | WEIGHT: 181 LBS

## 2022-07-15 DIAGNOSIS — N17.9 ACUTE RENAL FAILURE, UNSPECIFIED ACUTE RENAL FAILURE TYPE: ICD-10-CM

## 2022-07-15 DIAGNOSIS — D64.9 ANEMIA, UNSPECIFIED TYPE: ICD-10-CM

## 2022-07-15 DIAGNOSIS — N17.9 AKI (ACUTE KIDNEY INJURY): ICD-10-CM

## 2022-07-15 DIAGNOSIS — R07.9 CHEST PAIN: ICD-10-CM

## 2022-07-15 DIAGNOSIS — E88.810 METABOLIC SYNDROME: ICD-10-CM

## 2022-07-15 DIAGNOSIS — E11.42 TYPE 2 DIABETES, CONTROLLED, WITH PERIPHERAL NEUROPATHY: ICD-10-CM

## 2022-07-15 PROBLEM — E11.9 TYPE 2 DIABETES MELLITUS, WITHOUT LONG-TERM CURRENT USE OF INSULIN: Status: ACTIVE | Noted: 2022-07-15

## 2022-07-15 PROBLEM — E87.5 HYPERKALEMIA: Status: ACTIVE | Noted: 2022-07-15

## 2022-07-15 PROBLEM — R06.00 DYSPNEA: Status: ACTIVE | Noted: 2022-07-15

## 2022-07-15 LAB
ALBUMIN SERPL BCP-MCNC: 2.6 G/DL (ref 3.5–5.2)
ALP SERPL-CCNC: 152 U/L (ref 55–135)
ALT SERPL W/O P-5'-P-CCNC: 41 U/L (ref 10–44)
ANION GAP SERPL CALC-SCNC: 11 MMOL/L (ref 8–16)
AST SERPL-CCNC: 37 U/L (ref 10–40)
BACTERIA #/AREA URNS HPF: NEGATIVE /HPF
BASOPHILS # BLD AUTO: 0.04 K/UL (ref 0–0.2)
BASOPHILS NFR BLD: 0.4 % (ref 0–1.9)
BILIRUB SERPL-MCNC: 0.2 MG/DL (ref 0.1–1)
BILIRUB UR QL STRIP: NEGATIVE
BUN SERPL-MCNC: 49 MG/DL (ref 6–20)
CALCIUM SERPL-MCNC: 6.8 MG/DL (ref 8.7–10.5)
CHLORIDE SERPL-SCNC: 107 MMOL/L (ref 95–110)
CLARITY UR: CLEAR
CO2 SERPL-SCNC: 16 MMOL/L (ref 23–29)
COLOR UR: YELLOW
CREAT SERPL-MCNC: 6 MG/DL (ref 0.5–1.4)
CREAT UR-MCNC: <13 MG/DL (ref 15–325)
DIFFERENTIAL METHOD: ABNORMAL
EOSINOPHIL # BLD AUTO: 0 K/UL (ref 0–0.5)
EOSINOPHIL NFR BLD: 0.1 % (ref 0–8)
ERYTHROCYTE [DISTWIDTH] IN BLOOD BY AUTOMATED COUNT: 15.9 % (ref 11.5–14.5)
EST. GFR  (AFRICAN AMERICAN): 9.1 ML/MIN/1.73 M^2
EST. GFR  (NON AFRICAN AMERICAN): 7.9 ML/MIN/1.73 M^2
ESTIMATED AVG GLUCOSE: 154 MG/DL (ref 68–131)
GLUCOSE SERPL-MCNC: 127 MG/DL (ref 70–110)
GLUCOSE UR QL STRIP: ABNORMAL
HBA1C MFR BLD: 7 % (ref 4–5.6)
HCT VFR BLD AUTO: 28.1 % (ref 37–48.5)
HGB BLD-MCNC: 8.8 G/DL (ref 12–16)
HGB UR QL STRIP: ABNORMAL
HYALINE CASTS #/AREA URNS LPF: 5.1 /LPF
IMM GRANULOCYTES # BLD AUTO: 0.1 K/UL (ref 0–0.04)
IMM GRANULOCYTES NFR BLD AUTO: 0.9 % (ref 0–0.5)
KETONES UR QL STRIP: NEGATIVE
LEUKOCYTE ESTERASE UR QL STRIP: NEGATIVE
LYMPHOCYTES # BLD AUTO: 1.7 K/UL (ref 1–4.8)
LYMPHOCYTES NFR BLD: 14.8 % (ref 18–48)
MAGNESIUM SERPL-MCNC: 1.8 MG/DL (ref 1.6–2.6)
MCH RBC QN AUTO: 25.2 PG (ref 27–31)
MCHC RBC AUTO-ENTMCNC: 31.3 G/DL (ref 32–36)
MCV RBC AUTO: 81 FL (ref 82–98)
MICROSCOPIC COMMENT: ABNORMAL
MONOCYTES # BLD AUTO: 1.2 K/UL (ref 0.3–1)
MONOCYTES NFR BLD: 10.9 % (ref 4–15)
NEUTROPHILS # BLD AUTO: 8.2 K/UL (ref 1.8–7.7)
NEUTROPHILS NFR BLD: 72.9 % (ref 38–73)
NITRITE UR QL STRIP: NEGATIVE
NRBC BLD-RTO: 0 /100 WBC
PH UR STRIP: 6 [PH] (ref 5–8)
PLATELET # BLD AUTO: 118 K/UL (ref 150–450)
PMV BLD AUTO: 11.4 FL (ref 9.2–12.9)
POCT GLUCOSE: 138 MG/DL (ref 70–110)
POCT GLUCOSE: 165 MG/DL (ref 70–110)
POCT GLUCOSE: 248 MG/DL (ref 70–110)
POTASSIUM SERPL-SCNC: 5.4 MMOL/L (ref 3.5–5.1)
PROT SERPL-MCNC: 6.3 G/DL (ref 6–8.4)
PROT UR QL STRIP: NEGATIVE
PROT UR-MCNC: 17.3 MG/DL (ref 0–15)
PROT/CREAT UR: ABNORMAL MG/G{CREAT} (ref 0–0.2)
RBC # BLD AUTO: 3.49 M/UL (ref 4–5.4)
RBC #/AREA URNS HPF: 27 /HPF (ref 0–4)
SODIUM SERPL-SCNC: 134 MMOL/L (ref 136–145)
SP GR UR STRIP: <=1.005 (ref 1–1.03)
SQUAMOUS #/AREA URNS HPF: 1 /HPF
URN SPEC COLLECT METH UR: ABNORMAL
UROBILINOGEN UR STRIP-ACNC: NEGATIVE EU/DL
WBC # BLD AUTO: 11.19 K/UL (ref 3.9–12.7)
WBC #/AREA URNS HPF: 3 /HPF (ref 0–5)

## 2022-07-15 PROCEDURE — 86060 ANTISTREPTOLYSIN O TITER: CPT | Performed by: NURSE PRACTITIONER

## 2022-07-15 PROCEDURE — 25000003 PHARM REV CODE 250: Performed by: NURSE PRACTITIONER

## 2022-07-15 PROCEDURE — 63600175 PHARM REV CODE 636 W HCPCS: Performed by: EMERGENCY MEDICINE

## 2022-07-15 PROCEDURE — 86038 ANTINUCLEAR ANTIBODIES: CPT | Performed by: NURSE PRACTITIONER

## 2022-07-15 PROCEDURE — 25000003 PHARM REV CODE 250: Performed by: FAMILY MEDICINE

## 2022-07-15 PROCEDURE — 36415 COLL VENOUS BLD VENIPUNCTURE: CPT | Performed by: NURSE PRACTITIONER

## 2022-07-15 PROCEDURE — 86255 FLUORESCENT ANTIBODY SCREEN: CPT | Mod: 59 | Performed by: NURSE PRACTITIONER

## 2022-07-15 PROCEDURE — 25000242 PHARM REV CODE 250 ALT 637 W/ HCPCS: Performed by: INTERNAL MEDICINE

## 2022-07-15 PROCEDURE — 83036 HEMOGLOBIN GLYCOSYLATED A1C: CPT | Performed by: NURSE PRACTITIONER

## 2022-07-15 PROCEDURE — 94761 N-INVAS EAR/PLS OXIMETRY MLT: CPT

## 2022-07-15 PROCEDURE — 11000001 HC ACUTE MED/SURG PRIVATE ROOM

## 2022-07-15 PROCEDURE — 94640 AIRWAY INHALATION TREATMENT: CPT

## 2022-07-15 PROCEDURE — 81000 URINALYSIS NONAUTO W/SCOPE: CPT | Performed by: NURSE PRACTITIONER

## 2022-07-15 PROCEDURE — 86225 DNA ANTIBODY NATIVE: CPT | Performed by: NURSE PRACTITIONER

## 2022-07-15 PROCEDURE — 80053 COMPREHEN METABOLIC PANEL: CPT | Performed by: NURSE PRACTITIONER

## 2022-07-15 PROCEDURE — 85025 COMPLETE CBC W/AUTO DIFF WBC: CPT | Performed by: NURSE PRACTITIONER

## 2022-07-15 PROCEDURE — 63600175 PHARM REV CODE 636 W HCPCS: Performed by: NURSE PRACTITIONER

## 2022-07-15 PROCEDURE — 27000221 HC OXYGEN, UP TO 24 HOURS

## 2022-07-15 PROCEDURE — 86162 COMPLEMENT TOTAL (CH50): CPT | Performed by: NURSE PRACTITIONER

## 2022-07-15 PROCEDURE — 80074 ACUTE HEPATITIS PANEL: CPT | Performed by: NURSE PRACTITIONER

## 2022-07-15 PROCEDURE — 82570 ASSAY OF URINE CREATININE: CPT | Performed by: NURSE PRACTITIONER

## 2022-07-15 PROCEDURE — 87389 HIV-1 AG W/HIV-1&-2 AB AG IA: CPT | Performed by: NURSE PRACTITIONER

## 2022-07-15 PROCEDURE — 63600175 PHARM REV CODE 636 W HCPCS: Performed by: INTERNAL MEDICINE

## 2022-07-15 PROCEDURE — 83735 ASSAY OF MAGNESIUM: CPT | Performed by: NURSE PRACTITIONER

## 2022-07-15 PROCEDURE — 99900035 HC TECH TIME PER 15 MIN (STAT)

## 2022-07-15 PROCEDURE — 63600175 PHARM REV CODE 636 W HCPCS

## 2022-07-15 PROCEDURE — 25000003 PHARM REV CODE 250: Performed by: INTERNAL MEDICINE

## 2022-07-15 PROCEDURE — 99900031 HC PATIENT EDUCATION (STAT)

## 2022-07-15 RX ORDER — INSULIN ASPART 100 [IU]/ML
0-5 INJECTION, SOLUTION INTRAVENOUS; SUBCUTANEOUS
Status: DISCONTINUED | OUTPATIENT
Start: 2022-07-15 | End: 2022-07-21 | Stop reason: HOSPADM

## 2022-07-15 RX ORDER — HEPARIN SODIUM 5000 [USP'U]/ML
5000 INJECTION, SOLUTION INTRAVENOUS; SUBCUTANEOUS EVERY 8 HOURS
Status: DISCONTINUED | OUTPATIENT
Start: 2022-07-15 | End: 2022-07-21 | Stop reason: HOSPADM

## 2022-07-15 RX ORDER — PROCHLORPERAZINE EDISYLATE 5 MG/ML
5 INJECTION INTRAMUSCULAR; INTRAVENOUS EVERY 6 HOURS PRN
Status: DISCONTINUED | OUTPATIENT
Start: 2022-07-15 | End: 2022-07-21 | Stop reason: HOSPADM

## 2022-07-15 RX ORDER — TALC
6 POWDER (GRAM) TOPICAL NIGHTLY PRN
Status: DISCONTINUED | OUTPATIENT
Start: 2022-07-15 | End: 2022-07-21 | Stop reason: HOSPADM

## 2022-07-15 RX ORDER — IBUPROFEN 200 MG
16 TABLET ORAL
Status: DISCONTINUED | OUTPATIENT
Start: 2022-07-15 | End: 2022-07-21 | Stop reason: HOSPADM

## 2022-07-15 RX ORDER — FUROSEMIDE 10 MG/ML
80 INJECTION INTRAMUSCULAR; INTRAVENOUS ONCE
Status: COMPLETED | OUTPATIENT
Start: 2022-07-15 | End: 2022-07-15

## 2022-07-15 RX ORDER — IPRATROPIUM BROMIDE AND ALBUTEROL SULFATE 2.5; .5 MG/3ML; MG/3ML
3 SOLUTION RESPIRATORY (INHALATION) EVERY 6 HOURS PRN
Status: DISCONTINUED | OUTPATIENT
Start: 2022-07-15 | End: 2022-07-21 | Stop reason: HOSPADM

## 2022-07-15 RX ORDER — SODIUM CHLORIDE 0.9 % (FLUSH) 0.9 %
10 SYRINGE (ML) INJECTION EVERY 8 HOURS PRN
Status: DISCONTINUED | OUTPATIENT
Start: 2022-07-15 | End: 2022-07-21 | Stop reason: HOSPADM

## 2022-07-15 RX ORDER — GLUCAGON 1 MG
1 KIT INJECTION
Status: DISCONTINUED | OUTPATIENT
Start: 2022-07-15 | End: 2022-07-21 | Stop reason: HOSPADM

## 2022-07-15 RX ORDER — SIMETHICONE 80 MG
1 TABLET,CHEWABLE ORAL 4 TIMES DAILY PRN
Status: DISCONTINUED | OUTPATIENT
Start: 2022-07-15 | End: 2022-07-21 | Stop reason: HOSPADM

## 2022-07-15 RX ORDER — ACETAMINOPHEN 325 MG/1
650 TABLET ORAL EVERY 4 HOURS PRN
Status: DISCONTINUED | OUTPATIENT
Start: 2022-07-15 | End: 2022-07-21 | Stop reason: HOSPADM

## 2022-07-15 RX ORDER — ONDANSETRON 2 MG/ML
4 INJECTION INTRAMUSCULAR; INTRAVENOUS EVERY 8 HOURS PRN
Status: DISCONTINUED | OUTPATIENT
Start: 2022-07-15 | End: 2022-07-21 | Stop reason: HOSPADM

## 2022-07-15 RX ORDER — MUPIROCIN 20 MG/G
OINTMENT TOPICAL 2 TIMES DAILY
Status: COMPLETED | OUTPATIENT
Start: 2022-07-15 | End: 2022-07-20

## 2022-07-15 RX ORDER — MAG HYDROX/ALUMINUM HYD/SIMETH 200-200-20
30 SUSPENSION, ORAL (FINAL DOSE FORM) ORAL 4 TIMES DAILY PRN
Status: DISCONTINUED | OUTPATIENT
Start: 2022-07-15 | End: 2022-07-21 | Stop reason: HOSPADM

## 2022-07-15 RX ORDER — IBUPROFEN 200 MG
24 TABLET ORAL
Status: DISCONTINUED | OUTPATIENT
Start: 2022-07-15 | End: 2022-07-21 | Stop reason: HOSPADM

## 2022-07-15 RX ADMIN — SERTRALINE HYDROCHLORIDE 100 MG: 100 TABLET ORAL at 09:07

## 2022-07-15 RX ADMIN — MUPIROCIN: 20 OINTMENT TOPICAL at 09:07

## 2022-07-15 RX ADMIN — CEFTRIAXONE 1 G: 1 INJECTION, SOLUTION INTRAVENOUS at 05:07

## 2022-07-15 RX ADMIN — ENOXAPARIN SODIUM 30 MG: 30 INJECTION SUBCUTANEOUS at 05:07

## 2022-07-15 RX ADMIN — IPRATROPIUM BROMIDE AND ALBUTEROL SULFATE 3 ML: .5; 3 SOLUTION RESPIRATORY (INHALATION) at 07:07

## 2022-07-15 RX ADMIN — TRAMADOL HYDROCHLORIDE 50 MG: 50 TABLET ORAL at 03:07

## 2022-07-15 RX ADMIN — ONDANSETRON HYDROCHLORIDE 4 MG: 2 SOLUTION INTRAMUSCULAR; INTRAVENOUS at 09:07

## 2022-07-15 RX ADMIN — HEPARIN SODIUM 5000 UNITS: 5000 INJECTION INTRAVENOUS; SUBCUTANEOUS at 10:07

## 2022-07-15 RX ADMIN — IPRATROPIUM BROMIDE AND ALBUTEROL SULFATE 3 ML: .5; 3 SOLUTION RESPIRATORY (INHALATION) at 11:07

## 2022-07-15 RX ADMIN — IPRATROPIUM BROMIDE AND ALBUTEROL SULFATE 3 ML: .5; 3 SOLUTION RESPIRATORY (INHALATION) at 03:07

## 2022-07-15 RX ADMIN — FUROSEMIDE 80 MG: 10 INJECTION, SOLUTION INTRAMUSCULAR; INTRAVENOUS at 09:07

## 2022-07-15 RX ADMIN — FAMOTIDINE 20 MG: 20 TABLET ORAL at 09:07

## 2022-07-15 RX ADMIN — INSULIN ASPART 2 UNITS: 100 INJECTION, SOLUTION INTRAVENOUS; SUBCUTANEOUS at 04:07

## 2022-07-15 RX ADMIN — MUPIROCIN: 20 OINTMENT TOPICAL at 10:07

## 2022-07-15 NOTE — ASSESSMENT & PLAN NOTE
Pt appears fluid overloaded. Quick catheter in place with urine output. I&O shows +7.8L since admit. IV fluids discontinued. Lasix 80mg IV x1 dose. Serum Creatinine trending up. Renal US negative. Additional studies ordered, nephrology consulted.

## 2022-07-15 NOTE — SUBJECTIVE & OBJECTIVE
Interval History: Pt seen and evaluated    Review of Systems   Constitutional:  Positive for appetite change. Negative for chills, fatigue and fever.   HENT: Negative.     Eyes: Negative.    Respiratory:  Positive for cough. Negative for chest tightness and shortness of breath.    Cardiovascular: Negative.    Gastrointestinal:  Positive for abdominal pain and nausea.   Genitourinary:  Positive for difficulty urinating. Negative for dysuria and flank pain.        Pressure to her bladder area   Musculoskeletal: Negative.    Skin: Negative.    Neurological:  Negative for dizziness, weakness, light-headedness and headaches.   Psychiatric/Behavioral:  Negative for agitation, confusion and hallucinations. The patient is nervous/anxious.    Objective:     Vital Signs (Most Recent):  Temp: 98.4 °F (36.9 °C) (07/15/22 0709)  Pulse: 95 (07/15/22 0724)  Resp: 20 (07/15/22 0724)  BP: 134/78 (07/15/22 0709)  SpO2: (!) 91 % (07/15/22 0724)   Vital Signs (24h Range):  Temp:  [98 °F (36.7 °C)-99.1 °F (37.3 °C)] 98.4 °F (36.9 °C)  Pulse:  [87-96] 95  Resp:  [16-20] 20  SpO2:  [91 %-96 %] 91 %  BP: (110-134)/(69-78) 134/78     Weight: 82.1 kg (181 lb)  Body mass index is 30.12 kg/m².    Intake/Output Summary (Last 24 hours) at 7/15/2022 0851  Last data filed at 7/15/2022 0600  Gross per 24 hour   Intake 5440 ml   Output 1800 ml   Net 3640 ml      Physical Exam  Vitals and nursing note reviewed.   HENT:      Head: Normocephalic.      Mouth/Throat:      Mouth: Mucous membranes are moist.      Pharynx: Oropharynx is clear.   Eyes:      General: No scleral icterus.     Extraocular Movements: Extraocular movements intact.      Pupils: Pupils are equal, round, and reactive to light.   Cardiovascular:      Rate and Rhythm: Normal rate and regular rhythm.      Pulses: Normal pulses.      Heart sounds: Normal heart sounds.   Pulmonary:      Effort: Pulmonary effort is normal.      Breath sounds: Normal breath sounds.   Abdominal:       General: Bowel sounds are normal. There is distension.      Palpations: Abdomen is soft. There is no mass.      Tenderness: There is abdominal tenderness. There is no right CVA tenderness, left CVA tenderness or guarding.      Hernia: No hernia is present.   Musculoskeletal:         General: Swelling present. Normal range of motion.      Cervical back: Normal range of motion. No rigidity.   Skin:     General: Skin is warm and dry.      Capillary Refill: Capillary refill takes less than 2 seconds.   Neurological:      General: No focal deficit present.      Mental Status: She is alert and oriented to person, place, and time.   Psychiatric:         Mood and Affect: Mood is anxious.         Behavior: Behavior normal.       Significant Labs: All pertinent labs within the past 24 hours have been reviewed.  CBC:   Recent Labs   Lab 07/13/22 1446 07/14/22  0408 07/15/22  0542   WBC 12.03 9.93 11.19   HGB 10.2* 9.0* 8.8*   HCT 32.3* 28.7* 28.1*    126* 118*     CMP:   Recent Labs   Lab 07/13/22  1446 07/14/22  0408 07/15/22  0542   * 133* 134*   K 4.0 5.0 5.4*   CL 99 104 107   CO2 19* 20* 16*   GLU 86 112* 127*   BUN 42* 43* 49*   CREATININE 4.7* 5.1* 6.0*   CALCIUM 9.8 7.5* 6.8*   PROT 8.0 6.5 6.3   ALBUMIN 3.7 2.8* 2.6*   BILITOT 0.3 0.3 0.2   ALKPHOS 216* 179* 152*   AST 79* 53* 37   ALT 83* 59* 41   ANIONGAP 13 9 11   EGFRNONAA 10.6* 9.6* 7.9*     Magnesium:   Recent Labs   Lab 07/14/22  0408 07/15/22  0542   MG 1.9 1.8       Significant Imaging: I have reviewed all pertinent imaging results/findings within the past 24 hours.    US Retroperitoneal Complete  Narrative: EXAMINATION:  US RETROPERITONEAL COMPLETE    CLINICAL HISTORY:  acute renal injury;    COMPARISON:  None    FINDINGS:  Right kidney measures 13 x 6 x 5 cm and demonstrates no hydronephrosis, stone or parenchymal abnormality.    Left kidney measures 13 x 7 x 6 cm and demonstrates no hydronephrosis, stone or parenchymal abnormality.    The  urinary bladder contains a Quick catheter and is empty.  Impression: No abnormality.    Electronically signed by: Shade Cardenas MD  Date:    07/14/2022  Time:    14:51

## 2022-07-15 NOTE — HOSPITAL COURSE
7/15 SD: Pt appears fluid overloaded. Quick catheter in place with urine output. I&O shows +7.8L since admit. Serum Creatinine trending up. Renal US negative. Additional studies ordered, nephrology consulted.    Discharge Note:  Patient was admitted and had worsening oliguric renal failure.  We have worked up intrinsic renal processes and unfortunately do not have general surgery to assist or Nephrology.  We are transferring out to a higher level of care and consulting specialties.

## 2022-07-15 NOTE — PROVIDER TRANSFER
Outside Transfer Acceptance Note / Regional Referral Center    Referring facility: [facility]   Referring provider: LOW CHILEL III  Accepting facility: [facility]  Accepting provider: FREDDY KWAN  Admitting provider: RENAE JONES  Reason for transfer:  Need Nephrology  Transfer diagnosis: HENRY  Transfer specialty requested: Nephrology  Transfer specialty notified: yes  Transfer level: NUMBER 1-5: 2  Bed type requested: med-tel  Isolation status: No active isolations   Admission class or status: IP- Inpatient      Narrative     42-year-old female with a history of anemia, bipolar disorder, diabetes, esophageal stenosis, and fatty liver admitted to [facility] on July 13 with acute kidney injury (creatinine 4.7, baseline 1.1 on June 19) and hyponatremia.  She was initially treated with IV fluids, but subsequently appeared somewhat volume overloaded.  Fluids were stopped, and she received 1 dose of Lasix.  Creatinine continued to worsen, and she has oliguria.  Etiology of the acute kidney injury is uncertain.  Multiple serologies have been ordered.  Requesting transfer to Hospital Medicine at [facility] for Nephrology evaluation of HENRY.  Referring provider noted she has no evidence of acute respiratory distress or altered mentation.  Case discussed with Nephrology at [facility].  Referring provider will add antibiotics to cover for possible UTI with urine culture showing Gram-negative rods.    July 15:  Sodium 134, potassium 5.4, chloride 107, CO2 16, BUN 49, creatinine 6 (4.7 on admit-July 13), glucose 127, calcium 6.8, magnesium 1.8, white blood cells 11.19, hemoglobin 8.8, hematocrit 28, platelets 118, multiple autoimmune/inflammatory labs ordered    July 14:  Renal ultrasound with no abnormality noted.    July 13:  COVID negative, CPK 72, urine drug screen negative    Objective     Vitals: Temp: 97.7 °F (36.5 °C) (07/15/22 1620)  Pulse: 106 (07/15/22  1620)  Resp: 20 (07/15/22 1620)  BP: (!) 116/53 (07/15/22 1620)  SpO2: 95 % (07/15/22 1620)  Recent Labs:   CBC:   Recent Labs   Lab 07/14/22  0408 07/15/22  0542   WBC 9.93 11.19   HGB 9.0* 8.8*   HCT 28.7* 28.1*   * 118*     CMP:   Recent Labs   Lab 07/14/22  0408 07/15/22  0542   * 134*   K 5.0 5.4*    107   CO2 20* 16*   * 127*   BUN 43* 49*   CREATININE 5.1* 6.0*   CALCIUM 7.5* 6.8*   PROT 6.5 6.3   ALBUMIN 2.8* 2.6*   BILITOT 0.3 0.2   ALKPHOS 179* 152*   AST 53* 37   ALT 59* 41   ANIONGAP 9 11   EGFRNONAA 9.6* 7.9*     Recent imaging: see above   Airway:     Vent settings: Oxygen Concentration (%):  [24] 24   IV access:        Peripheral IV - Single Lumen 07/13/22 1445 20 G Right Antecubital (Active)   Site Assessment Clean;No swelling;Dry;Intact;No redness 07/15/22 1546   Line Status Infusing 07/15/22 0400   Dressing Status Clean;Dry;Intact 07/15/22 0400   Dressing Intervention Integrity maintained 07/15/22 0400   Reason Not Rotated Not due 07/14/22 0800   Allergies:   Review of patient's allergies indicates:   Allergen Reactions    Amoxicillin Hives    Avelox [moxifloxacin] Hives    Clarinex [desloratadine] Hives    Pcn [penicillins] Hives    Claritin [loratadine] Palpitations    Latex, natural rubber Rash      NPO: No      Anticoagulation:   Anticoagulants     Ordered     Route Frequency Start Stop    07/13/22 2019  enoxaparin         SubQ Daily 07/13/22 2030 --           Instructions    1. Admit to Hospital Medicine  2. Consult Nephrology    Upon patient arrival to floor, please contact Hospital Medicine on call.     MOISES Rosario MD  Hospital Medicine Staff  Cell: [phone]

## 2022-07-15 NOTE — PROGRESS NOTES
[facility] Medicine  Progress Note    Patient Name: Rohini Dang  MRN: [id number]  Patient Class: IP- Inpatient   Admission Date: 7/13/2022  Length of Stay: 1 days  Attending Physician: Nikhil Karimi III, MD  Primary Care Provider: Skip Celestin NP        Subjective:     Principal Problem:Acute renal failure        HPI:  Pt is a 43 y/o female with Hx of anxiety, bipolar, JAMESON, and DM II, presented to her GYN yesterday morning with c/o vaginal/pelvic pressure and report that she hadn't voided since the previous night and felt the need to void but couldn't. Her GYN reported similar symptoms several times in the past year. The US reported as normal and her bladder had very little urine in it. She had not urinated by the afternoon despite drinking plenty of fluids, so she reported to the ED as instructed by her GYN. Dr. Rodriguez reported pt was found to have minimal urine in her bladder per bedside US. Her kidney function was elevated and patient appeared dry. She was admitted for acute renal failure, a yarbrough catheter was inserted for accurate I&O, and she was given IV fluids. This morning, her serum creatinine is trending up.      Overview/Hospital Course:  7/15 SD: Pt appears fluid overloaded. Yarbrough catheter in place with urine output. I&O shows +7.8L since admit. Serum Creatinine trending up. Renal US negative. Additional studies ordered, nephrology consulted.      Interval History: Pt seen and evaluated    Review of Systems   Constitutional:  Positive for appetite change. Negative for chills, fatigue and fever.   HENT: Negative.     Eyes: Negative.    Respiratory:  Positive for cough. Negative for chest tightness and shortness of breath.    Cardiovascular: Negative.    Gastrointestinal:  Positive for abdominal pain and nausea.   Genitourinary:  Positive for difficulty urinating. Negative for dysuria and flank pain.        Pressure to her bladder area   Musculoskeletal: Negative.     Skin: Negative.    Neurological:  Negative for dizziness, weakness, light-headedness and headaches.   Psychiatric/Behavioral:  Negative for agitation, confusion and hallucinations. The patient is nervous/anxious.    Objective:     Vital Signs (Most Recent):  Temp: 98.4 °F (36.9 °C) (07/15/22 0709)  Pulse: 95 (07/15/22 0724)  Resp: 20 (07/15/22 0724)  BP: 134/78 (07/15/22 0709)  SpO2: (!) 91 % (07/15/22 0724)   Vital Signs (24h Range):  Temp:  [98 °F (36.7 °C)-99.1 °F (37.3 °C)] 98.4 °F (36.9 °C)  Pulse:  [87-96] 95  Resp:  [16-20] 20  SpO2:  [91 %-96 %] 91 %  BP: (110-134)/(69-78) 134/78     Weight: 82.1 kg (181 lb)  Body mass index is 30.12 kg/m².    Intake/Output Summary (Last 24 hours) at 7/15/2022 0851  Last data filed at 7/15/2022 0600  Gross per 24 hour   Intake 5440 ml   Output 1800 ml   Net 3640 ml      Physical Exam  Vitals and nursing note reviewed.   HENT:      Head: Normocephalic.      Mouth/Throat:      Mouth: Mucous membranes are moist.      Pharynx: Oropharynx is clear.   Eyes:      General: No scleral icterus.     Extraocular Movements: Extraocular movements intact.      Pupils: Pupils are equal, round, and reactive to light.   Cardiovascular:      Rate and Rhythm: Normal rate and regular rhythm.      Pulses: Normal pulses.      Heart sounds: Normal heart sounds.   Pulmonary:      Effort: Pulmonary effort is normal.      Breath sounds: Normal breath sounds.   Abdominal:      General: Bowel sounds are normal. There is distension.      Palpations: Abdomen is soft. There is no mass.      Tenderness: There is abdominal tenderness. There is no right CVA tenderness, left CVA tenderness or guarding.      Hernia: No hernia is present.   Musculoskeletal:         General: Swelling present. Normal range of motion.      Cervical back: Normal range of motion. No rigidity.   Skin:     General: Skin is warm and dry.      Capillary Refill: Capillary refill takes less than 2 seconds.   Neurological:      General:  No focal deficit present.      Mental Status: She is alert and oriented to person, place, and time.   Psychiatric:         Mood and Affect: Mood is anxious.         Behavior: Behavior normal.       Significant Labs: All pertinent labs within the past 24 hours have been reviewed.  CBC:   Recent Labs   Lab 07/13/22  1446 07/14/22  0408 07/15/22  0542   WBC 12.03 9.93 11.19   HGB 10.2* 9.0* 8.8*   HCT 32.3* 28.7* 28.1*    126* 118*     CMP:   Recent Labs   Lab 07/13/22  1446 07/14/22  0408 07/15/22  0542   * 133* 134*   K 4.0 5.0 5.4*   CL 99 104 107   CO2 19* 20* 16*   GLU 86 112* 127*   BUN 42* 43* 49*   CREATININE 4.7* 5.1* 6.0*   CALCIUM 9.8 7.5* 6.8*   PROT 8.0 6.5 6.3   ALBUMIN 3.7 2.8* 2.6*   BILITOT 0.3 0.3 0.2   ALKPHOS 216* 179* 152*   AST 79* 53* 37   ALT 83* 59* 41   ANIONGAP 13 9 11   EGFRNONAA 10.6* 9.6* 7.9*     Magnesium:   Recent Labs   Lab 07/14/22  0408 07/15/22  0542   MG 1.9 1.8       Significant Imaging: I have reviewed all pertinent imaging results/findings within the past 24 hours.    US Retroperitoneal Complete  Narrative: EXAMINATION:  US RETROPERITONEAL COMPLETE    CLINICAL HISTORY:  acute renal injury;    COMPARISON:  None    FINDINGS:  Right kidney measures 13 x 6 x 5 cm and demonstrates no hydronephrosis, stone or parenchymal abnormality.    Left kidney measures 13 x 7 x 6 cm and demonstrates no hydronephrosis, stone or parenchymal abnormality.    The urinary bladder contains a Quick catheter and is empty.  Impression: No abnormality.    Electronically signed by: Shade Cardenas MD  Date:    07/14/2022  Time:    14:51      Assessment/Plan:      * Acute renal failure  Pt appears fluid overloaded. Quick catheter in place with urine output. I&O shows +7.8L since admit. IV fluids discontinued. Lasix 80mg IV x1 dose. Serum Creatinine trending up. Renal US negative. Additional studies ordered, nephrology consulted.    Hyponatremia  Improving. Monitor daily labs.      JAMESON  (nonalcoholic steatohepatitis)  Avoid hepatotoxic meds. Monitor daily labs.      Borderline personality disorder  Mood stable. Continue home meds.      DVT prophylaxis  Lovenox        VTE Risk Mitigation (From admission, onward)         Ordered     enoxaparin injection 30 mg  Daily         07/13/22 2019     IP VTE HIGH RISK PATIENT  Once         07/13/22 1939     Place sequential compression device  Until discontinued         07/13/22 1939                Discharge Planning   FERNANDO:      Code Status: Full Code   Is the patient medically ready for discharge?:     Reason for patient still in hospital (select all that apply): Patient trending condition, Laboratory test and Treatment  Discharge Plan A: Home, Home with family                  Argelia Andrade NP  Department of Hospital Medicine   [facility]

## 2022-07-16 LAB
ALBUMIN SERPL BCP-MCNC: 2.7 G/DL (ref 3.5–5.2)
ALLENS TEST: ABNORMAL
ALP SERPL-CCNC: 145 U/L (ref 55–135)
ALT SERPL W/O P-5'-P-CCNC: 25 U/L (ref 10–44)
ANION GAP SERPL CALC-SCNC: 16 MMOL/L (ref 8–16)
AST SERPL-CCNC: 23 U/L (ref 10–40)
BACTERIA #/AREA URNS HPF: ABNORMAL /HPF
BACTERIA UR CULT: ABNORMAL
BASOPHILS # BLD AUTO: 0.02 K/UL (ref 0–0.2)
BASOPHILS NFR BLD: 0.2 % (ref 0–1.9)
BILIRUB SERPL-MCNC: 0.3 MG/DL (ref 0.1–1)
BILIRUB UR QL STRIP: NEGATIVE
BUN SERPL-MCNC: 57 MG/DL (ref 6–20)
CALCIUM SERPL-MCNC: 8.9 MG/DL (ref 8.7–10.5)
CHLORIDE SERPL-SCNC: 107 MMOL/L (ref 95–110)
CLARITY UR: CLEAR
CO2 SERPL-SCNC: 14 MMOL/L (ref 23–29)
COLOR UR: COLORLESS
CREAT SERPL-MCNC: 7.1 MG/DL (ref 0.5–1.4)
DELSYS: ABNORMAL
DIFFERENTIAL METHOD: ABNORMAL
EOSINOPHIL # BLD AUTO: 0 K/UL (ref 0–0.5)
EOSINOPHIL NFR BLD: 0.1 % (ref 0–8)
ERYTHROCYTE [DISTWIDTH] IN BLOOD BY AUTOMATED COUNT: 16.3 % (ref 11.5–14.5)
EST. GFR  (AFRICAN AMERICAN): 7 ML/MIN/1.73 M^2
EST. GFR  (NON AFRICAN AMERICAN): 6 ML/MIN/1.73 M^2
GLUCOSE SERPL-MCNC: 125 MG/DL (ref 70–110)
GLUCOSE UR QL STRIP: ABNORMAL
HCO3 UR-SCNC: 14.5 MMOL/L (ref 24–28)
HCT VFR BLD AUTO: 26.4 % (ref 37–48.5)
HCT VFR BLD CALC: 24 %PCV (ref 36–54)
HGB BLD-MCNC: 8 G/DL
HGB BLD-MCNC: 8.5 G/DL (ref 12–16)
HGB UR QL STRIP: ABNORMAL
HYALINE CASTS #/AREA URNS LPF: 1 /LPF
IMM GRANULOCYTES # BLD AUTO: 0.14 K/UL (ref 0–0.04)
IMM GRANULOCYTES NFR BLD AUTO: 1.5 % (ref 0–0.5)
KETONES UR QL STRIP: NEGATIVE
LEUKOCYTE ESTERASE UR QL STRIP: ABNORMAL
LYMPHOCYTES # BLD AUTO: 1.4 K/UL (ref 1–4.8)
LYMPHOCYTES NFR BLD: 15.7 % (ref 18–48)
MAGNESIUM SERPL-MCNC: 1.7 MG/DL (ref 1.6–2.6)
MCH RBC QN AUTO: 25.8 PG (ref 27–31)
MCHC RBC AUTO-ENTMCNC: 32.2 G/DL (ref 32–36)
MCV RBC AUTO: 80 FL (ref 82–98)
MICROSCOPIC COMMENT: ABNORMAL
MONOCYTES # BLD AUTO: 1.2 K/UL (ref 0.3–1)
MONOCYTES NFR BLD: 12.8 % (ref 4–15)
NEUTROPHILS # BLD AUTO: 6.3 K/UL (ref 1.8–7.7)
NEUTROPHILS NFR BLD: 69.7 % (ref 38–73)
NITRITE UR QL STRIP: NEGATIVE
NRBC BLD-RTO: 0 /100 WBC
PCO2 BLDA: 31.5 MMHG (ref 35–45)
PH SMN: 7.27 [PH] (ref 7.35–7.45)
PH UR STRIP: 6 [PH] (ref 5–8)
PLATELET # BLD AUTO: 118 K/UL (ref 150–450)
PMV BLD AUTO: 11.1 FL (ref 9.2–12.9)
PO2 BLDA: 84 MMHG (ref 80–100)
POC BE: -12 MMOL/L
POC SATURATED O2: 95 % (ref 95–100)
POC TCO2: 15 MMOL/L (ref 23–27)
POCT GLUCOSE: 100 MG/DL (ref 70–110)
POCT GLUCOSE: 102 MG/DL (ref 70–110)
POCT GLUCOSE: 102 MG/DL (ref 70–110)
POCT GLUCOSE: 135 MG/DL (ref 70–110)
POTASSIUM BLD-SCNC: 4.3 MMOL/L (ref 3.5–5.1)
POTASSIUM SERPL-SCNC: 4.4 MMOL/L (ref 3.5–5.1)
PROT SERPL-MCNC: 6.6 G/DL (ref 6–8.4)
PROT UR QL STRIP: NEGATIVE
RBC # BLD AUTO: 3.3 M/UL (ref 4–5.4)
RBC #/AREA URNS HPF: 80 /HPF (ref 0–4)
SAMPLE: ABNORMAL
SITE: ABNORMAL
SODIUM BLD-SCNC: 136 MMOL/L (ref 136–145)
SODIUM SERPL-SCNC: 137 MMOL/L (ref 136–145)
SP GR UR STRIP: 1.01 (ref 1–1.03)
SQUAMOUS #/AREA URNS HPF: 0 /HPF
URN SPEC COLLECT METH UR: ABNORMAL
UROBILINOGEN UR STRIP-ACNC: NEGATIVE EU/DL
WBC # BLD AUTO: 9.08 K/UL (ref 3.9–12.7)
WBC #/AREA URNS HPF: 6 /HPF (ref 0–5)

## 2022-07-16 PROCEDURE — 63600175 PHARM REV CODE 636 W HCPCS

## 2022-07-16 PROCEDURE — 99222 PR INITIAL HOSPITAL CARE,LEVL II: ICD-10-PCS | Mod: ,,, | Performed by: INTERNAL MEDICINE

## 2022-07-16 PROCEDURE — 82803 BLOOD GASES ANY COMBINATION: CPT

## 2022-07-16 PROCEDURE — 36600 WITHDRAWAL OF ARTERIAL BLOOD: CPT

## 2022-07-16 PROCEDURE — 25000003 PHARM REV CODE 250: Performed by: NURSE PRACTITIONER

## 2022-07-16 PROCEDURE — 11000001 HC ACUTE MED/SURG PRIVATE ROOM

## 2022-07-16 PROCEDURE — 94760 N-INVAS EAR/PLS OXIMETRY 1: CPT

## 2022-07-16 PROCEDURE — 85025 COMPLETE CBC W/AUTO DIFF WBC: CPT

## 2022-07-16 PROCEDURE — 82570 ASSAY OF URINE CREATININE: CPT | Performed by: INTERNAL MEDICINE

## 2022-07-16 PROCEDURE — 27000221 HC OXYGEN, UP TO 24 HOURS

## 2022-07-16 PROCEDURE — 36415 COLL VENOUS BLD VENIPUNCTURE: CPT

## 2022-07-16 PROCEDURE — 99900035 HC TECH TIME PER 15 MIN (STAT)

## 2022-07-16 PROCEDURE — 81000 URINALYSIS NONAUTO W/SCOPE: CPT | Performed by: INTERNAL MEDICINE

## 2022-07-16 PROCEDURE — 82043 UR ALBUMIN QUANTITATIVE: CPT | Performed by: INTERNAL MEDICINE

## 2022-07-16 PROCEDURE — 80053 COMPREHEN METABOLIC PANEL: CPT

## 2022-07-16 PROCEDURE — 99222 1ST HOSP IP/OBS MODERATE 55: CPT | Mod: ,,, | Performed by: INTERNAL MEDICINE

## 2022-07-16 PROCEDURE — 83735 ASSAY OF MAGNESIUM: CPT

## 2022-07-16 PROCEDURE — 25000003 PHARM REV CODE 250

## 2022-07-16 RX ORDER — TRAMADOL HYDROCHLORIDE 50 MG/1
50 TABLET ORAL EVERY 8 HOURS PRN
Status: DISCONTINUED | OUTPATIENT
Start: 2022-07-16 | End: 2022-07-21 | Stop reason: HOSPADM

## 2022-07-16 RX ORDER — INDOMETHACIN 25 MG/1
50 CAPSULE ORAL ONCE
Status: DISCONTINUED | OUTPATIENT
Start: 2022-07-16 | End: 2022-07-16

## 2022-07-16 RX ADMIN — ACETAMINOPHEN 650 MG: 325 TABLET ORAL at 03:07

## 2022-07-16 RX ADMIN — MUPIROCIN: 20 OINTMENT TOPICAL at 09:07

## 2022-07-16 RX ADMIN — ACETAMINOPHEN 650 MG: 325 TABLET ORAL at 09:07

## 2022-07-16 RX ADMIN — HEPARIN SODIUM 5000 UNITS: 5000 INJECTION INTRAVENOUS; SUBCUTANEOUS at 09:07

## 2022-07-16 RX ADMIN — HEPARIN SODIUM 5000 UNITS: 5000 INJECTION INTRAVENOUS; SUBCUTANEOUS at 02:07

## 2022-07-16 RX ADMIN — HEPARIN SODIUM 5000 UNITS: 5000 INJECTION INTRAVENOUS; SUBCUTANEOUS at 05:07

## 2022-07-16 RX ADMIN — TRAMADOL HYDROCHLORIDE 50 MG: 50 TABLET, COATED ORAL at 02:07

## 2022-07-16 NOTE — HOSPITAL COURSE
She was transferred to [facility] for nephrology eval. She was started on Lasix gtt but then held per Nephrology. Urine output closely monitored.  Nephrology recommending kidney biopsy.  Interventional Radiology consulted, kidney biopsy done on 07/18.  Continue monitor kidney function.  Holding nephrotoxic agents.

## 2022-07-16 NOTE — SUBJECTIVE & OBJECTIVE
Interval History: Seen this am. Complaining of edema to the upper and lower ext. Complaining of pain to the abd. Awaiting nephrology eval. Abd u/s. Lasix gtt infusing.     Review of Systems   Constitutional:  Positive for activity change, appetite change and fatigue. Negative for fever.   HENT:  Negative for trouble swallowing.    Eyes:  Negative for visual disturbance.   Respiratory:  Positive for shortness of breath. Negative for cough.    Cardiovascular:  Negative for chest pain.   Gastrointestinal:  Negative for diarrhea, nausea and vomiting.   Genitourinary:  Positive for decreased urine volume. Negative for hematuria.   Musculoskeletal:  Negative for arthralgias, back pain, gait problem and myalgias.   Neurological:  Positive for weakness.   Hematological:  Does not bruise/bleed easily.   Psychiatric/Behavioral:  Negative for confusion and hallucinations. The patient is not nervous/anxious.    Objective:     Vital Signs (Most Recent):  Temp: 98.7 °F (37.1 °C) (07/16/22 0903)  Pulse: 97 (07/16/22 0903)  Resp: 16 (07/16/22 0903)  BP: 123/65 (07/16/22 0903)  SpO2: (!) 94 % (07/16/22 0903)   Vital Signs (24h Range):  Temp:  [97.7 °F (36.5 °C)-98.9 °F (37.2 °C)] 98.7 °F (37.1 °C)  Pulse:  [] 97  Resp:  [16-20] 16  SpO2:  [90 %-97 %] 94 %  BP: (116-139)/(53-79) 123/65     Weight: 91.7 kg (202 lb 2.6 oz)  Body mass index is 34.7 kg/m².    Intake/Output Summary (Last 24 hours) at 7/16/2022 1013  Last data filed at 7/16/2022 0700  Gross per 24 hour   Intake --   Output 2200 ml   Net -2200 ml      Physical Exam  Vitals and nursing note reviewed.   Constitutional:       Appearance: She is obese. She is ill-appearing.   HENT:      Head: Normocephalic and atraumatic.      Nose: Nose normal.      Mouth/Throat:      Mouth: Mucous membranes are dry.   Eyes:      Extraocular Movements: Extraocular movements intact.      Conjunctiva/sclera: Conjunctivae normal.      Pupils: Pupils are equal, round, and reactive to light.    Cardiovascular:      Rate and Rhythm: Normal rate and regular rhythm.      Pulses: Normal pulses.      Heart sounds: Normal heart sounds.   Pulmonary:      Effort: Pulmonary effort is normal. No respiratory distress.      Breath sounds: Normal breath sounds.   Abdominal:      General: There is distension.      Palpations: Abdomen is soft.      Tenderness: There is abdominal tenderness. There is guarding.   Musculoskeletal:         General: Swelling (noted to the upper ext) present.      Cervical back: Normal range of motion and neck supple.      Right lower leg: Edema present.      Left lower leg: Edema present.   Skin:     General: Skin is warm and dry.      Capillary Refill: Capillary refill takes 2 to 3 seconds.      Coloration: Skin is pale.   Neurological:      Mental Status: She is alert and oriented to person, place, and time.   Psychiatric:         Mood and Affect: Mood normal.       Significant Labs: All pertinent labs within the past 24 hours have been reviewed.    Significant Imaging: I have reviewed all pertinent imaging results/findings within the past 24 hours.

## 2022-07-16 NOTE — HPI
"Per  note: "42-year-old female with a history of anemia, bipolar disorder, diabetes, esophageal stenosis, and fatty liver admitted to [facility] on July 13 with acute kidney injury (creatinine 4.7, baseline 1.1 on June 19) and hyponatremia.  She was initially treated with IV fluids, but subsequently appeared somewhat volume overloaded on July 15.  Fluids were stopped, and she received 1 dose of Lasix.  Creatinine continued to worsen, and she has oliguria.  Etiology of the acute kidney injury is uncertain.  Multiple serologies have been ordered.  Requesting transfer to Hospital Medicine for Nephrology evaluation.  Referring provider noted she has no evidence of acute respiratory distress or altered mentation. July 15:  Sodium 134, potassium 5.4, chloride 107, CO2 16, BUN 49, creatinine 6 (4.7 on admit-July 13), glucose 127, calcium 6.8, magnesium 1.8, white blood cells 11.19, hemoglobin 8.8, hematocrit 28, platelets 118, multiple autoimmune/inflammatory labs ordered. July 14:  Renal ultrasound with no abnormality noted. July 13:  COVID negative, CPK 72, urine drug screen negative. VS:  Temperature 98.6°, pulse 95, respirations 20, blood pressure 133/65, O2 sats 91% (1 L nasal cannula)"    Patient transferred from [facility] to [facility] for evaluation by nephrology. On exam, patient lethargic but responds to name, reports difficulty breathing and shortness of breath. She was on 4L NC upon assessment. Nephrology consulted. Will admit to hospital medicine for further evaluation and treatment.           "

## 2022-07-16 NOTE — PROGRESS NOTES
07/15/22 2101   Patient Request   Patient Requested something to drink   Nurse Notification   Bedside Nurse Notified? Yes   Name of Bedside Nurse MONA Cameron   Nurse Notfication Method Secure Chat   Nurse Notified Of Patient Request   Provider Notification   Provider Notified? Yes   Name of Provider Young   Provider Notification Method Secure Chat   Provider Notified Of Orders  (pt came with yarbrough from outside facility that was placed for retention; request for yarbrough order set)   Admission   Initial VN Admission Questions Complete   Shift   Virtual Nurse - Patient Verbalized Approval Of Camera Use;VN Rounding   Safety/Activity   Patient Rounds bed in low position;call light in patient/parent reach;clutter free environment maintained;visualized patient;placement of personal items at bedside   Safety Promotion/Fall Prevention assistive device/personal item within reach;Fall Risk reviewed with patient/family;side rails raised x 3   Positioning   Body Position supine   Head of Bed (HOB) Positioning HOB at 30-45 degrees   Pain/Comfort/Sleep   Comfort/Acceptable Pain Level 6   VN cued in to pt's room with permission. Admission questions completed. Plan of care reviewed with pt. . Call bell w/in reach. Instructed to call for needs/assist oob.

## 2022-07-16 NOTE — PROGRESS NOTES
"[facility] Medicine  Progress Note    Patient Name: Rohini Dang  MRN: [id number]  Patient Class: IP- Inpatient   Admission Date: 7/15/2022  Length of Stay: 1 days  Attending Physician: Arcelia Davidson*  Primary Care Provider: Skip Celestin NP        Subjective:     Principal Problem:Acute renal failure        HPI:  Per  note: "42-year-old female with a history of anemia, bipolar disorder, diabetes, esophageal stenosis, and fatty liver admitted to [facility] on July 13 with acute kidney injury (creatinine 4.7, baseline 1.1 on June 19) and hyponatremia.  She was initially treated with IV fluids, but subsequently appeared somewhat volume overloaded on July 15.  Fluids were stopped, and she received 1 dose of Lasix.  Creatinine continued to worsen, and she has oliguria.  Etiology of the acute kidney injury is uncertain.  Multiple serologies have been ordered.  Requesting transfer to Hospital Medicine for Nephrology evaluation.  Referring provider noted she has no evidence of acute respiratory distress or altered mentation. July 15:  Sodium 134, potassium 5.4, chloride 107, CO2 16, BUN 49, creatinine 6 (4.7 on admit-July 13), glucose 127, calcium 6.8, magnesium 1.8, white blood cells 11.19, hemoglobin 8.8, hematocrit 28, platelets 118, multiple autoimmune/inflammatory labs ordered. July 14:  Renal ultrasound with no abnormality noted. July 13:  COVID negative, CPK 72, urine drug screen negative. VS:  Temperature 98.6°, pulse 95, respirations 20, blood pressure 133/65, O2 sats 91% (1 L nasal cannula)"    Patient transferred from [facility] to [facility] for evaluation by nephrology. On exam, patient lethargic but responds to name, reports difficulty breathing and shortness of breath. She was on 4L NC upon assessment. Nephrology consulted. Will admit to hospital medicine for further evaluation and treatment.               Overview/Hospital Course:  She was transferred " to [facility] for nephrology eval. She was started on Lasix gtt. Urine output closely monitored.       Interval History: Seen this am. Complaining of edema to the upper and lower ext. Complaining of pain to the abd. Awaiting nephrology eval. Abd u/s. Lasix gtt infusing.     Review of Systems   Constitutional:  Positive for activity change, appetite change and fatigue. Negative for fever.   HENT:  Negative for trouble swallowing.    Eyes:  Negative for visual disturbance.   Respiratory:  Positive for shortness of breath. Negative for cough.    Cardiovascular:  Negative for chest pain.   Gastrointestinal:  Negative for diarrhea, nausea and vomiting.   Genitourinary:  Positive for decreased urine volume. Negative for hematuria.   Musculoskeletal:  Negative for arthralgias, back pain, gait problem and myalgias.   Neurological:  Positive for weakness.   Hematological:  Does not bruise/bleed easily.   Psychiatric/Behavioral:  Negative for confusion and hallucinations. The patient is not nervous/anxious.    Objective:     Vital Signs (Most Recent):  Temp: 98.7 °F (37.1 °C) (07/16/22 0903)  Pulse: 97 (07/16/22 0903)  Resp: 16 (07/16/22 0903)  BP: 123/65 (07/16/22 0903)  SpO2: (!) 94 % (07/16/22 0903)   Vital Signs (24h Range):  Temp:  [97.7 °F (36.5 °C)-98.9 °F (37.2 °C)] 98.7 °F (37.1 °C)  Pulse:  [] 97  Resp:  [16-20] 16  SpO2:  [90 %-97 %] 94 %  BP: (116-139)/(53-79) 123/65     Weight: 91.7 kg (202 lb 2.6 oz)  Body mass index is 34.7 kg/m².    Intake/Output Summary (Last 24 hours) at 7/16/2022 1013  Last data filed at 7/16/2022 0700  Gross per 24 hour   Intake --   Output 2200 ml   Net -2200 ml      Physical Exam  Vitals and nursing note reviewed.   Constitutional:       Appearance: She is obese. She is ill-appearing.   HENT:      Head: Normocephalic and atraumatic.      Nose: Nose normal.      Mouth/Throat:      Mouth: Mucous membranes are dry.   Eyes:      Extraocular Movements: Extraocular movements intact.       Conjunctiva/sclera: Conjunctivae normal.      Pupils: Pupils are equal, round, and reactive to light.   Cardiovascular:      Rate and Rhythm: Normal rate and regular rhythm.      Pulses: Normal pulses.      Heart sounds: Normal heart sounds.   Pulmonary:      Effort: Pulmonary effort is normal. No respiratory distress.      Breath sounds: Normal breath sounds.   Abdominal:      General: There is distension.      Palpations: Abdomen is soft.      Tenderness: There is abdominal tenderness. There is guarding.   Musculoskeletal:         General: Swelling (noted to the upper ext) present.      Cervical back: Normal range of motion and neck supple.      Right lower leg: Edema present.      Left lower leg: Edema present.   Skin:     General: Skin is warm and dry.      Capillary Refill: Capillary refill takes 2 to 3 seconds.      Coloration: Skin is pale.   Neurological:      Mental Status: She is alert and oriented to person, place, and time.   Psychiatric:         Mood and Affect: Mood normal.       Significant Labs: All pertinent labs within the past 24 hours have been reviewed.    Significant Imaging: I have reviewed all pertinent imaging results/findings within the past 24 hours.      Assessment/Plan:      * Acute renal failure  Estimated Creatinine Clearance: 11.1 mL/min (A) (based on SCr of 7.1 mg/dL (H)).  Baseline Cr: 0.9Baseline BUN:12   Recent Labs   Lab 07/14/22  0408 07/15/22  0542 07/16/22  0301   CO2 20* 16* 14*   BUN 43* 49* 57*   CREATININE 5.1* 6.0* 7.1*   MG 1.9 1.8 1.7       -Etiology uncertain at this time  -Monitor strict urine output  - Continue to monitor renal function with daily labs and trend electrolytes  - renally dose medications  - avoid nephrotoxic agents including NSAIDs, aminoglycosides, IV contrast (unless absolutely necessary), gadolinium, fleets and other phosphorous-based laxatives  - monitor events that may lead to decreased renal perfusion (hypovolemia, hypotension, sepsis)  -  monitor urine output to assure that no obstruction precipitates worsening in GFR  -Renal US without evidence of obstruction or hydronephrosis.  -Nephrology consulted  -Insulin gtt ordered       Dyspnea  -report dyspnea on exam; diminished BS noted--patient was on 4L NC  -Will obtain CXR to assess for cardiopulmonary abnormality  -ABG ordered  -Supplemental oxygen PRN for SpO2 <90%  -Duo-nebs PRN for SOB/wheezing      Anemia  -H/H is 8.8, which is stable and consistent with previous laboratory measurements. Patient exhibits no signs or symptoms of acute bleeding  -Etiology likely due to multifactorial..iron deficiency vs chronic disease?  -No indication for blood transfusion  -Continue to monitor serial CBC  -Transfuse for Hgb <7 or if symptomatic        Type 2 diabetes mellitus, without long-term current use of insulin  Hemoglobin A1C   Date Value Ref Range Status   07/15/2022 7.0 (H) 4.0 - 5.6 % Final     -Serum glucose on admit 127  - hold home oral medications  - LDSSI with ACCUcheck ACHS  - Diabetic diet  -Hypoglycemia protocol PRN          Hyperkalemia  -Last electrolytes reviewed-   Recent Labs   Lab 07/15/22  0542 07/16/22  0301   K 5.4* 4.4   .   -likely 2/2 to worsening renal function  -Repeat BMP and Mg in am  -Replace electrolytes as indicated  -Monitor on telemetry        JAMESON (nonalcoholic steatohepatitis)  -chronic; stable  -no evidence of decompensation  -continue to monitor        VTE Risk Mitigation (From admission, onward)         Ordered     heparin (porcine) injection 5,000 Units  Every 8 hours         07/15/22 2102     IP VTE HIGH RISK PATIENT  Once         07/15/22 2102     Place sequential compression device  Until discontinued         07/15/22 2102                Discharge Planning   FERNANDO:      Code Status: Full Code   Is the patient medically ready for discharge?:     Reason for patient still in hospital (select all that apply): Patient trending condition, Laboratory test, Treatment, Imaging  and Consult recommendations                     Spencer Altamirano NP  Department of Hospital Medicine   [facility] - Telemetry

## 2022-07-16 NOTE — ASSESSMENT & PLAN NOTE
Hemoglobin A1C   Date Value Ref Range Status   07/15/2022 7.0 (H) 4.0 - 5.6 % Final     -Serum glucose on admit 127  - hold home oral medications  - LDSSI with ACCUcheck ACHS  - Diabetic diet  -Hypoglycemia protocol PRN

## 2022-07-16 NOTE — H&P
"[facility] Medicine  History & Physical    Patient Name: Rohini Dang  MRN: [id number]  Patient Class: IP- Inpatient  Admission Date: 7/15/2022  Attending Physician: Arcelia Davidson*   Primary Care Provider: Skip Celestin NP         Patient information was obtained from patient and ER records.     Subjective:     Principal Problem:Acute renal failure    Chief Complaint: No chief complaint on file.       HPI: Per  note: "42-year-old female with a history of anemia, bipolar disorder, diabetes, esophageal stenosis, and fatty liver admitted to [facility] on July 13 with acute kidney injury (creatinine 4.7, baseline 1.1 on June 19) and hyponatremia.  She was initially treated with IV fluids, but subsequently appeared somewhat volume overloaded on July 15.  Fluids were stopped, and she received 1 dose of Lasix.  Creatinine continued to worsen, and she has oliguria.  Etiology of the acute kidney injury is uncertain.  Multiple serologies have been ordered.  Requesting transfer to Hospital Medicine for Nephrology evaluation.  Referring provider noted she has no evidence of acute respiratory distress or altered mentation. July 15:  Sodium 134, potassium 5.4, chloride 107, CO2 16, BUN 49, creatinine 6 (4.7 on admit-July 13), glucose 127, calcium 6.8, magnesium 1.8, white blood cells 11.19, hemoglobin 8.8, hematocrit 28, platelets 118, multiple autoimmune/inflammatory labs ordered. July 14:  Renal ultrasound with no abnormality noted. July 13:  COVID negative, CPK 72, urine drug screen negative. VS:  Temperature 98.6°, pulse 95, respirations 20, blood pressure 133/65, O2 sats 91% (1 L nasal cannula)"    Patient transferred from [facility] to [facility] for evaluation by nephrology. On exam, patient lethargic but responds to name, reports difficulty breathing and shortness of breath. She was on 4L NC upon assessment. Nephrology consulted. Will admit to hospital medicine for " further evaluation and treatment.               Past Medical History:   Diagnosis Date    Anemia     Anxiety disorder     Bipolar 2 disorder     Borderline personality disorder     Chronic bilateral low back pain without sciatica     Depression with anxiety     Diabetes mellitus     Elevated LFTs     Esophageal stenosis     Fatty liver     Fibromyalgia     Fibromyalgia     High cholesterol     Hypoglycemia     Intermittent explosive disorder     Liver disease     JAMESON (nonalcoholic steatohepatitis)        Past Surgical History:   Procedure Laterality Date    COLONOSCOPY N/A 3/20/2018    Procedure: COLONOSCOPY;  Surgeon: Janelle Meaed MD;  Location: [facility] END[facility];  Service: Endoscopy;  Laterality: N/A;    COLONOSCOPY N/A 5/11/2021    Procedure: COLONOSCOPY;  Surgeon: Janelle Meade MD;  Location: [facility];  Service: Endoscopy;  Laterality: N/A;    ESOPHAGEAL DILATION      x2    ESOPHAGOGASTRODUODENOSCOPY N/A 5/7/2019    Procedure: EGD (ESOPHAGOGASTRODUODENOSCOPY);  Surgeon: Janelle Meade MD;  Location: [facility];  Service: Endoscopy;  Laterality: N/A;    ESOPHAGOGASTRODUODENOSCOPY N/A 5/11/2021    Procedure: EGD (ESOPHAGOGASTRODUODENOSCOPY);  Surgeon: Janelle Meade MD;  Location: [facility];  Service: Endoscopy;  Laterality: N/A;  Rapid on arrival    HIATAL HERNIA REPAIR      HYSTERECTOMY      LIVER BIOPSY  2018    fibrosis stage 3 JAMESON    OOPHORECTOMY      TUBAL LIGATION      UPPER GASTROINTESTINAL ENDOSCOPY      2013? unable to obtain records       Review of patient's allergies indicates:   Allergen Reactions    Amoxicillin Hives    Avelox [moxifloxacin] Hives    Clarinex [desloratadine] Hives    Pcn [penicillins] Hives    Claritin [loratadine] Palpitations    Latex, natural rubber Rash       Current Facility-Administered Medications on File Prior to Encounter   Medication    [COMPLETED] furosemide injection 80 mg    [DISCONTINUED] 0.9%  NaCl  infusion    [DISCONTINUED] acetaminophen tablet 650 mg    [DISCONTINUED] albuterol-ipratropium 2.5 mg-0.5 mg/3 mL nebulizer solution 3 mL    [DISCONTINUED] cefTRIAXone (ROCEPHIN) 1 g/50 mL D5W IVPB    [DISCONTINUED] dextrose 10% bolus 125 mL    [DISCONTINUED] dextrose 10% bolus 250 mL    [DISCONTINUED] enoxaparin injection 30 mg    [DISCONTINUED] famotidine tablet 20 mg    [DISCONTINUED] glucagon (human recombinant) injection 1 mg    [DISCONTINUED] glucose chewable tablet 16 g    [DISCONTINUED] glucose chewable tablet 24 g    [DISCONTINUED] insulin aspart U-100 pen 0-5 Units    [DISCONTINUED] melatonin tablet 6 mg    [DISCONTINUED] mupirocin 2 % ointment    [DISCONTINUED] ondansetron injection 4 mg    [DISCONTINUED] sertraline tablet 100 mg    [DISCONTINUED] sodium chloride 0.9% flush 10 mL    [DISCONTINUED] topiramate tablet 200 mg    [DISCONTINUED] traMADoL tablet 50 mg     Current Outpatient Medications on File Prior to Encounter   Medication Sig    albuterol (PROVENTIL/VENTOLIN HFA) 90 mcg/actuation inhaler Inhale 1-2 puffs into the lungs every 6 (six) hours as needed. Rescue    busPIRone (BUSPAR) 30 MG Tab Take 30 mg by mouth 2 (two) times daily.    dulaglutide (TRULICITY) 0.75 mg/0.5 mL pen injector Inject 0.75 mg into the skin every 7 days.    empagliflozin (JARDIANCE) 10 mg tablet Take 1 tablet (10 mg total) by mouth once daily.    LIDOcaine (LIDODERM) 5 % UNWRAP AND APPLY 1 PATCH TO SKIN ONCE A DAY AS NEEDED FOR PAIN. APPLY 12 HOURS THEN REMOVE FOR 12 HOURS    linaCLOtide (LINZESS) 72 mcg Cap capsule Take 1 capsule (72 mcg total) by mouth before breakfast.    metFORMIN (GLUCOPHAGE) 1000 MG tablet Take 1 tablet (1,000 mg total) by mouth 2 (two) times daily with meals.    metoprolol tartrate (LOPRESSOR) 100 MG tablet Take 100 mg by mouth 2 (two) times daily.    mirtazapine (REMERON) 30 MG tablet Take 30 mg by mouth every evening.     montelukast (SINGULAIR) 10 mg tablet Take 10 mg  by mouth every evening.    nortriptyline (PAMELOR) 50 MG capsule TAKE 1 CAPSULE BY MOUTH IN THE EVENING    pregabalin (LYRICA) 100 MG capsule TAKE 1 CAPSULE(100 MG) BY MOUTH TWICE DAILY    promethazine (PHENERGAN) 25 MG tablet Take 1 tablet (25 mg total) by mouth every 4 to 6 hours as needed.    rosuvastatin (CRESTOR) 40 MG Tab Take 40 mg by mouth once daily.    sertraline (ZOLOFT) 100 MG tablet Take 150 mg by mouth once daily.    tiZANidine (ZANAFLEX) 2 MG tablet Take 2 mg by mouth 3 (three) times daily as needed.    topiramate (TOPAMAX) 200 MG Tab Take 200 mg by mouth nightly.    valsartan-hydrochlorothiazide (DIOVAN-HCT) 80-12.5 mg per tablet Take 1 tablet by mouth once daily.    ACCU-CHEK KERRY PLUS TEST STRP Strp USE 1 STRIP TO CHECK GLUCOSE TWICE DAILY    valsartan-hydrochlorothiazide (DIOVAN-HCT) 160-25 mg per tablet Take 1 tablet by mouth once daily. For 30 days     Family History       Problem Relation (Age of Onset)    Cancer Maternal Grandmother    Clotting disorder Father, Sister    Diabetes Maternal Aunt    Hypertension Mother    Lung cancer Paternal Grandmother    No Known Problems Daughter, Maternal Uncle, Paternal Aunt, Paternal Uncle, Maternal Grandfather, Paternal Grandfather    Ulcers Father          Tobacco Use    Smoking status: Never Smoker    Smokeless tobacco: Never Used   Substance and Sexual Activity    Alcohol use: No     Alcohol/week: 0.0 standard drinks    Drug use: No    Sexual activity: Not Currently     Partners: Male     Review of Systems   Constitutional:  Positive for fatigue. Negative for chills, diaphoresis and fever.   HENT:  Negative for hearing loss and sore throat.    Eyes:  Negative for visual disturbance.   Respiratory:  Positive for cough and shortness of breath.    Cardiovascular:  Negative for chest pain and leg swelling.   Gastrointestinal:  Negative for abdominal pain, diarrhea, nausea and vomiting.   Genitourinary:  Negative for difficulty urinating and  flank pain.   Musculoskeletal:  Negative for back pain.   Skin:  Negative for rash and wound.   Neurological:  Negative for dizziness, weakness and headaches.   Psychiatric/Behavioral:  Negative for agitation and confusion.    Objective:     Vital Signs (Most Recent):  Temp: 98.6 °F (37 °C) (07/15/22 2045)  Pulse: 94 (07/15/22 2259)  Resp: 20 (07/15/22 2045)  BP: 124/77 (07/15/22 2045)  SpO2: 95 % (07/15/22 2259) Vital Signs (24h Range):  Temp:  [97.7 °F (36.5 °C)-99.1 °F (37.3 °C)] 98.6 °F (37 °C)  Pulse:  [] 94  Resp:  [16-20] 20  SpO2:  [90 %-97 %] 95 %  BP: (110-134)/(53-78) 124/77     Weight: 91.7 kg (202 lb 2.6 oz)  Body mass index is 34.7 kg/m².    Physical Exam  Vitals and nursing note reviewed.   Constitutional:       General: She is not in acute distress.     Appearance: She is obese. She is not ill-appearing.      Interventions: Nasal cannula in place.      Comments: 4LNC   HENT:      Head: Normocephalic and atraumatic.      Mouth/Throat:      Mouth: Mucous membranes are moist.   Eyes:      Extraocular Movements: Extraocular movements intact.      Pupils: Pupils are equal, round, and reactive to light.   Cardiovascular:      Rate and Rhythm: Normal rate and regular rhythm.      Pulses: Normal pulses.      Heart sounds: Normal heart sounds. No murmur heard.    No friction rub. No gallop.   Pulmonary:      Effort: Pulmonary effort is normal. No respiratory distress.      Breath sounds: Examination of the right-lower field reveals decreased breath sounds. Examination of the left-lower field reveals decreased breath sounds. Decreased breath sounds present. No wheezing or rhonchi.   Abdominal:      General: Bowel sounds are normal. There is no distension.      Palpations: Abdomen is soft.      Tenderness: There is no abdominal tenderness. There is no guarding.   Musculoskeletal:         General: No swelling.      Cervical back: Normal range of motion and neck supple.      Right lower le+ Edema  present.      Left lower le+ Edema present.   Skin:     General: Skin is warm and dry.      Capillary Refill: Capillary refill takes less than 2 seconds.      Findings: No bruising, erythema or rash.   Neurological:      General: No focal deficit present.      Mental Status: She is oriented to person, place, and time and easily aroused. She is lethargic.      GCS: GCS eye subscore is 3. GCS verbal subscore is 5. GCS motor subscore is 6.   Psychiatric:         Mood and Affect: Affect is flat.         Behavior: Behavior is cooperative.         CRANIAL NERVES     CN III, IV, VI   Pupils are equal, round, and reactive to light.     Significant Labs: All pertinent labs within the past 24 hours have been reviewed.  Recent Results (from the past 24 hour(s))   CBC Auto Differential    Collection Time: 07/15/22  5:42 AM   Result Value Ref Range    WBC 11.19 3.90 - 12.70 K/uL    RBC 3.49 (L) 4.00 - 5.40 M/uL    Hemoglobin 8.8 (L) 12.0 - 16.0 g/dL    Hematocrit 28.1 (L) 37.0 - 48.5 %    MCV 81 (L) 82 - 98 fL    MCH 25.2 (L) 27.0 - 31.0 pg    MCHC 31.3 (L) 32.0 - 36.0 g/dL    RDW 15.9 (H) 11.5 - 14.5 %    Platelets 118 (L) 150 - 450 K/uL    MPV 11.4 9.2 - 12.9 fL    Immature Granulocytes 0.9 (H) 0.0 - 0.5 %    Gran # (ANC) 8.2 (H) 1.8 - 7.7 K/uL    Immature Grans (Abs) 0.10 (H) 0.00 - 0.04 K/uL    Lymph # 1.7 1.0 - 4.8 K/uL    Mono # 1.2 (H) 0.3 - 1.0 K/uL    Eos # 0.0 0.0 - 0.5 K/uL    Baso # 0.04 0.00 - 0.20 K/uL    nRBC 0 0 /100 WBC    Gran % 72.9 38.0 - 73.0 %    Lymph % 14.8 (L) 18.0 - 48.0 %    Mono % 10.9 4.0 - 15.0 %    Eosinophil % 0.1 0.0 - 8.0 %    Basophil % 0.4 0.0 - 1.9 %    Differential Method Automated    Comprehensive Metabolic Panel    Collection Time: 07/15/22  5:42 AM   Result Value Ref Range    Sodium 134 (L) 136 - 145 mmol/L    Potassium 5.4 (H) 3.5 - 5.1 mmol/L    Chloride 107 95 - 110 mmol/L    CO2 16 (L) 23 - 29 mmol/L    Glucose 127 (H) 70 - 110 mg/dL    BUN 49 (H) 6 - 20 mg/dL    Creatinine 6.0  (H) 0.5 - 1.4 mg/dL    Calcium 6.8 (LL) 8.7 - 10.5 mg/dL    Total Protein 6.3 6.0 - 8.4 g/dL    Albumin 2.6 (L) 3.5 - 5.2 g/dL    Total Bilirubin 0.2 0.1 - 1.0 mg/dL    Alkaline Phosphatase 152 (H) 55 - 135 U/L    AST 37 10 - 40 U/L    ALT 41 10 - 44 U/L    Anion Gap 11 8 - 16 mmol/L    eGFR if African American 9.1 (A) >60 mL/min/1.73 m^2    eGFR if non  7.9 (A) >60 mL/min/1.73 m^2   Magnesium    Collection Time: 07/15/22  5:42 AM   Result Value Ref Range    Magnesium 1.8 1.6 - 2.6 mg/dL   POCT glucose    Collection Time: 07/15/22 10:53 AM   Result Value Ref Range    POCT Glucose 165 (H) 70 - 110 mg/dL   Hemoglobin A1C    Collection Time: 07/15/22 10:57 AM   Result Value Ref Range    Hemoglobin A1C 7.0 (H) 4.0 - 5.6 %    Estimated Avg Glucose 154 (H) 68 - 131 mg/dL   Protein / creatinine ratio, urine    Collection Time: 07/15/22  2:41 PM   Result Value Ref Range    Protein, Urine Random 17.3 (H) 0.0 - 15.0 mg/dL    Creatinine, Urine <13.0 (L) 15.0 - 325.0 mg/dL    Prot/Creat Ratio, Urine Unable to calculate 0.00 - 0.20   Urinalysis, Reflex to Urine Culture Urine, Clean Catch    Collection Time: 07/15/22  2:50 PM    Specimen: Urine   Result Value Ref Range    Specimen UA Urine, Clean Catch     Color, UA Yellow Yellow, Straw, Jaylene    Appearance, UA Clear Clear    pH, UA 6.0 5.0 - 8.0    Specific Gravity, UA <=1.005 (A) 1.005 - 1.030    Protein, UA Negative Negative    Glucose, UA 1+ (A) Negative    Ketones, UA Negative Negative    Bilirubin (UA) Negative Negative    Occult Blood UA 3+ (A) Negative    Nitrite, UA Negative Negative    Urobilinogen, UA Negative <2.0 EU/dL    Leukocytes, UA Negative Negative   Urinalysis Microscopic    Collection Time: 07/15/22  2:50 PM   Result Value Ref Range    RBC, UA 27 (H) 0 - 4 /hpf    WBC, UA 3 0 - 5 /hpf    Bacteria Negative None-Occ /hpf    Squam Epithel, UA 1 /hpf    Hyaline Casts, UA 5.1 (A) 0-1/lpf /lpf    Microscopic Comment SEE COMMENT    POCT glucose     Collection Time: 07/15/22  4:04 PM   Result Value Ref Range    POCT Glucose 248 (H) 70 - 110 mg/dL   POCT glucose    Collection Time: 07/15/22  9:58 PM   Result Value Ref Range    POCT Glucose 138 (H) 70 - 110 mg/dL           Significant Imaging: I have reviewed all pertinent imaging results/findings within the past 24 hours.    Assessment/Plan:     * Acute renal failure  Estimated Creatinine Clearance: 13.1 mL/min (A) (based on SCr of 6 mg/dL (H)).  Baseline Cr: 0.9Baseline BUN:12   Recent Labs   Lab 07/13/22  1446 07/14/22  0408 07/15/22  0542   CO2 19* 20* 16*   BUN 42* 43* 49*   CREATININE 4.7* 5.1* 6.0*   MG  --  1.9 1.8       -Etiology uncertain at this time  -Monitor strict urine output  - Continue to monitor renal function with daily labs and trend electrolytes  - renally dose medications  - avoid nephrotoxic agents including NSAIDs, aminoglycosides, IV contrast (unless absolutely necessary), gadolinium, fleets and other phosphorous-based laxatives  - monitor events that may lead to decreased renal perfusion (hypovolemia, hypotension, sepsis)  - monitor urine output to assure that no obstruction precipitates worsening in GFR  -Renal US without evidence of obstruction or hydronephrosis.  -Nephrology consulted      Dyspnea  -report dyspnea on exam; diminished BS noted--patient was on 4L NC  -Will obtain CXR to assess for cardiopulmonary abnormality  -ABG ordered  -Supplemental oxygen PRN for SpO2 <90%  -Duo-nebs PRN for SOB/wheezing      Anemia  -H/H is 8.8, which is stable and consistent with previous laboratory measurements. Patient exhibits no signs or symptoms of acute bleeding  -Etiology likely due to multifactorial..iron deficiency vs chronic disease?  -No indication for blood transfusion  -Continue to monitor serial CBC  -Transfuse for Hgb <7 or if symptomatic        Type 2 diabetes mellitus, without long-term current use of insulin  Hemoglobin A1C   Date Value Ref Range Status   07/15/2022 7.0 (H) 4.0  - 5.6 % Final     -Serum glucose on admit 127  - hold home oral medications  - LDSSI with ACCUcheck ACHS  - Diabetic diet  -Hypoglycemia protocol PRN          Hyperkalemia  -Last electrolytes reviewed- Recent Labs   Lab 07/14/22  0408 07/15/22  0542   K 5.0 5.4*   .   -likely 2/2 to worsening renal function  -Repeat BMP and Mg in am  -Replace electrolytes as indicated  -Monitor on telemetry        JAMESON (nonalcoholic steatohepatitis)  -chronic; stable  -no evidence of decompensation  -continue to monitor      VTE Risk Mitigation (From admission, onward)         Ordered     heparin (porcine) injection 5,000 Units  Every 8 hours         07/15/22 2102     IP VTE HIGH RISK PATIENT  Once         07/15/22 2102     Place sequential compression device  Until discontinued         07/15/22 2102                   Lakeshia Pena, PUJA, [id number], CCRN  Hospitalist  Department of Hospital Medicine  [facility]   Pager: [phone]

## 2022-07-16 NOTE — ASSESSMENT & PLAN NOTE
-H/H is 8.8, which is stable and consistent with previous laboratory measurements. Patient exhibits no signs or symptoms of acute bleeding  -Etiology likely due to multifactorial..iron deficiency vs chronic disease?  -No indication for blood transfusion  -Continue to monitor serial CBC  -Transfuse for Hgb <7 or if symptomatic

## 2022-07-16 NOTE — ASSESSMENT & PLAN NOTE
-report dyspnea on exam; diminished BS noted--patient was on 4L NC  -Will obtain CXR to assess for cardiopulmonary abnormality  -ABG ordered  -Supplemental oxygen PRN for SpO2 <90%  -Duo-nebs PRN for SOB/wheezing

## 2022-07-16 NOTE — PLAN OF CARE
"Patient came from [place] @ 2045 hrs. Oriented the patient to the unit and the nurse call light system. Vital signs recorded as Temp: 98.6, HR: 99, Resp:20, SpO2: 97% and BP: 124/77. Plan of care reviewed with the patient. Scheduled medicines given and the patient tolerated well. Given Tylenol 650 mg for headache 8/10. Fall and safety precautions taken and the standard interventions katelyn in place. On Telemetry monitoring with NSR to Sinus Tachycardia, no true "red alarms" noted in the shift. Patient is on 2 L Oxygen through Nasal Cannula, saturating @ 96%. Accu check was 138 mg/dl. Advised the patient to call for the assistance. Continued monitoring the patient.  "

## 2022-07-16 NOTE — ASSESSMENT & PLAN NOTE
-Last electrolytes reviewed-   Recent Labs   Lab 07/15/22  0542 07/16/22  0301   K 5.4* 4.4   .   -likely 2/2 to worsening renal function  -Repeat BMP and Mg in am  -Replace electrolytes as indicated  -Monitor on telemetry

## 2022-07-16 NOTE — ASSESSMENT & PLAN NOTE
-Last electrolytes reviewed- Recent Labs   Lab 07/14/22  0408 07/15/22  0542   K 5.0 5.4*   .   -likely 2/2 to worsening renal function  -Repeat BMP and Mg in am  -Replace electrolytes as indicated  -Monitor on telemetry

## 2022-07-16 NOTE — CONSULTS
[facility] Consult Note    Date of Admit: 7/15/2022  Length of Stay: 1    Chief Complaint/Reason for Consult     New onset HENRY in the setting of pelvic pressure/discomfort.    Subjective:      History of Present Illness:  Rohini Dang is a 42 y.o. morbidly obese female w PMH of DM II, HLD, JAMESON, esophageal stenosis, fibromyalgia and bipolar disorder, who was admitted 07/13 w/ HENRY and new onset pelvic pressure.  According to the pt, she was recently evaluated by her PCP for acutely worsening lower back pain around the end of last lukasz, she was prescribed Fenoprofen 600 mg TID as needed for pain control, which she states that she has been using it about 1-2 times daily, since it was prescribed around 07/01.  On this admission, pt's serum Cr was noted to be elevated at ~ 4.7, and has been rising since admission (highest Cr ~ 7.1, as of today). Her baseline Cr ~ 1.0-1.2 (as of 06/19/2022). Pt also reports some feelings of pelvic pressure, for which she recently saw Gyn for evaluation for possible bladder prolapse. No evidence for obstructive lesions or hydronephrosis on renal US. UA was remarkable for 3+ blood and 80 RBCs. FeNA ~ 22.     Past Medical History:  Past Medical History:   Diagnosis Date    Anemia     Anxiety disorder     Bipolar 2 disorder     Borderline personality disorder     Chronic bilateral low back pain without sciatica     Depression with anxiety     Diabetes mellitus     Elevated LFTs     Esophageal stenosis     Fatty liver     Fibromyalgia     Fibromyalgia     High cholesterol     Hypoglycemia     Intermittent explosive disorder     Liver disease     JAMESON (nonalcoholic steatohepatitis)        Past Surgical History:  Past Surgical History:   Procedure Laterality Date    COLONOSCOPY N/A 3/20/2018    Procedure: COLONOSCOPY;  Surgeon: Janelle Meade MD;  Location: [facility];  Service: Endoscopy;  Laterality: N/A;    COLONOSCOPY N/A 5/11/2021    Procedure:  COLONOSCOPY;  Surgeon: Janelle Meade MD;  [facility];  Service: Endoscopy;  Laterality: N/A;    ESOPHAGEAL DILATION      x2    ESOPHAGOGASTRODUODENOSCOPY N/A 5/7/2019    Procedure: EGD (ESOPHAGOGASTRODUODENOSCOPY);  Surgeon: Janelle Meade MD;  [facility];  Service: Endoscopy;  Laterality: N/A;    ESOPHAGOGASTRODUODENOSCOPY N/A 5/11/2021    Procedure: EGD (ESOPHAGOGASTRODUODENOSCOPY);  Surgeon: Janelle Meade MD;  [facility];  Service: Endoscopy;  Laterality: N/A;  Rapid on arrival    HIATAL HERNIA REPAIR      HYSTERECTOMY      LIVER BIOPSY  2018    fibrosis stage 3 JAMESON    OOPHORECTOMY      TUBAL LIGATION      UPPER GASTROINTESTINAL ENDOSCOPY      2013? unable to obtain records       Allergies:  Review of patient's allergies indicates:   Allergen Reactions    Amoxicillin Hives    Avelox [moxifloxacin] Hives    Clarinex [desloratadine] Hives    Pcn [penicillins] Hives    Claritin [loratadine] Palpitations    Latex, natural rubber Rash       Home Medications:  Prior to Admission medications    Medication Sig Start Date End Date Taking? Authorizing Provider   albuterol (PROVENTIL/VENTOLIN HFA) 90 mcg/actuation inhaler Inhale 1-2 puffs into the lungs every 6 (six) hours as needed. Rescue 12/31/21 12/31/22 Yes Ana Yo NP   busPIRone (BUSPAR) 30 MG Tab Take 30 mg by mouth 2 (two) times daily.   Yes Historical Provider   dulaglutide (TRULICITY) 0.75 mg/0.5 mL pen injector Inject 0.75 mg into the skin every 7 days. 6/22/22 6/22/23 Yes Fide Melendez NP   empagliflozin (JARDIANCE) 10 mg tablet Take 1 tablet (10 mg total) by mouth once daily. 6/22/22  Yes Fide Melendez NP   LIDOcaine (LIDODERM) 5 % UNWRAP AND APPLY 1 PATCH TO SKIN ONCE A DAY AS NEEDED FOR PAIN. APPLY 12 HOURS THEN REMOVE FOR 12 HOURS 5/23/22  Yes Historical Provider   linaCLOtide (LINZESS) 72 mcg Cap capsule Take 1 capsule (72 mcg total) by mouth before breakfast. 3/25/22   "Yes KAMLA Funes   metFORMIN (GLUCOPHAGE) 1000 MG tablet Take 1 tablet (1,000 mg total) by mouth 2 (two) times daily with meals. 6/22/22  Yes Fide Melendez NP   metoprolol tartrate (LOPRESSOR) 100 MG tablet Take 100 mg by mouth 2 (two) times daily.   Yes Historical Provider   mirtazapine (REMERON) 30 MG tablet Take 30 mg by mouth every evening.    Yes Historical Provider   montelukast (SINGULAIR) 10 mg tablet Take 10 mg by mouth every evening.   Yes Historical Provider   nortriptyline (PAMELOR) 50 MG capsule TAKE 1 CAPSULE BY MOUTH IN THE EVENING 11/19/21  Yes Hola Cooney MD   pregabalin (LYRICA) 100 MG capsule TAKE 1 CAPSULE(100 MG) BY MOUTH TWICE DAILY 1/18/22  Yes Hola Cooney MD   promethazine (PHENERGAN) 25 MG tablet Take 1 tablet (25 mg total) by mouth every 4 to 6 hours as needed. 4/3/17  Yes Janelle Meade MD   rosuvastatin (CRESTOR) 40 MG Tab Take 40 mg by mouth once daily. 5/13/22  Yes Historical Provider   sertraline (ZOLOFT) 100 MG tablet Take 150 mg by mouth once daily.   Yes Historical Provider   tiZANidine (ZANAFLEX) 2 MG tablet Take 2 mg by mouth 3 (three) times daily as needed. 5/23/22  Yes Historical Provider   topiramate (TOPAMAX) 200 MG Tab Take 200 mg by mouth nightly. 5/23/22  Yes Historical Provider   valsartan-hydrochlorothiazide (DIOVAN-HCT) 80-12.5 mg per tablet Take 1 tablet by mouth once daily.   Yes Historical Provider   ACCU-CHEK KERRY PLUS TEST STRP Strp USE 1 STRIP TO CHECK GLUCOSE TWICE DAILY 3/2/20   Historical Provider   valsartan-hydrochlorothiazide (DIOVAN-HCT) 160-25 mg per tablet Take 1 tablet by mouth once daily. For 30 days 4/14/21   Historical Provider       Family History:  Family History   Problem Relation Age of Onset    Hypertension Mother     Clotting disorder Father     Ulcers Father     Clotting disorder Sister     Cancer Maternal Grandmother         "female Cancer"    Lung cancer Paternal Grandmother     Diabetes Maternal Aunt     No Known " "Problems Daughter     No Known Problems Maternal Uncle     No Known Problems Paternal Aunt     No Known Problems Paternal Uncle     No Known Problems Maternal Grandfather     No Known Problems Paternal Grandfather     Breast cancer Neg Hx     Ovarian cancer Neg Hx     BRCA 1/2 Neg Hx        Social History:  Social History     Tobacco Use    Smoking status: Never Smoker    Smokeless tobacco: Never Used   Substance Use Topics    Alcohol use: No     Alcohol/week: 0.0 standard drinks    Drug use: No       Review of Systems:  Pertinent positives and negatives are listed in HPI.  All other systems are reviewed and are negative.     Objective:   Last 24 Hour Vital Signs:  BP  Min: 122/79  Max: 140/82  Temp  Av.8 °F (37.1 °C)  Min: 98 °F (36.7 °C)  Max: 99.9 °F (37.7 °C)  Pulse  Av.1  Min: 84  Max: 100  Resp  Av  Min: 14  Max: 20  SpO2  Av.3 %  Min: 92 %  Max: 97 %  Height  Av' 4" (162.6 cm)  Min: 5' 4" (162.6 cm)  Max: 5' 4" (162.6 cm)  Weight  Av.7 kg (202 lb 2.6 oz)  Min: 91.7 kg (202 lb 2.6 oz)  Max: 91.7 kg (202 lb 2.6 oz)  Body mass index is 34.7 kg/m².  I/O last 3 completed shifts:  In: -   Out: 2200 [Urine:2200]    Physical Examination:  General: No acute distress, morbidly obese  female  HEENT: EOMI, PERRL, MMM, OP clear and without exudate or erythema  Cardiovascular: NRRR, without appreciated murmurs or rubs, without extra heart sounds, peripheral pulses 2+ throughout  Chest/Pulmonary: CTABL, normal work of breathing without accessory muscle use,   Abdomen: soft, nontender, nondistended, bowel sounds heard in all four quadrants and normo-active   MSKL: without gross deformity, active FROM  Lymphatic: no appreciated LAD  Skin: without cyanosis or clubbing, without peripheral edema   Neurologic: AAOx3, sensation intact to light palpation throughout extremities, strength 5/5 of gross flexor and extensor groups of extremities  Access:     Laboratory:  Most Recent " Data:  CBC:   Lab Results   Component Value Date    WBC 9.08 07/16/2022    HGB 8.5 (L) 07/16/2022    HCT 26.4 (L) 07/16/2022     (L) 07/16/2022    MCV 80 (L) 07/16/2022    RDW 16.3 (H) 07/16/2022     BMP:   Lab Results   Component Value Date     07/16/2022    K 4.4 07/16/2022     07/16/2022    CO2 14 (L) 07/16/2022    BUN 57 (H) 07/16/2022    CREATININE 7.1 (H) 07/16/2022     (H) 07/16/2022    CALCIUM 8.9 07/16/2022    MG 1.7 07/16/2022     LFTs:   Lab Results   Component Value Date    PROT 6.6 07/16/2022    ALBUMIN 2.7 (L) 07/16/2022    BILITOT 0.3 07/16/2022    AST 23 07/16/2022    ALKPHOS 145 (H) 07/16/2022    ALT 25 07/16/2022     (H) 03/05/2018     Coags:   Lab Results   Component Value Date    INR 1.1 03/17/2021     Urinalysis:   Lab Results   Component Value Date    LABURIN ESCHERICHIA COLI  50,000 - 99,999 cfu/ml   (A) 07/13/2022    COLORU Colorless (A) 07/16/2022    SPECGRAV 1.010 07/16/2022    NITRITE Negative 07/16/2022    KETONESU Negative 07/16/2022    UROBILINOGEN Negative 07/16/2022    WBCUA 6 (H) 07/16/2022       Trended Lab Data:  Recent Labs   Lab 07/14/22  0408 07/15/22  0542 07/16/22  0056 07/16/22  0301   WBC 9.93 11.19  --  9.08   HGB 9.0* 8.8*  --  8.5*   HCT 28.7* 28.1* 24* 26.4*   * 118*  --  118*   MCV 80* 81*  --  80*   RDW 15.5* 15.9*  --  16.3*   * 134*  --  137   K 5.0 5.4*  --  4.4    107  --  107   CO2 20* 16*  --  14*   BUN 43* 49*  --  57*   CREATININE 5.1* 6.0*  --  7.1*   * 127*  --  125*   PROT 6.5 6.3  --  6.6   ALBUMIN 2.8* 2.6*  --  2.7*   BILITOT 0.3 0.2  --  0.3   AST 53* 37  --  23   ALKPHOS 179* 152*  --  145*   ALT 59* 41  --  25     Radiology:    Renal US 07/16/2022:  Impression:     Hepatosplenomegaly with coarse echogenic liver.  Correlate for fibrofatty change.     Enlarged kidneys.  Correlate for is infiltrative process such is amyloid, nephritis or nephrotic syndrome .     Assessment and Plan:      Crystal  Anabelle Dang is a 42 y.o. morbidly obese female w PMH of DM II, HLD, JAMESON, esophageal stenosis, fibromyalgia and bipolar disorder, who was admitted 07/13 w/ HENRY and new onset pelvic pressure; [facility] are following for;    #) Stage III HENRY:  - Cr has been progressively rising since this admission, highest level ~ 7.1, GFR ~ 6.0  - Baseline Cr ~ 1.0-1.2 (as of 06/19/2022)  - Pt was initially oliguric but currently having normal urine output  - Urine Na ~ 72, Urine Cr < 13, FeNa ~ 22  - UA +ve for microscopic hematuria  - Renal US w/ significantly enlarged B/L kidneys, no evidence for obstructive process/hydronephrosis  - Etiology remains unclear at this point but highly suspect intrinsic renal etiology, possible ATN/AIN in the setting of recent NSAIDs use Vs vascular Vs glomerular  - Will send for MARCO ANTONIO, ANCA, ASO, C3/C4, HIV, hepatitis panel, DsDNA panels  - Obtain Microalbumin/Cr ratio    #) Hypertension   - On lopressor 100 mg BID, which Ok to resume  - Valsartan-HCTZ, 80-12.5 mg, hold for now    #) Anemia Evaluation  Lab Results   Component Value Date    HGB 8.5 (L) 07/16/2022    MCV 80 (L) 07/16/2022    FERRITIN 24 06/03/2022    FESATURATED 7 (L) 06/03/2022    [id number] 02/14/2022   - Obtain updated iron panel  - Obtain folate, B12, LDH, haptoglobin and retics    #) AGMA:  - Serum HCO3 ~ 14, AG ~ 16  - Mostly related to her HENRY as above           Yousuf Guerrero MD  [facility]  If after 5pm please forward any questions to the [facility] Fellow/Attending on call.

## 2022-07-16 NOTE — ASSESSMENT & PLAN NOTE
Estimated Creatinine Clearance: 13.1 mL/min (A) (based on SCr of 6 mg/dL (H)).  Baseline Cr: 0.9Baseline BUN:12   Recent Labs   Lab 07/13/22  1446 07/14/22  0408 07/15/22  0542   CO2 19* 20* 16*   BUN 42* 43* 49*   CREATININE 4.7* 5.1* 6.0*   MG  --  1.9 1.8       -Etiology uncertain at this time  -Monitor strict urine output  - Continue to monitor renal function with daily labs and trend electrolytes  - renally dose medications  - avoid nephrotoxic agents including NSAIDs, aminoglycosides, IV contrast (unless absolutely necessary), gadolinium, fleets and other phosphorous-based laxatives  - monitor events that may lead to decreased renal perfusion (hypovolemia, hypotension, sepsis)  - monitor urine output to assure that no obstruction precipitates worsening in GFR  -Renal US without evidence of obstruction or hydronephrosis.  -Nephrology consulted

## 2022-07-16 NOTE — SUBJECTIVE & OBJECTIVE
Past Medical History:   Diagnosis Date    Anemia     Anxiety disorder     Bipolar 2 disorder     Borderline personality disorder     Chronic bilateral low back pain without sciatica     Depression with anxiety     Diabetes mellitus     Elevated LFTs     Esophageal stenosis     Fatty liver     Fibromyalgia     Fibromyalgia     High cholesterol     Hypoglycemia     Intermittent explosive disorder     Liver disease     JAMESON (nonalcoholic steatohepatitis)        Past Surgical History:   Procedure Laterality Date    COLONOSCOPY N/A 3/20/2018    Procedure: COLONOSCOPY;  Surgeon: Janelle Meade MD;  Location: [facility];  Service: Endoscopy;  Laterality: N/A;    COLONOSCOPY N/A 5/11/2021    Procedure: COLONOSCOPY;  Surgeon: Janelle Meade MD;  Location: [facility];  Service: Endoscopy;  Laterality: N/A;    ESOPHAGEAL DILATION      x2    ESOPHAGOGASTRODUODENOSCOPY N/A 5/7/2019    Procedure: EGD (ESOPHAGOGASTRODUODENOSCOPY);  Surgeon: Janelle Meade MD;  Location: [facility];  Service: Endoscopy;  Laterality: N/A;    ESOPHAGOGASTRODUODENOSCOPY N/A 5/11/2021    Procedure: EGD (ESOPHAGOGASTRODUODENOSCOPY);  Surgeon: Janelle Meade MD;  Location: [facility];  Service: Endoscopy;  Laterality: N/A;  Rapid on arrival    HIATAL HERNIA REPAIR      HYSTERECTOMY      LIVER BIOPSY  2018    fibrosis stage 3 JAMESON    OOPHORECTOMY      TUBAL LIGATION      UPPER GASTROINTESTINAL ENDOSCOPY      2013? unable to obtain records       Review of patient's allergies indicates:   Allergen Reactions    Amoxicillin Hives    Avelox [moxifloxacin] Hives    Clarinex [desloratadine] Hives    Pcn [penicillins] Hives    Claritin [loratadine] Palpitations    Latex, natural rubber Rash       Current Facility-Administered Medications on File Prior to Encounter   Medication    [COMPLETED] furosemide injection 80 mg    [DISCONTINUED] 0.9%  NaCl infusion    [DISCONTINUED] acetaminophen tablet 650 mg    [DISCONTINUED]  albuterol-ipratropium 2.5 mg-0.5 mg/3 mL nebulizer solution 3 mL    [DISCONTINUED] cefTRIAXone (ROCEPHIN) 1 g/50 mL D5W IVPB    [DISCONTINUED] dextrose 10% bolus 125 mL    [DISCONTINUED] dextrose 10% bolus 250 mL    [DISCONTINUED] enoxaparin injection 30 mg    [DISCONTINUED] famotidine tablet 20 mg    [DISCONTINUED] glucagon (human recombinant) injection 1 mg    [DISCONTINUED] glucose chewable tablet 16 g    [DISCONTINUED] glucose chewable tablet 24 g    [DISCONTINUED] insulin aspart U-100 pen 0-5 Units    [DISCONTINUED] melatonin tablet 6 mg    [DISCONTINUED] mupirocin 2 % ointment    [DISCONTINUED] ondansetron injection 4 mg    [DISCONTINUED] sertraline tablet 100 mg    [DISCONTINUED] sodium chloride 0.9% flush 10 mL    [DISCONTINUED] topiramate tablet 200 mg    [DISCONTINUED] traMADoL tablet 50 mg     Current Outpatient Medications on File Prior to Encounter   Medication Sig    albuterol (PROVENTIL/VENTOLIN HFA) 90 mcg/actuation inhaler Inhale 1-2 puffs into the lungs every 6 (six) hours as needed. Rescue    busPIRone (BUSPAR) 30 MG Tab Take 30 mg by mouth 2 (two) times daily.    dulaglutide (TRULICITY) 0.75 mg/0.5 mL pen injector Inject 0.75 mg into the skin every 7 days.    empagliflozin (JARDIANCE) 10 mg tablet Take 1 tablet (10 mg total) by mouth once daily.    LIDOcaine (LIDODERM) 5 % UNWRAP AND APPLY 1 PATCH TO SKIN ONCE A DAY AS NEEDED FOR PAIN. APPLY 12 HOURS THEN REMOVE FOR 12 HOURS    linaCLOtide (LINZESS) 72 mcg Cap capsule Take 1 capsule (72 mcg total) by mouth before breakfast.    metFORMIN (GLUCOPHAGE) 1000 MG tablet Take 1 tablet (1,000 mg total) by mouth 2 (two) times daily with meals.    metoprolol tartrate (LOPRESSOR) 100 MG tablet Take 100 mg by mouth 2 (two) times daily.    mirtazapine (REMERON) 30 MG tablet Take 30 mg by mouth every evening.     montelukast (SINGULAIR) 10 mg tablet Take 10 mg by mouth every evening.    nortriptyline (PAMELOR) 50 MG capsule TAKE 1 CAPSULE BY MOUTH IN THE  EVENING    pregabalin (LYRICA) 100 MG capsule TAKE 1 CAPSULE(100 MG) BY MOUTH TWICE DAILY    promethazine (PHENERGAN) 25 MG tablet Take 1 tablet (25 mg total) by mouth every 4 to 6 hours as needed.    rosuvastatin (CRESTOR) 40 MG Tab Take 40 mg by mouth once daily.    sertraline (ZOLOFT) 100 MG tablet Take 150 mg by mouth once daily.    tiZANidine (ZANAFLEX) 2 MG tablet Take 2 mg by mouth 3 (three) times daily as needed.    topiramate (TOPAMAX) 200 MG Tab Take 200 mg by mouth nightly.    valsartan-hydrochlorothiazide (DIOVAN-HCT) 80-12.5 mg per tablet Take 1 tablet by mouth once daily.    ACCU-CHEK KERRY PLUS TEST STRP Strp USE 1 STRIP TO CHECK GLUCOSE TWICE DAILY    valsartan-hydrochlorothiazide (DIOVAN-HCT) 160-25 mg per tablet Take 1 tablet by mouth once daily. For 30 days     Family History       Problem Relation (Age of Onset)    Cancer Maternal Grandmother    Clotting disorder Father, Sister    Diabetes Maternal Aunt    Hypertension Mother    Lung cancer Paternal Grandmother    No Known Problems Daughter, Maternal Uncle, Paternal Aunt, Paternal Uncle, Maternal Grandfather, Paternal Grandfather    Ulcers Father          Tobacco Use    Smoking status: Never Smoker    Smokeless tobacco: Never Used   Substance and Sexual Activity    Alcohol use: No     Alcohol/week: 0.0 standard drinks    Drug use: No    Sexual activity: Not Currently     Partners: Male     Review of Systems   Constitutional:  Positive for fatigue. Negative for chills, diaphoresis and fever.   HENT:  Negative for hearing loss and sore throat.    Eyes:  Negative for visual disturbance.   Respiratory:  Positive for cough and shortness of breath.    Cardiovascular:  Negative for chest pain and leg swelling.   Gastrointestinal:  Negative for abdominal pain, diarrhea, nausea and vomiting.   Genitourinary:  Negative for difficulty urinating and flank pain.   Musculoskeletal:  Negative for back pain.   Skin:  Negative for rash and wound.   Neurological:   Negative for dizziness, weakness and headaches.   Psychiatric/Behavioral:  Negative for agitation and confusion.    Objective:     Vital Signs (Most Recent):  Temp: 98.6 °F (37 °C) (07/15/22 2045)  Pulse: 94 (07/15/22 2259)  Resp: 20 (07/15/22 2045)  BP: 124/77 (07/15/22 2045)  SpO2: 95 % (07/15/22 2259) Vital Signs (24h Range):  Temp:  [97.7 °F (36.5 °C)-99.1 °F (37.3 °C)] 98.6 °F (37 °C)  Pulse:  [] 94  Resp:  [16-20] 20  SpO2:  [90 %-97 %] 95 %  BP: (110-134)/(53-78) 124/77     Weight: 91.7 kg (202 lb 2.6 oz)  Body mass index is 34.7 kg/m².    Physical Exam  Vitals and nursing note reviewed.   Constitutional:       General: She is not in acute distress.     Appearance: She is obese. She is not ill-appearing.      Interventions: Nasal cannula in place.      Comments: 4LNC   HENT:      Head: Normocephalic and atraumatic.      Mouth/Throat:      Mouth: Mucous membranes are moist.   Eyes:      Extraocular Movements: Extraocular movements intact.      Pupils: Pupils are equal, round, and reactive to light.   Cardiovascular:      Rate and Rhythm: Normal rate and regular rhythm.      Pulses: Normal pulses.      Heart sounds: Normal heart sounds. No murmur heard.    No friction rub. No gallop.   Pulmonary:      Effort: Pulmonary effort is normal. No respiratory distress.      Breath sounds: Examination of the right-lower field reveals decreased breath sounds. Examination of the left-lower field reveals decreased breath sounds. Decreased breath sounds present. No wheezing or rhonchi.   Abdominal:      General: Bowel sounds are normal. There is no distension.      Palpations: Abdomen is soft.      Tenderness: There is no abdominal tenderness. There is no guarding.   Musculoskeletal:         General: No swelling.      Cervical back: Normal range of motion and neck supple.      Right lower le+ Edema present.      Left lower le+ Edema present.   Skin:     General: Skin is warm and dry.      Capillary Refill:  Capillary refill takes less than 2 seconds.      Findings: No bruising, erythema or rash.   Neurological:      General: No focal deficit present.      Mental Status: She is oriented to person, place, and time and easily aroused. She is lethargic.      GCS: GCS eye subscore is 3. GCS verbal subscore is 5. GCS motor subscore is 6.   Psychiatric:         Mood and Affect: Affect is flat.         Behavior: Behavior is cooperative.         CRANIAL NERVES     CN III, IV, VI   Pupils are equal, round, and reactive to light.     Significant Labs: All pertinent labs within the past 24 hours have been reviewed.  Recent Results (from the past 24 hour(s))   CBC Auto Differential    Collection Time: 07/15/22  5:42 AM   Result Value Ref Range    WBC 11.19 3.90 - 12.70 K/uL    RBC 3.49 (L) 4.00 - 5.40 M/uL    Hemoglobin 8.8 (L) 12.0 - 16.0 g/dL    Hematocrit 28.1 (L) 37.0 - 48.5 %    MCV 81 (L) 82 - 98 fL    MCH 25.2 (L) 27.0 - 31.0 pg    MCHC 31.3 (L) 32.0 - 36.0 g/dL    RDW 15.9 (H) 11.5 - 14.5 %    Platelets 118 (L) 150 - 450 K/uL    MPV 11.4 9.2 - 12.9 fL    Immature Granulocytes 0.9 (H) 0.0 - 0.5 %    Gran # (ANC) 8.2 (H) 1.8 - 7.7 K/uL    Immature Grans (Abs) 0.10 (H) 0.00 - 0.04 K/uL    Lymph # 1.7 1.0 - 4.8 K/uL    Mono # 1.2 (H) 0.3 - 1.0 K/uL    Eos # 0.0 0.0 - 0.5 K/uL    Baso # 0.04 0.00 - 0.20 K/uL    nRBC 0 0 /100 WBC    Gran % 72.9 38.0 - 73.0 %    Lymph % 14.8 (L) 18.0 - 48.0 %    Mono % 10.9 4.0 - 15.0 %    Eosinophil % 0.1 0.0 - 8.0 %    Basophil % 0.4 0.0 - 1.9 %    Differential Method Automated    Comprehensive Metabolic Panel    Collection Time: 07/15/22  5:42 AM   Result Value Ref Range    Sodium 134 (L) 136 - 145 mmol/L    Potassium 5.4 (H) 3.5 - 5.1 mmol/L    Chloride 107 95 - 110 mmol/L    CO2 16 (L) 23 - 29 mmol/L    Glucose 127 (H) 70 - 110 mg/dL    BUN 49 (H) 6 - 20 mg/dL    Creatinine 6.0 (H) 0.5 - 1.4 mg/dL    Calcium 6.8 (LL) 8.7 - 10.5 mg/dL    Total Protein 6.3 6.0 - 8.4 g/dL    Albumin 2.6 (L) 3.5  - 5.2 g/dL    Total Bilirubin 0.2 0.1 - 1.0 mg/dL    Alkaline Phosphatase 152 (H) 55 - 135 U/L    AST 37 10 - 40 U/L    ALT 41 10 - 44 U/L    Anion Gap 11 8 - 16 mmol/L    eGFR if African American 9.1 (A) >60 mL/min/1.73 m^2    eGFR if non  7.9 (A) >60 mL/min/1.73 m^2   Magnesium    Collection Time: 07/15/22  5:42 AM   Result Value Ref Range    Magnesium 1.8 1.6 - 2.6 mg/dL   POCT glucose    Collection Time: 07/15/22 10:53 AM   Result Value Ref Range    POCT Glucose 165 (H) 70 - 110 mg/dL   Hemoglobin A1C    Collection Time: 07/15/22 10:57 AM   Result Value Ref Range    Hemoglobin A1C 7.0 (H) 4.0 - 5.6 %    Estimated Avg Glucose 154 (H) 68 - 131 mg/dL   Protein / creatinine ratio, urine    Collection Time: 07/15/22  2:41 PM   Result Value Ref Range    Protein, Urine Random 17.3 (H) 0.0 - 15.0 mg/dL    Creatinine, Urine <13.0 (L) 15.0 - 325.0 mg/dL    Prot/Creat Ratio, Urine Unable to calculate 0.00 - 0.20   Urinalysis, Reflex to Urine Culture Urine, Clean Catch    Collection Time: 07/15/22  2:50 PM    Specimen: Urine   Result Value Ref Range    Specimen UA Urine, Clean Catch     Color, UA Yellow Yellow, Straw, Jaylene    Appearance, UA Clear Clear    pH, UA 6.0 5.0 - 8.0    Specific Gravity, UA <=1.005 (A) 1.005 - 1.030    Protein, UA Negative Negative    Glucose, UA 1+ (A) Negative    Ketones, UA Negative Negative    Bilirubin (UA) Negative Negative    Occult Blood UA 3+ (A) Negative    Nitrite, UA Negative Negative    Urobilinogen, UA Negative <2.0 EU/dL    Leukocytes, UA Negative Negative   Urinalysis Microscopic    Collection Time: 07/15/22  2:50 PM   Result Value Ref Range    RBC, UA 27 (H) 0 - 4 /hpf    WBC, UA 3 0 - 5 /hpf    Bacteria Negative None-Occ /hpf    Squam Epithel, UA 1 /hpf    Hyaline Casts, UA 5.1 (A) 0-1/lpf /lpf    Microscopic Comment SEE COMMENT    POCT glucose    Collection Time: 07/15/22  4:04 PM   Result Value Ref Range    POCT Glucose 248 (H) 70 - 110 mg/dL   POCT glucose     Collection Time: 07/15/22  9:58 PM   Result Value Ref Range    POCT Glucose 138 (H) 70 - 110 mg/dL           Significant Imaging: I have reviewed all pertinent imaging results/findings within the past 24 hours.

## 2022-07-16 NOTE — ASSESSMENT & PLAN NOTE
Estimated Creatinine Clearance: 11.1 mL/min (A) (based on SCr of 7.1 mg/dL (H)).  Baseline Cr: 0.9Baseline BUN:12   Recent Labs   Lab 07/14/22  0408 07/15/22  0542 07/16/22  0301   CO2 20* 16* 14*   BUN 43* 49* 57*   CREATININE 5.1* 6.0* 7.1*   MG 1.9 1.8 1.7       -Etiology uncertain at this time  -Monitor strict urine output  - Continue to monitor renal function with daily labs and trend electrolytes  - renally dose medications  - avoid nephrotoxic agents including NSAIDs, aminoglycosides, IV contrast (unless absolutely necessary), gadolinium, fleets and other phosphorous-based laxatives  - monitor events that may lead to decreased renal perfusion (hypovolemia, hypotension, sepsis)  - monitor urine output to assure that no obstruction precipitates worsening in GFR  -Renal US without evidence of obstruction or hydronephrosis.  -Nephrology consulted  -Insulin gtt ordered

## 2022-07-17 LAB
ALBUMIN SERPL BCP-MCNC: 2.8 G/DL (ref 3.5–5.2)
ALBUMIN/CREAT UR: 121.7 UG/MG (ref 0–30)
ALBUMIN/CREAT UR: 129.6 UG/MG (ref 0–30)
ALP SERPL-CCNC: 183 U/L (ref 55–135)
ALT SERPL W/O P-5'-P-CCNC: 20 U/L (ref 10–44)
ANION GAP SERPL CALC-SCNC: 15 MMOL/L (ref 8–16)
AST SERPL-CCNC: 28 U/L (ref 10–40)
BILIRUB SERPL-MCNC: 0.3 MG/DL (ref 0.1–1)
BUN SERPL-MCNC: 60 MG/DL (ref 6–20)
CALCIUM SERPL-MCNC: 9.8 MG/DL (ref 8.7–10.5)
CALCIUM SERPL-MCNC: 9.8 MG/DL (ref 8.7–10.5)
CHLORIDE SERPL-SCNC: 107 MMOL/L (ref 95–110)
CO2 SERPL-SCNC: 17 MMOL/L (ref 23–29)
CREAT SERPL-MCNC: 6.5 MG/DL (ref 0.5–1.4)
CREAT UR-MCNC: 27 MG/DL (ref 15–325)
CREAT UR-MCNC: 46 MG/DL (ref 15–325)
EST. GFR  (AFRICAN AMERICAN): 8 ML/MIN/1.73 M^2
EST. GFR  (NON AFRICAN AMERICAN): 7 ML/MIN/1.73 M^2
GLUCOSE SERPL-MCNC: 103 MG/DL (ref 70–110)
MICROALBUMIN UR DL<=1MG/L-MCNC: 35 UG/ML
MICROALBUMIN UR DL<=1MG/L-MCNC: 56 UG/ML
PHOSPHATE SERPL-MCNC: 7.7 MG/DL (ref 2.7–4.5)
POCT GLUCOSE: 110 MG/DL (ref 70–110)
POCT GLUCOSE: 112 MG/DL (ref 70–110)
POCT GLUCOSE: 141 MG/DL (ref 70–110)
POCT GLUCOSE: 99 MG/DL (ref 70–110)
POTASSIUM SERPL-SCNC: 4.6 MMOL/L (ref 3.5–5.1)
PROT SERPL-MCNC: 7.4 G/DL (ref 6–8.4)
SODIUM SERPL-SCNC: 139 MMOL/L (ref 136–145)

## 2022-07-17 PROCEDURE — 82570 ASSAY OF URINE CREATININE: CPT | Performed by: NURSE PRACTITIONER

## 2022-07-17 PROCEDURE — 99232 SBSQ HOSP IP/OBS MODERATE 35: CPT | Mod: ,,, | Performed by: INTERNAL MEDICINE

## 2022-07-17 PROCEDURE — 36415 COLL VENOUS BLD VENIPUNCTURE: CPT | Performed by: INTERNAL MEDICINE

## 2022-07-17 PROCEDURE — 63600175 PHARM REV CODE 636 W HCPCS

## 2022-07-17 PROCEDURE — 99232 PR SUBSEQUENT HOSPITAL CARE,LEVL II: ICD-10-PCS | Mod: ,,, | Performed by: INTERNAL MEDICINE

## 2022-07-17 PROCEDURE — 25000003 PHARM REV CODE 250: Performed by: FAMILY MEDICINE

## 2022-07-17 PROCEDURE — 80053 COMPREHEN METABOLIC PANEL: CPT | Performed by: INTERNAL MEDICINE

## 2022-07-17 PROCEDURE — 94760 N-INVAS EAR/PLS OXIMETRY 1: CPT

## 2022-07-17 PROCEDURE — 82043 UR ALBUMIN QUANTITATIVE: CPT | Performed by: NURSE PRACTITIONER

## 2022-07-17 PROCEDURE — 99900035 HC TECH TIME PER 15 MIN (STAT)

## 2022-07-17 PROCEDURE — 11000001 HC ACUTE MED/SURG PRIVATE ROOM

## 2022-07-17 PROCEDURE — 25000003 PHARM REV CODE 250: Performed by: NURSE PRACTITIONER

## 2022-07-17 PROCEDURE — 84100 ASSAY OF PHOSPHORUS: CPT | Performed by: INTERNAL MEDICINE

## 2022-07-17 PROCEDURE — 27000221 HC OXYGEN, UP TO 24 HOURS

## 2022-07-17 PROCEDURE — 25000003 PHARM REV CODE 250: Performed by: INTERNAL MEDICINE

## 2022-07-17 RX ORDER — METOPROLOL TARTRATE 50 MG/1
100 TABLET ORAL 2 TIMES DAILY
Status: DISCONTINUED | OUTPATIENT
Start: 2022-07-17 | End: 2022-07-21 | Stop reason: HOSPADM

## 2022-07-17 RX ORDER — SODIUM CITRATE AND CITRIC ACID MONOHYDRATE 334; 500 MG/5ML; MG/5ML
30 SOLUTION ORAL 2 TIMES DAILY
Status: COMPLETED | OUTPATIENT
Start: 2022-07-17 | End: 2022-07-18

## 2022-07-17 RX ADMIN — HEPARIN SODIUM 5000 UNITS: 5000 INJECTION INTRAVENOUS; SUBCUTANEOUS at 03:07

## 2022-07-17 RX ADMIN — TRAMADOL HYDROCHLORIDE 50 MG: 50 TABLET, COATED ORAL at 09:07

## 2022-07-17 RX ADMIN — METOPROLOL TARTRATE 100 MG: 50 TABLET, FILM COATED ORAL at 10:07

## 2022-07-17 RX ADMIN — METOPROLOL TARTRATE 100 MG: 50 TABLET, FILM COATED ORAL at 09:07

## 2022-07-17 RX ADMIN — SODIUM CITRATE AND CITRIC ACID MONOHYDRATE 30 ML: 500; 334 SOLUTION ORAL at 10:07

## 2022-07-17 RX ADMIN — TRAMADOL HYDROCHLORIDE 50 MG: 50 TABLET, COATED ORAL at 05:07

## 2022-07-17 RX ADMIN — MUPIROCIN: 20 OINTMENT TOPICAL at 10:07

## 2022-07-17 RX ADMIN — HEPARIN SODIUM 5000 UNITS: 5000 INJECTION INTRAVENOUS; SUBCUTANEOUS at 05:07

## 2022-07-17 RX ADMIN — MUPIROCIN: 20 OINTMENT TOPICAL at 09:07

## 2022-07-17 RX ADMIN — SODIUM CITRATE AND CITRIC ACID MONOHYDRATE 30 ML: 500; 334 SOLUTION ORAL at 09:07

## 2022-07-17 RX ADMIN — HEPARIN SODIUM 5000 UNITS: 5000 INJECTION INTRAVENOUS; SUBCUTANEOUS at 09:07

## 2022-07-17 NOTE — PROGRESS NOTES
"[facility] Medicine  Progress Note    Patient Name: Rohini Dang  MRN: [id number]  Patient Class: IP- Inpatient   Admission Date: 7/15/2022  Length of Stay: 2 days  Attending Physician: Arcelia Davidson*  Primary Care Provider: Skip Celestin NP        Subjective:     Principal Problem:Acute renal failure        HPI:  Per  note: "42-year-old female with a history of anemia, bipolar disorder, diabetes, esophageal stenosis, and fatty liver admitted to [facility] on July 13 with acute kidney injury (creatinine 4.7, baseline 1.1 on June 19) and hyponatremia.  She was initially treated with IV fluids, but subsequently appeared somewhat volume overloaded on July 15.  Fluids were stopped, and she received 1 dose of Lasix.  Creatinine continued to worsen, and she has oliguria.  Etiology of the acute kidney injury is uncertain.  Multiple serologies have been ordered.  Requesting transfer to Hospital Medicine for Nephrology evaluation.  Referring provider noted she has no evidence of acute respiratory distress or altered mentation. July 15:  Sodium 134, potassium 5.4, chloride 107, CO2 16, BUN 49, creatinine 6 (4.7 on admit-July 13), glucose 127, calcium 6.8, magnesium 1.8, white blood cells 11.19, hemoglobin 8.8, hematocrit 28, platelets 118, multiple autoimmune/inflammatory labs ordered. July 14:  Renal ultrasound with no abnormality noted. July 13:  COVID negative, CPK 72, urine drug screen negative. VS:  Temperature 98.6°, pulse 95, respirations 20, blood pressure 133/65, O2 sats 91% (1 L nasal cannula)"    Patient transferred from [facility] to [facility] for evaluation by nephrology. On exam, patient lethargic but responds to name, reports difficulty breathing and shortness of breath. She was on 4L NC upon assessment. Nephrology consulted. Will admit to hospital medicine for further evaluation and treatment.               Overview/Hospital Course:  She was transferred " to [facility] for nephrology eval. She was started on Lasix gtt but then held per Nephrology. Urine output closely monitored.       Interval History: no distress noted. Will follow recs from Nephrology.     Review of Systems   Constitutional:  Positive for activity change, appetite change and fever (low grade temp). Negative for fatigue.   HENT:  Negative for trouble swallowing.    Eyes:  Negative for visual disturbance.   Respiratory:  Positive for shortness of breath. Negative for cough.    Cardiovascular:  Negative for chest pain.   Gastrointestinal:  Negative for diarrhea, nausea and vomiting.   Genitourinary:  Positive for decreased urine volume. Negative for hematuria.   Musculoskeletal:  Negative for arthralgias, back pain, gait problem and myalgias.   Skin:  Negative for pallor and wound.   Neurological:  Positive for weakness.   Hematological:  Does not bruise/bleed easily.   Psychiatric/Behavioral:  Negative for confusion and hallucinations. The patient is not nervous/anxious.    Objective:     Vital Signs (Most Recent):  Temp: 99.2 °F (37.3 °C) (07/17/22 1204)  Pulse: 75 (07/17/22 1204)  Resp: 15 (07/17/22 1204)  BP: 126/75 (07/17/22 1204)  SpO2: 96 % (07/17/22 1204)   Vital Signs (24h Range):  Temp:  [96.6 °F (35.9 °C)-99.9 °F (37.7 °C)] 99.2 °F (37.3 °C)  Pulse:  [] 75  Resp:  [14-20] 15  SpO2:  [93 %-96 %] 96 %  BP: (118-139)/(67-80) 126/75     Weight: 91.7 kg (202 lb 2.6 oz)  Body mass index is 34.7 kg/m².    Intake/Output Summary (Last 24 hours) at 7/17/2022 1331  Last data filed at 7/17/2022 1000  Gross per 24 hour   Intake 888 ml   Output 3000 ml   Net -2112 ml        Physical Exam  Vitals and nursing note reviewed.   Constitutional:       Appearance: She is obese. She is ill-appearing.   HENT:      Head: Normocephalic and atraumatic.      Nose: Nose normal.      Mouth/Throat:      Mouth: Mucous membranes are dry.   Eyes:      Extraocular Movements: Extraocular movements intact.       Conjunctiva/sclera: Conjunctivae normal.      Pupils: Pupils are equal, round, and reactive to light.   Cardiovascular:      Rate and Rhythm: Normal rate and regular rhythm.      Pulses: Normal pulses.      Heart sounds: Normal heart sounds.   Pulmonary:      Effort: Pulmonary effort is normal. No respiratory distress.      Breath sounds: Normal breath sounds.   Abdominal:      General: Bowel sounds are normal. There is distension.      Palpations: Abdomen is soft.      Tenderness: There is no abdominal tenderness. There is no guarding.   Musculoskeletal:         General: Swelling (noted to the upper ext) present.      Cervical back: Normal range of motion and neck supple.      Right lower leg: Edema present.      Left lower leg: Edema present.   Skin:     General: Skin is warm and dry.      Capillary Refill: Capillary refill takes 2 to 3 seconds.      Coloration: Skin is pale.   Neurological:      Mental Status: She is alert and oriented to person, place, and time.      Motor: Weakness and tremor present.   Psychiatric:         Mood and Affect: Mood normal.       Significant Labs: All pertinent labs within the past 24 hours have been reviewed.    Significant Imaging: I have reviewed all pertinent imaging results/findings within the past 24 hours.      Assessment/Plan:      * Acute renal failure  Estimated Creatinine Clearance: 12.1 mL/min (A) (based on SCr of 6.5 mg/dL (H)).  Baseline Cr: 0.9Baseline BUN:12   Recent Labs   Lab 07/14/22  0408 07/15/22  0542 07/16/22  0301 07/17/22  0743   CO2 20* 16* 14* 17*   BUN 43* 49* 57* 60*   CREATININE 5.1* 6.0* 7.1* 6.5*   MG 1.9 1.8 1.7  --    PHOS  --   --   --  7.7*       -Etiology uncertain at this time  -Monitor strict urine output  - Continue to monitor renal function with daily labs and trend electrolytes  - renally dose medications  - avoid nephrotoxic agents including NSAIDs, aminoglycosides, IV contrast (unless absolutely necessary), gadolinium, fleets and other  phosphorous-based laxatives  - monitor events that may lead to decreased renal perfusion (hypovolemia, hypotension, sepsis)  - monitor urine output to assure that no obstruction precipitates worsening in GFR  -Renal US without evidence of obstruction or hydronephrosis.  -Nephrology consulted      Dyspnea  -report dyspnea on exam; diminished BS noted--patient was on 4L NC  -Will obtain CXR to assess for cardiopulmonary abnormality  -ABG ordered  -Supplemental oxygen PRN for SpO2 <90%  -Duo-nebs PRN for SOB/wheezing      Anemia  -H/H is 8.8, which is stable and consistent with previous laboratory measurements. Patient exhibits no signs or symptoms of acute bleeding  -Etiology likely due to multifactorial..iron deficiency vs chronic disease?  -No indication for blood transfusion  -Continue to monitor serial CBC  -Transfuse for Hgb <7 or if symptomatic        Type 2 diabetes mellitus, without long-term current use of insulin  Hemoglobin A1C   Date Value Ref Range Status   07/15/2022 7.0 (H) 4.0 - 5.6 % Final     -Serum glucose on admit 127  - hold home oral medications  - LDSSI with ACCUcheck ACHS  - Diabetic diet  -Hypoglycemia protocol PRN          Hyperkalemia  -Last electrolytes reviewed-   Recent Labs   Lab 07/16/22  0301 07/17/22  0743   K 4.4 4.6   .   -likely 2/2 to worsening renal function  -Repeat BMP and Mg in am  -Replace electrolytes as indicated  -Monitor on telemetry        JAMESON (nonalcoholic steatohepatitis)  -chronic; stable  -no evidence of decompensation  -continue to monitor        VTE Risk Mitigation (From admission, onward)         Ordered     heparin (porcine) injection 5,000 Units  Every 8 hours         07/15/22 2102     IP VTE HIGH RISK PATIENT  Once         07/15/22 2102     Place sequential compression device  Until discontinued         07/15/22 2102                Discharge Planning   FERNANDO:      Code Status: Full Code   Is the patient medically ready for discharge?:     Reason for patient  still in hospital (select all that apply): Laboratory test, Treatment, Imaging and Consult recommendations                     Spencer Altamirano NP  Department of Hospital Medicine   [facility]

## 2022-07-17 NOTE — ASSESSMENT & PLAN NOTE
Estimated Creatinine Clearance: 12.1 mL/min (A) (based on SCr of 6.5 mg/dL (H)).  Baseline Cr: 0.9Baseline BUN:12   Recent Labs   Lab 07/14/22  0408 07/15/22  0542 07/16/22  0301 07/17/22  0743   CO2 20* 16* 14* 17*   BUN 43* 49* 57* 60*   CREATININE 5.1* 6.0* 7.1* 6.5*   MG 1.9 1.8 1.7  --    PHOS  --   --   --  7.7*       -Etiology uncertain at this time  -Monitor strict urine output  - Continue to monitor renal function with daily labs and trend electrolytes  - renally dose medications  - avoid nephrotoxic agents including NSAIDs, aminoglycosides, IV contrast (unless absolutely necessary), gadolinium, fleets and other phosphorous-based laxatives  - monitor events that may lead to decreased renal perfusion (hypovolemia, hypotension, sepsis)  - monitor urine output to assure that no obstruction precipitates worsening in GFR  -Renal US without evidence of obstruction or hydronephrosis.  -Nephrology consulted

## 2022-07-17 NOTE — DISCHARGE SUMMARY
[facility] Medicine  Discharge Summary      Patient Name: Rohini Dang  MRN: [id number]  Patient Class: IP- Inpatient  Admission Date: 7/13/2022  Hospital Length of Stay: 1 days  Discharge Date and Time: 7/15/2022  7:00 PM  Attending Physician: No att. providers found   Discharging Provider: Nikhil Karimi III, MD  Primary Care Provider: Skip Celestin NP      HPI:   Pt is a 41 y/o female with Hx of anxiety, bipolar, JAMESON, and DM II, presented to her GYN yesterday morning with c/o vaginal/pelvic pressure and report that she hadn't voided since the previous night and felt the need to void but couldn't. Her GYN reported similar symptoms several times in the past year. The US reported as normal and her bladder had very little urine in it. She had not urinated by the afternoon despite drinking plenty of fluids, so she reported to the ED as instructed by her GYN. Dr. Rodriguez reported pt was found to have minimal urine in her bladder per bedside US. Her kidney function was elevated and patient appeared dry. She was admitted for acute renal failure, a yarbrough catheter was inserted for accurate I&O, and she was given IV fluids. This morning, her serum creatinine is trending up.      * No surgery found *      Hospital Course:   7/15 SD: Pt appears fluid overloaded. Yarbrough catheter in place with urine output. I&O shows +7.8L since admit. Serum Creatinine trending up. Renal US negative. Additional studies ordered, nephrology consulted.    Discharge Note:  Patient was admitted and had worsening oliguric renal failure.  We have worked up intrinsic renal processes and unfortunately do not have general surgery to assist or Nephrology.  We are transferring out to a higher level of care and consulting specialties.       Goals of Care Treatment Preferences:  Code Status: Full Code      Consults:   Consults (From admission, onward)        Status Ordering Provider     Inpatient consult to Nephrology  Once         Provider:  Angella Gray MD    Completed BHASKAR MONIQUE          * Acute renal failure  Pt appears fluid overloaded. Quick catheter in place with urine output. I&O shows +7.8L since admit. IV fluids discontinued. Lasix 80mg IV x1 dose. Serum Creatinine trending up. Renal US negative. Additional studies ordered, nephrology consulted.    Borderline personality disorder  Mood stable. Continue home meds.      Hyponatremia  Improving. Monitor daily labs.      DVT prophylaxis  Lovenox      JAMESON (nonalcoholic steatohepatitis)  Avoid hepatotoxic meds. Monitor daily labs.        Final Active Diagnoses:    Diagnosis Date Noted POA    PRINCIPAL PROBLEM:  Acute renal failure [N17.9] 07/14/2022 Yes    Hyponatremia [E87.1] 07/14/2022 Yes    Borderline personality disorder [F60.3] 07/14/2022 Yes    DVT prophylaxis [Z29.9] 05/11/2021 Not Applicable    JAMESON (nonalcoholic steatohepatitis) [K75.81] 04/07/2016 Yes      Problems Resolved During this Admission:       Discharged Condition: fair    Disposition: Hospice/Medical Facility    Follow Up:    Patient Instructions:   No discharge procedures on file.    Significant Diagnostic Studies: Noted.    Pending Diagnostic Studies:     Procedure Component Value Units Date/Time    MARCO ANTONIO Screen w/Reflex [[id number]] Collected: 07/15/22 1057    Order Status: Sent Lab Status: In process Updated: 07/15/22 1717    Specimen: Blood     Anti-Neutrophilic Cytoplasmic Antibody [[id number]] Collected: 07/15/22 1057    Order Status: Sent Lab Status: In process Updated: 07/15/22 1703    Specimen: Blood     Anti-Neutrophilic Cytoplasmic Antibody [[id number]] Collected: 07/15/22 1057    Order Status: Sent Lab Status: In process Updated: 07/15/22 1703    Specimen: Blood     Complement, Total [[id number]] Collected: 07/15/22 1057    Order Status: Sent Lab Status: In process Updated: 07/15/22 1703    Specimen: Blood     DSDNA IgG By IFA [[id number]] Collected: 07/15/22 1057    Order Status: Sent Lab  Status: In process Updated: 07/15/22 1717    Specimen: Blood     HIV 1/2 Ag/Ab (4th Gen) [[id number]] Collected: 07/15/22 1057    Order Status: Sent Lab Status: In process Updated: 07/15/22 1717    Specimen: Blood     Hepatitis Panel, Acute [[id number]] Collected: 07/15/22 1057    Order Status: Sent Lab Status: In process Updated: 07/15/22 1717    Specimen: Blood     Streptococcal Antibodes (ASO Titer) [[id number]] Collected: 07/15/22 1057    Order Status: Sent Lab Status: In process Updated: 07/15/22 1717    Specimen: Blood          Medications:  Reconciled Home Medications:      Medication List      STOP taking these medications    LATUDA 120 mg Tab  Generic drug: lurasidone        ASK your doctor about these medications    ACCU-CHEK KERRY PLUS TEST STRP Strp  Generic drug: blood sugar diagnostic  USE 1 STRIP TO CHECK GLUCOSE TWICE DAILY     albuterol 90 mcg/actuation inhaler  Commonly known as: PROVENTIL/VENTOLIN HFA  Inhale 1-2 puffs into the lungs every 6 (six) hours as needed. Rescue     busPIRone 30 MG Tab  Commonly known as: BUSPAR  Take 30 mg by mouth 2 (two) times daily.     empagliflozin 10 mg tablet  Commonly known as: JARDIANCE  Take 1 tablet (10 mg total) by mouth once daily.     LIDOcaine 5 %  Commonly known as: LIDODERM  UNWRAP AND APPLY 1 PATCH TO SKIN ONCE A DAY AS NEEDED FOR PAIN. APPLY 12 HOURS THEN REMOVE FOR 12 HOURS     linaCLOtide 72 mcg Cap capsule  Commonly known as: LINZESS  Take 1 capsule (72 mcg total) by mouth before breakfast.     metFORMIN 1000 MG tablet  Commonly known as: GLUCOPHAGE  Take 1 tablet (1,000 mg total) by mouth 2 (two) times daily with meals.     metoprolol tartrate 100 MG tablet  Commonly known as: LOPRESSOR  Take 100 mg by mouth 2 (two) times daily.     mirtazapine 30 MG tablet  Commonly known as: REMERON  Take 30 mg by mouth every evening.     montelukast 10 mg tablet  Commonly known as: SINGULAIR  Take 10 mg by mouth every evening.     nortriptyline 50 MG  capsule  Commonly known as: PAMELOR  TAKE 1 CAPSULE BY MOUTH IN THE EVENING     pregabalin 100 MG capsule  Commonly known as: LYRICA  TAKE 1 CAPSULE(100 MG) BY MOUTH TWICE DAILY     promethazine 25 MG tablet  Commonly known as: PHENERGAN  Take 1 tablet (25 mg total) by mouth every 4 to 6 hours as needed.     rosuvastatin 40 MG Tab  Commonly known as: CRESTOR  Take 40 mg by mouth once daily.     sertraline 100 MG tablet  Commonly known as: ZOLOFT  Take 150 mg by mouth once daily.     tiZANidine 2 MG tablet  Commonly known as: ZANAFLEX  Take 2 mg by mouth 3 (three) times daily as needed.     topiramate 200 MG Tab  Commonly known as: TOPAMAX  Take 200 mg by mouth nightly.     TRULICITY 0.75 mg/0.5 mL pen injector  Generic drug: dulaglutide  Inject 0.75 mg into the skin every 7 days.     * valsartan-hydrochlorothiazide 160-25 mg per tablet  Commonly known as: DIOVAN-HCT  Take 1 tablet by mouth once daily. For 30 days     * valsartan-hydrochlorothiazide 80-12.5 mg per tablet  Commonly known as: DIOVAN-HCT  Take 1 tablet by mouth once daily.         * This list has 2 medication(s) that are the same as other medications prescribed for you. Read the directions carefully, and ask your doctor or other care provider to review them with you.                Indwelling Lines/Drains at time of discharge:   Lines/Drains/Airways     Drain  Duration                Urethral Catheter 07/13/22 1559 Non-latex 16 Fr. 3 days                Time spent on the discharge of patient: 45 minutes         Nikhil Karimi III, MD  Department of Hospital Medicine  [facility]

## 2022-07-17 NOTE — SUBJECTIVE & OBJECTIVE
Interval History: no distress noted. Will follow recs from Nephrology.     Review of Systems   Constitutional:  Positive for activity change, appetite change and fever (low grade temp). Negative for fatigue.   HENT:  Negative for trouble swallowing.    Eyes:  Negative for visual disturbance.   Respiratory:  Positive for shortness of breath. Negative for cough.    Cardiovascular:  Negative for chest pain.   Gastrointestinal:  Negative for diarrhea, nausea and vomiting.   Genitourinary:  Positive for decreased urine volume. Negative for hematuria.   Musculoskeletal:  Negative for arthralgias, back pain, gait problem and myalgias.   Skin:  Negative for pallor and wound.   Neurological:  Positive for weakness.   Hematological:  Does not bruise/bleed easily.   Psychiatric/Behavioral:  Negative for confusion and hallucinations. The patient is not nervous/anxious.    Objective:     Vital Signs (Most Recent):  Temp: 99.2 °F (37.3 °C) (07/17/22 1204)  Pulse: 75 (07/17/22 1204)  Resp: 15 (07/17/22 1204)  BP: 126/75 (07/17/22 1204)  SpO2: 96 % (07/17/22 1204)   Vital Signs (24h Range):  Temp:  [96.6 °F (35.9 °C)-99.9 °F (37.7 °C)] 99.2 °F (37.3 °C)  Pulse:  [] 75  Resp:  [14-20] 15  SpO2:  [93 %-96 %] 96 %  BP: (118-139)/(67-80) 126/75     Weight: 91.7 kg (202 lb 2.6 oz)  Body mass index is 34.7 kg/m².    Intake/Output Summary (Last 24 hours) at 7/17/2022 1331  Last data filed at 7/17/2022 1000  Gross per 24 hour   Intake 888 ml   Output 3000 ml   Net -2112 ml        Physical Exam  Vitals and nursing note reviewed.   Constitutional:       Appearance: She is obese. She is ill-appearing.   HENT:      Head: Normocephalic and atraumatic.      Nose: Nose normal.      Mouth/Throat:      Mouth: Mucous membranes are dry.   Eyes:      Extraocular Movements: Extraocular movements intact.      Conjunctiva/sclera: Conjunctivae normal.      Pupils: Pupils are equal, round, and reactive to light.   Cardiovascular:      Rate and  Rhythm: Normal rate and regular rhythm.      Pulses: Normal pulses.      Heart sounds: Normal heart sounds.   Pulmonary:      Effort: Pulmonary effort is normal. No respiratory distress.      Breath sounds: Normal breath sounds.   Abdominal:      General: Bowel sounds are normal. There is distension.      Palpations: Abdomen is soft.      Tenderness: There is no abdominal tenderness. There is no guarding.   Musculoskeletal:         General: Swelling (noted to the upper ext) present.      Cervical back: Normal range of motion and neck supple.      Right lower leg: Edema present.      Left lower leg: Edema present.   Skin:     General: Skin is warm and dry.      Capillary Refill: Capillary refill takes 2 to 3 seconds.      Coloration: Skin is pale.   Neurological:      Mental Status: She is alert and oriented to person, place, and time.      Motor: Weakness and tremor present.   Psychiatric:         Mood and Affect: Mood normal.       Significant Labs: All pertinent labs within the past 24 hours have been reviewed.    Significant Imaging: I have reviewed all pertinent imaging results/findings within the past 24 hours.

## 2022-07-17 NOTE — ASSESSMENT & PLAN NOTE
-Last electrolytes reviewed-   Recent Labs   Lab 07/16/22  0301 07/17/22  0743   K 4.4 4.6   .   -likely 2/2 to worsening renal function  -Repeat BMP and Mg in am  -Replace electrolytes as indicated  -Monitor on telemetry

## 2022-07-17 NOTE — PROGRESS NOTES
[facility] Progress Note    Admit Date: 7/15/2022   LOS: 2 days     SUBJECTIVE:     Follow-up For:  evaluation and management of HENRY    Overnight Events:  Did well overnight with no acute events.  Otherwise denies recent CP, SOB, palp, abd pain, n/v/d, LE swelling.  Denies appetite disturbances or significant fatigue. Her serum BUN/Cr slightly improved this am ~ 60/6.5. Continues to make urine UOP ~ 3.4 L yesterday.    Scheduled Meds:   heparin (porcine)  5,000 Units Subcutaneous Q8H    metoprolol tartrate  100 mg Oral BID    mupirocin   Nasal BID    sodium citrate-citric acid 500-334 mg/5 ml  30 mL Oral BID     Continuous Infusions:  PRN Meds:acetaminophen, albuterol-ipratropium, aluminum-magnesium hydroxide-simethicone, glucagon (human recombinant), glucose, glucose, insulin aspart U-100, melatonin, ondansetron, prochlorperazine, simethicone, sodium chloride 0.9%, traMADoL    Review of patient's allergies indicates:   Allergen Reactions    Amoxicillin Hives    Avelox [moxifloxacin] Hives    Clarinex [desloratadine] Hives    Pcn [penicillins] Hives    Claritin [loratadine] Palpitations    Latex, natural rubber Rash       Review of Systems  10 point ROS done and is neg besides what is listed above in HPI    OBJECTIVE:     Vital Signs (Most Recent)  Temp: 98.9 °F (37.2 °C) (07/17/22 1700)  Pulse: 80 (07/17/22 1700)  Resp: 15 (07/17/22 1700)  BP: 139/87 (07/17/22 1700)  SpO2: 95 % (07/17/22 1700)    Vital Signs Range (Last 24H):  Temp:  [96.6 °F (35.9 °C)-99.4 °F (37.4 °C)]   Pulse:  []   Resp:  [15-20]   BP: (118-139)/(67-87)   SpO2:  [94 %-96 %]     I & O (Last 24H):    Intake/Output Summary (Last 24 hours) at 7/17/2022 1724  Last data filed at 7/17/2022 1500  Gross per 24 hour   Intake 1138 ml   Output 3350 ml   Net -2212 ml     Physical Exam:  GEN:  appears well, comfortably in bed, in no acute distress  HEENT/NECK:  anicteric sclera, MMM, no apparent JVD  CVS:  RRR with no murmurs or  rubs  PULM:  CTAB  ABD:  soft, nontender, nondistended, normal bowel sounds  EXTR:  no edema  SKIN:  no rashes or lesions, warm and not diaphoretic  ACCESS:  none    Laboratory:  I have reviewed all pertinent lab results within the past 24 hours.  CBC:   Recent Labs   Lab 07/16/22  0301   WBC 9.08   RBC 3.30*   HGB 8.5*   HCT 26.4*   *   MCV 80*   MCH 25.8*   MCHC 32.2     CMP:   Recent Labs   Lab 07/17/22  0743      CALCIUM 9.8  9.8   ALBUMIN 2.8*   PROT 7.4      K 4.6   CO2 17*      BUN 60*   CREATININE 6.5*   ALKPHOS 183*   ALT 20   AST 28   BILITOT 0.3     Recent Labs   Lab 07/16/22  1354   COLORU Colorless*   SPECGRAV 1.010   PHUR 6.0   PROTEINUA Negative   BACTERIA Rare   NITRITE Negative   LEUKOCYTESUR 1+*   UROBILINOGEN Negative   HYALINECASTS 1       Diagnostic Results:  Renal US 07/16/2022:  Impression:     Hepatosplenomegaly with coarse echogenic liver.  Correlate for fibrofatty change.     Enlarged kidneys.  Correlate for is infiltrative process such is amyloid, nephritis or nephrotic syndrome .    ASSESSMENT/PLAN:     Rohini Dang is a 42 y.o. morbidly obese female w PMH of DM II, HLD, JAMESON, esophageal stenosis, fibromyalgia and bipolar disorder, who was admitted 07/13 w/ HENRY and new onset pelvic pressure; [facility] nephrology are following for;     #) Stage III HENRY:  - Cr was progressively rising since this admission, highest level ~ 7.1, GFR ~ 6.0  - Slight improvement today  - Baseline Cr ~ 1.0-1.2 (as of 06/19/2022)  - Pt was initially oliguric but currently having normal urine output, 3.4L overnight  - Urine Na ~ 72, Urine Cr < 13, FeNa ~ 22  - UA +ve for microscopic hematuria  - Urine Microalbumin/Cr ratio ~ 129.6  - Renal US w/ significantly enlarged B/L kidneys, no evidence for obstructive process/hydronephrosis  - Etiology remains unclear at this point but highly suspect intrinsic renal etiology, possible ATN/AIN in the setting of recent NSAIDs use Vs vascular Vs  glomerular  - MARCO ANTONIO, ANCA, ASO, C3/C4, HIV, hepatitis panel, DsDNA panels still pending  - Recommend IR consult for renal biopsy for further work up       #) Hypertension   - On lopressor 100 mg BID, which Ok to resume  - Valsartan-HCTZ, 80-12.5 mg, hold for now     #) Anemia Evaluation  - Hb ~ 8.5 this am  - iron panel w/ iron sat ~ 7%  - Picture fits iron deficiency anemia rather than anemia of kidney disease  - Pt w/ Hx of heavy menstrual bleeding  - Recommend supplementation w/ IV iron infusion     #) AGMA:  - Serum HCO3 ~ 14, AG ~ 16  - Mostly related to her HENRY as above              Yousuf Guerrero MD  [facility]

## 2022-07-17 NOTE — PLAN OF CARE
"Plan of care reviewed with the patient. Scheduled medicines given and the patient tolerated well. Fall and safety precautions taken and the standard interventions are in place. On 2 L Oxygen through Nasal Cannula saturating @ 95%. Patient is on Telemetry with NSR, no true"red alarms" noted in the shift. Accu check was 100 mg/dl. No acute distress reported either in the shift. Advised the patient to call for the assistance. Continued monitoring the patient.  "

## 2022-07-18 LAB
ANA SER QL IF: NORMAL
ANCA AB TITR SER IF: NORMAL TITER
ANCA AB TITR SER IF: NORMAL TITER
ANION GAP SERPL CALC-SCNC: 13 MMOL/L (ref 8–16)
ASO AB SERPL-ACNC: 17 IU/ML
BASOPHILS # BLD AUTO: 0.05 K/UL (ref 0–0.2)
BASOPHILS NFR BLD: 0.5 % (ref 0–1.9)
BUN SERPL-MCNC: 56 MG/DL (ref 6–20)
CALCIUM SERPL-MCNC: 8.6 MG/DL (ref 8.7–10.5)
CH50 SERPL-ACNC: >95 U/ML (ref 42–95)
CHLORIDE SERPL-SCNC: 107 MMOL/L (ref 95–110)
CO2 SERPL-SCNC: 19 MMOL/L (ref 23–29)
CREAT SERPL-MCNC: 4.9 MG/DL (ref 0.5–1.4)
DIFFERENTIAL METHOD: ABNORMAL
DSDNA IGG SER QL IA: 2 IU (ref 0–24)
EOSINOPHIL # BLD AUTO: 0.3 K/UL (ref 0–0.5)
EOSINOPHIL NFR BLD: 2.8 % (ref 0–8)
ERYTHROCYTE [DISTWIDTH] IN BLOOD BY AUTOMATED COUNT: 16.1 % (ref 11.5–14.5)
EST. GFR  (AFRICAN AMERICAN): 12 ML/MIN/1.73 M^2
EST. GFR  (NON AFRICAN AMERICAN): 10 ML/MIN/1.73 M^2
GLUCOSE SERPL-MCNC: 104 MG/DL (ref 70–110)
HAV IGM SERPL QL IA: NEGATIVE
HBV CORE IGM SERPL QL IA: NEGATIVE
HBV SURFACE AG SERPL QL IA: NEGATIVE
HCT VFR BLD AUTO: 25.6 % (ref 37–48.5)
HCV AB SERPL QL IA: NEGATIVE
HGB BLD-MCNC: 8.1 G/DL (ref 12–16)
HIV 1+2 AB+HIV1 P24 AG SERPL QL IA: NEGATIVE
IMM GRANULOCYTES # BLD AUTO: 0.09 K/UL (ref 0–0.04)
IMM GRANULOCYTES NFR BLD AUTO: 0.8 % (ref 0–0.5)
LYMPHOCYTES # BLD AUTO: 2.3 K/UL (ref 1–4.8)
LYMPHOCYTES NFR BLD: 21.6 % (ref 18–48)
MAGNESIUM SERPL-MCNC: 1.8 MG/DL (ref 1.6–2.6)
MCH RBC QN AUTO: 25 PG (ref 27–31)
MCHC RBC AUTO-ENTMCNC: 31.6 G/DL (ref 32–36)
MCV RBC AUTO: 79 FL (ref 82–98)
MONOCYTES # BLD AUTO: 1.3 K/UL (ref 0.3–1)
MONOCYTES NFR BLD: 12.6 % (ref 4–15)
NEUTROPHILS # BLD AUTO: 6.6 K/UL (ref 1.8–7.7)
NEUTROPHILS NFR BLD: 61.7 % (ref 38–73)
NRBC BLD-RTO: 0 /100 WBC
P-ANCA TITR SER IF: NORMAL TITER
P-ANCA TITR SER IF: NORMAL TITER
PHOSPHATE SERPL-MCNC: 6.2 MG/DL (ref 2.7–4.5)
PLATELET # BLD AUTO: 179 K/UL (ref 150–450)
PMV BLD AUTO: 10.5 FL (ref 9.2–12.9)
POCT GLUCOSE: 100 MG/DL (ref 70–110)
POCT GLUCOSE: 106 MG/DL (ref 70–110)
POCT GLUCOSE: 108 MG/DL (ref 70–110)
POCT GLUCOSE: 88 MG/DL (ref 70–110)
POTASSIUM SERPL-SCNC: 4.2 MMOL/L (ref 3.5–5.1)
RBC # BLD AUTO: 3.24 M/UL (ref 4–5.4)
SODIUM SERPL-SCNC: 139 MMOL/L (ref 136–145)
WBC # BLD AUTO: 10.62 K/UL (ref 3.9–12.7)

## 2022-07-18 PROCEDURE — 11000001 HC ACUTE MED/SURG PRIVATE ROOM

## 2022-07-18 PROCEDURE — 36415 COLL VENOUS BLD VENIPUNCTURE: CPT | Performed by: HOSPITALIST

## 2022-07-18 PROCEDURE — 25000003 PHARM REV CODE 250: Performed by: NURSE PRACTITIONER

## 2022-07-18 PROCEDURE — 63600175 PHARM REV CODE 636 W HCPCS

## 2022-07-18 PROCEDURE — 85025 COMPLETE CBC W/AUTO DIFF WBC: CPT | Performed by: HOSPITALIST

## 2022-07-18 PROCEDURE — 80048 BASIC METABOLIC PNL TOTAL CA: CPT | Performed by: HOSPITALIST

## 2022-07-18 PROCEDURE — 25000003 PHARM REV CODE 250: Performed by: HOSPITALIST

## 2022-07-18 PROCEDURE — 83735 ASSAY OF MAGNESIUM: CPT | Performed by: HOSPITALIST

## 2022-07-18 PROCEDURE — 25000003 PHARM REV CODE 250

## 2022-07-18 PROCEDURE — 25000003 PHARM REV CODE 250: Performed by: RADIOLOGY

## 2022-07-18 PROCEDURE — 94761 N-INVAS EAR/PLS OXIMETRY MLT: CPT

## 2022-07-18 PROCEDURE — 84100 ASSAY OF PHOSPHORUS: CPT | Performed by: HOSPITALIST

## 2022-07-18 PROCEDURE — 63600175 PHARM REV CODE 636 W HCPCS: Performed by: RADIOLOGY

## 2022-07-18 PROCEDURE — 25000003 PHARM REV CODE 250: Performed by: INTERNAL MEDICINE

## 2022-07-18 PROCEDURE — 25000003 PHARM REV CODE 250: Performed by: FAMILY MEDICINE

## 2022-07-18 PROCEDURE — 27000221 HC OXYGEN, UP TO 24 HOURS

## 2022-07-18 RX ORDER — AMOXICILLIN 250 MG
1 CAPSULE ORAL DAILY PRN
Status: DISCONTINUED | OUTPATIENT
Start: 2022-07-18 | End: 2022-07-21 | Stop reason: HOSPADM

## 2022-07-18 RX ORDER — MIDAZOLAM HYDROCHLORIDE 1 MG/ML
INJECTION INTRAMUSCULAR; INTRAVENOUS CODE/TRAUMA/SEDATION MEDICATION
Status: COMPLETED | OUTPATIENT
Start: 2022-07-18 | End: 2022-07-18

## 2022-07-18 RX ORDER — LIDOCAINE HYDROCHLORIDE 10 MG/ML
INJECTION INFILTRATION; PERINEURAL CODE/TRAUMA/SEDATION MEDICATION
Status: COMPLETED | OUTPATIENT
Start: 2022-07-18 | End: 2022-07-18

## 2022-07-18 RX ORDER — DOCUSATE SODIUM 100 MG/1
100 CAPSULE, LIQUID FILLED ORAL DAILY
Status: DISCONTINUED | OUTPATIENT
Start: 2022-07-18 | End: 2022-07-21 | Stop reason: HOSPADM

## 2022-07-18 RX ORDER — SODIUM CHLORIDE 9 MG/ML
INJECTION, SOLUTION INTRAVENOUS
Status: COMPLETED | OUTPATIENT
Start: 2022-07-18 | End: 2022-07-18

## 2022-07-18 RX ORDER — FENTANYL CITRATE 50 UG/ML
INJECTION, SOLUTION INTRAMUSCULAR; INTRAVENOUS CODE/TRAUMA/SEDATION MEDICATION
Status: COMPLETED | OUTPATIENT
Start: 2022-07-18 | End: 2022-07-18

## 2022-07-18 RX ORDER — POLYETHYLENE GLYCOL 3350 17 G/17G
17 POWDER, FOR SOLUTION ORAL DAILY
Status: DISCONTINUED | OUTPATIENT
Start: 2022-07-18 | End: 2022-07-21 | Stop reason: HOSPADM

## 2022-07-18 RX ADMIN — SODIUM CHLORIDE 500 ML: 0.9 INJECTION, SOLUTION INTRAVENOUS at 04:07

## 2022-07-18 RX ADMIN — DOCUSATE SODIUM 100 MG: 100 CAPSULE, LIQUID FILLED ORAL at 08:07

## 2022-07-18 RX ADMIN — SODIUM CITRATE AND CITRIC ACID MONOHYDRATE 30 ML: 500; 334 SOLUTION ORAL at 08:07

## 2022-07-18 RX ADMIN — MUPIROCIN: 20 OINTMENT TOPICAL at 08:07

## 2022-07-18 RX ADMIN — METOPROLOL TARTRATE 100 MG: 50 TABLET, FILM COATED ORAL at 08:07

## 2022-07-18 RX ADMIN — TRAMADOL HYDROCHLORIDE 50 MG: 50 TABLET, COATED ORAL at 08:07

## 2022-07-18 RX ADMIN — TRAMADOL HYDROCHLORIDE 50 MG: 50 TABLET, COATED ORAL at 02:07

## 2022-07-18 RX ADMIN — LIDOCAINE HYDROCHLORIDE 5 ML: 10 INJECTION, SOLUTION INFILTRATION; PERINEURAL at 04:07

## 2022-07-18 RX ADMIN — HEPARIN SODIUM 5000 UNITS: 5000 INJECTION INTRAVENOUS; SUBCUTANEOUS at 05:07

## 2022-07-18 RX ADMIN — MIDAZOLAM HYDROCHLORIDE 1 MG: 1 INJECTION, SOLUTION INTRAMUSCULAR; INTRAVENOUS at 04:07

## 2022-07-18 RX ADMIN — FENTANYL CITRATE 50 MCG: 50 INJECTION INTRAMUSCULAR; INTRAVENOUS at 04:07

## 2022-07-18 RX ADMIN — ACETAMINOPHEN 650 MG: 325 TABLET ORAL at 08:07

## 2022-07-18 RX ADMIN — POLYETHYLENE GLYCOL 3350 17 G: 17 POWDER, FOR SOLUTION ORAL at 05:07

## 2022-07-18 RX ADMIN — GELATIN ABSORBABLE SPONGE SIZE 100 1 APPLICATOR: MISC at 04:07

## 2022-07-18 RX ADMIN — TRAMADOL HYDROCHLORIDE 50 MG: 50 TABLET, COATED ORAL at 05:07

## 2022-07-18 NOTE — PLAN OF CARE
[place] - Telemetry  Initial Discharge Assessment       Primary Care Provider: Skip Celestin NP    Admission Diagnosis: HENRY (acute kidney injury) [N17.9]    Admission Date: 7/15/2022  Expected Discharge Date:     Pt oriented. DCA done at bedside with patient. Demographics/Ins/NextofKin/PCP verified with patient/family and updated as needed. Denies any DME needs at present from pt/family. Patient/family plans to return home with family. Educated on bedside RX. Patient consents for [place] to discuss discharge plan with next of kin. Preferences appointment times and location obtained. Patient reports they will have transportation home upon discharge.      Discharge Barriers Identified: (P) None    Payor: MEDICAID / Plan: [facility] / Product Type: Managed Medicaid /     Extended Emergency Contact Information  Primary Emergency Contact: Yuliya Hidalgo   [facility]  Home Phone: [phone]  Mobile Phone: [phone]  Relation: Sister  Secondary Emergency Contact: Arnulfo Dang   [place]  Mobile Phone: [phone]  Relation: Spouse    Discharge Plan A: (P) Home, Home with family         [id number] - [place], [place] - [address] [facility] [address] [facility]  [address]  [facility] [address]  Phone: [phone] Fax: [phone]    [facility] [address] [place] [address] [address] [facility] [address] [facility]  [address] [address] [facility] [address]  Phone: [phone] Fax: [phone]      Initial Assessment (most recent)       Adult Discharge Assessment - 07/18/22 1136          Discharge Assessment    Assessment Type Discharge Planning Assessment (P)      Confirmed/corrected address, phone number and insurance Yes (P)      Confirmed Demographics Correct on Facesheet (P)      Communicated FERNANDO with patient/caregiver Yes (P)      Reason For Admission ARF (P)      Lives With spouse;sibling(s);child(mahesh), dependent (P)      Do you  "expect to return to your current living situation? Yes (P)      Prior to hospitilization cognitive status: Alert/Oriented;No Deficits (P)      Current cognitive status: Alert/Oriented;No Deficits (P)      Walking or Climbing Stairs Difficulty none (P)      Dressing/Bathing Difficulty none (P)      Equipment Currently Used at Home none (P)      Readmission within 30 days? Yes (P)    Pt was admitted from out of area [facility] facility    Do you currently have service(s) that help you manage your care at home? No (P)      Do you take prescription medications? Yes (P)      Do you have any problems affording any of your prescribed medications? No (P)      Is the patient taking medications as prescribed? yes (P)      Who is going to help you get home at discharge? daughter vs Medicaid Transportation (P)      How do you get to doctors appointments? car, drives self;family or friend will provide;health plan transportation (P)      Are you on dialysis? No (P)      Do you take coumadin? No (P)      Discharge Plan A Home;Home with family (P)      DME Needed Upon Discharge  none (P)      Discharge Plan discussed with: Patient (P)      Discharge Barriers Identified None (P)                    Future Appointments   Date Time Provider Department Center   8/16/2022  2:00 PM Hola Cooney MD [facility] RHEUM IBAN ACT   9/2/2022  9:20 AM KAMLA Funes [facility] GASTRO IBAN 4TH FL   9/2/2022 10:40 AM [facility]   9/22/2022 10:20 AM Fide Melendez NP [facility] ENDOCRN IBAN FRNT     BP (!) 155/88 (BP Location: Left arm, Patient Position: Lying)   Pulse 68   Temp 97 °F (36.1 °C) (Oral)   Resp 18   Ht 5' 4" (1.626 m)   Wt 91.7 kg (202 lb 2.6 oz)   SpO2 95%   Breastfeeding No   BMI 34.70 kg/m²      docusate sodium  100 mg Oral Daily    heparin (porcine)  5,000 Units Subcutaneous Q8H    metoprolol tartrate  100 mg Oral BID    mupirocin   Nasal BID    sodium citrate-citric acid 500-334 mg/5 ml  30 mL Oral BID "

## 2022-07-18 NOTE — CONSULTS
Interventional Radiology Consult/Pre-Procedure Note      Chief Complaint/Reason for Consult: HENRY    History of Present Illness:  Rohini Dang is a 42 y.o. female with the PMH listed below who presents with HENRY, hematuria. IR consulted for native kidney biopsy.    Admission H&P reviewed.    Past Medical History:   Diagnosis Date    Anemia     Anxiety disorder     Bipolar 2 disorder     Borderline personality disorder     Chronic bilateral low back pain without sciatica     Depression with anxiety     Diabetes mellitus     Elevated LFTs     Esophageal stenosis     Fatty liver     Fibromyalgia     Fibromyalgia     High cholesterol     Hypoglycemia     Intermittent explosive disorder     Liver disease     JAMESON (nonalcoholic steatohepatitis)      Past Surgical History:   Procedure Laterality Date    COLONOSCOPY N/A 3/20/2018    Procedure: COLONOSCOPY;  Surgeon: Janelle Meade MD;  Location: [facility];  Service: Endoscopy;  Laterality: N/A;    COLONOSCOPY N/A 5/11/2021    Procedure: COLONOSCOPY;  Surgeon: Janelle Meade MD;  Location: [facility];  Service: Endoscopy;  Laterality: N/A;    ESOPHAGEAL DILATION      x2    ESOPHAGOGASTRODUODENOSCOPY N/A 5/7/2019    Procedure: EGD (ESOPHAGOGASTRODUODENOSCOPY);  Surgeon: Janelle Meade MD;  Location: [facility];  Service: Endoscopy;  Laterality: N/A;    ESOPHAGOGASTRODUODENOSCOPY N/A 5/11/2021    Procedure: EGD (ESOPHAGOGASTRODUODENOSCOPY);  Surgeon: Janelle Meade MD;  Location: [facility];  Service: Endoscopy;  Laterality: N/A;  Rapid on arrival    HIATAL HERNIA REPAIR      HYSTERECTOMY      LIVER BIOPSY  2018    fibrosis stage 3 JAMESON    OOPHORECTOMY      TUBAL LIGATION      UPPER GASTROINTESTINAL ENDOSCOPY      2013? unable to obtain records       Allergies:   Review of patient's allergies indicates:   Allergen Reactions    Amoxicillin Hives    Avelox [moxifloxacin] Hives    Clarinex [desloratadine] Hives     Pcn [penicillins] Hives    Claritin [loratadine] Palpitations    Latex, natural rubber Rash       Scheduled Meds:    docusate sodium  100 mg Oral Daily    heparin (porcine)  5,000 Units Subcutaneous Q8H    metoprolol tartrate  100 mg Oral BID    mupirocin   Nasal BID    polyethylene glycol  17 g Oral Daily    sodium citrate-citric acid 500-334 mg/5 ml  30 mL Oral BID     Continuous Infusions:   PRN Meds:acetaminophen, albuterol-ipratropium, aluminum-magnesium hydroxide-simethicone, glucagon (human recombinant), glucose, glucose, insulin aspart U-100, melatonin, ondansetron, prochlorperazine, senna-docusate 8.6-50 mg, simethicone, sodium chloride 0.9%, traMADoL    Anticoagulation/Antiplatelet Meds: subq heparin >9 hours ago    Review of Systems:   As documented in admission H&P.    Physical Exam:  Temp: 97 °F (36.1 °C) (07/18/22 1155)  Pulse: 68 (07/18/22 1155)  Resp: 16 (07/18/22 1438)  BP: (!) 155/88 (07/18/22 1155)  SpO2: 95 % (07/18/22 1155)     General: WNWD, NAD  HEENT: Normocephalic, sclera anicteric, oropharynx clear  Heart: RRR  Lungs: Symmetric excursions, breathing unlabored  Abd: NTND, soft  Extremities: GOMEZ  Neuro: Gross nonfocal    Labs:  No results for input(s): INR in the last 168 hours.    Invalid input(s):  PT,  PTT    Recent Labs   Lab 07/18/22  0414   WBC 10.62   HGB 8.1*   HCT 25.6*   MCV 79*         Recent Labs   Lab 07/17/22  0743 07/18/22  0414    104    139   K 4.6 4.2    107   CO2 17* 19*   BUN 60* 56*   CREATININE 6.5* 4.9*   CALCIUM 9.8  9.8 8.6*   MG  --  1.8   ALT 20  --    AST 28  --    ALBUMIN 2.8*  --    BILITOT 0.3  --        Imaging:  Abd US 7/16/22 reviewed.    Assessment/Plan:   HENRY and microscopic hematuria. Will undergo US-guided native kidney bx today.    Sedation:  Sedation history: have not been any systemic reactions  ASA: 3 / Mallampati: 3  Sedation plan: Up to moderate (Versed, fentanyl)     Risks (including, but not limited to, pain,  bleeding, infection, damage to nearby structures, treatment failure/recurrence, and the need for additional procedures), potential benefits, and alternatives were discussed with the patient. All questions were answered to the best of my abilities. The patient wishes to proceed. Written informed consent was obtained.      Paulino Tirado MD  [facility]  Pager [phone]

## 2022-07-18 NOTE — PROCEDURES
Interventional Radiology Immediate Post-Procedure Note    Pre-Op Diagnosis: HENRY  Post-Op Diagnosis: Same    Procedure:     Procedure performed by: Paulino Tirado MD  Assistants: None    Estimated Blood Loss: Minimal  Specimen Removed: Yes    Findings/description of procedure:  18-ga cores x3 taken from lower pole of LEFT native kidney. Specimens placed in sterile NS and submitted to pathology.    No immediate complications. Patient tolerated procedure well. Please see full dictated procedure report for additional details and recommendations.     Recs:  1. Bed rest x6 hours.  2. Hold heparin/anticoagualtion until at least tomorrow.      Paulino Tirado MD  [facility] IR  Pager [phone]

## 2022-07-18 NOTE — PROGRESS NOTES
Progress Note  Nephrology      Consult Requested By: Meche Flemnig MD      SUBJECTIVE:     Overnight events: Patient reports increased urinary output today and continued pelvic pressure. Patient repeats her answers on questioning but is still oriented to person, place, and time. She does not have any new complaints or report any new symptoms from overnight.     Patient is a 42 y.o. female presents with new onset pelvic pressure and decreased urinary output for three days      Patient Active Problem List   Diagnosis    JAMESON (nonalcoholic steatohepatitis)    Family history of diabetes mellitus (DM)    H/O hyperthyroidism    Metabolic syndrome    Impaired fasting glucose    Ulnar neuropathy at wrist    Need for prophylactic vaccination and inoculation against influenza    Heartburn    DVT prophylaxis    Cervicalgia    Myositis    Lumbar pain    Rectocele    Acute renal failure    Hyponatremia    Borderline personality disorder    Hyperkalemia    Type 2 diabetes mellitus, without long-term current use of insulin    Anemia    Dyspnea     Past Medical History:   Diagnosis Date    Anemia     Anxiety disorder     Bipolar 2 disorder     Borderline personality disorder     Chronic bilateral low back pain without sciatica     Depression with anxiety     Diabetes mellitus     Elevated LFTs     Esophageal stenosis     Fatty liver     Fibromyalgia     Fibromyalgia     High cholesterol     Hypoglycemia     Intermittent explosive disorder     Liver disease     JAMESON (nonalcoholic steatohepatitis)               OBJECTIVE:     Vitals:    07/18/22 0513 07/18/22 0810 07/18/22 0848 07/18/22 0851   BP:  (!) 145/77  (!) 145/66   BP Location:  Left arm     Patient Position:  Lying     Pulse:  75  75   Resp: 18 18     Temp:  97 °F (36.1 °C)     TempSrc:  Oral     SpO2:  (!) 93% 95%    Weight:       Height:           Temp: 97 °F (36.1 °C) (07/18/22 0810)  Pulse: 75 (07/18/22 0851)  Resp: 18 (07/18/22  0810)  BP: (!) 145/66 (07/18/22 0851)  SpO2: 95 % (07/18/22 0848)              Medications:   docusate sodium  100 mg Oral Daily    heparin (porcine)  5,000 Units Subcutaneous Q8H    metoprolol tartrate  100 mg Oral BID    mupirocin   Nasal BID    sodium citrate-citric acid 500-334 mg/5 ml  30 mL Oral BID                 Physical Exam:  General appearance: well developed, well nourished, appears stated age  HEENT: Pupils equal and reactive to light, extra ocular movements intact  Lungs:  clear to auscultation bilaterally and normal respiratory effort  Heart: regular rate and rhythm, S1, S2 normal, no murmur, click, rub or gallop  Abdomen: soft, non-tender non-distented; bowel sounds normal; no masses,  no organomegaly  Extremities: no cyanosis or edema, or clubbing  Skin: Skin color, texture, turgor normal. No rashes or lesions  Neurologic: Normal strength and tone. No focal numbness or weakness  Psychiatric: Behavior normal, thought process normal    Laboratory:  ABG  Labs reviewed  Recent Results (from the past 336 hour(s))   Basic Metabolic Panel    Collection Time: 07/18/22  4:14 AM   Result Value Ref Range    Sodium 139 136 - 145 mmol/L    Potassium 4.2 3.5 - 5.1 mmol/L    Chloride 107 95 - 110 mmol/L    CO2 19 (L) 23 - 29 mmol/L    BUN 56 (H) 6 - 20 mg/dL    Creatinine 4.9 (H) 0.5 - 1.4 mg/dL    Calcium 8.6 (L) 8.7 - 10.5 mg/dL    Anion Gap 13 8 - 16 mmol/L     Recent Results (from the past 336 hour(s))   CBC Auto Differential    Collection Time: 07/18/22  4:14 AM   Result Value Ref Range    WBC 10.62 3.90 - 12.70 K/uL    Hemoglobin 8.1 (L) 12.0 - 16.0 g/dL    Hematocrit 25.6 (L) 37.0 - 48.5 %    Platelets 179 150 - 450 K/uL   CBC with Automated Differential    Collection Time: 07/16/22  3:01 AM   Result Value Ref Range    WBC 9.08 3.90 - 12.70 K/uL    Hemoglobin 8.5 (L) 12.0 - 16.0 g/dL    Hematocrit 26.4 (L) 37.0 - 48.5 %    Platelets 118 (L) 150 - 450 K/uL   CBC Auto Differential    Collection Time:  07/15/22  5:42 AM   Result Value Ref Range    WBC 11.19 3.90 - 12.70 K/uL    Hemoglobin 8.8 (L) 12.0 - 16.0 g/dL    Hematocrit 28.1 (L) 37.0 - 48.5 %    Platelets 118 (L) 150 - 450 K/uL       Diagnostic Results:  X-Ray: Reviewed  US: Reviewed  Echo: Reviewed  ACCESS    ASSESSMENT/PLAN:     Rohini Dang is a 42 y.o. morbidly obese female w PMH of DM II, HLD, JAMESON, esophageal stenosis, fibromyalgia and bipolar disorder, who was admitted 07/13 w/ HENRY and new onset pelvic pressure; [facility] nephrology are following for HENRY w/ hematuria and proteinuria of unknown etiology.     Plan:   #) Stage III HENRY:  - Cr starting to trend down from peak of 7.1 during this admission., highest level ~ 7.1, GFR ~ 10  - Baseline Cr ~ 1.0-1.2 (as of 06/19/2022)  - Pt was initially oliguric but currently having normal urine output w/ yarbrough in place  - UA +ve for microscopic hematuria  - Renal US w/ significantly enlarged B/L kidneys, no evidence for obstructive process/hydronephrosis  - Etiology remains unclear at this point but highly suspect intrinsic renal etiology  - Will send for MARCO ANTONIO, ANCA, ASO, C3/C4, HIV, hepatitis panel, DsDNA panels   -Hepatitis panel negative. HIV negative   -ASO negative   -MARCO ANTONIO/ANCA/DsDNA/C3/C4 pending  -Microalbumin/Cr ratio: 121.7  -Patient scheduled for IR guided biopsy today (7/18/22) to dx etiology.     #) Hypertension   - On lopressor 100 mg BID, Ok to continue  - Valsartan-HCTZ, 80-12.5 mg, hold for now     #) Anemia Evaluation        Lab Results   Component Value Date     HGB 8.5 (L) 07/16/2022     MCV 80 (L) 07/16/2022     FERRITIN 24 06/03/2022     FESATURATED 7 (L) 06/03/2022     [id number] 02/14/2022   - Obtain updated iron panel  - Obtain folate, B12, LDH, haptoglobin and retics      Apollo Zuluaga MD   Internal Medicine [facility]

## 2022-07-18 NOTE — ASSESSMENT & PLAN NOTE
Estimated Creatinine Clearance: 16.1 mL/min (A) (based on SCr of 4.9 mg/dL (H)).  Baseline Cr: 0.9Baseline BUN:12   Recent Labs   Lab 07/15/22  0542 07/16/22  0301 07/17/22  0743 07/18/22  0414   CO2 16* 14* 17* 19*   BUN 49* 57* 60* 56*   CREATININE 6.0* 7.1* 6.5* 4.9*   MG 1.8 1.7  --  1.8   PHOS  --   --  7.7* 6.2*       -Etiology uncertain at this time  -Monitor strict urine output  - Continue to monitor renal function with daily labs and trend electrolytes  - renally dose medications  - avoid nephrotoxic agents including NSAIDs, aminoglycosides, IV contrast (unless absolutely necessary), gadolinium, fleets and other phosphorous-based laxatives  - monitor events that may lead to decreased renal perfusion (hypovolemia, hypotension, sepsis)  - monitor urine output to assure that no obstruction precipitates worsening in GFR  -Renal US without evidence of obstruction or hydronephrosis.  -Nephrology consulted - suspect intrinsic renal issue.  Recommend renal biopsy  -interventional radiology consulted, kidney biopsy on 07/18

## 2022-07-18 NOTE — PROGRESS NOTES
"      [facility]  Telemedicine Progress Note    Patient Name: Rohini Dang  MRN: [id number]  Patient Class: IP- Inpatient   Admission Date: 7/15/2022  Length of Stay: 3 days  Attending Physician: Meche Fleming MD  Primary Care Provider: Skip Celestin NP          Subjective:     Principal Problem:Acute renal failure        HPI:  Per  note: "42-year-old female with a history of anemia, bipolar disorder, diabetes, esophageal stenosis, and fatty liver admitted to [facility] on July 13 with acute kidney injury (creatinine 4.7, baseline 1.1 on June 19) and hyponatremia.  She was initially treated with IV fluids, but subsequently appeared somewhat volume overloaded on July 15.  Fluids were stopped, and she received 1 dose of Lasix.  Creatinine continued to worsen, and she has oliguria.  Etiology of the acute kidney injury is uncertain.  Multiple serologies have been ordered.  Requesting transfer to Hospital Medicine for Nephrology evaluation.  Referring provider noted she has no evidence of acute respiratory distress or altered mentation. July 15:  Sodium 134, potassium 5.4, chloride 107, CO2 16, BUN 49, creatinine 6 (4.7 on admit-July 13), glucose 127, calcium 6.8, magnesium 1.8, white blood cells 11.19, hemoglobin 8.8, hematocrit 28, platelets 118, multiple autoimmune/inflammatory labs ordered. July 14:  Renal ultrasound with no abnormality noted. July 13:  COVID negative, CPK 72, urine drug screen negative. VS:  Temperature 98.6°, pulse 95, respirations 20, blood pressure 133/65, O2 sats 91% (1 L nasal cannula)"    Patient transferred from [facility] to [facility] for evaluation by nephrology. On exam, patient lethargic but responds to name, reports difficulty breathing and shortness of breath. She was on 4L NC upon assessment. Nephrology consulted. Will admit to hospital medicine for further evaluation and treatment.               Overview/Hospital Course:  She " was transferred to [facility] for nephrology eval. She was started on Lasix gtt but then held per Nephrology. Urine output closely monitored.  Nephrology recommending kidney biopsy.  Interventional Radiology consulted, kidney biopsy done on 07/18.  Continue monitor kidney function.  Holding nephrotoxic agents.      Interval History:  Patient has been complaining of a headache this morning because she did not receive her tramadol as she was NPO.  Patient pending kidney biopsy today.  Nephrology following, appreciate assistance.    Review of Systems   Constitutional:  Positive for activity change, appetite change and fatigue. Negative for fever.   Respiratory:  Negative for shortness of breath.    Cardiovascular:  Negative for chest pain.   Gastrointestinal:  Negative for abdominal pain.   Neurological:  Positive for headaches. Negative for dizziness.   All other systems reviewed and are negative.  Objective:     Vital Signs (Most Recent):  Temp: 97.2 °F (36.2 °C) (07/18/22 1700)  Pulse: 68 (07/18/22 1700)  Resp: 17 (07/18/22 1700)  BP: 128/68 (07/18/22 1700)  SpO2: 95 % (07/18/22 1700) Vital Signs (24h Range):  Temp:  [97 °F (36.1 °C)-99.6 °F (37.6 °C)] 97.2 °F (36.2 °C)  Pulse:  [66-87] 68  Resp:  [16-22] 17  SpO2:  [93 %-95 %] 95 %  BP: (128-155)/(66-88) 128/68     Weight: 91.7 kg (202 lb 2.6 oz)  Body mass index is 34.7 kg/m².    Intake/Output Summary (Last 24 hours) at 7/18/2022 1807  Last data filed at 7/18/2022 1700  Gross per 24 hour   Intake 0 ml   Output 3200 ml   Net -3200 ml      Physical Exam  Vitals and nursing note reviewed.   Constitutional:       Appearance: Normal appearance.   HENT:      Head: Normocephalic and atraumatic.   Eyes:      Extraocular Movements: Extraocular movements intact.      Pupils: Pupils are equal, round, and reactive to light.   Cardiovascular:      Rate and Rhythm: Normal rate and regular rhythm.   Pulmonary:      Effort: Pulmonary effort is normal. No respiratory distress.    Abdominal:      General: There is no distension.   Musculoskeletal:         General: Normal range of motion.      Cervical back: Normal range of motion.   Neurological:      General: No focal deficit present.      Mental Status: She is alert and oriented to person, place, and time.   Psychiatric:         Mood and Affect: Mood normal.         Behavior: Behavior normal.       Significant Labs: All pertinent labs within the past 24 hours have been reviewed.  CBC:   Recent Labs   Lab 07/18/22  0414   WBC 10.62   HGB 8.1*   HCT 25.6*        CMP:   Recent Labs   Lab 07/17/22  0743 07/18/22  0414    139   K 4.6 4.2    107   CO2 17* 19*    104   BUN 60* 56*   CREATININE 6.5* 4.9*   CALCIUM 9.8  9.8 8.6*   PROT 7.4  --    ALBUMIN 2.8*  --    BILITOT 0.3  --    ALKPHOS 183*  --    AST 28  --    ALT 20  --    ANIONGAP 15 13   EGFRNONAA 7* 10*       Significant Imaging: I have reviewed all pertinent imaging results/findings within the past 24 hours.      Assessment/Plan:      * Acute renal failure  Estimated Creatinine Clearance: 16.1 mL/min (A) (based on SCr of 4.9 mg/dL (H)).  Baseline Cr: 0.9Baseline BUN:12   Recent Labs   Lab 07/15/22  0542 07/16/22  0301 07/17/22  0743 07/18/22  0414   CO2 16* 14* 17* 19*   BUN 49* 57* 60* 56*   CREATININE 6.0* 7.1* 6.5* 4.9*   MG 1.8 1.7  --  1.8   PHOS  --   --  7.7* 6.2*       -Etiology uncertain at this time  -Monitor strict urine output  - Continue to monitor renal function with daily labs and trend electrolytes  - renally dose medications  - avoid nephrotoxic agents including NSAIDs, aminoglycosides, IV contrast (unless absolutely necessary), gadolinium, fleets and other phosphorous-based laxatives  - monitor events that may lead to decreased renal perfusion (hypovolemia, hypotension, sepsis)  - monitor urine output to assure that no obstruction precipitates worsening in GFR  -Renal US without evidence of obstruction or hydronephrosis.  -Nephrology  consulted - suspect intrinsic renal issue.  Recommend renal biopsy  -interventional radiology consulted, kidney biopsy on 07/18      Dyspnea  -report dyspnea on exam; diminished BS noted--patient was on 4L NC  -Will obtain CXR to assess for cardiopulmonary abnormality  -ABG ordered  -Supplemental oxygen PRN for SpO2 <90%  -Duo-nebs PRN for SOB/wheezing      Anemia  -H/H is 8.8, which is stable and consistent with previous laboratory measurements. Patient exhibits no signs or symptoms of acute bleeding  -Etiology likely due to multifactorial..iron deficiency vs chronic disease?  -No indication for blood transfusion  -Continue to monitor serial CBC  -Transfuse for Hgb <7 or if symptomatic        Type 2 diabetes mellitus, without long-term current use of insulin  Hemoglobin A1C   Date Value Ref Range Status   07/15/2022 7.0 (H) 4.0 - 5.6 % Final     -Serum glucose on admit 127  - hold home oral medications  - LDSSI with ACCUcheck ACHS  - Diabetic diet  -Hypoglycemia protocol PRN          Hyperkalemia  -Last electrolytes reviewed-   Recent Labs   Lab 07/16/22  0301 07/17/22  0743   K 4.4 4.6   .   -likely 2/2 to worsening renal function  -Repeat BMP and Mg in am  -Replace electrolytes as indicated  -Monitor on telemetry        JAMESON (nonalcoholic steatohepatitis)  -chronic; stable  -no evidence of decompensation  -continue to monitor        VTE Risk Mitigation (From admission, onward)         Ordered     heparin (porcine) injection 5,000 Units  Every 8 hours         07/15/22 2102     IP VTE HIGH RISK PATIENT  Once         07/15/22 2102     Place sequential compression device  Until discontinued         07/15/22 2102                      I have assessed these finding virtually using telemed platform and with assistance of bedside nurse                 The attending portion of this evaluation, treatment, and documentation was performed per Meche Fleming MD via Telemedicine AudioVisual using the secure [employer] software  platform with 2 way audio/video. The provider was located off-site and the patient is located in the hospital. The aforementioned video software was utilized to document the relevant history and physical exam    Meche Fleming MD  Department of [facility] Medicine   [facility]

## 2022-07-18 NOTE — SUBJECTIVE & OBJECTIVE
Interval History:  Patient has been complaining of a headache this morning because she did not receive her tramadol as she was NPO.  Patient pending kidney biopsy today.  Nephrology following, appreciate assistance.    Review of Systems   Constitutional:  Positive for activity change, appetite change and fatigue. Negative for fever.   Respiratory:  Negative for shortness of breath.    Cardiovascular:  Negative for chest pain.   Gastrointestinal:  Negative for abdominal pain.   Neurological:  Positive for headaches. Negative for dizziness.   All other systems reviewed and are negative.  Objective:     Vital Signs (Most Recent):  Temp: 97.2 °F (36.2 °C) (07/18/22 1700)  Pulse: 68 (07/18/22 1700)  Resp: 17 (07/18/22 1700)  BP: 128/68 (07/18/22 1700)  SpO2: 95 % (07/18/22 1700) Vital Signs (24h Range):  Temp:  [97 °F (36.1 °C)-99.6 °F (37.6 °C)] 97.2 °F (36.2 °C)  Pulse:  [66-87] 68  Resp:  [16-22] 17  SpO2:  [93 %-95 %] 95 %  BP: (128-155)/(66-88) 128/68     Weight: 91.7 kg (202 lb 2.6 oz)  Body mass index is 34.7 kg/m².    Intake/Output Summary (Last 24 hours) at 7/18/2022 1807  Last data filed at 7/18/2022 1700  Gross per 24 hour   Intake 0 ml   Output 3200 ml   Net -3200 ml      Physical Exam  Vitals and nursing note reviewed.   Constitutional:       Appearance: Normal appearance.   HENT:      Head: Normocephalic and atraumatic.   Eyes:      Extraocular Movements: Extraocular movements intact.      Pupils: Pupils are equal, round, and reactive to light.   Cardiovascular:      Rate and Rhythm: Normal rate and regular rhythm.   Pulmonary:      Effort: Pulmonary effort is normal. No respiratory distress.   Abdominal:      General: There is no distension.   Musculoskeletal:         General: Normal range of motion.      Cervical back: Normal range of motion.   Neurological:      General: No focal deficit present.      Mental Status: She is alert and oriented to person, place, and time.   Psychiatric:         Mood and  Affect: Mood normal.         Behavior: Behavior normal.       Significant Labs: All pertinent labs within the past 24 hours have been reviewed.  CBC:   Recent Labs   Lab 07/18/22 0414   WBC 10.62   HGB 8.1*   HCT 25.6*        CMP:   Recent Labs   Lab 07/17/22  0743 07/18/22 0414    139   K 4.6 4.2    107   CO2 17* 19*    104   BUN 60* 56*   CREATININE 6.5* 4.9*   CALCIUM 9.8  9.8 8.6*   PROT 7.4  --    ALBUMIN 2.8*  --    BILITOT 0.3  --    ALKPHOS 183*  --    AST 28  --    ALT 20  --    ANIONGAP 15 13   EGFRNONAA 7* 10*       Significant Imaging: I have reviewed all pertinent imaging results/findings within the past 24 hours.

## 2022-07-18 NOTE — CONSULTS
Thank you for your consult to [facility]. We have reviewed the patient chart. This patient does meet criteria for [facility] service at this time. Will assume care on 07/18/22 at 7AM.

## 2022-07-18 NOTE — PLAN OF CARE
Problem: Adult Inpatient Plan of Care  Goal: Plan of Care Review  Outcome: Ongoing, Progressing   Patient is alert, oriented X4. Care plan explained to patient, she verbalized understanding.     On 2L of nasal cannula, O2 sat maintain 94%, no respiratory distress noted. On cardiac monitor, running normal sinus rhythm.     Complaint nausea. PRN zofran given. Complaint left flank pain, rate 8/10, PRN tramadol given. Due medications given.     Maintain fall risk precaution, bed in lowest position, bed alarm on. Quick in place. Call light/personal items in reach. Instructed patient call for help as needed. Will continue to monitor.

## 2022-07-19 LAB
ANION GAP SERPL CALC-SCNC: 16 MMOL/L (ref 8–16)
BASOPHILS # BLD AUTO: 0.05 K/UL (ref 0–0.2)
BASOPHILS NFR BLD: 0.4 % (ref 0–1.9)
BUN SERPL-MCNC: 51 MG/DL (ref 6–20)
CALCIUM SERPL-MCNC: 9.7 MG/DL (ref 8.7–10.5)
CHLORIDE SERPL-SCNC: 105 MMOL/L (ref 95–110)
CO2 SERPL-SCNC: 20 MMOL/L (ref 23–29)
CREAT SERPL-MCNC: 3.2 MG/DL (ref 0.5–1.4)
DIFFERENTIAL METHOD: ABNORMAL
EOSINOPHIL # BLD AUTO: 0.4 K/UL (ref 0–0.5)
EOSINOPHIL NFR BLD: 3.8 % (ref 0–8)
ERYTHROCYTE [DISTWIDTH] IN BLOOD BY AUTOMATED COUNT: 15.8 % (ref 11.5–14.5)
EST. GFR  (AFRICAN AMERICAN): 19 ML/MIN/1.73 M^2
EST. GFR  (NON AFRICAN AMERICAN): 17 ML/MIN/1.73 M^2
FERRITIN SERPL-MCNC: 159 NG/ML (ref 20–300)
GLUCOSE SERPL-MCNC: 102 MG/DL (ref 70–110)
HCT VFR BLD AUTO: 25.4 % (ref 37–48.5)
HGB BLD-MCNC: 8.3 G/DL (ref 12–16)
IMM GRANULOCYTES # BLD AUTO: 0.13 K/UL (ref 0–0.04)
IMM GRANULOCYTES NFR BLD AUTO: 1.2 % (ref 0–0.5)
IRON SERPL-MCNC: 39 UG/DL (ref 30–160)
LYMPHOCYTES # BLD AUTO: 2.4 K/UL (ref 1–4.8)
LYMPHOCYTES NFR BLD: 21.2 % (ref 18–48)
MAGNESIUM SERPL-MCNC: 1.7 MG/DL (ref 1.6–2.6)
MCH RBC QN AUTO: 25.7 PG (ref 27–31)
MCHC RBC AUTO-ENTMCNC: 32.7 G/DL (ref 32–36)
MCV RBC AUTO: 79 FL (ref 82–98)
MONOCYTES # BLD AUTO: 1.4 K/UL (ref 0.3–1)
MONOCYTES NFR BLD: 12.2 % (ref 4–15)
NEUTROPHILS # BLD AUTO: 6.9 K/UL (ref 1.8–7.7)
NEUTROPHILS NFR BLD: 61.2 % (ref 38–73)
NRBC BLD-RTO: 0 /100 WBC
PHOSPHATE SERPL-MCNC: 5.8 MG/DL (ref 2.7–4.5)
PLATELET # BLD AUTO: 188 K/UL (ref 150–450)
PMV BLD AUTO: 10.2 FL (ref 9.2–12.9)
POCT GLUCOSE: 100 MG/DL (ref 70–110)
POCT GLUCOSE: 110 MG/DL (ref 70–110)
POCT GLUCOSE: 115 MG/DL (ref 70–110)
POCT GLUCOSE: 74 MG/DL (ref 70–110)
POTASSIUM SERPL-SCNC: 3.8 MMOL/L (ref 3.5–5.1)
PROT UR-MCNC: 19 MG/DL (ref 0–15)
RBC # BLD AUTO: 3.23 M/UL (ref 4–5.4)
SATURATED IRON: 11 % (ref 20–50)
SODIUM SERPL-SCNC: 141 MMOL/L (ref 136–145)
TOTAL IRON BINDING CAPACITY: 355 UG/DL (ref 250–450)
TRANSFERRIN SERPL-MCNC: 240 MG/DL (ref 200–375)
WBC # BLD AUTO: 11.2 K/UL (ref 3.9–12.7)

## 2022-07-19 PROCEDURE — 25000003 PHARM REV CODE 250: Performed by: NURSE PRACTITIONER

## 2022-07-19 PROCEDURE — 86334 IMMUNOFIX E-PHORESIS SERUM: CPT | Performed by: STUDENT IN AN ORGANIZED HEALTH CARE EDUCATION/TRAINING PROGRAM

## 2022-07-19 PROCEDURE — 84165 PROTEIN E-PHORESIS SERUM: CPT | Performed by: STUDENT IN AN ORGANIZED HEALTH CARE EDUCATION/TRAINING PROGRAM

## 2022-07-19 PROCEDURE — 86335 IMMUNFIX E-PHORSIS/URINE/CSF: CPT | Performed by: STUDENT IN AN ORGANIZED HEALTH CARE EDUCATION/TRAINING PROGRAM

## 2022-07-19 PROCEDURE — 82728 ASSAY OF FERRITIN: CPT | Performed by: HOSPITALIST

## 2022-07-19 PROCEDURE — 84165 PROTEIN E-PHORESIS SERUM: CPT | Mod: 26,,, | Performed by: PATHOLOGY

## 2022-07-19 PROCEDURE — 99900035 HC TECH TIME PER 15 MIN (STAT)

## 2022-07-19 PROCEDURE — 83735 ASSAY OF MAGNESIUM: CPT | Performed by: HOSPITALIST

## 2022-07-19 PROCEDURE — 86334 PATHOLOGIST INTERPRETATION IFE: ICD-10-PCS | Mod: 26,,, | Performed by: PATHOLOGY

## 2022-07-19 PROCEDURE — 84156 ASSAY OF PROTEIN URINE: CPT | Performed by: STUDENT IN AN ORGANIZED HEALTH CARE EDUCATION/TRAINING PROGRAM

## 2022-07-19 PROCEDURE — 63600175 PHARM REV CODE 636 W HCPCS

## 2022-07-19 PROCEDURE — 84466 ASSAY OF TRANSFERRIN: CPT | Performed by: HOSPITALIST

## 2022-07-19 PROCEDURE — 36415 COLL VENOUS BLD VENIPUNCTURE: CPT | Performed by: STUDENT IN AN ORGANIZED HEALTH CARE EDUCATION/TRAINING PROGRAM

## 2022-07-19 PROCEDURE — 86335 PATHOLOGIST INTERPRETATION UIFE: ICD-10-PCS | Mod: 26,,, | Performed by: PATHOLOGY

## 2022-07-19 PROCEDURE — 25000003 PHARM REV CODE 250

## 2022-07-19 PROCEDURE — 11000001 HC ACUTE MED/SURG PRIVATE ROOM

## 2022-07-19 PROCEDURE — 84165 PATHOLOGIST INTERPRETATION SPE: ICD-10-PCS | Mod: 26,,, | Performed by: PATHOLOGY

## 2022-07-19 PROCEDURE — 94761 N-INVAS EAR/PLS OXIMETRY MLT: CPT

## 2022-07-19 PROCEDURE — 25000003 PHARM REV CODE 250: Performed by: HOSPITALIST

## 2022-07-19 PROCEDURE — 84100 ASSAY OF PHOSPHORUS: CPT | Performed by: HOSPITALIST

## 2022-07-19 PROCEDURE — 27000221 HC OXYGEN, UP TO 24 HOURS

## 2022-07-19 PROCEDURE — 36415 COLL VENOUS BLD VENIPUNCTURE: CPT | Performed by: HOSPITALIST

## 2022-07-19 PROCEDURE — 86334 IMMUNOFIX E-PHORESIS SERUM: CPT | Mod: 26,,, | Performed by: PATHOLOGY

## 2022-07-19 PROCEDURE — 80048 BASIC METABOLIC PNL TOTAL CA: CPT | Performed by: HOSPITALIST

## 2022-07-19 PROCEDURE — 85025 COMPLETE CBC W/AUTO DIFF WBC: CPT | Performed by: HOSPITALIST

## 2022-07-19 PROCEDURE — 86335 IMMUNFIX E-PHORSIS/URINE/CSF: CPT | Mod: 26,,, | Performed by: PATHOLOGY

## 2022-07-19 RX ADMIN — METOPROLOL TARTRATE 100 MG: 50 TABLET, FILM COATED ORAL at 09:07

## 2022-07-19 RX ADMIN — POLYETHYLENE GLYCOL 3350 17 G: 17 POWDER, FOR SOLUTION ORAL at 09:07

## 2022-07-19 RX ADMIN — DOCUSATE SODIUM 100 MG: 100 CAPSULE, LIQUID FILLED ORAL at 09:07

## 2022-07-19 RX ADMIN — TRAMADOL HYDROCHLORIDE 50 MG: 50 TABLET, COATED ORAL at 04:07

## 2022-07-19 RX ADMIN — HEPARIN SODIUM 5000 UNITS: 5000 INJECTION INTRAVENOUS; SUBCUTANEOUS at 10:07

## 2022-07-19 RX ADMIN — MUPIROCIN: 20 OINTMENT TOPICAL at 09:07

## 2022-07-19 RX ADMIN — ACETAMINOPHEN 650 MG: 325 TABLET ORAL at 09:07

## 2022-07-19 RX ADMIN — TRAMADOL HYDROCHLORIDE 50 MG: 50 TABLET, COATED ORAL at 06:07

## 2022-07-19 NOTE — PROGRESS NOTES
Progress Note  Nephrology      Consult Requested By: Arcelia Davidson*      SUBJECTIVE:     Overnight events: Patient reports continued urine output today. Patient states she has some back pain that is higher and different from her usual back pain. States that not laying on her back makes it better and laying on it makes it worse. Denies nausea, vomiting, fever, hematuria, SOB, and chest pains. She denies any other complaints or symptoms.     Patient is a 42 y.o. female presents with new onset pelvic pressure and decreased urinary output for three days           OBJECTIVE:     Vitals:    07/19/22 0303 07/19/22 0400 07/19/22 0632 07/19/22 0740   BP: (!) 159/84   (!) 169/92   BP Location: Left arm   Left arm   Patient Position: Lying   Lying   Pulse: 71 71  72   Resp: 18  18 18   Temp: 98.5 °F (36.9 °C)   96.1 °F (35.6 °C)   TempSrc: Oral   Oral   SpO2: 95%   95%   Weight:       Height:           Temp: 96.1 °F (35.6 °C) (07/19/22 0740)  Pulse: 72 (07/19/22 0740)  Resp: 18 (07/19/22 0740)  BP: (!) 169/92 (07/19/22 0740)  SpO2: 95 % (07/19/22 0740)              Medications:   docusate sodium  100 mg Oral Daily    heparin (porcine)  5,000 Units Subcutaneous Q8H    metoprolol tartrate  100 mg Oral BID    mupirocin   Nasal BID    polyethylene glycol  17 g Oral Daily                 Physical Exam:  General appearance: well developed, well nourished, appears stated age  HEENT: Pupils equal and reactive to light, extra ocular movements intact  Lungs:  clear to auscultation bilaterally and normal respiratory effort  Heart: regular rate and rhythm, S1, S2 normal, no murmur, click, rub or gallop  Abdomen: soft, non-tender non-distented; bowel sounds normal; no masses,  no organomegaly  Extremities: no cyanosis or edema, or clubbing  Skin: Skin color, texture, turgor normal. No rashes or lesions  Neurologic: Normal strength and tone. No focal numbness or weakness  Psychiatric: Behavior normal, thought process  normal    Laboratory:  ABG  Labs reviewed  Recent Results (from the past 336 hour(s))   Basic Metabolic Panel    Collection Time: 07/19/22  3:58 AM   Result Value Ref Range    Sodium 141 136 - 145 mmol/L    Potassium 3.8 3.5 - 5.1 mmol/L    Chloride 105 95 - 110 mmol/L    CO2 20 (L) 23 - 29 mmol/L    BUN 51 (H) 6 - 20 mg/dL    Creatinine 3.2 (H) 0.5 - 1.4 mg/dL    Calcium 9.7 8.7 - 10.5 mg/dL    Anion Gap 16 8 - 16 mmol/L   Basic Metabolic Panel    Collection Time: 07/18/22  4:14 AM   Result Value Ref Range    Sodium 139 136 - 145 mmol/L    Potassium 4.2 3.5 - 5.1 mmol/L    Chloride 107 95 - 110 mmol/L    CO2 19 (L) 23 - 29 mmol/L    BUN 56 (H) 6 - 20 mg/dL    Creatinine 4.9 (H) 0.5 - 1.4 mg/dL    Calcium 8.6 (L) 8.7 - 10.5 mg/dL    Anion Gap 13 8 - 16 mmol/L     Recent Results (from the past 336 hour(s))   CBC Auto Differential    Collection Time: 07/19/22  3:58 AM   Result Value Ref Range    WBC 11.20 3.90 - 12.70 K/uL    Hemoglobin 8.3 (L) 12.0 - 16.0 g/dL    Hematocrit 25.4 (L) 37.0 - 48.5 %    Platelets 188 150 - 450 K/uL   CBC Auto Differential    Collection Time: 07/18/22  4:14 AM   Result Value Ref Range    WBC 10.62 3.90 - 12.70 K/uL    Hemoglobin 8.1 (L) 12.0 - 16.0 g/dL    Hematocrit 25.6 (L) 37.0 - 48.5 %    Platelets 179 150 - 450 K/uL   CBC with Automated Differential    Collection Time: 07/16/22  3:01 AM   Result Value Ref Range    WBC 9.08 3.90 - 12.70 K/uL    Hemoglobin 8.5 (L) 12.0 - 16.0 g/dL    Hematocrit 26.4 (L) 37.0 - 48.5 %    Platelets 118 (L) 150 - 450 K/uL       Diagnostic Results:  X-Ray: Reviewed  US: Reviewed  Echo: Reviewed  ACCESS    ASSESSMENT/PLAN:     Rohini Dang is a 42 y.o. morbidly obese female w PMH of DM II, HLD, JAMESON, esophageal stenosis, fibromyalgia and bipolar disorder, who was admitted 07/13 w/ HENRY and new onset pelvic pressure; [facility] nephrology are following for HENRY w/ hematuria and proteinuria of unknown etiology.     Plan:   #) Stage III HENRY:  - Cr starting to  trend down, currently 3.2, from peak of 7.1 during this admission., highest level ~ 7.1, GFR ~ 10  - Baseline Cr ~ 1.0-1.2 (as of 06/19/2022)  - Pt was initially oliguric but currently having normal urine output w/ yarbrough in place  - UA +ve for microscopic hematuria  - Renal US w/ significantly enlarged B/L kidneys, no evidence for obstructive process/hydronephrosis  - Etiology remains unclear   -Hepatitis panel negative, HIV negative, ASO/MARCO ANTONIO/ANCA negative   -DsDNA/C3/C4 pending  -Microalbumin/Cr ratio: 121.7  -Patient received IR guided biopsy yesterday. Pathology results are pending.    -Patient can follow up results w/ Dr. Angella Gray outpatient     #) Hypertension   - On lopressor 100 mg BID, Ok to continue  - Blood pressures currently trending up.  - Valsartan-HCTZ, 80-12.5 mg, hold for now  - Creatinine at 3.2, recommend calcium channel blocker until baseline kidney function returns to maintain blood pressure control.     #) Anemia Evaluation  -obtaining serum iron, TIBC, and ferritin today    Apollo Zuluaga MD   Internal Medicine [facility]

## 2022-07-19 NOTE — PLAN OF CARE
"  Problem: Adult Inpatient Plan of Care  Goal: Plan of Care Review  Outcome: Ongoing, Progressing   Patient back from biopsy with room air, O2 sat down to 87%, no respiratory distress, but reported she felt tired. 2L of nasal cannula given. O2 sat maintain 97%. On cardiac monitor, running normal sinus rhythm.     Deny nausea/vomiting/diarrhea. Pt had bowel movement during the night shift. Complaint headache and left flank pain, tramadol and tylenol given. Hold heparin for 24 hours due to the doctors notes"Do not give heparin in 24 hours after biopsy." Due medications given.     Maintain fall risk precaution, bed in lowest position, bed alarm on. Call light/personal items in reach. Quick in place. Instructed patient call for help as needed. Will continue to monitor.   "

## 2022-07-19 NOTE — PLAN OF CARE
"   07/19/22 1055   Post-Acute Status   Post-Acute Authorization Other   HME Status Pending medical clearance/testing   Other Status Awaiting f/u Appts   Discharge Plan   Discharge Plan A Home;Home with family     Pending medical clearance after kidney biopsy on yesterday. Awaiting Nephro F/U appt.     Follow-up Information       [facility] Follow up in 2 week(s).    Why: FOLLOW UP WITH NEPHROLOGY AFTER DISCHARGE  Contact information:  Dr. SHERRY Gray  [address] [place] [address] [phone]                             Future Appointments   Date Time Provider Department Center   8/16/2022  2:00 PM Hola Cooney MD [facility] RHEUM IBAN ACT   9/2/2022  9:20 AM KAMLA Funes [facility] GASTRO IBAN 4TH FL   9/2/2022 10:40 AM [facility] LAB [facility] LAB [facility]   9/22/2022 10:20 AM Fide Melendez NP [facility] ENDOCRN IBAN FRNT     BP (!) 169/92   Pulse 72   Temp 96.1 °F (35.6 °C) (Oral)   Resp 18   Ht 5' 4" (1.626 m)   Wt 91.7 kg (202 lb 2.6 oz)   SpO2 96%   Breastfeeding No   BMI 34.70 kg/m²      docusate sodium  100 mg Oral Daily    heparin (porcine)  5,000 Units Subcutaneous Q8H    metoprolol tartrate  100 mg Oral BID    mupirocin   Nasal BID    polyethylene glycol  17 g Oral Daily       "

## 2022-07-20 LAB
ALBUMIN SERPL ELPH-MCNC: 3.04 G/DL (ref 3.35–5.55)
ALPHA1 GLOB SERPL ELPH-MCNC: 0.57 G/DL (ref 0.17–0.41)
ALPHA2 GLOB SERPL ELPH-MCNC: 1.11 G/DL (ref 0.43–0.99)
ANION GAP SERPL CALC-SCNC: 14 MMOL/L (ref 8–16)
B-GLOBULIN SERPL ELPH-MCNC: 1.07 G/DL (ref 0.5–1.1)
BUN SERPL-MCNC: 39 MG/DL (ref 6–20)
C3 SERPL-MCNC: 235 MG/DL (ref 50–180)
C4 SERPL-MCNC: 56 MG/DL (ref 11–44)
CALCIUM SERPL-MCNC: 9.2 MG/DL (ref 8.7–10.5)
CHLORIDE SERPL-SCNC: 106 MMOL/L (ref 95–110)
CO2 SERPL-SCNC: 21 MMOL/L (ref 23–29)
CREAT SERPL-MCNC: 1.9 MG/DL (ref 0.5–1.4)
EST. GFR  (AFRICAN AMERICAN): 36 ML/MIN/1.73 M^2
EST. GFR  (NON AFRICAN AMERICAN): 32 ML/MIN/1.73 M^2
GAMMA GLOB SERPL ELPH-MCNC: 0.92 G/DL (ref 0.67–1.58)
GLUCOSE SERPL-MCNC: 113 MG/DL (ref 70–110)
POCT GLUCOSE: 108 MG/DL (ref 70–110)
POCT GLUCOSE: 113 MG/DL (ref 70–110)
POCT GLUCOSE: 87 MG/DL (ref 70–110)
POTASSIUM SERPL-SCNC: 3.6 MMOL/L (ref 3.5–5.1)
PROT SERPL-MCNC: 6.7 G/DL (ref 6–8.4)
PROT UR-MCNC: 19 MG/DL (ref 0–15)
SODIUM SERPL-SCNC: 141 MMOL/L (ref 136–145)

## 2022-07-20 PROCEDURE — 86160 COMPLEMENT ANTIGEN: CPT

## 2022-07-20 PROCEDURE — 84166 PROTEIN E-PHORESIS/URINE/CSF: CPT | Performed by: STUDENT IN AN ORGANIZED HEALTH CARE EDUCATION/TRAINING PROGRAM

## 2022-07-20 PROCEDURE — 27000221 HC OXYGEN, UP TO 24 HOURS

## 2022-07-20 PROCEDURE — 86160 COMPLEMENT ANTIGEN: CPT | Mod: 59

## 2022-07-20 PROCEDURE — 25000003 PHARM REV CODE 250: Performed by: FAMILY MEDICINE

## 2022-07-20 PROCEDURE — 99900035 HC TECH TIME PER 15 MIN (STAT)

## 2022-07-20 PROCEDURE — 80048 BASIC METABOLIC PNL TOTAL CA: CPT | Performed by: FAMILY MEDICINE

## 2022-07-20 PROCEDURE — 84166 PROTEIN E-PHORESIS/URINE/CSF: CPT | Mod: 26,,, | Performed by: PATHOLOGY

## 2022-07-20 PROCEDURE — 94761 N-INVAS EAR/PLS OXIMETRY MLT: CPT

## 2022-07-20 PROCEDURE — 36415 COLL VENOUS BLD VENIPUNCTURE: CPT | Performed by: FAMILY MEDICINE

## 2022-07-20 PROCEDURE — 63600175 PHARM REV CODE 636 W HCPCS

## 2022-07-20 PROCEDURE — 25000003 PHARM REV CODE 250: Performed by: NURSE PRACTITIONER

## 2022-07-20 PROCEDURE — 84156 ASSAY OF PROTEIN URINE: CPT | Performed by: STUDENT IN AN ORGANIZED HEALTH CARE EDUCATION/TRAINING PROGRAM

## 2022-07-20 PROCEDURE — 36415 COLL VENOUS BLD VENIPUNCTURE: CPT

## 2022-07-20 PROCEDURE — 11000001 HC ACUTE MED/SURG PRIVATE ROOM

## 2022-07-20 PROCEDURE — 84166 PATHOLOGIST INTERPRETATION UPE: ICD-10-PCS | Mod: 26,,, | Performed by: PATHOLOGY

## 2022-07-20 RX ORDER — POLYETHYLENE GLYCOL 3350 17 G/17G
17 POWDER, FOR SOLUTION ORAL DAILY
Qty: 510 G | Refills: 0 | Status: SHIPPED | OUTPATIENT
Start: 2022-07-21 | End: 2022-08-16

## 2022-07-20 RX ORDER — AMLODIPINE BESYLATE 10 MG/1
10 TABLET ORAL DAILY
Qty: 30 TABLET | Refills: 11 | Status: SHIPPED | OUTPATIENT
Start: 2022-07-20 | End: 2023-07-28

## 2022-07-20 RX ORDER — METFORMIN HYDROCHLORIDE 1000 MG/1
1000 TABLET ORAL 2 TIMES DAILY WITH MEALS
Qty: 180 TABLET | Refills: 1 | Status: SHIPPED | OUTPATIENT
Start: 2022-07-24 | End: 2022-08-16

## 2022-07-20 RX ORDER — AMLODIPINE BESYLATE 5 MG/1
10 TABLET ORAL DAILY
Status: DISCONTINUED | OUTPATIENT
Start: 2022-07-20 | End: 2022-07-21 | Stop reason: HOSPADM

## 2022-07-20 RX ADMIN — TRAMADOL HYDROCHLORIDE 50 MG: 50 TABLET, COATED ORAL at 05:07

## 2022-07-20 RX ADMIN — HEPARIN SODIUM 5000 UNITS: 5000 INJECTION INTRAVENOUS; SUBCUTANEOUS at 02:07

## 2022-07-20 RX ADMIN — METOPROLOL TARTRATE 100 MG: 50 TABLET, FILM COATED ORAL at 08:07

## 2022-07-20 RX ADMIN — METOPROLOL TARTRATE 100 MG: 50 TABLET, FILM COATED ORAL at 09:07

## 2022-07-20 RX ADMIN — HEPARIN SODIUM 5000 UNITS: 5000 INJECTION INTRAVENOUS; SUBCUTANEOUS at 05:07

## 2022-07-20 RX ADMIN — MUPIROCIN: 20 OINTMENT TOPICAL at 09:07

## 2022-07-20 RX ADMIN — AMLODIPINE BESYLATE 10 MG: 5 TABLET ORAL at 09:07

## 2022-07-20 NOTE — PLAN OF CARE
07/20/22 1445   Post-Acute Status   Post-Acute Authorization Other   Discharge Delays (!) Personal Transportation  (TN spoke with pt and bedside nurse to confirm that mother was picking up . Still to complete 24 hr urine at 1530pm.)

## 2022-07-20 NOTE — SUBJECTIVE & OBJECTIVE
Interval History:  awake and alert, s/p kidney biopsy report.   Appreciate nephrology rec's - hold ACEi and diuretic, continue CCB until renal functio ns returns. HIMANSHU Gray outpatient. Renal function improving. Possible discharge today.       Review of Systems   Constitutional:  Negative for activity change, appetite change, fatigue and fever.   Respiratory:  Negative for shortness of breath.    Cardiovascular:  Negative for chest pain.   Gastrointestinal:  Negative for abdominal pain.   Neurological:  Negative for dizziness and headaches.   All other systems reviewed and are negative.  Objective:     Vital Signs (Most Recent):  Temp: 97.9 °F (36.6 °C) (07/20/22 0819)  Pulse: 76 (07/20/22 0912)  Resp: 18 (07/20/22 0819)  BP: (!) 158/95 (07/20/22 0912)  SpO2: 98 % (07/20/22 0844) Vital Signs (24h Range):  Temp:  [97.4 °F (36.3 °C)-99.4 °F (37.4 °C)] 97.9 °F (36.6 °C)  Pulse:  [64-76] 76  Resp:  [18-20] 18  SpO2:  [90 %-98 %] 98 %  BP: (150-172)/(77-95) 158/95     Weight: 79.5 kg (175 lb 4.3 oz)  Body mass index is 30.08 kg/m².    Intake/Output Summary (Last 24 hours) at 7/20/2022 0916  Last data filed at 7/20/2022 0505  Gross per 24 hour   Intake --   Output 1265 ml   Net -1265 ml        Physical Exam  Vitals and nursing note reviewed.   Constitutional:       Appearance: Normal appearance.   HENT:      Head: Normocephalic and atraumatic.   Cardiovascular:      Rate and Rhythm: Normal rate and regular rhythm.   Pulmonary:      Effort: Pulmonary effort is normal. No respiratory distress.   Abdominal:      General: There is no distension.   Musculoskeletal:         General: Normal range of motion.      Cervical back: Normal range of motion.   Neurological:      General: No focal deficit present.      Mental Status: She is alert and oriented to person, place, and time.   Psychiatric:         Mood and Affect: Mood normal.         Behavior: Behavior normal.       Significant Labs: All pertinent labs within the past  24 hours have been reviewed.  CBC:   Recent Labs   Lab 07/19/22 0358   WBC 11.20   HGB 8.3*   HCT 25.4*          CMP:   Recent Labs   Lab 07/19/22 0358 07/20/22  0805    141   K 3.8 3.6    106   CO2 20* 21*    113*   BUN 51* 39*   CREATININE 3.2* 1.9*   CALCIUM 9.7 9.2   ANIONGAP 16 14   EGFRNONAA 17* 32*         Significant Imaging: I have reviewed all pertinent imaging results/findings within the past 24 hours.

## 2022-07-20 NOTE — PROGRESS NOTES
Discharge orders noted. Additional clinical references attached. Patient's discharge instructions given by bedside RN and reviewed via this VN.  Education provided on new medication, diagnosis, and follow-up appointments.  Teach back method used. Patient verbalized understanding. All questions answered. Patient awaiting hospital assisted transportation to home. Floor nurse notified.      07/20/22 6519   AVS Confirmation   Discharge instructions and AVS given to and reviewed with patient and/or significant other. Yes

## 2022-07-20 NOTE — PLAN OF CARE
07/20/22 1013   Post-Acute Status   Discharge Delays (!) Other  (Medicaid transportation to be called for transportation home at [facility]. Pending nursing clearance. Pt is on a 24 hr urine to end collection at 1530 pm. Pt also need O2 weaning and yarbrough removal with void after. Attending notified.)

## 2022-07-20 NOTE — ASSESSMENT & PLAN NOTE
-Last electrolytes reviewed-   Recent Labs   Lab 07/19/22  0358 07/20/22  0805   K 3.8 3.6   .   -likely 2/2 to worsening renal function  -Repeat BMP and Mg in am  -Replace electrolytes as indicated  -Monitor on telemetry

## 2022-07-20 NOTE — PHARMACY MED REC
"[facility]           Pharmacy  Admission Medication Reconciliation     The home medication history was taken by Beronica Nguyễn PharmD.      Medication history obtained from Medications listed below were obtained from: Patient/family    Based on information gathered for medication list, you may go to "Admission" then "Reconcile Home Medications" tabs to review and/or act upon those items.      The home medication list has been updated by the Pharmacy department.    Please read ALL comments highlighted in yellow.    Please address this information as you see fit.     Feel free to contact us if you have any questions or require assistance.    The current inpatient medication list has been compared to the home medication list and the following discrepancies were noted:     Patient reports he/she IS TAKING the following which was not ordered upon admit  o Buspirone 30mg twice dialy  o Trulicity inject 0.75mg weekly  o Jardiance 10mg daily  o Linzess 72mcg daily  o Mirtazepine 30mg daily  o Montelukast 10mg daily  o Nortriptyline 50mg daily  o Lyrica 100mg daily  o Rosuvastatin 40mg daily  o Sertraline 100mg daily  o Tizanidine 2mg 3 times daily  o Topiramate 200mg daily      No current facility-administered medications on file prior to encounter.     Current Outpatient Medications on File Prior to Encounter   Medication Sig Dispense Refill    ACCU-CHEK KERRY PLUS TEST STRP Strp USE 1 STRIP TO CHECK GLUCOSE TWICE DAILY      albuterol (PROVENTIL/VENTOLIN HFA) 90 mcg/actuation inhaler Inhale 1-2 puffs into the lungs every 6 (six) hours as needed. Rescue 18 g 0    busPIRone (BUSPAR) 30 MG Tab Take 30 mg by mouth 2 (two) times daily.      dulaglutide (TRULICITY) 0.75 mg/0.5 mL pen injector Inject 0.75 mg into the skin every 7 days. 4 pen 11    empagliflozin (JARDIANCE) 10 mg tablet Take 1 tablet (10 mg total) by mouth once daily. 30 tablet 6    linaCLOtide (LINZESS) 72 mcg Cap capsule Take 1 " capsule (72 mcg total) by mouth before breakfast. 30 capsule 11    metoprolol tartrate (LOPRESSOR) 100 MG tablet Take 100 mg by mouth 2 (two) times daily.      mirtazapine (REMERON) 30 MG tablet Take 30 mg by mouth every evening.       montelukast (SINGULAIR) 10 mg tablet Take 10 mg by mouth every evening.      nortriptyline (PAMELOR) 50 MG capsule TAKE 1 CAPSULE BY MOUTH IN THE EVENING 90 capsule 3    pregabalin (LYRICA) 100 MG capsule TAKE 1 CAPSULE(100 MG) BY MOUTH TWICE DAILY 60 capsule 11    promethazine (PHENERGAN) 25 MG tablet Take 1 tablet (25 mg total) by mouth every 4 to 6 hours as needed. 60 tablet 1    rosuvastatin (CRESTOR) 40 MG Tab Take 40 mg by mouth once daily.      sertraline (ZOLOFT) 100 MG tablet Take 150 mg by mouth once daily.      tiZANidine (ZANAFLEX) 2 MG tablet Take 2 mg by mouth 3 (three) times daily as needed.      topiramate (TOPAMAX) 200 MG Tab Take 200 mg by mouth nightly.      [DISCONTINUED] LIDOcaine (LIDODERM) 5 % UNWRAP AND APPLY 1 PATCH TO SKIN ONCE A DAY AS NEEDED FOR PAIN. APPLY 12 HOURS THEN REMOVE FOR 12 HOURS      [DISCONTINUED] metFORMIN (GLUCOPHAGE) 1000 MG tablet Take 1 tablet (1,000 mg total) by mouth 2 (two) times daily with meals. 180 tablet 1    [DISCONTINUED] valsartan-hydrochlorothiazide (DIOVAN-HCT) 80-12.5 mg per tablet Take 1 tablet by mouth once daily.         Please address this information as you see fit.  Feel free to contact us if you have any questions or require assistance.    Beronica Nguyễn, PharmD  [phone]                  .

## 2022-07-20 NOTE — NURSING
"[facility] - Telemetry  Discharge Final Note    Primary Care Provider: Skip Celestin NP    Expected Discharge Date: 7/20/2022     Pt is still wearing oxygen that needs to be weaned. Pt does still have yarbrough. Attending notified. Medicaid transportation to [facility]. Pending nursing clearance. Pt is on a 24 hr urine to end collection at 1530 pm.    Final Discharge Note (most recent)     Final Note - 07/19/22 1055        Post-Acute Status    Post-Acute Authorization Other     HME Status Pending medical clearance/testing     Other Status Awaiting f/u Appts   1013 am 7/20/2022 - Appts done.                Future Appointments   Date Time Provider Department Center   8/16/2022  2:00 PM Hola Cooney MD [facility] RHEUM IBAN ACT   9/2/2022  9:20 AM KAMLA Funes [facility] GASTRO IBAN 4TH FL   9/2/2022 10:40 AM [facility] LAB [facility]   9/22/2022 10:20 AM Fide Melendez NP [facility] ENDOCRN IBAN FRNT     BP (!) 158/95   Pulse 76   Temp 97.9 °F (36.6 °C) (Oral)   Resp 18   Ht 5' 4" (1.626 m)   Wt 79.5 kg (175 lb 4.3 oz)   SpO2 98%   Breastfeeding No   BMI 30.08 kg/m²       Contact Info     [facility]    Dr. SHERRY Gray  [address] [place] [address]  [phone]       Next Steps: Go on 7/26/2022    Instructions: Arrive at 215 pm, appt for 230pm; FOLLOW UP WITH NEPHROLOGY AFTER DISCHARGE    [facility]   Specialty: Physical Therapy, Speech Pathology, Occupational Therapy    [address] [place] [id number]   Phone: [phone]       Next Steps: Go on 7/26/2022    Instructions: at 0830 am; DISCHARGE FOLLOW UP WITH COMMUNITY PCP, CLINICALS FAXED TO OFFICE    Medicaid Transportation - [facility]    [phone]    Call for transportation to medical appointments 24 - 48 hrs prior to appt.       Next Steps: Call    Instructions: As needed, TRANSPORTATION TO APPOINTMENTS AS NEEDED           Medication List      START " taking these medications    amLODIPine 10 MG tablet  Commonly known as: NORVASC  Take 1 tablet (10 mg total) by mouth once daily.     polyethylene glycol 17 gram/dose powder  Commonly known as: GLYCOLAX  Take 17 g by mouth once daily.  Start taking on: July 21, 2022        CHANGE how you take these medications    metFORMIN 1000 MG tablet  Commonly known as: GLUCOPHAGE  Take 1 tablet (1,000 mg total) by mouth 2 (two) times daily with meals.  Start taking on: July 24, 2022  What changed: These instructions start on July 24, 2022. If you are unsure what to do until then, ask your doctor or other care provider.        CONTINUE taking these medications    ACCU-CHEK KERRY PLUS TEST STRP Strp  Generic drug: blood sugar diagnostic     albuterol 90 mcg/actuation inhaler  Commonly known as: PROVENTIL/VENTOLIN HFA  Inhale 1-2 puffs into the lungs every 6 (six) hours as needed. Rescue     busPIRone 30 MG Tab  Commonly known as: BUSPAR     empagliflozin 10 mg tablet  Commonly known as: JARDIANCE  Take 1 tablet (10 mg total) by mouth once daily.     linaCLOtide 72 mcg Cap capsule  Commonly known as: LINZESS  Take 1 capsule (72 mcg total) by mouth before breakfast.     metoprolol tartrate 100 MG tablet  Commonly known as: LOPRESSOR     mirtazapine 30 MG tablet  Commonly known as: REMERON     montelukast 10 mg tablet  Commonly known as: SINGULAIR     nortriptyline 50 MG capsule  Commonly known as: PAMELOR  TAKE 1 CAPSULE BY MOUTH IN THE EVENING     pregabalin 100 MG capsule  Commonly known as: LYRICA  TAKE 1 CAPSULE(100 MG) BY MOUTH TWICE DAILY     promethazine 25 MG tablet  Commonly known as: PHENERGAN  Take 1 tablet (25 mg total) by mouth every 4 to 6 hours as needed.     rosuvastatin 40 MG Tab  Commonly known as: CRESTOR     sertraline 100 MG tablet  Commonly known as: ZOLOFT     tiZANidine 2 MG tablet  Commonly known as: ZANAFLEX     topiramate 200 MG Tab  Commonly known as: TOPAMAX     TRULICITY 0.75 mg/0.5 mL pen  injector  Generic drug: dulaglutide  Inject 0.75 mg into the skin every 7 days.        STOP taking these medications    LIDOcaine 5 %  Commonly known as: LIDODERM     valsartan-hydrochlorothiazide 80-12.5 mg per tablet  Commonly known as: DIOVAN-HCT           Where to Get Your Medications      These medications were sent to [facility] [address], [place] [address]    Hours: Mon-Fri, 8a-5:30p Phone: [phone]   · amLODIPine 10 MG tablet  · metFORMIN 1000 MG tablet  · polyethylene glycol 17 gram/dose powder

## 2022-07-20 NOTE — PLAN OF CARE
[facility] Plan of Care    Urine electrophoresis can be done as a random collection, not a 24 hour collection. Discussed with lab and changed order. She will not need to stay til 3 for urine collection. This should not delay discharge..    Biopsy pending, C3,C4, SPEP, UPEP pending and will follow up as outpt.     Valeria Yo, DO  [facility]

## 2022-07-20 NOTE — ASSESSMENT & PLAN NOTE
Estimated Creatinine Clearance: 38.5 mL/min (A) (based on SCr of 1.9 mg/dL (H)).  Baseline Cr: 0.9Baseline BUN:12   Recent Labs   Lab 07/16/22  0301 07/17/22  0743 07/18/22  0414 07/19/22  0358 07/20/22  0805   CO2 14* 17* 19* 20* 21*   BUN 57* 60* 56* 51* 39*   CREATININE 7.1* 6.5* 4.9* 3.2* 1.9*   MG 1.7  --  1.8 1.7  --    PHOS  --  7.7* 6.2* 5.8*  --        -Etiology uncertain at this time  -Monitor strict urine output  - Continue to monitor renal function with daily labs and trend electrolytes  - renally dose medications  - avoid nephrotoxic agents including NSAIDs, aminoglycosides, IV contrast (unless absolutely necessary), gadolinium, fleets and other phosphorous-based laxatives  - monitor events that may lead to decreased renal perfusion (hypovolemia, hypotension, sepsis)  - monitor urine output to assure that no obstruction precipitates worsening in GFR  -Renal US without evidence of obstruction or hydronephrosis.  -Nephrology consulted - suspect intrinsic renal issue.  Recommend renal biopsy  -interventional radiology consulted, kidney biopsy on 07/18  Appreciate nephrology rec's - hold ACEi and diuretic, continue CCB until renal functio ns returns. HIMANSHU Gray outpatient. Renal function improving. Possible discharge today.

## 2022-07-20 NOTE — PROGRESS NOTES
"      [facility] Medicine  Telemedicine Progress Note    Patient Name: Rohini Dang  MRN: [id number]  Patient Class: IP- Inpatient   Admission Date: 7/15/2022  Length of Stay: 5 days  Attending Physician: Arcelia Davidson*  Primary Care Provider: Skip Celestin NP          Subjective:     Principal Problem:Acute renal failure        HPI:  Per  note: "42-year-old female with a history of anemia, bipolar disorder, diabetes, esophageal stenosis, and fatty liver admitted to [facility] on July 13 with acute kidney injury (creatinine 4.7, baseline 1.1 on June 19) and hyponatremia.  She was initially treated with IV fluids, but subsequently appeared somewhat volume overloaded on July 15.  Fluids were stopped, and she received 1 dose of Lasix.  Creatinine continued to worsen, and she has oliguria.  Etiology of the acute kidney injury is uncertain.  Multiple serologies have been ordered.  Requesting transfer to Hospital Medicine for Nephrology evaluation.  Referring provider noted she has no evidence of acute respiratory distress or altered mentation. July 15:  Sodium 134, potassium 5.4, chloride 107, CO2 16, BUN 49, creatinine 6 (4.7 on admit-July 13), glucose 127, calcium 6.8, magnesium 1.8, white blood cells 11.19, hemoglobin 8.8, hematocrit 28, platelets 118, multiple autoimmune/inflammatory labs ordered. July 14:  Renal ultrasound with no abnormality noted. July 13:  COVID negative, CPK 72, urine drug screen negative. VS:  Temperature 98.6°, pulse 95, respirations 20, blood pressure 133/65, O2 sats 91% (1 L nasal cannula)"    Patient transferred from [facility] to [facility] for evaluation by nephrology. On exam, patient lethargic but responds to name, reports difficulty breathing and shortness of breath. She was on 4L NC upon assessment. Nephrology consulted. Will admit to hospital medicine for further evaluation and treatment.               Overview/Hospital " Course:  She was transferred to [facility] for nephrology eval. She was started on Lasix gtt but then held per Nephrology. Urine output closely monitored.  Nephrology recommending kidney biopsy.  Interventional Radiology consulted, kidney biopsy done on 07/18.  Continue monitor kidney function.  Holding nephrotoxic agents.    Interval History:  no new complain   S/p liver bx on 7/18   Nephrology following, appreciate assistance.     Review of Systems   Constitutional:  Positive for activity change, appetite change and fatigue. Negative for fever.   Respiratory:  Negative for shortness of breath.    Cardiovascular:  Negative for chest pain.   Gastrointestinal:  Negative for abdominal pain.   Neurological:  Positive for headaches. Negative for dizziness.   All other systems reviewed and are negative.  Objective:      Vital Signs (Most Recent):  Temp: 97.2 °F (36.2 °C) (07/18/22 1700)  Pulse: 68 (07/18/22 1700)  Resp: 17 (07/18/22 1700)  BP: 128/68 (07/18/22 1700)  SpO2: 95 % (07/18/22 1700) Vital Signs (24h Range):  Temp:  [97 °F (36.1 °C)-99.6 °F (37.6 °C)] 97.2 °F (36.2 °C)  Pulse:  [66-87] 68  Resp:  [16-22] 17  SpO2:  [93 %-95 %] 95 %  BP: (128-155)/(66-88) 128/68      Weight: 91.7 kg (202 lb 2.6 oz)  Body mass index is 34.7 kg/m².     Intake/Output Summary (Last 24 hours) at 7/18/2022 1807  Last data filed at 7/18/2022 1700      Gross per 24 hour   Intake 0 ml   Output 3200 ml   Net -3200 ml      Physical Exam  Vitals and nursing note reviewed.   Constitutional:       Appearance: Normal appearance.   HENT:      Head: Normocephalic and atraumatic.   Eyes:      Extraocular Movements: Extraocular movements intact.      Pupils: Pupils are equal, round, and reactive to light.   Cardiovascular:      Rate and Rhythm: Normal rate and regular rhythm.   Pulmonary:      Effort: Pulmonary effort is normal. No respiratory distress.   Abdominal:      General: There is no distension.   Musculoskeletal:         General: Normal range  of motion.      Cervical back: Normal range of motion.   Neurological:      General: No focal deficit present.      Mental Status: She is alert and oriented to person, place, and time.   Psychiatric:         Mood and Affect: Mood normal.         Behavior: Behavior normal.         Significant Labs: All pertinent labs within the past 24 hours have been reviewed.  CBC:       Recent Labs   Lab 07/18/22 0414   WBC 10.62   HGB 8.1*   HCT 25.6*         CMP:        Recent Labs   Lab 07/17/22  0743 07/18/22 0414    139   K 4.6 4.2    107   CO2 17* 19*    104   BUN 60* 56*   CREATININE 6.5* 4.9*   CALCIUM 9.8  9.8 8.6*   PROT 7.4  --    ALBUMIN 2.8*  --    BILITOT 0.3  --    ALKPHOS 183*  --    AST 28  --    ALT 20  --    ANIONGAP 15 13   EGFRNONAA 7* 10*         Significant Imaging: I have reviewed all pertinent imaging results/findings within the past 24 hours            Assessment/Plan:      * Acute renal failure  Estimated Creatinine Clearance: 16.1 mL/min (A) (based on SCr of 4.9 mg/dL (H)).  Baseline Cr: 0.9Baseline BUN:12   Recent Labs   Lab 07/15/22  0542 07/16/22  0301 07/17/22 0743 07/18/22  0414   CO2 16* 14* 17* 19*   BUN 49* 57* 60* 56*   CREATININE 6.0* 7.1* 6.5* 4.9*   MG 1.8 1.7  --  1.8   PHOS  --   --  7.7* 6.2*       -Etiology uncertain at this time  -Monitor strict urine output  - Continue to monitor renal function with daily labs and trend electrolytes  - renally dose medications  - avoid nephrotoxic agents including NSAIDs, aminoglycosides, IV contrast (unless absolutely necessary), gadolinium, fleets and other phosphorous-based laxatives  - monitor events that may lead to decreased renal perfusion (hypovolemia, hypotension, sepsis)  - monitor urine output to assure that no obstruction precipitates worsening in GFR  -Renal US without evidence of obstruction or hydronephrosis.  -Nephrology consulted - suspect intrinsic renal issue.  Recommend renal biopsy  -interventional  radiology consulted, kidney biopsy on 07/18      Dyspnea  -report dyspnea on exam; diminished BS noted--patient was on 4L NC  -Will obtain CXR to assess for cardiopulmonary abnormality  -ABG ordered  -Supplemental oxygen PRN for SpO2 <90%  -Duo-nebs PRN for SOB/wheezing      Anemia  -H/H is 8.8, which is stable and consistent with previous laboratory measurements. Patient exhibits no signs or symptoms of acute bleeding  -Etiology likely due to multifactorial..iron deficiency vs chronic disease?  -No indication for blood transfusion  -Continue to monitor serial CBC  -Transfuse for Hgb <7 or if symptomatic        Type 2 diabetes mellitus, without long-term current use of insulin  Hemoglobin A1C   Date Value Ref Range Status   07/15/2022 7.0 (H) 4.0 - 5.6 % Final     -Serum glucose on admit 127  - hold home oral medications  - LDSSI with ACCUcheck ACHS  - Diabetic diet  -Hypoglycemia protocol PRN          Hyperkalemia  -Last electrolytes reviewed-   Recent Labs   Lab 07/16/22  0301 07/17/22  0743   K 4.4 4.6   .   -likely 2/2 to worsening renal function  -Repeat BMP and Mg in am  -Replace electrolytes as indicated  -Monitor on telemetry        JAMESON (nonalcoholic steatohepatitis)  -chronic; stable  -no evidence of decompensation  -continue to monitor      VTE Risk Mitigation (From admission, onward)         Ordered     heparin (porcine) injection 5,000 Units  Every 8 hours         07/15/22 2102     IP VTE HIGH RISK PATIENT  Once         07/15/22 2102     Place sequential compression device  Until discontinued         07/15/22 2102                      I have assessed these finding virtually using telemed platform and with assistance of bedside nurse         The attending portion of this evaluation, treatment, and documentation was performed per Arcelia Davidson MD via Telemedicine AudioVisual using the secure Lingu[employer]o software platform with 2 way audio/video. The provider was located off-site and the patient is  located in the hospital. The aforementioned video software was utilized to document the relevant history and physical exam    Arcelia Davidson MD  Department of [facility] Medicine   [facility]

## 2022-07-20 NOTE — PROGRESS NOTES
Pt  reports that her mother was picking her up but not until 11 am tmrw morning. Medicaid transportation contacted. Transportation has 3 hr window to  pt. Trip ID# [id number]

## 2022-07-20 NOTE — PROGRESS NOTES
[facility]                    Pharmacy       Discharge Medication Education    Patient ACCEPTED medication education. Pharmacy has provided education on the name, indication, and possible side effects of the medication(s) prescribed, using teach-back method.     The following medications have also been discussed, during this admission.        Medication List        START taking these medications      amLODIPine 10 MG tablet  Commonly known as: NORVASC  Take 1 tablet (10 mg total) by mouth once daily.     polyethylene glycol 17 gram/dose powder  Commonly known as: GLYCOLAX  Take 17 g by mouth once daily.  Start taking on: July 21, 2022            CHANGE how you take these medications      metFORMIN 1000 MG tablet  Commonly known as: GLUCOPHAGE  Take 1 tablet (1,000 mg total) by mouth 2 (two) times daily with meals.  Start taking on: July 24, 2022  What changed: These instructions start on July 24, 2022. If you are unsure what to do until then, ask your doctor or other care provider.            CONTINUE taking these medications      ACCU-CHEK KERRY PLUS TEST STRP Strp  Generic drug: blood sugar diagnostic     albuterol 90 mcg/actuation inhaler  Commonly known as: PROVENTIL/VENTOLIN HFA  Inhale 1-2 puffs into the lungs every 6 (six) hours as needed. Rescue     busPIRone 30 MG Tab  Commonly known as: BUSPAR     empagliflozin 10 mg tablet  Commonly known as: JARDIANCE  Take 1 tablet (10 mg total) by mouth once daily.     linaCLOtide 72 mcg Cap capsule  Commonly known as: LINZESS  Take 1 capsule (72 mcg total) by mouth before breakfast.     metoprolol tartrate 100 MG tablet  Commonly known as: LOPRESSOR     mirtazapine 30 MG tablet  Commonly known as: REMERON     montelukast 10 mg tablet  Commonly known as: SINGULAIR     nortriptyline 50 MG capsule  Commonly known as: PAMELOR  TAKE 1 CAPSULE BY MOUTH IN THE EVENING     pregabalin 100 MG capsule  Commonly known as: LYRICA  TAKE 1 CAPSULE(100 MG) BY  MOUTH TWICE DAILY     promethazine 25 MG tablet  Commonly known as: PHENERGAN  Take 1 tablet (25 mg total) by mouth every 4 to 6 hours as needed.     rosuvastatin 40 MG Tab  Commonly known as: CRESTOR     sertraline 100 MG tablet  Commonly known as: ZOLOFT     tiZANidine 2 MG tablet  Commonly known as: ZANAFLEX     topiramate 200 MG Tab  Commonly known as: TOPAMAX     TRULICITY 0.75 mg/0.5 mL pen injector  Generic drug: dulaglutide  Inject 0.75 mg into the skin every 7 days.            STOP taking these medications      LIDOcaine 5 %  Commonly known as: LIDODERM     valsartan-hydrochlorothiazide 80-12.5 mg per tablet  Commonly known as: DIOVAN-HCT               Where to Get Your Medications        These medications were sent to [facility] [id number]      Hours: Mon-Fri, 8a-5:30p Phone: [phone]   amLODIPine 10 MG tablet  metFORMIN 1000 MG tablet  polyethylene glycol 17 gram/dose powder          Thank you  Fish Buitrago, PharmD  [phone]

## 2022-07-20 NOTE — NURSING
Home Oxygen Evaluation    Date Performed: 2022    1) Patient's Home O2 Sat on room air, while at rest: 96%        If O2 sats on room air at rest are 88% or below, patient qualifies. No additional testing needed. Document N/A in steps 2 and 3. If 89% or above, complete steps 2.      2) Patient's O2 Sat on room air while exercisin%  If O2 sats on room air while exercising remain 89% or above patient does not qualify, no further testing needed Document N/A in step 3. If O2 sats on room air while exercising are 88% or below, continue to step 3.      3) Patient's O2 Sat while exercising on O2: NA  at NA LPM         (Must show improvement from #2 for patients to qualify)    If O2 sats improve on oxygen, patient qualifies for portable oxygen. If not, the patient does not qualify.

## 2022-07-20 NOTE — PROGRESS NOTES
Progress Note  Nephrology      Consult Requested By: Arcelia Davidson*      SUBJECTIVE:     Overnight events:Patient reports feeling well today and that she desires to leave. No acute overnight events. Denies nausea, vomiting, fever, hematuria, SOB, and chest pains. All other ROS is negative.      Patient is a 42 y.o. female presents with new onset pelvic pressure and decreased urinary output for three days           OBJECTIVE:     Vitals:    07/20/22 0819 07/20/22 0844 07/20/22 0912 07/20/22 0953   BP: (!) 158/95  (!) 158/95 (!) 158/65   BP Location: Left arm      Patient Position: Sitting      Pulse: 76  76    Resp: 18      Temp: 97.9 °F (36.6 °C)      TempSrc: Oral      SpO2: 98% 98%     Weight:       Height:           Temp: 97.9 °F (36.6 °C) (07/20/22 0819)  Pulse: 76 (07/20/22 0912)  Resp: 18 (07/20/22 0819)  BP: (!) 158/65 (07/20/22 0953)  SpO2: 98 % (07/20/22 0844)    Date 07/20/22 0700 - 07/21/22 0659   Shift 7573-4160 8462-3630 7751-2523 24 Hour Total   INTAKE   P.O. 200   200   Shift Total(mL/kg) 200(2.5)   200(2.5)   OUTPUT   Urine(mL/kg/hr) 500   500   Shift Total(mL/kg) 500(6.3)   500(6.3)   Weight (kg) 79.5 79.5 79.5 79.5             Medications:   amLODIPine  10 mg Oral Daily    docusate sodium  100 mg Oral Daily    heparin (porcine)  5,000 Units Subcutaneous Q8H    metoprolol tartrate  100 mg Oral BID    polyethylene glycol  17 g Oral Daily                 Physical Exam:  General appearance: well developed, well nourished, appears stated age  HEENT: Pupils equal and reactive to light, extra ocular movements intact  Lungs:  clear to auscultation bilaterally and normal respiratory effort  Heart: regular rate and rhythm, S1, S2 normal, no murmur, click, rub or gallop  Abdomen: soft, non-tender non-distented; bowel sounds normal; no masses,  no organomegaly  Extremities: no cyanosis or edema, or clubbing  Skin: Skin color, texture, turgor normal. No rashes or lesions  Neurologic: Normal  strength and tone. No focal numbness or weakness  Psychiatric: Behavior normal, thought process normal    Laboratory:  ABG  Labs reviewed  Recent Results (from the past 336 hour(s))   Basic Metabolic Panel    Collection Time: 07/20/22  8:05 AM   Result Value Ref Range    Sodium 141 136 - 145 mmol/L    Potassium 3.6 3.5 - 5.1 mmol/L    Chloride 106 95 - 110 mmol/L    CO2 21 (L) 23 - 29 mmol/L    BUN 39 (H) 6 - 20 mg/dL    Creatinine 1.9 (H) 0.5 - 1.4 mg/dL    Calcium 9.2 8.7 - 10.5 mg/dL    Anion Gap 14 8 - 16 mmol/L   Basic Metabolic Panel    Collection Time: 07/19/22  3:58 AM   Result Value Ref Range    Sodium 141 136 - 145 mmol/L    Potassium 3.8 3.5 - 5.1 mmol/L    Chloride 105 95 - 110 mmol/L    CO2 20 (L) 23 - 29 mmol/L    BUN 51 (H) 6 - 20 mg/dL    Creatinine 3.2 (H) 0.5 - 1.4 mg/dL    Calcium 9.7 8.7 - 10.5 mg/dL    Anion Gap 16 8 - 16 mmol/L   Basic Metabolic Panel    Collection Time: 07/18/22  4:14 AM   Result Value Ref Range    Sodium 139 136 - 145 mmol/L    Potassium 4.2 3.5 - 5.1 mmol/L    Chloride 107 95 - 110 mmol/L    CO2 19 (L) 23 - 29 mmol/L    BUN 56 (H) 6 - 20 mg/dL    Creatinine 4.9 (H) 0.5 - 1.4 mg/dL    Calcium 8.6 (L) 8.7 - 10.5 mg/dL    Anion Gap 13 8 - 16 mmol/L     Recent Results (from the past 336 hour(s))   CBC Auto Differential    Collection Time: 07/19/22  3:58 AM   Result Value Ref Range    WBC 11.20 3.90 - 12.70 K/uL    Hemoglobin 8.3 (L) 12.0 - 16.0 g/dL    Hematocrit 25.4 (L) 37.0 - 48.5 %    Platelets 188 150 - 450 K/uL   CBC Auto Differential    Collection Time: 07/18/22  4:14 AM   Result Value Ref Range    WBC 10.62 3.90 - 12.70 K/uL    Hemoglobin 8.1 (L) 12.0 - 16.0 g/dL    Hematocrit 25.6 (L) 37.0 - 48.5 %    Platelets 179 150 - 450 K/uL   CBC with Automated Differential    Collection Time: 07/16/22  3:01 AM   Result Value Ref Range    WBC 9.08 3.90 - 12.70 K/uL    Hemoglobin 8.5 (L) 12.0 - 16.0 g/dL    Hematocrit 26.4 (L) 37.0 - 48.5 %    Platelets 118 (L) 150 - 450 K/uL        Diagnostic Results:  X-Ray: Reviewed  US: Reviewed  Echo: Reviewed  ACCESS    ASSESSMENT/PLAN:     Rohini Dang is a 42 y.o. morbidly obese female w PMH of DM II, HLD, JAMESON, esophageal stenosis, fibromyalgia and bipolar disorder, who was admitted 07/13 w/ HENRY and new onset pelvic pressure; [facility] nephrology are following for HENRY w/ hematuria and proteinuria of unknown etiology.     Plan:   #) Stage III HENRY:  - Cr starting to trend down, currently labs are still pending last was 3.2, from peak of 7.1 during this admission  - Baseline Cr ~ 1.0-1.2 (as of 06/19/2022)  - Pt was initially oliguric but currently having normal urine output w/ yarbrough in place  - UA +ve for microscopic hematuria  - Renal US w/ significantly enlarged B/L kidneys, no evidence for obstructive process/hydronephrosis  - Etiology remains unclear   -Hepatitis panel negative, HIV negative, ASO/MARCO ANTONIO/ANCA/DsDNA negative   -C3/C4 pending  -Microalbumin/Cr ratio: 121.7  -Patient received IR guided biopsy and pathology results are pending.     -Patient can follow up results w/ Dr. Angella Gray outpatient     #) Hypertension   - On lopressor 100 mg BID, Ok to continue  - Valsartan-HCTZ, 80-12.5 mg, hold for now  - Creatinine 1.9 this morning okay to restart home meds     #) Anemia of Chronic Inflammation   - H/H 8.3/25.4 w/ MCV 79 and RDW 15.8  - Iron 39, TIBC 355, Ferritin 159, Transferrin 240  - Most likely secondary to chronic inflammation    Patient will f/u w/ nephrology outpatient. Thank you for allowing us to participate in the care of this patient.     Apollo Zuluaga MD   Internal Medicine -I

## 2022-07-20 NOTE — PROGRESS NOTES
"      [facility] Medicine  Telemedicine Progress Note    Patient Name: Rohini Dang  MRN: [id number]  Patient Class: IP- Inpatient   Admission Date: 7/15/2022  Length of Stay: 5 days  Attending Physician: Arcelia Davidson*  Primary Care Provider: Skip Celestin NP          Subjective:     Principal Problem:Acute renal failure        HPI:  Per  note: "42-year-old female with a history of anemia, bipolar disorder, diabetes, esophageal stenosis, and fatty liver admitted to [facility] on July 13 with acute kidney injury (creatinine 4.7, baseline 1.1 on June 19) and hyponatremia.  She was initially treated with IV fluids, but subsequently appeared somewhat volume overloaded on July 15.  Fluids were stopped, and she received 1 dose of Lasix.  Creatinine continued to worsen, and she has oliguria.  Etiology of the acute kidney injury is uncertain.  Multiple serologies have been ordered.  Requesting transfer to Hospital Medicine for Nephrology evaluation.  Referring provider noted she has no evidence of acute respiratory distress or altered mentation. July 15:  Sodium 134, potassium 5.4, chloride 107, CO2 16, BUN 49, creatinine 6 (4.7 on admit-July 13), glucose 127, calcium 6.8, magnesium 1.8, white blood cells 11.19, hemoglobin 8.8, hematocrit 28, platelets 118, multiple autoimmune/inflammatory labs ordered. July 14:  Renal ultrasound with no abnormality noted. July 13:  COVID negative, CPK 72, urine drug screen negative. VS:  Temperature 98.6°, pulse 95, respirations 20, blood pressure 133/65, O2 sats 91% (1 L nasal cannula)"    Patient transferred from [facility] to [facility] for evaluation by nephrology. On exam, patient lethargic but responds to name, reports difficulty breathing and shortness of breath. She was on 4L NC upon assessment. Nephrology consulted. Will admit to hospital medicine for further evaluation and treatment.               Overview/Hospital " Course:  She was transferred to [facility] for nephrology eval. She was started on Lasix gtt but then held per Nephrology. Urine output closely monitored.  Nephrology recommending kidney biopsy.  Interventional Radiology consulted, kidney biopsy done on 07/18.  Continue monitor kidney function.  Holding nephrotoxic agents.      Interval History:  awake and alert, s/p kidney biopsy report.   Appreciate nephrology rec's - hold ACEi and diuretic, continue CCB until renal functio ns returns. HIMANSHU Gray outpatient. Renal function improving. Possible discharge today.       Review of Systems   Constitutional:  Negative for activity change, appetite change, fatigue and fever.   Respiratory:  Negative for shortness of breath.    Cardiovascular:  Negative for chest pain.   Gastrointestinal:  Negative for abdominal pain.   Neurological:  Negative for dizziness and headaches.   All other systems reviewed and are negative.  Objective:     Vital Signs (Most Recent):  Temp: 97.9 °F (36.6 °C) (07/20/22 0819)  Pulse: 76 (07/20/22 0912)  Resp: 18 (07/20/22 0819)  BP: (!) 158/95 (07/20/22 0912)  SpO2: 98 % (07/20/22 0844) Vital Signs (24h Range):  Temp:  [97.4 °F (36.3 °C)-99.4 °F (37.4 °C)] 97.9 °F (36.6 °C)  Pulse:  [64-76] 76  Resp:  [18-20] 18  SpO2:  [90 %-98 %] 98 %  BP: (150-172)/(77-95) 158/95     Weight: 79.5 kg (175 lb 4.3 oz)  Body mass index is 30.08 kg/m².    Intake/Output Summary (Last 24 hours) at 7/20/2022 0916  Last data filed at 7/20/2022 0505  Gross per 24 hour   Intake --   Output 1265 ml   Net -1265 ml        Physical Exam  Vitals and nursing note reviewed.   Constitutional:       Appearance: Normal appearance.   HENT:      Head: Normocephalic and atraumatic.   Cardiovascular:      Rate and Rhythm: Normal rate and regular rhythm.   Pulmonary:      Effort: Pulmonary effort is normal. No respiratory distress.   Abdominal:      General: There is no distension.   Musculoskeletal:         General: Normal range of  motion.      Cervical back: Normal range of motion.   Neurological:      General: No focal deficit present.      Mental Status: She is alert and oriented to person, place, and time.   Psychiatric:         Mood and Affect: Mood normal.         Behavior: Behavior normal.       Significant Labs: All pertinent labs within the past 24 hours have been reviewed.  CBC:   Recent Labs   Lab 07/19/22  0358   WBC 11.20   HGB 8.3*   HCT 25.4*          CMP:   Recent Labs   Lab 07/19/22  0358 07/20/22  0805    141   K 3.8 3.6    106   CO2 20* 21*    113*   BUN 51* 39*   CREATININE 3.2* 1.9*   CALCIUM 9.7 9.2   ANIONGAP 16 14   EGFRNONAA 17* 32*         Significant Imaging: I have reviewed all pertinent imaging results/findings within the past 24 hours.      Assessment/Plan:      * Acute renal failure  Estimated Creatinine Clearance: 38.5 mL/min (A) (based on SCr of 1.9 mg/dL (H)).  Baseline Cr: 0.9Baseline BUN:12   Recent Labs   Lab 07/16/22  0301 07/17/22  0743 07/18/22  0414 07/19/22  0358 07/20/22  0805   CO2 14* 17* 19* 20* 21*   BUN 57* 60* 56* 51* 39*   CREATININE 7.1* 6.5* 4.9* 3.2* 1.9*   MG 1.7  --  1.8 1.7  --    PHOS  --  7.7* 6.2* 5.8*  --        -Etiology uncertain at this time  -Monitor strict urine output  - Continue to monitor renal function with daily labs and trend electrolytes  - renally dose medications  - avoid nephrotoxic agents including NSAIDs, aminoglycosides, IV contrast (unless absolutely necessary), gadolinium, fleets and other phosphorous-based laxatives  - monitor events that may lead to decreased renal perfusion (hypovolemia, hypotension, sepsis)  - monitor urine output to assure that no obstruction precipitates worsening in GFR  -Renal US without evidence of obstruction or hydronephrosis.  -Nephrology consulted - suspect intrinsic renal issue.  Recommend renal biopsy  -interventional radiology consulted, kidney biopsy on 07/18  Appreciate nephrology rec's - hold ACEi and  diuretic, continue CCB until renal functio ns returns. HIMANSHU Gray outpatient. Renal function improving. Possible discharge today.       Dyspnea  -report dyspnea on exam; diminished BS noted--patient was on 4L NC  -Will obtain CXR to assess for cardiopulmonary abnormality  -ABG ordered  -Supplemental oxygen PRN for SpO2 <90%  -Duo-nebs PRN for SOB/wheezing      Anemia  -H/H is 8.8, which is stable and consistent with previous laboratory measurements. Patient exhibits no signs or symptoms of acute bleeding  -Etiology likely due to multifactorial..iron deficiency vs chronic disease?  -No indication for blood transfusion  -Continue to monitor serial CBC  -Transfuse for Hgb <7 or if symptomatic        Type 2 diabetes mellitus, without long-term current use of insulin  Hemoglobin A1C   Date Value Ref Range Status   07/15/2022 7.0 (H) 4.0 - 5.6 % Final     -Serum glucose on admit 127  - hold home oral medications  - LDSSI with ACCUcheck ACHS  - Diabetic diet  -Hypoglycemia protocol PRN          Hyperkalemia  -Last electrolytes reviewed-   Recent Labs   Lab 07/19/22  0358 07/20/22  0805   K 3.8 3.6   .   -likely 2/2 to worsening renal function  -Repeat BMP and Mg in am  -Replace electrolytes as indicated  -Monitor on telemetry        JAMESON (nonalcoholic steatohepatitis)  -chronic; stable  -no evidence of decompensation  -continue to monitor        VTE Risk Mitigation (From admission, onward)         Ordered     heparin (porcine) injection 5,000 Units  Every 8 hours         07/15/22 2102     IP VTE HIGH RISK PATIENT  Once         07/15/22 2102     Place sequential compression device  Until discontinued         07/15/22 2102                      I have assessed these finding virtually using telemed platform and with assistance of bedside nurse                 The attending portion of this evaluation, treatment, and documentation was performed per Arcelia Davidson MD via Telemedicine AudioVisual using the secure  [employer] software platform with 2 way audio/video. The provider was located off-site and the patient is located in the hospital. The aforementioned video software was utilized to document the relevant history and physical exam    Arcelia Davidson MD  Department of [facility] Medicine   [facility]

## 2022-07-21 VITALS
HEIGHT: 64 IN | TEMPERATURE: 99 F | DIASTOLIC BLOOD PRESSURE: 75 MMHG | BODY MASS INDEX: 29.92 KG/M2 | WEIGHT: 175.25 LBS | OXYGEN SATURATION: 96 % | RESPIRATION RATE: 18 BRPM | HEART RATE: 61 BPM | SYSTOLIC BLOOD PRESSURE: 127 MMHG

## 2022-07-21 LAB
INTERPRETATION UR IFE-IMP: NORMAL
PATHOLOGIST INTERPRETATION SPE: NORMAL
PATHOLOGIST INTERPRETATION UIFE: NORMAL
PATHOLOGIST INTERPRETATION UPE: NORMAL
POCT GLUCOSE: 92 MG/DL (ref 70–110)
PROT PATTERN UR ELPH-IMP: NORMAL

## 2022-07-21 PROCEDURE — 94761 N-INVAS EAR/PLS OXIMETRY MLT: CPT

## 2022-07-21 PROCEDURE — 63600175 PHARM REV CODE 636 W HCPCS

## 2022-07-21 PROCEDURE — 25000003 PHARM REV CODE 250: Performed by: STUDENT IN AN ORGANIZED HEALTH CARE EDUCATION/TRAINING PROGRAM

## 2022-07-21 PROCEDURE — 25000003 PHARM REV CODE 250: Performed by: NURSE PRACTITIONER

## 2022-07-21 PROCEDURE — 25000003 PHARM REV CODE 250: Performed by: FAMILY MEDICINE

## 2022-07-21 RX ORDER — VALSARTAN 160 MG/1
160 TABLET ORAL DAILY
Status: DISCONTINUED | OUTPATIENT
Start: 2022-07-22 | End: 2022-07-21

## 2022-07-21 RX ORDER — VALSARTAN 40 MG/1
80 TABLET ORAL DAILY
Status: DISCONTINUED | OUTPATIENT
Start: 2022-07-21 | End: 2022-07-21

## 2022-07-21 RX ORDER — VALSARTAN AND HYDROCHLOROTHIAZIDE 80; 12.5 MG/1; MG/1
1 TABLET, FILM COATED ORAL DAILY
Qty: 90 TABLET | Refills: 3
Start: 2022-07-21 | End: 2022-09-22

## 2022-07-21 RX ORDER — VALSARTAN 40 MG/1
80 TABLET ORAL DAILY
Status: DISCONTINUED | OUTPATIENT
Start: 2022-07-22 | End: 2022-07-21 | Stop reason: HOSPADM

## 2022-07-21 RX ORDER — VALSARTAN 160 MG/1
160 TABLET ORAL DAILY
Status: DISCONTINUED | OUTPATIENT
Start: 2022-07-21 | End: 2022-07-21

## 2022-07-21 RX ORDER — HYDRALAZINE HYDROCHLORIDE 25 MG/1
25 TABLET, FILM COATED ORAL EVERY 8 HOURS
Status: DISCONTINUED | OUTPATIENT
Start: 2022-07-21 | End: 2022-07-21 | Stop reason: HOSPADM

## 2022-07-21 RX ADMIN — METOPROLOL TARTRATE 100 MG: 50 TABLET, FILM COATED ORAL at 08:07

## 2022-07-21 RX ADMIN — ONDANSETRON HYDROCHLORIDE 4 MG: 2 INJECTION, SOLUTION INTRAMUSCULAR; INTRAVENOUS at 08:07

## 2022-07-21 RX ADMIN — VALSARTAN 80 MG: 40 TABLET, FILM COATED ORAL at 08:07

## 2022-07-21 RX ADMIN — AMLODIPINE BESYLATE 10 MG: 5 TABLET ORAL at 08:07

## 2022-07-21 RX ADMIN — HYDRALAZINE HYDROCHLORIDE 25 MG: 25 TABLET, FILM COATED ORAL at 09:07

## 2022-07-21 RX ADMIN — ONDANSETRON HYDROCHLORIDE 4 MG: 2 INJECTION, SOLUTION INTRAMUSCULAR; INTRAVENOUS at 03:07

## 2022-07-21 NOTE — SUBJECTIVE & OBJECTIVE
Interval History:  awake and alert, complained of nausea  s/p kidney biopsy, report pending     Appreciate nephrology rec's - resume ACEi and diuretic at discharge, continue CCB until renal functio ns returns. HIMANSHU Gray outpatient. Renal function improving. Possible discharge today.       Review of Systems   Constitutional:  Negative for activity change, appetite change, fatigue and fever.   Respiratory:  Negative for shortness of breath.    Cardiovascular:  Negative for chest pain.   Gastrointestinal:  Negative for abdominal pain.   Neurological:  Negative for dizziness and headaches.   All other systems reviewed and are negative.  Objective:     Vital Signs (Most Recent):  Temp: 99 °F (37.2 °C) (07/21/22 0803)  Pulse: 69 (07/21/22 0820)  Resp: 18 (07/21/22 0803)  BP: (!) 179/87 (07/21/22 0820)  SpO2: 96 % (07/21/22 0822) Vital Signs (24h Range):  Temp:  [97.2 °F (36.2 °C)-99.3 °F (37.4 °C)] 99 °F (37.2 °C)  Pulse:  [58-94] 69  Resp:  [17-18] 18  SpO2:  [94 %-99 %] 96 %  BP: (135-179)/(65-95) 179/87     Weight: 79.5 kg (175 lb 4.3 oz)  Body mass index is 30.08 kg/m².    Intake/Output Summary (Last 24 hours) at 7/21/2022 0841  Last data filed at 7/21/2022 0000  Gross per 24 hour   Intake 700 ml   Output 500 ml   Net 200 ml        Physical Exam  Vitals and nursing note reviewed.   Constitutional:       Appearance: Normal appearance.   HENT:      Head: Normocephalic and atraumatic.   Cardiovascular:      Rate and Rhythm: Normal rate and regular rhythm.   Pulmonary:      Effort: Pulmonary effort is normal. No respiratory distress.   Abdominal:      General: There is no distension.   Musculoskeletal:         General: Normal range of motion.      Cervical back: Normal range of motion.   Neurological:      General: No focal deficit present.      Mental Status: She is alert and oriented to person, place, and time.   Psychiatric:         Mood and Affect: Mood normal.         Behavior: Behavior normal.        Significant Labs: All pertinent labs within the past 24 hours have been reviewed.  CBC:   No results for input(s): WBC, HGB, HCT, PLT in the last 48 hours.    CMP:   Recent Labs   Lab 07/20/22  0805      K 3.6      CO2 21*   *   BUN 39*   CREATININE 1.9*   CALCIUM 9.2   ANIONGAP 14   EGFRNONAA 32*         Significant Imaging: I have reviewed all pertinent imaging results/findings within the past 24 hours.

## 2022-07-21 NOTE — DISCHARGE SUMMARY
"[facility] Medicine  Discharge Summary      Patient Name: Rohini Dang  MRN: [id number]  Patient Class: IP- Inpatient  Admission Date: 7/15/2022  Hospital Length of Stay: 6 days  Discharge Date and Time: 7/21/2022 11:32 AM  Attending Physician: Arcelia Davidson*   Discharging Provider: Arcelia Davidson MD  Primary Care Provider: Skip Celestin NP      HPI:   Per  note: "42-year-old female with a history of anemia, bipolar disorder, diabetes, esophageal stenosis, and fatty liver admitted to [facility] on July 13 with acute kidney injury (creatinine 4.7, baseline 1.1 on June 19) and hyponatremia.  She was initially treated with IV fluids, but subsequently appeared somewhat volume overloaded on July 15.  Fluids were stopped, and she received 1 dose of Lasix.  Creatinine continued to worsen, and she has oliguria.  Etiology of the acute kidney injury is uncertain.  Multiple serologies have been ordered.  Requesting transfer to Hospital Medicine for Nephrology evaluation.  Referring provider noted she has no evidence of acute respiratory distress or altered mentation. July 15:  Sodium 134, potassium 5.4, chloride 107, CO2 16, BUN 49, creatinine 6 (4.7 on admit-July 13), glucose 127, calcium 6.8, magnesium 1.8, white blood cells 11.19, hemoglobin 8.8, hematocrit 28, platelets 118, multiple autoimmune/inflammatory labs ordered. July 14:  Renal ultrasound with no abnormality noted. July 13:  COVID negative, CPK 72, urine drug screen negative. VS:  Temperature 98.6°, pulse 95, respirations 20, blood pressure 133/65, O2 sats 91% (1 L nasal cannula)"    Patient transferred from [facility] to [facility] for evaluation by nephrology. On exam, patient lethargic but responds to name, reports difficulty breathing and shortness of breath. She was on 4L NC upon assessment. Nephrology consulted. Will admit to hospital medicine for further evaluation and treatment. "               * No surgery found *      Hospital Course:   She was transferred to [facility] for nephrology eval. She was started on Lasix gtt but then held per Nephrology. Urine output closely monitored.  Nephrology recommending kidney biopsy.  Interventional Radiology consulted, kidney biopsy done on 07/18.  Continue monitor kidney function.  Holding nephrotoxic agents.       Goals of Care Treatment Preferences:  Code Status: Full Code      Consults:   Consults (From admission, onward)          Status Ordering Provider     Inpatient consult to Interventional Radiology  Once        Provider:  (Not yet assigned)    Completed BRENDA TORIBIO     Inpatient virtual consult to Hospital Medicine  Once        Provider:  (Not yet assigned)    Completed ROCAEL JAQUEZ     Inpatient consult to Nephrology-[facility]  Once        Provider:  (Not yet assigned)    Completed ABRAHAM WANG            * Acute renal failure  Estimated Creatinine Clearance: 38.5 mL/min (A) (based on SCr of 1.9 mg/dL (H)).  Baseline Cr: 0.9Baseline BUN:12   Recent Labs   Lab 07/16/22  0301 07/17/22  0743 07/18/22  0414 07/19/22  0358 07/20/22  0805   CO2 14* 17* 19* 20* 21*   BUN 57* 60* 56* 51* 39*   CREATININE 7.1* 6.5* 4.9* 3.2* 1.9*   MG 1.7  --  1.8 1.7  --    PHOS  --  7.7* 6.2* 5.8*  --        -Etiology uncertain at this time  -Monitor strict urine output  - Continue to monitor renal function with daily labs and trend electrolytes  - renally dose medications  - avoid nephrotoxic agents including NSAIDs, aminoglycosides, IV contrast (unless absolutely necessary), gadolinium, fleets and other phosphorous-based laxatives  - monitor events that may lead to decreased renal perfusion (hypovolemia, hypotension, sepsis)  - monitor urine output to assure that no obstruction precipitates worsening in GFR  -Renal US without evidence of obstruction or hydronephrosis.  -Nephrology consulted - suspect intrinsic renal issue.  Recommend renal  biopsy  -interventional radiology consulted, kidney biopsy on 07/18  Appreciate nephrology rec's - resume ACEi and diuretic at discharge, continue CCB until renal functio ns returns. HIMANSHU Gray outpatient. Renal function improving. Possible discharge today.       Dyspnea  -report dyspnea on exam; diminished BS noted--patient was on 4L NC  -Will obtain CXR to assess for cardiopulmonary abnormality  -ABG ordered  -Supplemental oxygen PRN for SpO2 <90%  -Duo-nebs PRN for SOB/wheezing      Anemia  -H/H is 8.8, which is stable and consistent with previous laboratory measurements. Patient exhibits no signs or symptoms of acute bleeding  -Etiology likely due to multifactorial..iron deficiency vs chronic disease?  -No indication for blood transfusion  -Continue to monitor serial CBC  -Transfuse for Hgb <7 or if symptomatic        Type 2 diabetes mellitus, without long-term current use of insulin  Hemoglobin A1C   Date Value Ref Range Status   07/15/2022 7.0 (H) 4.0 - 5.6 % Final     -Serum glucose on admit 127  - hold home oral medications  - LDSSI with ACCUcheck ACHS  - Diabetic diet  -Hypoglycemia protocol PRN          Hyperkalemia  -Last electrolytes reviewed-   Recent Labs   Lab 07/20/22  0805   K 3.6   .   -likely 2/2 to worsening renal function  -Repeat BMP and Mg in am  -Replace electrolytes as indicated  -Monitor on telemetry        JAMESON (nonalcoholic steatohepatitis)  -chronic; stable  -no evidence of decompensation  -continue to monitor        Final Active Diagnoses:    Diagnosis Date Noted POA    PRINCIPAL PROBLEM:  Acute renal failure [N17.9] 07/14/2022 Yes    Hyperkalemia [E87.5] 07/15/2022 Yes    Type 2 diabetes mellitus, without long-term current use of insulin [E11.9] 07/15/2022 Yes    Anemia [D64.9] 07/15/2022 Yes    Dyspnea [R06.00] 07/15/2022 Yes    JAMESON (nonalcoholic steatohepatitis) [K75.81] 04/07/2016 Yes      Problems Resolved During this Admission:       Discharged Condition:  stable    Disposition: Home or Self Care    Follow Up:   Follow-up Information       [facility]. Go on 7/26/2022.    Why: Arrive at 215 pm, appt for 230pm; FOLLOW UP WITH NEPHROLOGY AFTER DISCHARGE  Contact information:  Dr. SHERRY Gray  [address] [place] [address]  [phone]             [facility]. Go on 7/26/2022.    Specialties: Physical Therapy, Speech Pathology, Occupational Therapy  Why: at 0830 am; DISCHARGE FOLLOW UP WITH COMMUNITY PCP, CLINICALS FAXED TO OFFICE  Contact information:  [address] [facility] [address]  [phone]               Medicaid Transportation - [employer]. Call.    Why: As needed, TRANSPORTATION TO APPOINTMENTS AS NEEDED  Contact information:  [phone]    Call for transportation to medical appointments 24 - 48 hrs prior to appt.                         Patient Instructions:      Ambulatory referral/consult to Nephrology   Standing Status: Future   Referral Priority: Routine Referral Type: Consultation   Referral Reason: Specialty Services Required   Referred to Provider: CHERRY GRAY Requested Specialty: Nephrology   Number of Visits Requested: 1     Diet Cardiac     Diet diabetic     Diet renal     Activity as tolerated         Pending Diagnostic Studies:       Procedure Component Value Units Date/Time    IR Biopsy Kidney [[id number]] Resulted: 07/18/22 1609    Order Status: Sent Lab Status: In process Updated: 07/18/22 1646    Immunofixation, Urine  Random [[id number]] Collected: 07/19/22 1510    Order Status: Sent Lab Status: In process Updated: 07/19/22 2202    Specimen: Urine, Catheterized     Protein Electrophoresis, Random Urine [[id number]] Collected: 07/20/22 1531    Order Status: Sent Lab Status: In process Updated: 07/20/22 2111    Specimen: Urine, 24Hr     Specimen to Pathology, Radiology Kidney, needle biopsy [[id number]] Collected: 07/18/22 1636    Order Status: Sent Lab Status: In  process Updated: 07/19/22 0841           Medications:  Reconciled Home Medications:      Medication List        START taking these medications      amLODIPine 10 MG tablet  Commonly known as: NORVASC  Take 1 tablet (10 mg total) by mouth once daily.     polyethylene glycol 17 gram/dose powder  Commonly known as: GLYCOLAX  Take 17 g by mouth once daily.            CHANGE how you take these medications      valsartan-hydrochlorothiazide 80-12.5 mg per tablet  Commonly known as: DIOVAN-HCT  Take 1 tablet by mouth once daily. Resume home medication  What changed: additional instructions            CONTINUE taking these medications      ACCU-CHEK KERRY PLUS TEST STRP Strp  Generic drug: blood sugar diagnostic  USE 1 STRIP TO CHECK GLUCOSE TWICE DAILY     albuterol 90 mcg/actuation inhaler  Commonly known as: PROVENTIL/VENTOLIN HFA  Inhale 1-2 puffs into the lungs every 6 (six) hours as needed. Rescue     busPIRone 30 MG Tab  Commonly known as: BUSPAR  Take 30 mg by mouth 2 (two) times daily.     empagliflozin 10 mg tablet  Commonly known as: JARDIANCE  Take 1 tablet (10 mg total) by mouth once daily.     linaCLOtide 72 mcg Cap capsule  Commonly known as: LINZESS  Take 1 capsule (72 mcg total) by mouth before breakfast.     metFORMIN 1000 MG tablet  Commonly known as: GLUCOPHAGE  Take 1 tablet (1,000 mg total) by mouth 2 (two) times daily with meals.  Start taking on: July 24, 2022     metoprolol tartrate 100 MG tablet  Commonly known as: LOPRESSOR  Take 100 mg by mouth 2 (two) times daily.     mirtazapine 30 MG tablet  Commonly known as: REMERON  Take 30 mg by mouth every evening.     montelukast 10 mg tablet  Commonly known as: SINGULAIR  Take 10 mg by mouth every evening.     nortriptyline 50 MG capsule  Commonly known as: PAMELOR  TAKE 1 CAPSULE BY MOUTH IN THE EVENING     pregabalin 100 MG capsule  Commonly known as: LYRICA  TAKE 1 CAPSULE(100 MG) BY MOUTH TWICE DAILY     promethazine 25 MG tablet  Commonly known as:  PHENERGAN  Take 1 tablet (25 mg total) by mouth every 4 to 6 hours as needed.     rosuvastatin 40 MG Tab  Commonly known as: CRESTOR  Take 40 mg by mouth once daily.     sertraline 100 MG tablet  Commonly known as: ZOLOFT  Take 150 mg by mouth once daily.     tiZANidine 2 MG tablet  Commonly known as: ZANAFLEX  Take 2 mg by mouth 3 (three) times daily as needed.     topiramate 200 MG Tab  Commonly known as: TOPAMAX  Take 200 mg by mouth nightly.     TRULICITY 0.75 mg/0.5 mL pen injector  Generic drug: dulaglutide  Inject 0.75 mg into the skin every 7 days.            STOP taking these medications      LIDOcaine 5 %  Commonly known as: LIDODERM              Indwelling Lines/Drains at time of discharge:   Lines/Drains/Airways       None                   Time spent on the discharge of patient: 35 minutes         The attending portion of this evaluation, treatment, and documentation was performed per Arcelia Davidson MD via Telemedicine AudioVisual using the secure [employer] software platform with 2 way audio/video. The provider was located off-site and the patient is located in the hospital. The aforementioned video software was utilized to document the relevant history and physical exam    Arcelia Davidson MD  Department of Hospital Medicine  [facility]

## 2022-07-21 NOTE — NURSING
Unable to find transport home. Per transport rep, will try back at 0700 to see which services are available for .

## 2022-07-21 NOTE — CARE UPDATE
[place] contacted Medicaid transportation yesterday after pt later reported to nurse that her mother was not coming until 11 am today to pick her up. Medicaid transportation never showed up to pickup patient. PFC request placed for now .     0707 am -  left with [facility] supervisor Malcolm for assistance.

## 2022-07-21 NOTE — ASSESSMENT & PLAN NOTE
Estimated Creatinine Clearance: 38.5 mL/min (A) (based on SCr of 1.9 mg/dL (H)).  Baseline Cr: 0.9Baseline BUN:12   Recent Labs   Lab 07/16/22  0301 07/17/22  0743 07/18/22  0414 07/19/22  0358 07/20/22  0805   CO2 14* 17* 19* 20* 21*   BUN 57* 60* 56* 51* 39*   CREATININE 7.1* 6.5* 4.9* 3.2* 1.9*   MG 1.7  --  1.8 1.7  --    PHOS  --  7.7* 6.2* 5.8*  --        -Etiology uncertain at this time  -Monitor strict urine output  - Continue to monitor renal function with daily labs and trend electrolytes  - renally dose medications  - avoid nephrotoxic agents including NSAIDs, aminoglycosides, IV contrast (unless absolutely necessary), gadolinium, fleets and other phosphorous-based laxatives  - monitor events that may lead to decreased renal perfusion (hypovolemia, hypotension, sepsis)  - monitor urine output to assure that no obstruction precipitates worsening in GFR  -Renal US without evidence of obstruction or hydronephrosis.  -Nephrology consulted - suspect intrinsic renal issue.  Recommend renal biopsy  -interventional radiology consulted, kidney biopsy on 07/18  Appreciate nephrology rec's - resume ACEi and diuretic at discharge, continue CCB until renal functio ns returns. HIMANSHU Gray outpatient. Renal function improving. Possible discharge today.

## 2022-07-21 NOTE — ASSESSMENT & PLAN NOTE
-Last electrolytes reviewed-   Recent Labs   Lab 07/20/22  0805   K 3.6   .   -likely 2/2 to worsening renal function  -Repeat BMP and Mg in am  -Replace electrolytes as indicated  -Monitor on telemetry

## 2022-07-21 NOTE — PLAN OF CARE
[facility] Plan of Care    Valsartan-hctz should be resumed on discharge. I corrected her discharge medications to resume this. Start valsartan today while here since BP is so high- I ordered this.     Prelim read on biopsy with normo-cellular glomeruli and no crescents and no significant interstitial fibrosis. Unrevealing so far but will update chart with final biopsy data once available.     Valeria Yo, DO  [facility]

## 2022-07-21 NOTE — PROGRESS NOTES
[facility]                    Pharmacy       Discharge Medication Education    Patient ACCEPTED medication education. Pharmacy has provided education on the name, indication, and possible side effects of the medication(s) prescribed, using teach-back method.     The following medications have also been discussed, during this admission.        Medication List        START taking these medications      amLODIPine 10 MG tablet  Commonly known as: NORVASC  Take 1 tablet (10 mg total) by mouth once daily.     polyethylene glycol 17 gram/dose powder  Commonly known as: GLYCOLAX  Take 17 g by mouth once daily.            CHANGE how you take these medications      metFORMIN 1000 MG tablet  Commonly known as: GLUCOPHAGE  Take 1 tablet (1,000 mg total) by mouth 2 (two) times daily with meals.  Start taking on: July 24, 2022  What changed: These instructions start on July 24, 2022. If you are unsure what to do until then, ask your doctor or other care provider.     valsartan-hydrochlorothiazide 80-12.5 mg per tablet  Commonly known as: DIOVAN-HCT  Take 1 tablet by mouth once daily. Resume home medication  What changed: additional instructions            CONTINUE taking these medications      ACCU-CHEK KERRY PLUS TEST STRP Strp  Generic drug: blood sugar diagnostic     albuterol 90 mcg/actuation inhaler  Commonly known as: PROVENTIL/VENTOLIN HFA  Inhale 1-2 puffs into the lungs every 6 (six) hours as needed. Rescue     busPIRone 30 MG Tab  Commonly known as: BUSPAR     empagliflozin 10 mg tablet  Commonly known as: JARDIANCE  Take 1 tablet (10 mg total) by mouth once daily.     linaCLOtide 72 mcg Cap capsule  Commonly known as: LINZESS  Take 1 capsule (72 mcg total) by mouth before breakfast.     metoprolol tartrate 100 MG tablet  Commonly known as: LOPRESSOR     mirtazapine 30 MG tablet  Commonly known as: REMERON     montelukast 10 mg tablet  Commonly known as: SINGULAIR     nortriptyline 50 MG  capsule  Commonly known as: PAMELOR  TAKE 1 CAPSULE BY MOUTH IN THE EVENING     pregabalin 100 MG capsule  Commonly known as: LYRICA  TAKE 1 CAPSULE(100 MG) BY MOUTH TWICE DAILY     promethazine 25 MG tablet  Commonly known as: PHENERGAN  Take 1 tablet (25 mg total) by mouth every 4 to 6 hours as needed.     rosuvastatin 40 MG Tab  Commonly known as: CRESTOR     sertraline 100 MG tablet  Commonly known as: ZOLOFT     tiZANidine 2 MG tablet  Commonly known as: ZANAFLEX     topiramate 200 MG Tab  Commonly known as: TOPAMAX     TRULICITY 0.75 mg/0.5 mL pen injector  Generic drug: dulaglutide  Inject 0.75 mg into the skin every 7 days.            STOP taking these medications      LIDOcaine 5 %  Commonly known as: LIDODERM               Where to Get Your Medications        These medications were sent to [facility], [place] [address]      Hours: Mon-Fri, 8a-5:30p Phone: [phone]   amLODIPine 10 MG tablet  metFORMIN 1000 MG tablet  polyethylene glycol 17 gram/dose powder       Information about where to get these medications is not yet available    Ask your nurse or doctor about these medications  valsartan-hydrochlorothiazide 80-12.5 mg per tablet          Thank you  Fish Buitrago, PharmD  [phone]

## 2022-07-21 NOTE — PROGRESS NOTES
"      [facility] Medicine  Telemedicine Progress Note    Patient Name: Rohini Dang  MRN: [id number]  Patient Class: IP- Inpatient   Admission Date: 7/15/2022  Length of Stay: 6 days  Attending Physician: Arcelia Davidson*  Primary Care Provider: Skip Celestin NP          Subjective:     Principal Problem:Acute renal failure        HPI:  Per  note: "42-year-old female with a history of anemia, bipolar disorder, diabetes, esophageal stenosis, and fatty liver admitted to [facility] on July 13 with acute kidney injury (creatinine 4.7, baseline 1.1 on June 19) and hyponatremia.  She was initially treated with IV fluids, but subsequently appeared somewhat volume overloaded on July 15.  Fluids were stopped, and she received 1 dose of Lasix.  Creatinine continued to worsen, and she has oliguria.  Etiology of the acute kidney injury is uncertain.  Multiple serologies have been ordered.  Requesting transfer to Hospital Medicine for Nephrology evaluation.  Referring provider noted she has no evidence of acute respiratory distress or altered mentation. July 15:  Sodium 134, potassium 5.4, chloride 107, CO2 16, BUN 49, creatinine 6 (4.7 on admit-July 13), glucose 127, calcium 6.8, magnesium 1.8, white blood cells 11.19, hemoglobin 8.8, hematocrit 28, platelets 118, multiple autoimmune/inflammatory labs ordered. July 14:  Renal ultrasound with no abnormality noted. July 13:  COVID negative, CPK 72, urine drug screen negative. VS:  Temperature 98.6°, pulse 95, respirations 20, blood pressure 133/65, O2 sats 91% (1 L nasal cannula)"    Patient transferred from [facility] to [facility] for evaluation by nephrology. On exam, patient lethargic but responds to name, reports difficulty breathing and shortness of breath. She was on 4L NC upon assessment. Nephrology consulted. Will admit to hospital medicine for further evaluation and treatment.               Overview/Hospital " Course:  She was transferred to [facility] for nephrology eval. She was started on Lasix gtt but then held per Nephrology. Urine output closely monitored.  Nephrology recommending kidney biopsy.  Interventional Radiology consulted, kidney biopsy done on 07/18.  Continue monitor kidney function.  Holding nephrotoxic agents.      Interval History:  awake and alert, complained of nausea  s/p kidney biopsy, report pending     Appreciate nephrology rec's - resume ACEi and diuretic at discharge, continue CCB until renal functio ns returns. HIMANSHU Gray outpatient. Renal function improving. Possible discharge today.       Review of Systems   Constitutional:  Negative for activity change, appetite change, fatigue and fever.   Respiratory:  Negative for shortness of breath.    Cardiovascular:  Negative for chest pain.   Gastrointestinal:  Negative for abdominal pain.   Neurological:  Negative for dizziness and headaches.   All other systems reviewed and are negative.  Objective:     Vital Signs (Most Recent):  Temp: 99 °F (37.2 °C) (07/21/22 0803)  Pulse: 69 (07/21/22 0820)  Resp: 18 (07/21/22 0803)  BP: (!) 179/87 (07/21/22 0820)  SpO2: 96 % (07/21/22 0822) Vital Signs (24h Range):  Temp:  [97.2 °F (36.2 °C)-99.3 °F (37.4 °C)] 99 °F (37.2 °C)  Pulse:  [58-94] 69  Resp:  [17-18] 18  SpO2:  [94 %-99 %] 96 %  BP: (135-179)/(65-95) 179/87     Weight: 79.5 kg (175 lb 4.3 oz)  Body mass index is 30.08 kg/m².    Intake/Output Summary (Last 24 hours) at 7/21/2022 0841  Last data filed at 7/21/2022 0000  Gross per 24 hour   Intake 700 ml   Output 500 ml   Net 200 ml        Physical Exam  Vitals and nursing note reviewed.   Constitutional:       Appearance: Normal appearance.   HENT:      Head: Normocephalic and atraumatic.   Cardiovascular:      Rate and Rhythm: Normal rate and regular rhythm.   Pulmonary:      Effort: Pulmonary effort is normal. No respiratory distress.   Abdominal:      General: There is no distension.    Musculoskeletal:         General: Normal range of motion.      Cervical back: Normal range of motion.   Neurological:      General: No focal deficit present.      Mental Status: She is alert and oriented to person, place, and time.   Psychiatric:         Mood and Affect: Mood normal.         Behavior: Behavior normal.       Significant Labs: All pertinent labs within the past 24 hours have been reviewed.  CBC:   No results for input(s): WBC, HGB, HCT, PLT in the last 48 hours.    CMP:   Recent Labs   Lab 07/20/22  0805      K 3.6      CO2 21*   *   BUN 39*   CREATININE 1.9*   CALCIUM 9.2   ANIONGAP 14   EGFRNONAA 32*         Significant Imaging: I have reviewed all pertinent imaging results/findings within the past 24 hours.      Assessment/Plan:      * Acute renal failure  Estimated Creatinine Clearance: 38.5 mL/min (A) (based on SCr of 1.9 mg/dL (H)).  Baseline Cr: 0.9Baseline BUN:12   Recent Labs   Lab 07/16/22  0301 07/17/22  0743 07/18/22  0414 07/19/22  0358 07/20/22  0805   CO2 14* 17* 19* 20* 21*   BUN 57* 60* 56* 51* 39*   CREATININE 7.1* 6.5* 4.9* 3.2* 1.9*   MG 1.7  --  1.8 1.7  --    PHOS  --  7.7* 6.2* 5.8*  --        -Etiology uncertain at this time  -Monitor strict urine output  - Continue to monitor renal function with daily labs and trend electrolytes  - renally dose medications  - avoid nephrotoxic agents including NSAIDs, aminoglycosides, IV contrast (unless absolutely necessary), gadolinium, fleets and other phosphorous-based laxatives  - monitor events that may lead to decreased renal perfusion (hypovolemia, hypotension, sepsis)  - monitor urine output to assure that no obstruction precipitates worsening in GFR  -Renal US without evidence of obstruction or hydronephrosis.  -Nephrology consulted - suspect intrinsic renal issue.  Recommend renal biopsy  -interventional radiology consulted, kidney biopsy on 07/18  Appreciate nephrology rec's - resume ACEi and diuretic at  discharge, continue CCB until renal functio ns returns. HIMANSHU Gray outpatient. Renal function improving. Possible discharge today.       Dyspnea  -report dyspnea on exam; diminished BS noted--patient was on 4L NC  -Will obtain CXR to assess for cardiopulmonary abnormality  -ABG ordered  -Supplemental oxygen PRN for SpO2 <90%  -Duo-nebs PRN for SOB/wheezing      Anemia  -H/H is 8.8, which is stable and consistent with previous laboratory measurements. Patient exhibits no signs or symptoms of acute bleeding  -Etiology likely due to multifactorial..iron deficiency vs chronic disease?  -No indication for blood transfusion  -Continue to monitor serial CBC  -Transfuse for Hgb <7 or if symptomatic        Type 2 diabetes mellitus, without long-term current use of insulin  Hemoglobin A1C   Date Value Ref Range Status   07/15/2022 7.0 (H) 4.0 - 5.6 % Final     -Serum glucose on admit 127  - hold home oral medications  - LDSSI with ACCUcheck ACHS  - Diabetic diet  -Hypoglycemia protocol PRN          Hyperkalemia  -Last electrolytes reviewed-   Recent Labs   Lab 07/20/22  0805   K 3.6   .   -likely 2/2 to worsening renal function  -Repeat BMP and Mg in am  -Replace electrolytes as indicated  -Monitor on telemetry        JAMESON (nonalcoholic steatohepatitis)  -chronic; stable  -no evidence of decompensation  -continue to monitor        VTE Risk Mitigation (From admission, onward)         Ordered     heparin (porcine) injection 5,000 Units  Every 8 hours         07/15/22 2102     IP VTE HIGH RISK PATIENT  Once         07/15/22 2102     Place sequential compression device  Until discontinued         07/15/22 2102                      I have assessed these finding virtually using telemed platform and with assistance of bedside nurse                 The attending portion of this evaluation, treatment, and documentation was performed per Arcelia Davidson MD via Telemedicine AudioVisual using the secure IMASTE software  platform with 2 way audio/video. The provider was located off-site and the patient is located in the hospital. The aforementioned video software was utilized to document the relevant history and physical exam    Arcelia Davidson MD  Department of [facility] Medicine   [facility]

## 2022-07-22 ENCOUNTER — PATIENT OUTREACH (OUTPATIENT)
Dept: ADMINISTRATIVE | Facility: CLINIC | Age: 42
End: 2022-07-22
Payer: MEDICAID

## 2022-07-22 LAB
INTERPRETATION SERPL IFE-IMP: NORMAL
PATHOLOGIST INTERPRETATION IFE: NORMAL

## 2022-07-22 NOTE — PROGRESS NOTES
C3 nurse attempted to contact Rohini Dang for a TCC post hospital discharge follow up call. The patient is unable to conduct the call @ this time. The patient requested a callback.    The patient does not have a scheduled HOSFU appointment within 5-7 days post hospital discharge date 07/21/22. Non-[facility] PCP.

## 2022-07-25 LAB
FINAL PATHOLOGIC DIAGNOSIS: NORMAL
GROSS: NORMAL
Lab: NORMAL

## 2022-08-02 ENCOUNTER — HOSPITAL ENCOUNTER (OUTPATIENT)
Dept: RADIOLOGY | Facility: HOSPITAL | Age: 42
Discharge: HOME | End: 2022-08-02
Attending: NURSE PRACTITIONER
Payer: MEDICAID

## 2022-08-02 DIAGNOSIS — R06.02 SHORTNESS OF BREATH: ICD-10-CM

## 2022-08-02 DIAGNOSIS — R06.02 SHORTNESS OF BREATH: Primary | ICD-10-CM

## 2022-08-02 PROCEDURE — 71046 X-RAY EXAM CHEST 2 VIEWS: CPT | Mod: TC

## 2022-08-11 ENCOUNTER — LAB VISIT (OUTPATIENT)
Dept: LAB | Facility: HOSPITAL | Age: 42
End: 2022-08-11
Attending: INTERNAL MEDICINE
Payer: MEDICAID

## 2022-08-11 DIAGNOSIS — E11.22 TYPE 2 DIABETES MELLITUS WITH END-STAGE RENAL DISEASE: Primary | ICD-10-CM

## 2022-08-11 DIAGNOSIS — N18.6 TYPE 2 DIABETES MELLITUS WITH END-STAGE RENAL DISEASE: Primary | ICD-10-CM

## 2022-08-11 LAB
BASOPHILS # BLD AUTO: 0.04 K/UL (ref 0–0.2)
BASOPHILS NFR BLD: 0.5 % (ref 0–1.9)
DIFFERENTIAL METHOD: ABNORMAL
EOSINOPHIL # BLD AUTO: 0.2 K/UL (ref 0–0.5)
EOSINOPHIL NFR BLD: 1.7 % (ref 0–8)
ERYTHROCYTE [DISTWIDTH] IN BLOOD BY AUTOMATED COUNT: 15.4 % (ref 11.5–14.5)
HCT VFR BLD AUTO: 33.4 % (ref 37–48.5)
HGB BLD-MCNC: 10.5 G/DL (ref 12–16)
IMM GRANULOCYTES # BLD AUTO: 0.04 K/UL (ref 0–0.04)
IMM GRANULOCYTES NFR BLD AUTO: 0.5 % (ref 0–0.5)
LYMPHOCYTES # BLD AUTO: 3.3 K/UL (ref 1–4.8)
LYMPHOCYTES NFR BLD: 37.8 % (ref 18–48)
MCH RBC QN AUTO: 25.1 PG (ref 27–31)
MCHC RBC AUTO-ENTMCNC: 31.4 G/DL (ref 32–36)
MCV RBC AUTO: 80 FL (ref 82–98)
MONOCYTES # BLD AUTO: 0.8 K/UL (ref 0.3–1)
MONOCYTES NFR BLD: 9 % (ref 4–15)
NEUTROPHILS # BLD AUTO: 4.4 K/UL (ref 1.8–7.7)
NEUTROPHILS NFR BLD: 50.5 % (ref 38–73)
NRBC BLD-RTO: 0 /100 WBC
PLATELET # BLD AUTO: 291 K/UL (ref 150–450)
PMV BLD AUTO: 10.5 FL (ref 9.2–12.9)
RBC # BLD AUTO: 4.19 M/UL (ref 4–5.4)
WBC # BLD AUTO: 8.75 K/UL (ref 3.9–12.7)

## 2022-08-11 PROCEDURE — 36415 COLL VENOUS BLD VENIPUNCTURE: CPT | Performed by: INTERNAL MEDICINE

## 2022-08-11 PROCEDURE — 85025 COMPLETE CBC W/AUTO DIFF WBC: CPT | Performed by: INTERNAL MEDICINE

## 2022-08-29 ENCOUNTER — HOSPITAL ENCOUNTER (EMERGENCY)
Facility: HOSPITAL | Age: 42
Discharge: HOME | End: 2022-08-29
Attending: EMERGENCY MEDICINE
Payer: MEDICAID

## 2022-08-29 VITALS
HEIGHT: 64 IN | SYSTOLIC BLOOD PRESSURE: 116 MMHG | WEIGHT: 165 LBS | TEMPERATURE: 99 F | BODY MASS INDEX: 28.17 KG/M2 | HEART RATE: 82 BPM | RESPIRATION RATE: 18 BRPM | OXYGEN SATURATION: 97 % | DIASTOLIC BLOOD PRESSURE: 90 MMHG

## 2022-08-29 DIAGNOSIS — M25.521 RIGHT ELBOW PAIN: Primary | ICD-10-CM

## 2022-08-29 DIAGNOSIS — M25.529 ELBOW PAIN: ICD-10-CM

## 2022-08-29 PROCEDURE — 99283 EMERGENCY DEPT VISIT LOW MDM: CPT

## 2022-08-29 RX ORDER — CYCLOBENZAPRINE HCL 10 MG
10 TABLET ORAL 3 TIMES DAILY PRN
Qty: 15 TABLET | Refills: 0 | Status: SHIPPED | OUTPATIENT
Start: 2022-08-29 | End: 2022-09-03

## 2022-08-29 NOTE — ED PROVIDER NOTES
Encounter Date: 8/29/2022       History     Chief Complaint   Patient presents with    Arm Problem     Complains of pain to right arm from elbow down to hands x 2 days.  Denies injury. Pain 10.      42-year-old female presents to the emergency room with right arm pain from the elbow down the last few days.  Denies any injury, trauma, fall.  History  history of anxiety, bipolar, arthritis, diabetes    Review of patient's allergies indicates:   Allergen Reactions    Amoxicillin Hives    Avelox [moxifloxacin] Hives    Clarinex [desloratadine] Hives    Pcn [penicillins] Hives    Claritin [loratadine] Palpitations    Latex, natural rubber Rash     Past Medical History:   Diagnosis Date    Anemia     Anxiety disorder     Arthritis     Bipolar 2 disorder     Borderline personality disorder     Chronic bilateral low back pain without sciatica     Depression with anxiety     Diabetes mellitus     Elevated LFTs     Esophageal stenosis     Fatty liver     Fibromyalgia     Fibromyalgia     High cholesterol     Hypoglycemia     Intermittent explosive disorder     Liver disease     JAMESON (nonalcoholic steatohepatitis)      Past Surgical History:   Procedure Laterality Date    COLONOSCOPY N/A 3/20/2018    Procedure: COLONOSCOPY;  Surgeon: Janelle Meade MD;  Location: [facility];  Service: Endoscopy;  Laterality: N/A;    COLONOSCOPY N/A 5/11/2021    Procedure: COLONOSCOPY;  Surgeon: Janelle Meade MD;  Location: [facility];  Service: Endoscopy;  Laterality: N/A;    ESOPHAGEAL DILATION      x2    ESOPHAGOGASTRODUODENOSCOPY N/A 5/7/2019    Procedure: EGD (ESOPHAGOGASTRODUODENOSCOPY);  Surgeon: Janelle Meade MD;  Location: [facility];  Service: Endoscopy;  Laterality: N/A;    ESOPHAGOGASTRODUODENOSCOPY N/A 5/11/2021    Procedure: EGD (ESOPHAGOGASTRODUODENOSCOPY);  Surgeon: Janelle Meade MD;  Location: [facility];  Service: Endoscopy;  Laterality: N/A;  Rapid on arrival    HIATAL HERNIA REPAIR       "HYSTERECTOMY      LIVER BIOPSY  2018    fibrosis stage 3 JAMESON    OOPHORECTOMY      TUBAL LIGATION      UPPER GASTROINTESTINAL ENDOSCOPY      2013? unable to obtain records     Family History   Problem Relation Age of Onset    Hypertension Mother     Clotting disorder Father     Ulcers Father     Clotting disorder Sister     Cancer Maternal Grandmother         "female Cancer"    Lung cancer Paternal Grandmother     Diabetes Maternal Aunt     No Known Problems Daughter     No Known Problems Maternal Uncle     No Known Problems Paternal Aunt     No Known Problems Paternal Uncle     No Known Problems Maternal Grandfather     No Known Problems Paternal Grandfather     Breast cancer Neg Hx     Ovarian cancer Neg Hx     BRCA 1/2 Neg Hx      Social History     Tobacco Use    Smoking status: Never    Smokeless tobacco: Never   Substance Use Topics    Alcohol use: No     Alcohol/week: 0.0 standard drinks    Drug use: No     Review of Systems   Constitutional:  Negative for fever.   HENT:  Negative for sore throat.    Respiratory:  Negative for shortness of breath.    Cardiovascular:  Negative for chest pain.   Gastrointestinal:  Negative for nausea.   Genitourinary:  Negative for dysuria.   Musculoskeletal:  Positive for arthralgias. Negative for back pain.   Skin:  Negative for rash.   Neurological:  Negative for weakness.   Hematological:  Does not bruise/bleed easily.   All other systems reviewed and are negative.    Physical Exam     Initial Vitals [08/29/22 1201]   BP Pulse Resp Temp SpO2   (!) 116/90 82 18 98.5 °F (36.9 °C) 97 %      MAP       --         Physical Exam    Nursing note and vitals reviewed.  Constitutional: She appears well-developed and well-nourished.   HENT:   Head: Normocephalic and atraumatic.   Eyes: Pupils are equal, round, and reactive to light.   Neck:   Normal range of motion.  Abdominal: Abdomen is soft.   Musculoskeletal:         General: Tenderness present. Normal range of motion.      " Cervical back: Normal range of motion.     Neurological: She is alert and oriented to person, place, and time.   Skin: Skin is warm and dry.   Psychiatric: She has a normal mood and affect.       ED Course   Procedures  Labs Reviewed - No data to display       Imaging Results              X-Ray Elbow Complete Right (In process)                      Medications - No data to display  Medical Decision Making:   Differential Diagnosis:    Muscle pain, elbow contusion  Clinical Tests:   Radiological Study: Ordered and Reviewed                    Clinical Impression:   Final diagnoses:  [M25.529] Elbow pain  [M25.521] Right elbow pain (Primary)        ED Disposition Condition    Discharge Stable          ED Prescriptions       Medication Sig Dispense Start Date End Date Auth. Provider    cyclobenzaprine (FLEXERIL) 10 MG tablet Take 1 tablet (10 mg total) by mouth 3 (three) times daily as needed for Muscle spasms. 15 tablet 8/29/2022 9/3/2022 Ana Yo NP          Follow-up Information       Follow up With Specialties Details Why Contact Info    Skip Celestin NP Emergency Medicine  As needed [address]  [place] [address]  [phone]               Ana Yo NP  08/29/22 1307

## 2022-09-02 ENCOUNTER — LAB VISIT (OUTPATIENT)
Dept: LAB | Facility: HOSPITAL | Age: 42
End: 2022-09-02
Attending: NURSE PRACTITIONER
Payer: MEDICAID

## 2022-09-02 DIAGNOSIS — K75.81 NASH (NONALCOHOLIC STEATOHEPATITIS): ICD-10-CM

## 2022-09-02 DIAGNOSIS — E11.42 TYPE 2 DIABETES, CONTROLLED, WITH PERIPHERAL NEUROPATHY: ICD-10-CM

## 2022-09-02 DIAGNOSIS — E88.810 METABOLIC SYNDROME: ICD-10-CM

## 2022-09-02 LAB
ALBUMIN SERPL BCP-MCNC: 3.6 G/DL (ref 3.5–5.2)
ALBUMIN/CREAT UR: 39 MG/MG (ref 0–30)
ALP SERPL-CCNC: 275 U/L (ref 55–135)
ALT SERPL W/O P-5'-P-CCNC: 114 U/L (ref 10–44)
ANION GAP SERPL CALC-SCNC: 4 MMOL/L (ref 8–16)
AST SERPL-CCNC: 103 U/L (ref 10–40)
BILIRUB SERPL-MCNC: 0.2 MG/DL (ref 0.1–1)
BUN SERPL-MCNC: 9 MG/DL (ref 6–20)
CALCIUM SERPL-MCNC: 9.5 MG/DL (ref 8.7–10.5)
CHLORIDE SERPL-SCNC: 111 MMOL/L (ref 95–110)
CO2 SERPL-SCNC: 26 MMOL/L (ref 23–29)
CREAT SERPL-MCNC: 0.7 MG/DL (ref 0.5–1.4)
CREAT UR-MCNC: 74.1 MG/DL (ref 15–325)
EST. GFR  (NO RACE VARIABLE): >60 ML/MIN/1.73 M^2
ESTIMATED AVG GLUCOSE: 137 MG/DL (ref 68–131)
GLUCOSE SERPL-MCNC: 117 MG/DL (ref 70–110)
HBA1C MFR BLD: 6.4 % (ref 4–5.6)
MICROALBUMIN UR DL<=1MG/L-MCNC: 28.9 MG/L
POTASSIUM SERPL-SCNC: 4 MMOL/L (ref 3.5–5.1)
PROT SERPL-MCNC: 8.3 G/DL (ref 6–8.4)
SODIUM SERPL-SCNC: 141 MMOL/L (ref 136–145)

## 2022-09-02 PROCEDURE — 80053 COMPREHEN METABOLIC PANEL: CPT | Performed by: NURSE PRACTITIONER

## 2022-09-02 PROCEDURE — 83036 HEMOGLOBIN GLYCOSYLATED A1C: CPT | Performed by: NURSE PRACTITIONER

## 2022-09-02 PROCEDURE — 82570 ASSAY OF URINE CREATININE: CPT | Performed by: NURSE PRACTITIONER

## 2022-09-02 PROCEDURE — 36415 COLL VENOUS BLD VENIPUNCTURE: CPT | Performed by: NURSE PRACTITIONER

## 2022-09-19 LAB
LEFT EYE DM RETINOPATHY: NEGATIVE
RIGHT EYE DM RETINOPATHY: NEGATIVE

## 2022-09-20 ENCOUNTER — LAB VISIT (OUTPATIENT)
Dept: LAB | Facility: HOSPITAL | Age: 42
End: 2022-09-20
Attending: INTERNAL MEDICINE
Payer: MEDICAID

## 2022-09-20 DIAGNOSIS — E11.22 TYPE 2 DIABETES MELLITUS WITH END-STAGE RENAL DISEASE: Primary | ICD-10-CM

## 2022-09-20 DIAGNOSIS — N18.6 TYPE 2 DIABETES MELLITUS WITH END-STAGE RENAL DISEASE: Primary | ICD-10-CM

## 2022-09-20 DIAGNOSIS — N17.9 ACUTE KIDNEY FAILURE, UNSPECIFIED: ICD-10-CM

## 2022-09-20 LAB
ALBUMIN SERPL BCP-MCNC: 3.5 G/DL (ref 3.5–5.2)
ALP SERPL-CCNC: 285 U/L (ref 55–135)
ALT SERPL W/O P-5'-P-CCNC: 99 U/L (ref 10–44)
ANION GAP SERPL CALC-SCNC: 6 MMOL/L (ref 8–16)
AST SERPL-CCNC: 92 U/L (ref 10–40)
BACTERIA #/AREA URNS HPF: ABNORMAL /HPF
BASOPHILS # BLD AUTO: 0.03 K/UL (ref 0–0.2)
BASOPHILS NFR BLD: 0.5 % (ref 0–1.9)
BILIRUB SERPL-MCNC: 0.2 MG/DL (ref 0.1–1)
BILIRUB UR QL STRIP: NEGATIVE
BUN SERPL-MCNC: 11 MG/DL (ref 6–20)
CALCIUM SERPL-MCNC: 9.5 MG/DL (ref 8.7–10.5)
CHLORIDE SERPL-SCNC: 108 MMOL/L (ref 95–110)
CLARITY UR: ABNORMAL
CO2 SERPL-SCNC: 26 MMOL/L (ref 23–29)
COLOR UR: YELLOW
CREAT SERPL-MCNC: 0.7 MG/DL (ref 0.5–1.4)
DIFFERENTIAL METHOD: ABNORMAL
EOSINOPHIL # BLD AUTO: 0.1 K/UL (ref 0–0.5)
EOSINOPHIL NFR BLD: 1.4 % (ref 0–8)
ERYTHROCYTE [DISTWIDTH] IN BLOOD BY AUTOMATED COUNT: 16 % (ref 11.5–14.5)
EST. GFR  (NO RACE VARIABLE): >60 ML/MIN/1.73 M^2
GLUCOSE SERPL-MCNC: 115 MG/DL (ref 70–110)
GLUCOSE UR QL STRIP: ABNORMAL
HCT VFR BLD AUTO: 32 % (ref 37–48.5)
HGB BLD-MCNC: 10 G/DL (ref 12–16)
HGB UR QL STRIP: ABNORMAL
HYALINE CASTS #/AREA URNS LPF: 7 /LPF
IMM GRANULOCYTES # BLD AUTO: 0.01 K/UL (ref 0–0.04)
IMM GRANULOCYTES NFR BLD AUTO: 0.2 % (ref 0–0.5)
KETONES UR QL STRIP: NEGATIVE
LEUKOCYTE ESTERASE UR QL STRIP: ABNORMAL
LYMPHOCYTES # BLD AUTO: 3.1 K/UL (ref 1–4.8)
LYMPHOCYTES NFR BLD: 47.3 % (ref 18–48)
MCH RBC QN AUTO: 24.8 PG (ref 27–31)
MCHC RBC AUTO-ENTMCNC: 31.3 G/DL (ref 32–36)
MCV RBC AUTO: 79 FL (ref 82–98)
MICROSCOPIC COMMENT: ABNORMAL
MONOCYTES # BLD AUTO: 0.5 K/UL (ref 0.3–1)
MONOCYTES NFR BLD: 8.3 % (ref 4–15)
NEUTROPHILS # BLD AUTO: 2.8 K/UL (ref 1.8–7.7)
NEUTROPHILS NFR BLD: 42.3 % (ref 38–73)
NITRITE UR QL STRIP: POSITIVE
NRBC BLD-RTO: 0 /100 WBC
PH UR STRIP: 6 [PH] (ref 5–8)
PLATELET # BLD AUTO: 172 K/UL (ref 150–450)
PMV BLD AUTO: 10.9 FL (ref 9.2–12.9)
POTASSIUM SERPL-SCNC: 3.3 MMOL/L (ref 3.5–5.1)
PROT SERPL-MCNC: 7.9 G/DL (ref 6–8.4)
PROT UR QL STRIP: ABNORMAL
RBC # BLD AUTO: 4.03 M/UL (ref 4–5.4)
RBC #/AREA URNS HPF: 3 /HPF (ref 0–4)
SODIUM SERPL-SCNC: 140 MMOL/L (ref 136–145)
SP GR UR STRIP: 1.01 (ref 1–1.03)
SQUAMOUS #/AREA URNS HPF: 2 /HPF
URN SPEC COLLECT METH UR: ABNORMAL
UROBILINOGEN UR STRIP-ACNC: NEGATIVE EU/DL
WBC # BLD AUTO: 6.51 K/UL (ref 3.9–12.7)
WBC #/AREA URNS HPF: >100 /HPF (ref 0–5)

## 2022-09-20 PROCEDURE — 87088 URINE BACTERIA CULTURE: CPT | Performed by: INTERNAL MEDICINE

## 2022-09-20 PROCEDURE — 81000 URINALYSIS NONAUTO W/SCOPE: CPT | Performed by: INTERNAL MEDICINE

## 2022-09-20 PROCEDURE — 80053 COMPREHEN METABOLIC PANEL: CPT | Performed by: INTERNAL MEDICINE

## 2022-09-20 PROCEDURE — 85025 COMPLETE CBC W/AUTO DIFF WBC: CPT | Performed by: INTERNAL MEDICINE

## 2022-09-20 PROCEDURE — 87186 SC STD MICRODIL/AGAR DIL: CPT | Performed by: INTERNAL MEDICINE

## 2022-09-20 PROCEDURE — 87077 CULTURE AEROBIC IDENTIFY: CPT | Performed by: INTERNAL MEDICINE

## 2022-09-20 PROCEDURE — 87086 URINE CULTURE/COLONY COUNT: CPT | Performed by: INTERNAL MEDICINE

## 2022-09-20 PROCEDURE — 36415 COLL VENOUS BLD VENIPUNCTURE: CPT | Performed by: INTERNAL MEDICINE

## 2022-09-22 PROBLEM — E11.9 TYPE 2 DIABETES MELLITUS, WITHOUT LONG-TERM CURRENT USE OF INSULIN: Status: RESOLVED | Noted: 2022-07-15 | Resolved: 2022-09-22

## 2022-09-22 LAB — BACTERIA UR CULT: ABNORMAL

## 2022-09-23 ENCOUNTER — OFFICE VISIT (OUTPATIENT)
Dept: OBSTETRICS AND GYNECOLOGY | Facility: CLINIC | Age: 42
End: 2022-09-23
Payer: MEDICAID

## 2022-09-23 DIAGNOSIS — L81.9 PIGMENTED SKIN LESION: Primary | ICD-10-CM

## 2022-09-23 PROCEDURE — 3044F PR MOST RECENT HEMOGLOBIN A1C LEVEL <7.0%: ICD-10-PCS | Mod: CPTII,,, | Performed by: OBSTETRICS & GYNECOLOGY

## 2022-09-23 PROCEDURE — 3066F PR DOCUMENTATION OF TREATMENT FOR NEPHROPATHY: ICD-10-PCS | Mod: CPTII,,, | Performed by: OBSTETRICS & GYNECOLOGY

## 2022-09-23 PROCEDURE — 1159F MED LIST DOCD IN RCRD: CPT | Mod: CPTII,,, | Performed by: OBSTETRICS & GYNECOLOGY

## 2022-09-23 PROCEDURE — 99213 OFFICE O/P EST LOW 20 MIN: CPT | Mod: S$PBB,,, | Performed by: OBSTETRICS & GYNECOLOGY

## 2022-09-23 PROCEDURE — 1160F RVW MEDS BY RX/DR IN RCRD: CPT | Mod: CPTII,,, | Performed by: OBSTETRICS & GYNECOLOGY

## 2022-09-23 PROCEDURE — 99999 PR PBB SHADOW E&M-EST. PATIENT-LVL II: CPT | Mod: PBBFAC,,, | Performed by: OBSTETRICS & GYNECOLOGY

## 2022-09-23 PROCEDURE — 99212 OFFICE O/P EST SF 10 MIN: CPT | Mod: PBBFAC | Performed by: OBSTETRICS & GYNECOLOGY

## 2022-09-23 PROCEDURE — 1160F PR REVIEW ALL MEDS BY PRESCRIBER/CLIN PHARMACIST DOCUMENTED: ICD-10-PCS | Mod: CPTII,,, | Performed by: OBSTETRICS & GYNECOLOGY

## 2022-09-23 PROCEDURE — 3060F POS MICROALBUMINURIA REV: CPT | Mod: CPTII,,, | Performed by: OBSTETRICS & GYNECOLOGY

## 2022-09-23 PROCEDURE — 1159F PR MEDICATION LIST DOCUMENTED IN MEDICAL RECORD: ICD-10-PCS | Mod: CPTII,,, | Performed by: OBSTETRICS & GYNECOLOGY

## 2022-09-23 PROCEDURE — 3044F HG A1C LEVEL LT 7.0%: CPT | Mod: CPTII,,, | Performed by: OBSTETRICS & GYNECOLOGY

## 2022-09-23 PROCEDURE — 3066F NEPHROPATHY DOC TX: CPT | Mod: CPTII,,, | Performed by: OBSTETRICS & GYNECOLOGY

## 2022-09-23 PROCEDURE — 99213 PR OFFICE/OUTPT VISIT, EST, LEVL III, 20-29 MIN: ICD-10-PCS | Mod: S$PBB,,, | Performed by: OBSTETRICS & GYNECOLOGY

## 2022-09-23 PROCEDURE — 99999 PR PBB SHADOW E&M-EST. PATIENT-LVL II: ICD-10-PCS | Mod: PBBFAC,,, | Performed by: OBSTETRICS & GYNECOLOGY

## 2022-09-23 PROCEDURE — 3060F PR POS MICROALBUMINURIA RESULT DOCUMENTED/REVIEW: ICD-10-PCS | Mod: CPTII,,, | Performed by: OBSTETRICS & GYNECOLOGY

## 2022-09-23 NOTE — PROGRESS NOTES
Subjective:       Patient ID: Rohini Dang is a 42 y.o. female.    Chief Complaint:  No chief complaint on file.      History of Present Illness  Patient referred to Dermatology for a lesion outside her right labia majora that is dark purple in color and raised and resembles a basal cell carcinoma but was told nobody within out network takes Medicaid. She wants it removed and I had mentioned getting General Surgery to remove it if Dermatology wouldn't see her since they have more experience and equipment to handle excisions of possible cancerous lesions in the office. She says the lesion is bigger than it was about 1 1/2 weeks ago when I first saw it.    Menstrual History:  OB History          3    Para   3    Term   3            AB        Living             SAB        IAB        Ectopic        Multiple        Live Births                    Menarche age:   No LMP recorded. Patient has had a hysterectomy.         Review of Systems  Review of Systems   Constitutional:  Negative for chills and fever.   HENT:  Negative for sore throat.    Respiratory:  Negative for cough and shortness of breath.    Cardiovascular:  Negative for chest pain.   Gastrointestinal:  Negative for constipation, diarrhea, nausea and vomiting.   Endocrine: Negative for cold intolerance and heat intolerance.   Genitourinary:  Negative for dysuria, pelvic pain, urgency, vaginal bleeding and vaginal discharge.   Musculoskeletal:  Negative for arthralgias.   Neurological:  Negative for syncope and headaches.   Psychiatric/Behavioral:  Negative for suicidal ideas.          Objective:      Physical Exam  Vitals and nursing note reviewed. Exam conducted with a chaperone present.   Constitutional:       Appearance: Normal appearance.   HENT:      Head: Normocephalic and atraumatic.   Pulmonary:      Effort: Pulmonary effort is normal.   Genitourinary:     Comments: See last note for exam details of the right groin lesion on the  right just outside the labia majora  Musculoskeletal:      Cervical back: Normal range of motion.   Skin:     General: Skin is warm and dry.   Neurological:      General: No focal deficit present.      Mental Status: She is alert and oriented to person, place, and time.      Gait: Gait normal.   Psychiatric:         Mood and Affect: Mood normal.         Behavior: Behavior normal.         Judgment: Judgment normal.           Assessment:        1. Pigmented skin lesion     - Possible basal cell carcinoma and Dermatology at [facility] won't accept her insurance           Plan:         Diagnoses and all orders for this visit:    Pigmented skin lesion  Comments:  Possible basal cell carcinoma  Orders:  -     Ambulatory referral/consult to General Surgery; Future     - Will refer to Dr Olea with General Surgery to see if she can excise this lesion in the office in case she thinks it needs a wide local excision. I counseled the patient I would probably have to take her to the OR to excise it should I have to do it myself, so I preferred General Surgery see her and decide if they can excise in the office

## 2022-10-13 ENCOUNTER — OFFICE VISIT (OUTPATIENT)
Dept: SURGERY | Facility: CLINIC | Age: 42
End: 2022-10-13
Payer: MEDICAID

## 2022-10-13 ENCOUNTER — LAB VISIT (OUTPATIENT)
Dept: LAB | Facility: HOSPITAL | Age: 42
End: 2022-10-13
Attending: NURSE PRACTITIONER
Payer: MEDICAID

## 2022-10-13 VITALS
OXYGEN SATURATION: 96 % | DIASTOLIC BLOOD PRESSURE: 63 MMHG | HEART RATE: 74 BPM | SYSTOLIC BLOOD PRESSURE: 96 MMHG | BODY MASS INDEX: 28.66 KG/M2 | HEIGHT: 64 IN | WEIGHT: 167.88 LBS

## 2022-10-13 DIAGNOSIS — N90.89 LESION OF LABIA: ICD-10-CM

## 2022-10-13 DIAGNOSIS — D64.9 ANEMIA, UNSPECIFIED TYPE: ICD-10-CM

## 2022-10-13 DIAGNOSIS — L81.9 PIGMENTED SKIN LESION: Primary | ICD-10-CM

## 2022-10-13 LAB
ALBUMIN SERPL BCP-MCNC: 3.7 G/DL (ref 3.5–5.2)
ALP SERPL-CCNC: 241 U/L (ref 55–135)
ALT SERPL W/O P-5'-P-CCNC: 108 U/L (ref 10–44)
ANION GAP SERPL CALC-SCNC: 3 MMOL/L (ref 8–16)
AST SERPL-CCNC: 96 U/L (ref 10–40)
BASOPHILS # BLD AUTO: 0.04 K/UL (ref 0–0.2)
BASOPHILS NFR BLD: 0.4 % (ref 0–1.9)
BILIRUB SERPL-MCNC: 0.2 MG/DL (ref 0.1–1)
BUN SERPL-MCNC: 10 MG/DL (ref 6–20)
CALCIUM SERPL-MCNC: 9.2 MG/DL (ref 8.7–10.5)
CHLORIDE SERPL-SCNC: 106 MMOL/L (ref 95–110)
CO2 SERPL-SCNC: 30 MMOL/L (ref 23–29)
CREAT SERPL-MCNC: 0.9 MG/DL (ref 0.5–1.4)
DIFFERENTIAL METHOD: ABNORMAL
EOSINOPHIL # BLD AUTO: 0.2 K/UL (ref 0–0.5)
EOSINOPHIL NFR BLD: 2.3 % (ref 0–8)
ERYTHROCYTE [DISTWIDTH] IN BLOOD BY AUTOMATED COUNT: 16.2 % (ref 11.5–14.5)
ERYTHROCYTE [SEDIMENTATION RATE] IN BLOOD: 40 MM/HR (ref 0–20)
EST. GFR  (NO RACE VARIABLE): >60 ML/MIN/1.73 M^2
FERRITIN SERPL-MCNC: 28 NG/ML (ref 20–300)
GLUCOSE SERPL-MCNC: 243 MG/DL (ref 70–110)
HCT VFR BLD AUTO: 35.4 % (ref 37–48.5)
HGB BLD-MCNC: 10.9 G/DL (ref 12–16)
IMM GRANULOCYTES # BLD AUTO: 0.03 K/UL (ref 0–0.04)
IMM GRANULOCYTES NFR BLD AUTO: 0.3 % (ref 0–0.5)
IRON SATN MFR SERPL: 11 % (ref 20–50)
IRON SERPL-MCNC: 45 UG/DL (ref 30–160)
LYMPHOCYTES # BLD AUTO: 4.1 K/UL (ref 1–4.8)
LYMPHOCYTES NFR BLD: 38.4 % (ref 18–48)
MCH RBC QN AUTO: 25.1 PG (ref 27–31)
MCHC RBC AUTO-ENTMCNC: 30.8 G/DL (ref 32–36)
MCV RBC AUTO: 82 FL (ref 82–98)
MONOCYTES # BLD AUTO: 0.8 K/UL (ref 0.3–1)
MONOCYTES NFR BLD: 7.9 % (ref 4–15)
NEUTROPHILS # BLD AUTO: 5.4 K/UL (ref 1.8–7.7)
NEUTROPHILS NFR BLD: 50.7 % (ref 38–73)
NRBC BLD-RTO: 0 /100 WBC
PLATELET # BLD AUTO: 262 K/UL (ref 150–450)
PMV BLD AUTO: 10.8 FL (ref 9.2–12.9)
POTASSIUM SERPL-SCNC: 3.9 MMOL/L (ref 3.5–5.1)
PROT SERPL-MCNC: 8.3 G/DL (ref 6–8.4)
RBC # BLD AUTO: 4.34 M/UL (ref 4–5.4)
RETICS/RBC NFR AUTO: 1.8 % (ref 0.5–2.5)
SODIUM SERPL-SCNC: 139 MMOL/L (ref 136–145)
TOTAL IRON BINDING CAPACITY: 425 UG/DL (ref 250–450)
WBC # BLD AUTO: 10.54 K/UL (ref 3.9–12.7)

## 2022-10-13 PROCEDURE — 36415 COLL VENOUS BLD VENIPUNCTURE: CPT | Performed by: NURSE PRACTITIONER

## 2022-10-13 PROCEDURE — 3074F PR MOST RECENT SYSTOLIC BLOOD PRESSURE < 130 MM HG: ICD-10-PCS | Mod: CPTII,,, | Performed by: STUDENT IN AN ORGANIZED HEALTH CARE EDUCATION/TRAINING PROGRAM

## 2022-10-13 PROCEDURE — 82728 ASSAY OF FERRITIN: CPT | Performed by: NURSE PRACTITIONER

## 2022-10-13 PROCEDURE — 3074F SYST BP LT 130 MM HG: CPT | Mod: CPTII,,, | Performed by: STUDENT IN AN ORGANIZED HEALTH CARE EDUCATION/TRAINING PROGRAM

## 2022-10-13 PROCEDURE — 3060F PR POS MICROALBUMINURIA RESULT DOCUMENTED/REVIEW: ICD-10-PCS | Mod: CPTII,,, | Performed by: STUDENT IN AN ORGANIZED HEALTH CARE EDUCATION/TRAINING PROGRAM

## 2022-10-13 PROCEDURE — 99999 PR PBB SHADOW E&M-EST. PATIENT-LVL V: ICD-10-PCS | Mod: PBBFAC,,, | Performed by: STUDENT IN AN ORGANIZED HEALTH CARE EDUCATION/TRAINING PROGRAM

## 2022-10-13 PROCEDURE — 3044F HG A1C LEVEL LT 7.0%: CPT | Mod: CPTII,,, | Performed by: STUDENT IN AN ORGANIZED HEALTH CARE EDUCATION/TRAINING PROGRAM

## 2022-10-13 PROCEDURE — 99205 OFFICE O/P NEW HI 60 MIN: CPT | Mod: S$PBB,25,, | Performed by: STUDENT IN AN ORGANIZED HEALTH CARE EDUCATION/TRAINING PROGRAM

## 2022-10-13 PROCEDURE — 3078F PR MOST RECENT DIASTOLIC BLOOD PRESSURE < 80 MM HG: ICD-10-PCS | Mod: CPTII,,, | Performed by: STUDENT IN AN ORGANIZED HEALTH CARE EDUCATION/TRAINING PROGRAM

## 2022-10-13 PROCEDURE — 3060F POS MICROALBUMINURIA REV: CPT | Mod: CPTII,,, | Performed by: STUDENT IN AN ORGANIZED HEALTH CARE EDUCATION/TRAINING PROGRAM

## 2022-10-13 PROCEDURE — 85045 AUTOMATED RETICULOCYTE COUNT: CPT | Performed by: NURSE PRACTITIONER

## 2022-10-13 PROCEDURE — 56605 BIOPSY OF VULVA/PERINEUM: CPT | Mod: PBBFAC | Performed by: STUDENT IN AN ORGANIZED HEALTH CARE EDUCATION/TRAINING PROGRAM

## 2022-10-13 PROCEDURE — 83540 ASSAY OF IRON: CPT | Performed by: NURSE PRACTITIONER

## 2022-10-13 PROCEDURE — 3044F PR MOST RECENT HEMOGLOBIN A1C LEVEL <7.0%: ICD-10-PCS | Mod: CPTII,,, | Performed by: STUDENT IN AN ORGANIZED HEALTH CARE EDUCATION/TRAINING PROGRAM

## 2022-10-13 PROCEDURE — 3066F NEPHROPATHY DOC TX: CPT | Mod: CPTII,,, | Performed by: STUDENT IN AN ORGANIZED HEALTH CARE EDUCATION/TRAINING PROGRAM

## 2022-10-13 PROCEDURE — 99215 OFFICE O/P EST HI 40 MIN: CPT | Mod: PBBFAC,25 | Performed by: STUDENT IN AN ORGANIZED HEALTH CARE EDUCATION/TRAINING PROGRAM

## 2022-10-13 PROCEDURE — 99999 PR PBB SHADOW E&M-EST. PATIENT-LVL V: CPT | Mod: PBBFAC,,, | Performed by: STUDENT IN AN ORGANIZED HEALTH CARE EDUCATION/TRAINING PROGRAM

## 2022-10-13 PROCEDURE — 80053 COMPREHEN METABOLIC PANEL: CPT | Performed by: NURSE PRACTITIONER

## 2022-10-13 PROCEDURE — 85025 COMPLETE CBC W/AUTO DIFF WBC: CPT | Performed by: NURSE PRACTITIONER

## 2022-10-13 PROCEDURE — 85651 RBC SED RATE NONAUTOMATED: CPT | Performed by: NURSE PRACTITIONER

## 2022-10-13 PROCEDURE — 3078F DIAST BP <80 MM HG: CPT | Mod: CPTII,,, | Performed by: STUDENT IN AN ORGANIZED HEALTH CARE EDUCATION/TRAINING PROGRAM

## 2022-10-13 PROCEDURE — 56605 PR BIOPSY VULVA/PERINEUM,ONE LESN: ICD-10-PCS | Mod: S$PBB,,, | Performed by: STUDENT IN AN ORGANIZED HEALTH CARE EDUCATION/TRAINING PROGRAM

## 2022-10-13 PROCEDURE — 99205 PR OFFICE/OUTPT VISIT, NEW, LEVL V, 60-74 MIN: ICD-10-PCS | Mod: S$PBB,25,, | Performed by: STUDENT IN AN ORGANIZED HEALTH CARE EDUCATION/TRAINING PROGRAM

## 2022-10-13 PROCEDURE — 3066F PR DOCUMENTATION OF TREATMENT FOR NEPHROPATHY: ICD-10-PCS | Mod: CPTII,,, | Performed by: STUDENT IN AN ORGANIZED HEALTH CARE EDUCATION/TRAINING PROGRAM

## 2022-10-13 PROCEDURE — 11104 PUNCH BX SKIN SINGLE LESION: CPT | Mod: PBBFAC | Performed by: STUDENT IN AN ORGANIZED HEALTH CARE EDUCATION/TRAINING PROGRAM

## 2022-10-13 PROCEDURE — 56605 BIOPSY OF VULVA/PERINEUM: CPT | Mod: S$PBB,,, | Performed by: STUDENT IN AN ORGANIZED HEALTH CARE EDUCATION/TRAINING PROGRAM

## 2022-10-19 ENCOUNTER — APPOINTMENT (OUTPATIENT)
Dept: LAB | Facility: HOSPITAL | Age: 42
End: 2022-10-19
Attending: INTERNAL MEDICINE
Payer: MEDICAID

## 2022-10-19 DIAGNOSIS — N39.0 URINARY TRACT INFECTION, SITE NOT SPECIFIED: Primary | ICD-10-CM

## 2022-10-19 LAB
BACTERIA #/AREA URNS HPF: ABNORMAL /HPF
BILIRUB UR QL STRIP: NEGATIVE
CLARITY UR: ABNORMAL
COLOR UR: YELLOW
GLUCOSE UR QL STRIP: ABNORMAL
HGB UR QL STRIP: NEGATIVE
HYALINE CASTS #/AREA URNS LPF: 0 /LPF
KETONES UR QL STRIP: NEGATIVE
LEUKOCYTE ESTERASE UR QL STRIP: ABNORMAL
MICROSCOPIC COMMENT: ABNORMAL
NITRITE UR QL STRIP: POSITIVE
PH UR STRIP: 7 [PH] (ref 5–8)
PROT UR QL STRIP: NEGATIVE
RBC #/AREA URNS HPF: 4 /HPF (ref 0–4)
SP GR UR STRIP: 1.02 (ref 1–1.03)
SQUAMOUS #/AREA URNS HPF: 1 /HPF
TISSUE SPECIMEN TO PATHOLOGY: NORMAL
URN SPEC COLLECT METH UR: ABNORMAL
UROBILINOGEN UR STRIP-ACNC: 1 EU/DL
WBC #/AREA URNS HPF: >100 /HPF (ref 0–5)
YEAST URNS QL MICRO: ABNORMAL

## 2022-10-19 PROCEDURE — 81000 URINALYSIS NONAUTO W/SCOPE: CPT | Performed by: INTERNAL MEDICINE

## 2022-10-19 PROCEDURE — 87077 CULTURE AEROBIC IDENTIFY: CPT | Performed by: INTERNAL MEDICINE

## 2022-10-19 PROCEDURE — 87086 URINE CULTURE/COLONY COUNT: CPT | Performed by: INTERNAL MEDICINE

## 2022-10-19 PROCEDURE — 87088 URINE BACTERIA CULTURE: CPT | Performed by: INTERNAL MEDICINE

## 2022-10-19 PROCEDURE — 87186 SC STD MICRODIL/AGAR DIL: CPT | Performed by: INTERNAL MEDICINE

## 2022-10-21 LAB — BACTERIA UR CULT: ABNORMAL

## 2022-10-25 ENCOUNTER — HOSPITAL ENCOUNTER (OUTPATIENT)
Facility: HOSPITAL | Age: 42
Discharge: HOME | End: 2022-11-01
Attending: EMERGENCY MEDICINE | Admitting: INTERNAL MEDICINE
Payer: MEDICAID

## 2022-10-25 DIAGNOSIS — A49.9 ESBL (EXTENDED SPECTRUM BETA-LACTAMASE) PRODUCING BACTERIA INFECTION: Primary | ICD-10-CM

## 2022-10-25 DIAGNOSIS — Z16.12 ESBL (EXTENDED SPECTRUM BETA-LACTAMASE) PRODUCING BACTERIA INFECTION: Primary | ICD-10-CM

## 2022-10-25 DIAGNOSIS — N39.0 URINARY TRACT INFECTION WITHOUT HEMATURIA, SITE UNSPECIFIED: ICD-10-CM

## 2022-10-25 LAB
ALBUMIN SERPL BCP-MCNC: 3.9 G/DL (ref 3.5–5.2)
ALP SERPL-CCNC: 246 U/L (ref 55–135)
ALT SERPL W/O P-5'-P-CCNC: 87 U/L (ref 10–44)
ANION GAP SERPL CALC-SCNC: 5 MMOL/L (ref 8–16)
AST SERPL-CCNC: 94 U/L (ref 10–40)
BACTERIA #/AREA URNS HPF: ABNORMAL /HPF
BASOPHILS # BLD AUTO: 0.06 K/UL (ref 0–0.2)
BASOPHILS NFR BLD: 0.7 % (ref 0–1.9)
BILIRUB SERPL-MCNC: 0.2 MG/DL (ref 0.1–1)
BILIRUB UR QL STRIP: NEGATIVE
BUN SERPL-MCNC: 11 MG/DL (ref 6–20)
CALCIUM SERPL-MCNC: 9.5 MG/DL (ref 8.7–10.5)
CHLORIDE SERPL-SCNC: 109 MMOL/L (ref 95–110)
CLARITY UR: ABNORMAL
CO2 SERPL-SCNC: 26 MMOL/L (ref 23–29)
COLOR UR: YELLOW
CREAT SERPL-MCNC: 0.8 MG/DL (ref 0.5–1.4)
CTP QC/QA: YES
DIFFERENTIAL METHOD: ABNORMAL
EOSINOPHIL # BLD AUTO: 0.2 K/UL (ref 0–0.5)
EOSINOPHIL NFR BLD: 1.9 % (ref 0–8)
ERYTHROCYTE [DISTWIDTH] IN BLOOD BY AUTOMATED COUNT: 15.8 % (ref 11.5–14.5)
EST. GFR  (NO RACE VARIABLE): >60 ML/MIN/1.73 M^2
GLUCOSE SERPL-MCNC: 113 MG/DL (ref 70–110)
GLUCOSE UR QL STRIP: ABNORMAL
HCT VFR BLD AUTO: 36.8 % (ref 37–48.5)
HGB BLD-MCNC: 11.3 G/DL (ref 12–16)
HGB UR QL STRIP: NEGATIVE
HYALINE CASTS #/AREA URNS LPF: 35.7 /LPF
IMM GRANULOCYTES # BLD AUTO: 0.02 K/UL (ref 0–0.04)
IMM GRANULOCYTES NFR BLD AUTO: 0.2 % (ref 0–0.5)
KETONES UR QL STRIP: NEGATIVE
LACTATE SERPL-SCNC: 1.5 MMOL/L (ref 0.5–2.2)
LEUKOCYTE ESTERASE UR QL STRIP: ABNORMAL
LYMPHOCYTES # BLD AUTO: 3.7 K/UL (ref 1–4.8)
LYMPHOCYTES NFR BLD: 41.6 % (ref 18–48)
MCH RBC QN AUTO: 24.7 PG (ref 27–31)
MCHC RBC AUTO-ENTMCNC: 30.7 G/DL (ref 32–36)
MCV RBC AUTO: 80 FL (ref 82–98)
MICROSCOPIC COMMENT: ABNORMAL
MONOCYTES # BLD AUTO: 0.9 K/UL (ref 0.3–1)
MONOCYTES NFR BLD: 9.8 % (ref 4–15)
NEUTROPHILS # BLD AUTO: 4.1 K/UL (ref 1.8–7.7)
NEUTROPHILS NFR BLD: 45.8 % (ref 38–73)
NITRITE UR QL STRIP: POSITIVE
NRBC BLD-RTO: 0 /100 WBC
PH UR STRIP: 6 [PH] (ref 5–8)
PLATELET # BLD AUTO: 221 K/UL (ref 150–450)
PMV BLD AUTO: 11 FL (ref 9.2–12.9)
POCT GLUCOSE: 116 MG/DL (ref 70–110)
POCT GLUCOSE: 84 MG/DL (ref 70–110)
POTASSIUM SERPL-SCNC: 3.3 MMOL/L (ref 3.5–5.1)
PROT SERPL-MCNC: 8.7 G/DL (ref 6–8.4)
PROT UR QL STRIP: ABNORMAL
RBC # BLD AUTO: 4.58 M/UL (ref 4–5.4)
RBC #/AREA URNS HPF: 1 /HPF (ref 0–4)
SARS-COV-2 RDRP RESP QL NAA+PROBE: NEGATIVE
SODIUM SERPL-SCNC: 140 MMOL/L (ref 136–145)
SP GR UR STRIP: 1.02 (ref 1–1.03)
SQUAMOUS #/AREA URNS HPF: 2 /HPF
URN SPEC COLLECT METH UR: ABNORMAL
UROBILINOGEN UR STRIP-ACNC: NEGATIVE EU/DL
WBC # BLD AUTO: 8.88 K/UL (ref 3.9–12.7)
WBC #/AREA URNS HPF: >100 /HPF (ref 0–5)
YEAST URNS QL MICRO: ABNORMAL

## 2022-10-25 PROCEDURE — 80053 COMPREHEN METABOLIC PANEL: CPT | Performed by: EMERGENCY MEDICINE

## 2022-10-25 PROCEDURE — 63600175 PHARM REV CODE 636 W HCPCS: Performed by: EMERGENCY MEDICINE

## 2022-10-25 PROCEDURE — 99285 EMERGENCY DEPT VISIT HI MDM: CPT | Mod: 25

## 2022-10-25 PROCEDURE — 87086 URINE CULTURE/COLONY COUNT: CPT | Performed by: EMERGENCY MEDICINE

## 2022-10-25 PROCEDURE — 81000 URINALYSIS NONAUTO W/SCOPE: CPT | Performed by: EMERGENCY MEDICINE

## 2022-10-25 PROCEDURE — 96366 THER/PROPH/DIAG IV INF ADDON: CPT

## 2022-10-25 PROCEDURE — 85025 COMPLETE CBC W/AUTO DIFF WBC: CPT | Performed by: EMERGENCY MEDICINE

## 2022-10-25 PROCEDURE — 87186 SC STD MICRODIL/AGAR DIL: CPT | Performed by: EMERGENCY MEDICINE

## 2022-10-25 PROCEDURE — 36415 COLL VENOUS BLD VENIPUNCTURE: CPT | Performed by: EMERGENCY MEDICINE

## 2022-10-25 PROCEDURE — 87077 CULTURE AEROBIC IDENTIFY: CPT | Performed by: EMERGENCY MEDICINE

## 2022-10-25 PROCEDURE — 87088 URINE BACTERIA CULTURE: CPT | Performed by: EMERGENCY MEDICINE

## 2022-10-25 PROCEDURE — 87635 SARS-COV-2 COVID-19 AMP PRB: CPT | Performed by: EMERGENCY MEDICINE

## 2022-10-25 PROCEDURE — 25000003 PHARM REV CODE 250: Performed by: INTERNAL MEDICINE

## 2022-10-25 PROCEDURE — 83605 ASSAY OF LACTIC ACID: CPT | Performed by: EMERGENCY MEDICINE

## 2022-10-25 PROCEDURE — G0378 HOSPITAL OBSERVATION PER HR: HCPCS

## 2022-10-25 PROCEDURE — 96365 THER/PROPH/DIAG IV INF INIT: CPT

## 2022-10-25 RX ORDER — IBUPROFEN 200 MG
16 TABLET ORAL
Status: DISCONTINUED | OUTPATIENT
Start: 2022-10-25 | End: 2022-11-01 | Stop reason: HOSPADM

## 2022-10-25 RX ORDER — METOPROLOL TARTRATE 100 MG/1
100 TABLET ORAL 2 TIMES DAILY
Status: DISCONTINUED | OUTPATIENT
Start: 2022-10-25 | End: 2022-11-01 | Stop reason: HOSPADM

## 2022-10-25 RX ORDER — IBUPROFEN 200 MG
24 TABLET ORAL
Status: DISCONTINUED | OUTPATIENT
Start: 2022-10-25 | End: 2022-11-01 | Stop reason: HOSPADM

## 2022-10-25 RX ORDER — SODIUM CHLORIDE 0.9 % (FLUSH) 0.9 %
10 SYRINGE (ML) INJECTION
Status: DISCONTINUED | OUTPATIENT
Start: 2022-10-25 | End: 2022-11-01 | Stop reason: HOSPADM

## 2022-10-25 RX ORDER — TIZANIDINE 2 MG/1
2 TABLET ORAL EVERY 8 HOURS PRN
Status: DISCONTINUED | OUTPATIENT
Start: 2022-10-25 | End: 2022-11-01 | Stop reason: HOSPADM

## 2022-10-25 RX ORDER — PREGABALIN 50 MG/1
100 CAPSULE ORAL 2 TIMES DAILY
Status: DISCONTINUED | OUTPATIENT
Start: 2022-10-25 | End: 2022-11-01 | Stop reason: HOSPADM

## 2022-10-25 RX ORDER — MIRTAZAPINE 15 MG/1
30 TABLET, FILM COATED ORAL NIGHTLY
Status: DISCONTINUED | OUTPATIENT
Start: 2022-10-25 | End: 2022-11-01 | Stop reason: HOSPADM

## 2022-10-25 RX ORDER — TOPIRAMATE 100 MG/1
200 TABLET, FILM COATED ORAL NIGHTLY
Status: DISCONTINUED | OUTPATIENT
Start: 2022-10-25 | End: 2022-11-01 | Stop reason: HOSPADM

## 2022-10-25 RX ORDER — GLUCAGON 1 MG
1 KIT INJECTION
Status: DISCONTINUED | OUTPATIENT
Start: 2022-10-25 | End: 2022-11-01 | Stop reason: HOSPADM

## 2022-10-25 RX ORDER — TOPIRAMATE 25 MG/1
TABLET ORAL
Status: COMPLETED
Start: 2022-10-25 | End: 2022-10-30

## 2022-10-25 RX ORDER — BUSPIRONE HYDROCHLORIDE 7.5 MG/1
30 TABLET ORAL 2 TIMES DAILY
Status: DISCONTINUED | OUTPATIENT
Start: 2022-10-25 | End: 2022-11-01 | Stop reason: HOSPADM

## 2022-10-25 RX ORDER — MONTELUKAST SODIUM 10 MG/1
10 TABLET ORAL NIGHTLY
Status: DISCONTINUED | OUTPATIENT
Start: 2022-10-25 | End: 2022-11-01 | Stop reason: HOSPADM

## 2022-10-25 RX ORDER — ONDANSETRON 2 MG/ML
4 INJECTION INTRAMUSCULAR; INTRAVENOUS EVERY 8 HOURS PRN
Status: DISCONTINUED | OUTPATIENT
Start: 2022-10-25 | End: 2022-11-01 | Stop reason: HOSPADM

## 2022-10-25 RX ORDER — TALC
6 POWDER (GRAM) TOPICAL NIGHTLY PRN
Status: DISCONTINUED | OUTPATIENT
Start: 2022-10-25 | End: 2022-11-01 | Stop reason: HOSPADM

## 2022-10-25 RX ORDER — INSULIN ASPART 100 [IU]/ML
0-5 INJECTION, SOLUTION INTRAVENOUS; SUBCUTANEOUS
Status: DISCONTINUED | OUTPATIENT
Start: 2022-10-25 | End: 2022-11-01 | Stop reason: HOSPADM

## 2022-10-25 RX ORDER — LIDOCAINE 50 MG/G
1 PATCH TOPICAL DAILY PRN
Status: DISCONTINUED | OUTPATIENT
Start: 2022-10-25 | End: 2022-11-01 | Stop reason: HOSPADM

## 2022-10-25 RX ORDER — MEROPENEM AND SODIUM CHLORIDE 500 MG/50ML
500 INJECTION, SOLUTION INTRAVENOUS
Status: DISCONTINUED | OUTPATIENT
Start: 2022-10-25 | End: 2022-10-26

## 2022-10-25 RX ORDER — ATORVASTATIN CALCIUM 40 MG/1
40 TABLET, FILM COATED ORAL DAILY
Status: DISCONTINUED | OUTPATIENT
Start: 2022-10-26 | End: 2022-11-01 | Stop reason: HOSPADM

## 2022-10-25 RX ORDER — FUROSEMIDE 20 MG/1
20 TABLET ORAL DAILY
Status: DISCONTINUED | OUTPATIENT
Start: 2022-10-25 | End: 2022-11-01 | Stop reason: HOSPADM

## 2022-10-25 RX ADMIN — MIRTAZAPINE 30 MG: 15 TABLET, FILM COATED ORAL at 09:10

## 2022-10-25 RX ADMIN — METOPROLOL TARTRATE 100 MG: 100 TABLET, FILM COATED ORAL at 09:10

## 2022-10-25 RX ADMIN — TOPIRAMATE 200 MG: 100 TABLET, FILM COATED ORAL at 10:10

## 2022-10-25 RX ADMIN — MEROPENEM AND SODIUM CHLORIDE 500 MG: 500 INJECTION, SOLUTION INTRAVENOUS at 10:10

## 2022-10-25 RX ADMIN — PREGABALIN 100 MG: 50 CAPSULE ORAL at 09:10

## 2022-10-25 RX ADMIN — FUROSEMIDE 20 MG: 20 TABLET ORAL at 09:10

## 2022-10-25 RX ADMIN — MONTELUKAST 10 MG: 10 TABLET, FILM COATED ORAL at 09:10

## 2022-10-25 RX ADMIN — MEROPENEM AND SODIUM CHLORIDE 500 MG: 500 INJECTION, SOLUTION INTRAVENOUS at 03:10

## 2022-10-25 RX ADMIN — BUSPIRONE HYDROCHLORIDE 30 MG: 7.5 TABLET ORAL at 09:10

## 2022-10-25 NOTE — ED PROVIDER NOTES
Encounter Date: 10/25/2022       History     Chief Complaint   Patient presents with    Dysuria     Sent to ER by Dr Adorno to go through ER for IV antibiotics, urine culture shows IV meds needed, per Alyssa at Dr Adorno's.     43 yo female with ESBL UTI failed macrobid and bactrim as outpatient here for IV abx. No fever. No flank or abd pain. No N/V/D. Gradual onset. Similar to previous. Endorses dysuria, frequency.     Review of patient's allergies indicates:   Allergen Reactions    Amoxicillin Hives    Avelox [moxifloxacin] Hives    Clarinex [desloratadine] Hives    Pcn [penicillins] Hives    Claritin [loratadine] Palpitations    Latex, natural rubber Rash     Past Medical History:   Diagnosis Date    Anemia     Anxiety disorder     Arthritis     Bipolar 2 disorder     Borderline personality disorder     Chronic bilateral low back pain without sciatica     Depression with anxiety     Diabetes mellitus     Disorder of kidney and ureter     Elevated LFTs     Esophageal stenosis     Fatty liver     Fibromyalgia     Fibromyalgia     High cholesterol     Hypoglycemia     Intermittent explosive disorder     Liver disease     JAMESON (nonalcoholic steatohepatitis)      Past Surgical History:   Procedure Laterality Date    COLONOSCOPY N/A 3/20/2018    Procedure: COLONOSCOPY;  Surgeon: Janelle Meade MD;  Location: [facility];  Service: Endoscopy;  Laterality: N/A;    COLONOSCOPY N/A 5/11/2021    Procedure: COLONOSCOPY;  Surgeon: Janelle Meade MD;  Location: [facility];  Service: Endoscopy;  Laterality: N/A;    ESOPHAGEAL DILATION      x2    ESOPHAGOGASTRODUODENOSCOPY N/A 5/7/2019    Procedure: EGD (ESOPHAGOGASTRODUODENOSCOPY);  Surgeon: Janelle Meade MD;  Location: [facility];  Service: Endoscopy;  Laterality: N/A;    ESOPHAGOGASTRODUODENOSCOPY N/A 5/11/2021    Procedure: EGD (ESOPHAGOGASTRODUODENOSCOPY);  Surgeon: Janelle Meade MD;  Location: [facility];  Service: Endoscopy;  Laterality: N/A;   "Rapid on arrival    HIATAL HERNIA REPAIR      HYSTERECTOMY      LIVER BIOPSY  2018    fibrosis stage 3 JAMESON    OOPHORECTOMY      TUBAL LIGATION      UPPER GASTROINTESTINAL ENDOSCOPY      2013? unable to obtain records     Family History   Problem Relation Age of Onset    Hypertension Mother     Clotting disorder Father     Ulcers Father     Clotting disorder Sister     Cancer Maternal Grandmother         "female Cancer"    Lung cancer Paternal Grandmother     Diabetes Maternal Aunt     No Known Problems Daughter     No Known Problems Maternal Uncle     No Known Problems Paternal Aunt     No Known Problems Paternal Uncle     No Known Problems Maternal Grandfather     No Known Problems Paternal Grandfather     Breast cancer Neg Hx     Ovarian cancer Neg Hx     BRCA 1/2 Neg Hx      Social History     Tobacco Use    Smoking status: Never    Smokeless tobacco: Never   Substance Use Topics    Alcohol use: No     Alcohol/week: 0.0 standard drinks    Drug use: No     Review of Systems   Constitutional: Negative.    Respiratory: Negative.     Cardiovascular: Negative.    Gastrointestinal: Negative.    Genitourinary:  Positive for dysuria.   All other systems reviewed and are negative.    Physical Exam     Initial Vitals [10/25/22 1217]   BP Pulse Resp Temp SpO2   101/69 77 16 98.4 °F (36.9 °C) 98 %      MAP       --         Physical Exam    Nursing note and vitals reviewed.  Constitutional: She appears well-developed and well-nourished. She is not diaphoretic. No distress.   HENT:   Head: Normocephalic and atraumatic.   Eyes: EOM are normal. Pupils are equal, round, and reactive to light.   Neck: Neck supple.   Normal range of motion.  Cardiovascular:  Normal rate, regular rhythm and intact distal pulses.           Pulmonary/Chest: Breath sounds normal. No respiratory distress. She has no wheezes. She has no rales.   Abdominal: Abdomen is soft. Bowel sounds are normal. She exhibits no distension. There is no abdominal " tenderness. There is no rebound.   Musculoskeletal:         General: No tenderness or edema. Normal range of motion.      Cervical back: Normal range of motion and neck supple.     Neurological: She is alert and oriented to person, place, and time. She has normal strength and normal reflexes. GCS score is 15. GCS eye subscore is 4. GCS verbal subscore is 5. GCS motor subscore is 6.   Skin: Skin is warm and dry. Capillary refill takes less than 2 seconds. No rash noted.       ED Course   Procedures  Labs Reviewed   CBC W/ AUTO DIFFERENTIAL - Abnormal; Notable for the following components:       Result Value    Hemoglobin 11.3 (*)     Hematocrit 36.8 (*)     MCV 80 (*)     MCH 24.7 (*)     MCHC 30.7 (*)     RDW 15.8 (*)     All other components within normal limits   COMPREHENSIVE METABOLIC PANEL - Abnormal; Notable for the following components:    Potassium 3.3 (*)     Glucose 113 (*)     Total Protein 8.7 (*)     Alkaline Phosphatase 246 (*)     AST 94 (*)     ALT 87 (*)     Anion Gap 5 (*)     All other components within normal limits   URINALYSIS, REFLEX TO URINE CULTURE - Abnormal; Notable for the following components:    Appearance, UA Cloudy (*)     Protein, UA 1+ (*)     Glucose, UA 4+ (*)     Nitrite, UA Positive (*)     Leukocytes, UA 1+ (*)     All other components within normal limits    Narrative:     Preferred Collection Type->Urine, Clean Catch  Specimen Source->Urine   URINALYSIS MICROSCOPIC - Abnormal; Notable for the following components:    WBC, UA >100 (*)     Bacteria Many (*)     Hyaline Casts, UA 35.7 (*)     All other components within normal limits    Narrative:     Preferred Collection Type->Urine, Clean Catch  Specimen Source->Urine   CULTURE, URINE   LACTIC ACID, PLASMA   SARS-COV-2 RDRP GENE    Narrative:     This test utilizes isothermal nucleic acid amplification   technology to detect the SARS-CoV-2 RdRp nucleic acid segment.   The analytical sensitivity (limit of detection) is 125  genome   equivalents/mL.   A POSITIVE result implies infection with the SARS-CoV-2 virus;   the patient is presumed to be contagious.     A NEGATIVE result means that SARS-CoV-2 nucleic acids are not   present above the limit of detection. A NEGATIVE result should be   treated as presumptive. It does not rule out the possibility of   COVID-19 and should not be the sole basis for treatment decisions.   If COVID-19 is strongly suspected based on clinical and exposure   history, re-testing using an alternate molecular assay should be   considered.   This test is only for use under the Food and Drug   Administration s Emergency Use Authorization (EUA).   Commercial kits are provided by [employer].   Performance characteristics of the EUA have been independently   verified by [facility] Department of   Pathology and Laboratory Medicine.   _________________________________________________________________   The authorized Fact Sheet for Healthcare Providers and the authorized Fact   Sheet for Patients of the ID NOW COVID-19 are available on the FDA   website:     https://www.fda.gov/media/128595/download  https://www.fda.gov/media/500795/download                  Imaging Results    None          Medications - No data to display  Medical Decision Making:   Clinical Tests:   Lab Tests: Ordered and Reviewed  ED Management:  Discussed with Dr Hernandez, patient accepted for carbapenem IV abx.                         Clinical Impression:   Final diagnoses:  [A49.9, Z16.12] ESBL (extended spectrum beta-lactamase) producing bacteria infection (Primary)  [N39.0] Urinary tract infection without hematuria, site unspecified        ED Disposition Condition    Observation Stable                Kale Rodriguez MD  10/25/22 2372

## 2022-10-25 NOTE — NURSING
Pt arrived to unit from ER. Received report from MONA Palacios. Pt transferred to bed self from stretcher without issue. Pt ambulated to bathroom in room. IV antibiotics continue. Vitals WNL. Pt stated no complaints at this time. Will continue to monitor.

## 2022-10-26 PROBLEM — N39.0 URINARY TRACT INFECTION WITHOUT HEMATURIA: Status: ACTIVE | Noted: 2022-10-26

## 2022-10-26 PROBLEM — A49.9 ESBL (EXTENDED SPECTRUM BETA-LACTAMASE) PRODUCING BACTERIA INFECTION: Status: ACTIVE | Noted: 2022-10-26

## 2022-10-26 PROBLEM — Z16.12 ESBL (EXTENDED SPECTRUM BETA-LACTAMASE) PRODUCING BACTERIA INFECTION: Status: ACTIVE | Noted: 2022-10-26

## 2022-10-26 LAB
ALBUMIN SERPL BCP-MCNC: 3.4 G/DL (ref 3.5–5.2)
ALP SERPL-CCNC: 234 U/L (ref 55–135)
ALT SERPL W/O P-5'-P-CCNC: 80 U/L (ref 10–44)
ANION GAP SERPL CALC-SCNC: 7 MMOL/L (ref 8–16)
AST SERPL-CCNC: 93 U/L (ref 10–40)
BILIRUB SERPL-MCNC: 0.2 MG/DL (ref 0.1–1)
BUN SERPL-MCNC: 13 MG/DL (ref 6–20)
CALCIUM SERPL-MCNC: 9.3 MG/DL (ref 8.7–10.5)
CHLORIDE SERPL-SCNC: 110 MMOL/L (ref 95–110)
CO2 SERPL-SCNC: 25 MMOL/L (ref 23–29)
CREAT SERPL-MCNC: 0.8 MG/DL (ref 0.5–1.4)
EST. GFR  (NO RACE VARIABLE): >60 ML/MIN/1.73 M^2
GLUCOSE SERPL-MCNC: 121 MG/DL (ref 70–110)
POCT GLUCOSE: 106 MG/DL (ref 70–110)
POCT GLUCOSE: 111 MG/DL (ref 70–110)
POCT GLUCOSE: 177 MG/DL (ref 70–110)
POTASSIUM SERPL-SCNC: 3.5 MMOL/L (ref 3.5–5.1)
PROT SERPL-MCNC: 7.9 G/DL (ref 6–8.4)
SODIUM SERPL-SCNC: 142 MMOL/L (ref 136–145)

## 2022-10-26 PROCEDURE — 36415 COLL VENOUS BLD VENIPUNCTURE: CPT | Performed by: EMERGENCY MEDICINE

## 2022-10-26 PROCEDURE — 96366 THER/PROPH/DIAG IV INF ADDON: CPT

## 2022-10-26 PROCEDURE — 63600175 PHARM REV CODE 636 W HCPCS: Performed by: INTERNAL MEDICINE

## 2022-10-26 PROCEDURE — 25000003 PHARM REV CODE 250: Performed by: INTERNAL MEDICINE

## 2022-10-26 PROCEDURE — G0378 HOSPITAL OBSERVATION PER HR: HCPCS

## 2022-10-26 PROCEDURE — 80053 COMPREHEN METABOLIC PANEL: CPT | Performed by: EMERGENCY MEDICINE

## 2022-10-26 PROCEDURE — 25000242 PHARM REV CODE 250 ALT 637 W/ HCPCS: Performed by: INTERNAL MEDICINE

## 2022-10-26 PROCEDURE — 63600175 PHARM REV CODE 636 W HCPCS: Performed by: EMERGENCY MEDICINE

## 2022-10-26 RX ORDER — LURASIDONE HYDROCHLORIDE 20 MG/1
80 TABLET, FILM COATED ORAL NIGHTLY
Status: DISCONTINUED | OUTPATIENT
Start: 2022-10-26 | End: 2022-11-01 | Stop reason: HOSPADM

## 2022-10-26 RX ORDER — LUBIPROSTONE 24 UG/1
24 CAPSULE ORAL 2 TIMES DAILY
Status: DISCONTINUED | OUTPATIENT
Start: 2022-10-26 | End: 2022-11-01 | Stop reason: HOSPADM

## 2022-10-26 RX ORDER — AMLODIPINE BESYLATE 10 MG/1
10 TABLET ORAL DAILY
Status: DISCONTINUED | OUTPATIENT
Start: 2022-10-26 | End: 2022-11-01 | Stop reason: HOSPADM

## 2022-10-26 RX ORDER — MEROPENEM AND SODIUM CHLORIDE 1 G/50ML
1 INJECTION, SOLUTION INTRAVENOUS
Status: DISCONTINUED | OUTPATIENT
Start: 2022-10-26 | End: 2022-11-01 | Stop reason: HOSPADM

## 2022-10-26 RX ADMIN — TIZANIDINE 2 MG: 2 TABLET ORAL at 09:10

## 2022-10-26 RX ADMIN — MEROPENEM AND SODIUM CHLORIDE 1 G: 1 INJECTION, SOLUTION INTRAVENOUS at 10:10

## 2022-10-26 RX ADMIN — FUROSEMIDE 20 MG: 20 TABLET ORAL at 09:10

## 2022-10-26 RX ADMIN — MONTELUKAST 10 MG: 10 TABLET, FILM COATED ORAL at 09:10

## 2022-10-26 RX ADMIN — BUSPIRONE HYDROCHLORIDE 30 MG: 7.5 TABLET ORAL at 09:10

## 2022-10-26 RX ADMIN — SERTRALINE HYDROCHLORIDE 150 MG: 100 TABLET ORAL at 09:10

## 2022-10-26 RX ADMIN — TOPIRAMATE 200 MG: 100 TABLET, FILM COATED ORAL at 09:10

## 2022-10-26 RX ADMIN — MEROPENEM AND SODIUM CHLORIDE 500 MG: 500 INJECTION, SOLUTION INTRAVENOUS at 09:10

## 2022-10-26 RX ADMIN — EMPAGLIFLOZIN 10 MG: 10 TABLET, FILM COATED ORAL at 04:10

## 2022-10-26 RX ADMIN — PREGABALIN 100 MG: 50 CAPSULE ORAL at 09:10

## 2022-10-26 RX ADMIN — ATORVASTATIN CALCIUM 40 MG: 40 TABLET, FILM COATED ORAL at 09:10

## 2022-10-26 RX ADMIN — LURASIDONE HYDROCHLORIDE 80 MG: 20 TABLET, FILM COATED ORAL at 09:10

## 2022-10-26 RX ADMIN — METOPROLOL TARTRATE 100 MG: 100 TABLET, FILM COATED ORAL at 09:10

## 2022-10-26 RX ADMIN — AMLODIPINE BESYLATE 10 MG: 10 TABLET ORAL at 04:10

## 2022-10-26 RX ADMIN — LUBIPROSTONE 24 MCG: 24 CAPSULE, GELATIN COATED ORAL at 09:10

## 2022-10-26 RX ADMIN — MEROPENEM AND SODIUM CHLORIDE 1 G: 1 INJECTION, SOLUTION INTRAVENOUS at 05:10

## 2022-10-26 RX ADMIN — MIRTAZAPINE 30 MG: 15 TABLET, FILM COATED ORAL at 09:10

## 2022-10-26 NOTE — PROGRESS NOTES
Home Medication Information  Pharmacy Name: [facility]  Pharmacy Address: [address] [place] [address]  Prescription Number: [id number]  Medication: Amitiza (lubiprostone) 24 mcg tablets  Directions For Use: Take one capsule by mouth twice daily  Quantity: 47 tablets remain in bottle    Has medication been identified? yes

## 2022-10-26 NOTE — PROGRESS NOTES
Pharmacist Renal Dose Adjustment Note    Rohini Dang is a 42 y.o. female being treated with the medication meropenem    Patient Data:    Vital Signs (Most Recent):  Temp: 98.3 °F (36.8 °C) (10/26/22 0730)  Pulse: 73 (10/26/22 0933)  Resp: 18 (10/26/22 0730)  BP: 109/74 (10/26/22 0933)  SpO2: 96 % (10/26/22 0730) Vital Signs (72h Range):  Temp:  [97.7 °F (36.5 °C)-98.4 °F (36.9 °C)]   Pulse:  [66-77]   Resp:  [16-20]   BP: (101-129)/(69-78)   SpO2:  [95 %-100 %]      Recent Labs   Lab 10/25/22  1304 10/26/22  0538   CREATININE 0.8 0.8     Serum creatinine: 0.8 mg/dL 10/26/22 0538  Estimated creatinine clearance: 89.1 mL/min    Medication:meropenem dose: 500mg frequency q8h will be changed to medication:meropenem dose:1000mg frequency:q8h    Pharmacist's Name: Jacquelyn Ford  Pharmacist's Extension: [phone]

## 2022-10-26 NOTE — NURSING
Spoke to Dr. Karimi about resuming patient home medications, ok to resume Buspar 30mg po BID, Atorvastatin 40mg po daily,Lasix 20mg po daily, Metoprolol 100 mg po BID, Remeron 30 mg q HS, Singulair 10 mg q HS, Lyrica 100mg po BID Zoloft 150mg po daily, tizanidine 2mg po prn TID, topamax 200 mg po q HS, Lidocaine patch 5%  PRN daily to back, ok for patient to bring in Trulicity pen, latuda 80 mg po q evening, Amitiza 24 mcg cap po daily, pamelor 50 mg po q evening for pharmacy to label and home medication to be administered here due to not on formulary. Read back orders. Will continue to monitor.

## 2022-10-26 NOTE — PROGRESS NOTES
Home Medication Information  Pharmacy Name: [facility]  Pharmacy Address: [address] [place] [address]  Prescription Number: [id number]  Medication: Jardiance (empaglifiozin) 10 mg tablet  Directions For Use: Take one tablet by mouth every day  Quantity: 34 tablets remain in bottle    Has medication been identified? yes

## 2022-10-26 NOTE — PLAN OF CARE
10/26/22 0825   Post-Acute Status   Post-Acute Authorization Home Health   Home Health Status Referrals Sent   Coverage MEDICAID - [employer] [place] [employer]   Discharge Delays None known at this time   Discharge Plan   Discharge Plan A Home with family;Home Health   Discharge Plan B Other   Spoke to the patient regarding possible home health care. The patient's choice form was signed.

## 2022-10-26 NOTE — SUBJECTIVE & OBJECTIVE
Past Medical History:   Diagnosis Date    Anemia     Anxiety disorder     Arthritis     Bipolar 2 disorder     Borderline personality disorder     Chronic bilateral low back pain without sciatica     Depression with anxiety     Diabetes mellitus     Disorder of kidney and ureter     Elevated LFTs     Esophageal stenosis     Fatty liver     Fibromyalgia     Fibromyalgia     High cholesterol     Hypoglycemia     Intermittent explosive disorder     Liver disease     JAMESON (nonalcoholic steatohepatitis)        Past Surgical History:   Procedure Laterality Date    COLONOSCOPY N/A 3/20/2018    Procedure: COLONOSCOPY;  Surgeon: Janelle Meade MD;  Location: [facility] END[facility];  Service: Endoscopy;  Laterality: N/A;    COLONOSCOPY N/A 5/11/2021    Procedure: COLONOSCOPY;  Surgeon: Janelle Meade MD;  Location: [facility] Tript[facility];  Service: Endoscopy;  Laterality: N/A;    ESOPHAGEAL DILATION      x2    ESOPHAGOGASTRODUODENOSCOPY N/A 5/7/2019    Procedure: EGD (ESOPHAGOGASTRODUODENOSCOPY);  Surgeon: Janelle Meade MD;  Location: [facility];  Service: Endoscopy;  Laterality: N/A;    ESOPHAGOGASTRODUODENOSCOPY N/A 5/11/2021    Procedure: EGD (ESOPHAGOGASTRODUODENOSCOPY);  Surgeon: Janelle Meade MD;  Location: [facility];  Service: Endoscopy;  Laterality: N/A;  Rapid on arrival    HIATAL HERNIA REPAIR      HYSTERECTOMY      LIVER BIOPSY  2018    fibrosis stage 3 JAMESON    OOPHORECTOMY      TUBAL LIGATION      UPPER GASTROINTESTINAL ENDOSCOPY      2013? unable to obtain records       Review of patient's allergies indicates:   Allergen Reactions    Amoxicillin Hives    Avelox [moxifloxacin] Hives    Clarinex [desloratadine] Hives    Pcn [penicillins] Hives    Claritin [loratadine] Palpitations    Latex, natural rubber Rash       No current facility-administered medications on file prior to encounter.     Current Outpatient Medications on File Prior to Encounter   Medication Sig    amLODIPine (NORVASC) 10 MG tablet Take 1  tablet (10 mg total) by mouth once daily.    busPIRone (BUSPAR) 30 MG Tab Take 30 mg by mouth 2 (two) times daily.    dulaglutide (TRULICITY) 0.75 mg/0.5 mL pen injector Inject 0.75 mg into the skin every 7 days.    empagliflozin (JARDIANCE) 10 mg tablet Take 1 tablet (10 mg total) by mouth once daily.    furosemide (LASIX) 20 MG tablet Take 20 mg by mouth daily as needed.    LATUDA 80 mg Tab tablet Take 80 mg by mouth every evening.    lubiprostone (AMITIZA) 24 MCG Cap Take 1 capsule (24 mcg total) by mouth 2 (two) times daily.    metoprolol tartrate (LOPRESSOR) 100 MG tablet Take 100 mg by mouth 2 (two) times daily.    mirtazapine (REMERON) 30 MG tablet Take 30 mg by mouth every evening.     montelukast (SINGULAIR) 10 mg tablet Take 10 mg by mouth every evening.    nortriptyline (PAMELOR) 50 MG capsule TAKE 1 CAPSULE BY MOUTH IN THE EVENING    pregabalin (LYRICA) 100 MG capsule TAKE 1 CAPSULE(100 MG) BY MOUTH TWICE DAILY    rosuvastatin (CRESTOR) 40 MG Tab Take 40 mg by mouth once daily.    sertraline (ZOLOFT) 100 MG tablet Take 150 mg by mouth once daily.    tiZANidine (ZANAFLEX) 2 MG tablet Take 2 mg by mouth 3 (three) times daily as needed.    topiramate (TOPAMAX) 200 MG Tab Take 200 mg by mouth nightly.    ACCU-CHEK KERRY PLUS TEST STRP Strp USE 1 STRIP TO CHECK GLUCOSE TWICE DAILY    albuterol (PROVENTIL/VENTOLIN HFA) 90 mcg/actuation inhaler Inhale 1-2 puffs into the lungs every 6 (six) hours as needed. Rescue    cyclobenzaprine (FLEXERIL) 10 MG tablet 1 tablet at bedtime as needed    diclofenac (VOLTAREN) 50 MG EC tablet Take 50 mg by mouth 3 (three) times daily as needed.    hydrOXYzine HCL (ATARAX) 25 MG tablet TAKE 1 TABLET BY MOUTH EVERY 6 HOURS FOR 7 DAYS AS NEEDED FOR ITCHING    LIDOcaine (LIDODERM) 5 % SMARTSIG:Topical    metFORMIN (GLUCOPHAGE) 1000 MG tablet TAKE 1 TABLET(1000 MG) BY MOUTH TWICE DAILY WITH MEALS    nitrofurantoin, macrocrystal-monohydrate, (MACROBID) 100 MG capsule Take 100 mg by  mouth 2 (two) times daily.    promethazine (PHENERGAN) 25 MG tablet Take 1 tablet (25 mg total) by mouth every 4 to 6 hours as needed.    sulfamethoxazole-trimethoprim 800-160mg (BACTRIM DS) 800-160 mg Tab Take 1 tablet by mouth every 12 (twelve) hours.     Family History       Problem Relation (Age of Onset)    Cancer Maternal Grandmother    Clotting disorder Father, Sister    Diabetes Maternal Aunt    Hypertension Mother    Lung cancer Paternal Grandmother    No Known Problems Daughter, Maternal Uncle, Paternal Aunt, Paternal Uncle, Maternal Grandfather, Paternal Grandfather    Ulcers Father          Tobacco Use    Smoking status: Never    Smokeless tobacco: Never   Substance and Sexual Activity    Alcohol use: No     Alcohol/week: 0.0 standard drinks    Drug use: No    Sexual activity: Not Currently     Partners: Male     Review of Systems   Constitutional:  Negative for activity change, chills, fatigue and fever.   HENT:  Negative for ear pain, postnasal drip, sinus pressure and sore throat.    Respiratory:  Negative for cough, shortness of breath and wheezing.    Cardiovascular:  Negative for chest pain, palpitations and leg swelling.   Gastrointestinal:  Negative for abdominal distention, abdominal pain, constipation, diarrhea, nausea and vomiting.   Genitourinary:  Positive for difficulty urinating, dysuria and frequency.   Musculoskeletal:  Negative for arthralgias and back pain.   Skin:  Negative for rash and wound.   Neurological:  Negative for tremors, syncope and weakness.   Objective:     Vital Signs (Most Recent):  Temp: 98.3 °F (36.8 °C) (10/26/22 0730)  Pulse: 73 (10/26/22 0730)  Resp: 18 (10/26/22 0730)  BP: 109/74 (10/26/22 0730)  SpO2: 96 % (10/26/22 0730) Vital Signs (24h Range):  Temp:  [97.7 °F (36.5 °C)-98.4 °F (36.9 °C)] 98.3 °F (36.8 °C)  Pulse:  [66-77] 73  Resp:  [16-20] 18  SpO2:  [95 %-100 %] 96 %  BP: (101-129)/(69-78) 109/74     Weight: 75.3 kg (166 lb)  Body mass index is 28.49  kg/m².    Physical Exam  Constitutional:       Appearance: Normal appearance.   HENT:      Nose: Nose normal.      Mouth/Throat:      Mouth: Mucous membranes are moist.   Eyes:      Extraocular Movements: Extraocular movements intact.      Pupils: Pupils are equal, round, and reactive to light.   Cardiovascular:      Rate and Rhythm: Normal rate and regular rhythm.   Pulmonary:      Effort: Pulmonary effort is normal.      Breath sounds: Normal breath sounds.   Abdominal:      General: Bowel sounds are normal. There is no distension.      Palpations: Abdomen is soft.      Tenderness: There is no abdominal tenderness. There is no guarding.   Musculoskeletal:         General: Normal range of motion.   Skin:     General: Skin is warm and dry.      Capillary Refill: Capillary refill takes less than 2 seconds.   Neurological:      Mental Status: She is alert and oriented to person, place, and time.         CRANIAL NERVES     CN III, IV, VI   Pupils are equal, round, and reactive to light.     Significant Labs: All pertinent labs within the past 24 hours have been reviewed.  Recent Lab Results  (Last 5 results in the past 24 hours)        10/26/22  0611   10/26/22  0538   10/25/22  2023   10/25/22  1631   10/25/22  1304        Albumin   3.4       3.9       Alkaline Phosphatase   234       246       ALT   80       87       Anion Gap   7       5       AST   93       94       Baso #         0.06       Basophil %         0.7       BILIRUBIN TOTAL   0.2  Comment: For infants and newborns, interpretation of results should be based  on gestational age, weight and in agreement with clinical  observations.    Premature Infant recommended reference ranges:  Up to 24 hours.............<8.0 mg/dL  Up to 48 hours............<12.0 mg/dL  3-5 days..................<15.0 mg/dL  6-29 days.................<15.0 mg/dL    For patients on Eltrombopag therapy, use of Dimension [place] TBIL is   not   recommended.         0.2  Comment: For  infants and newborns, interpretation of results should be based  on gestational age, weight and in agreement with clinical  observations.    Premature Infant recommended reference ranges:  Up to 24 hours.............<8.0 mg/dL  Up to 48 hours............<12.0 mg/dL  3-5 days..................<15.0 mg/dL  6-29 days.................<15.0 mg/dL    For patients on Eltrombopag therapy, use of Dimension [place] TBIL is   not   recommended.         BUN   13       11       Calcium   9.3       9.5       Chloride   110       109       CO2   25       26       Creatinine   0.8       0.8       Differential Method         Automated       eGFR   >60.0       >60.0       Eos #         0.2       Eosinophil %         1.9       Glucose   121       113       Gran # (ANC)         4.1       Gran %         45.8       Hematocrit         36.8       Hemoglobin         11.3       Immature Grans (Abs)         0.02  Comment: Mild elevation in immature granulocytes is non specific and   can be seen in a variety of conditions including stress response,   acute inflammation, trauma and pregnancy. Correlation with other   laboratory and clinical findings is essential.         Immature Granulocytes         0.2       Lactate, Julio C         1.5       Lymph #         3.7       Lymph %         41.6       MCH         24.7       MCHC         30.7       MCV         80       Mono #         0.9       Mono %         9.8       MPV         11.0       nRBC         0       Platelets         221       POCT Glucose 111     116   84         Potassium   3.5       3.3       PROTEIN TOTAL   7.9       8.7       RBC         4.58       RDW         15.8       Sodium   142       140       WBC         8.88                              Significant Imaging: I have reviewed all pertinent imaging results/findings within the past 24 hours.

## 2022-10-26 NOTE — PLAN OF CARE
Call from Wendy with [facility], stating that they will not be able to accept patient.  They can not service patient's needs.

## 2022-10-26 NOTE — PLAN OF CARE
Care plan reviewed with pt., no complaints of pain or respiratory distress this shift, takes meds whole, glucose monitoring, IV antibiotics as ordered, call bell in reach,instructed to call for needs/assistance, will continue to monitor.      Problem: Adult Inpatient Plan of Care  Goal: Plan of Care Review  Outcome: Ongoing, Progressing  Goal: Patient-Specific Goal (Individualized)  Outcome: Ongoing, Progressing  Goal: Absence of Hospital-Acquired Illness or Injury  Outcome: Ongoing, Progressing  Goal: Optimal Comfort and Wellbeing  Outcome: Ongoing, Progressing  Goal: Readiness for Transition of Care  Outcome: Ongoing, Progressing     Problem: Fluid and Electrolyte Imbalance (Acute Kidney Injury/Impairment)  Goal: Fluid and Electrolyte Balance  Outcome: Ongoing, Progressing     Problem: Oral Intake Inadequate (Acute Kidney Injury/Impairment)  Goal: Optimal Nutrition Intake  Outcome: Ongoing, Progressing     Problem: Renal Function Impairment (Acute Kidney Injury/Impairment)  Goal: Effective Renal Function  Outcome: Ongoing, Progressing

## 2022-10-26 NOTE — H&P
[facility] Medicine  History & Physical    Patient Name: Rohini Dang  MRN: [id number]  Patient Class: OP- Observation  Admission Date: 10/25/2022  Attending Physician: Yasmin Hernandez MD   Primary Care Provider: Skip Celestin NP         Patient information was obtained from patient, past medical records and ER records.     Subjective:     Principal Problem:ESBL (extended spectrum beta-lactamase) producing bacteria infection    Chief Complaint:   Chief Complaint   Patient presents with    Dysuria     Sent to ER by Dr Adorno to go through ER for IV antibiotics, urine culture shows IV meds needed, per Alyssa at Dr Adorno's.        HPI: 43 yo female with ESBL UTI failed macrobid and bactrim as outpatient here for IV abx. No fever. No flank or abd pain. No N/V/D. Gradual onset. Similar to previous. Endorses dysuria, frequency.     Pt has recently had an acute event of renal failure which has resolved.  Pt reports not having transportation to be able to get her abxs as a oupt.  We will also look into other options for getting her abxs at home. Otherwise pt will need 7-10 days of iv abxs to ensue resolution of uti      Past Medical History:   Diagnosis Date    Anemia     Anxiety disorder     Arthritis     Bipolar 2 disorder     Borderline personality disorder     Chronic bilateral low back pain without sciatica     Depression with anxiety     Diabetes mellitus     Disorder of kidney and ureter     Elevated LFTs     Esophageal stenosis     Fatty liver     Fibromyalgia     Fibromyalgia     High cholesterol     Hypoglycemia     Intermittent explosive disorder     Liver disease     JAMESON (nonalcoholic steatohepatitis)        Past Surgical History:   Procedure Laterality Date    COLONOSCOPY N/A 3/20/2018    Procedure: COLONOSCOPY;  Surgeon: Janelle Meade MD;  Location: [facility];  Service: Endoscopy;  Laterality: N/A;    COLONOSCOPY N/A 5/11/2021    Procedure:  COLONOSCOPY;  Surgeon: Janelle Meade MD;  Location: [facility];  Service: Endoscopy;  Laterality: N/A;    ESOPHAGEAL DILATION      x2    ESOPHAGOGASTRODUODENOSCOPY N/A 5/7/2019    Procedure: EGD (ESOPHAGOGASTRODUODENOSCOPY);  Surgeon: Janelle Meade MD;  Location: [facility];  Service: Endoscopy;  Laterality: N/A;    ESOPHAGOGASTRODUODENOSCOPY N/A 5/11/2021    Procedure: EGD (ESOPHAGOGASTRODUODENOSCOPY);  Surgeon: Janelle Meade MD;  Location: [facility];  Service: Endoscopy;  Laterality: N/A;  Rapid on arrival    HIATAL HERNIA REPAIR      HYSTERECTOMY      LIVER BIOPSY  2018    fibrosis stage 3 JAMESON    OOPHORECTOMY      TUBAL LIGATION      UPPER GASTROINTESTINAL ENDOSCOPY      2013? unable to obtain records       Review of patient's allergies indicates:   Allergen Reactions    Amoxicillin Hives    Avelox [moxifloxacin] Hives    Clarinex [desloratadine] Hives    Pcn [penicillins] Hives    Claritin [loratadine] Palpitations    Latex, natural rubber Rash       No current facility-administered medications on file prior to encounter.     Current Outpatient Medications on File Prior to Encounter   Medication Sig    amLODIPine (NORVASC) 10 MG tablet Take 1 tablet (10 mg total) by mouth once daily.    busPIRone (BUSPAR) 30 MG Tab Take 30 mg by mouth 2 (two) times daily.    dulaglutide (TRULICITY) 0.75 mg/0.5 mL pen injector Inject 0.75 mg into the skin every 7 days.    empagliflozin (JARDIANCE) 10 mg tablet Take 1 tablet (10 mg total) by mouth once daily.    furosemide (LASIX) 20 MG tablet Take 20 mg by mouth daily as needed.    LATUDA 80 mg Tab tablet Take 80 mg by mouth every evening.    lubiprostone (AMITIZA) 24 MCG Cap Take 1 capsule (24 mcg total) by mouth 2 (two) times daily.    metoprolol tartrate (LOPRESSOR) 100 MG tablet Take 100 mg by mouth 2 (two) times daily.    mirtazapine (REMERON) 30 MG tablet Take 30 mg by mouth every evening.     montelukast (SINGULAIR) 10 mg  tablet Take 10 mg by mouth every evening.    nortriptyline (PAMELOR) 50 MG capsule TAKE 1 CAPSULE BY MOUTH IN THE EVENING    pregabalin (LYRICA) 100 MG capsule TAKE 1 CAPSULE(100 MG) BY MOUTH TWICE DAILY    rosuvastatin (CRESTOR) 40 MG Tab Take 40 mg by mouth once daily.    sertraline (ZOLOFT) 100 MG tablet Take 150 mg by mouth once daily.    tiZANidine (ZANAFLEX) 2 MG tablet Take 2 mg by mouth 3 (three) times daily as needed.    topiramate (TOPAMAX) 200 MG Tab Take 200 mg by mouth nightly.    ACCU-CHEK KERRY PLUS TEST STRP Strp USE 1 STRIP TO CHECK GLUCOSE TWICE DAILY    albuterol (PROVENTIL/VENTOLIN HFA) 90 mcg/actuation inhaler Inhale 1-2 puffs into the lungs every 6 (six) hours as needed. Rescue    cyclobenzaprine (FLEXERIL) 10 MG tablet 1 tablet at bedtime as needed    diclofenac (VOLTAREN) 50 MG EC tablet Take 50 mg by mouth 3 (three) times daily as needed.    hydrOXYzine HCL (ATARAX) 25 MG tablet TAKE 1 TABLET BY MOUTH EVERY 6 HOURS FOR 7 DAYS AS NEEDED FOR ITCHING    LIDOcaine (LIDODERM) 5 % SMARTSIG:Topical    metFORMIN (GLUCOPHAGE) 1000 MG tablet TAKE 1 TABLET(1000 MG) BY MOUTH TWICE DAILY WITH MEALS    nitrofurantoin, macrocrystal-monohydrate, (MACROBID) 100 MG capsule Take 100 mg by mouth 2 (two) times daily.    promethazine (PHENERGAN) 25 MG tablet Take 1 tablet (25 mg total) by mouth every 4 to 6 hours as needed.    sulfamethoxazole-trimethoprim 800-160mg (BACTRIM DS) 800-160 mg Tab Take 1 tablet by mouth every 12 (twelve) hours.     Family History       Problem Relation (Age of Onset)    Cancer Maternal Grandmother    Clotting disorder Father, Sister    Diabetes Maternal Aunt    Hypertension Mother    Lung cancer Paternal Grandmother    No Known Problems Daughter, Maternal Uncle, Paternal Aunt, Paternal Uncle, Maternal Grandfather, Paternal Grandfather    Ulcers Father          Tobacco Use    Smoking status: Never    Smokeless tobacco: Never   Substance and Sexual Activity     Alcohol use: No     Alcohol/week: 0.0 standard drinks    Drug use: No    Sexual activity: Not Currently     Partners: Male     Review of Systems   Constitutional:  Negative for activity change, chills, fatigue and fever.   HENT:  Negative for ear pain, postnasal drip, sinus pressure and sore throat.    Respiratory:  Negative for cough, shortness of breath and wheezing.    Cardiovascular:  Negative for chest pain, palpitations and leg swelling.   Gastrointestinal:  Negative for abdominal distention, abdominal pain, constipation, diarrhea, nausea and vomiting.   Genitourinary:  Positive for difficulty urinating, dysuria and frequency.   Musculoskeletal:  Negative for arthralgias and back pain.   Skin:  Negative for rash and wound.   Neurological:  Negative for tremors, syncope and weakness.   Objective:     Vital Signs (Most Recent):  Temp: 98.3 °F (36.8 °C) (10/26/22 0730)  Pulse: 73 (10/26/22 0730)  Resp: 18 (10/26/22 0730)  BP: 109/74 (10/26/22 0730)  SpO2: 96 % (10/26/22 0730) Vital Signs (24h Range):  Temp:  [97.7 °F (36.5 °C)-98.4 °F (36.9 °C)] 98.3 °F (36.8 °C)  Pulse:  [66-77] 73  Resp:  [16-20] 18  SpO2:  [95 %-100 %] 96 %  BP: (101-129)/(69-78) 109/74     Weight: 75.3 kg (166 lb)  Body mass index is 28.49 kg/m².    Physical Exam  Constitutional:       Appearance: Normal appearance.   HENT:      Nose: Nose normal.      Mouth/Throat:      Mouth: Mucous membranes are moist.   Eyes:      Extraocular Movements: Extraocular movements intact.      Pupils: Pupils are equal, round, and reactive to light.   Cardiovascular:      Rate and Rhythm: Normal rate and regular rhythm.   Pulmonary:      Effort: Pulmonary effort is normal.      Breath sounds: Normal breath sounds.   Abdominal:      General: Bowel sounds are normal. There is no distension.      Palpations: Abdomen is soft.      Tenderness: There is no abdominal tenderness. There is no guarding.   Musculoskeletal:         General: Normal range of motion.    Skin:     General: Skin is warm and dry.      Capillary Refill: Capillary refill takes less than 2 seconds.   Neurological:      Mental Status: She is alert and oriented to person, place, and time.         CRANIAL NERVES     CN III, IV, VI   Pupils are equal, round, and reactive to light.     Significant Labs: All pertinent labs within the past 24 hours have been reviewed.  Recent Lab Results  (Last 5 results in the past 24 hours)        10/26/22  0611   10/26/22  0538   10/25/22  2023   10/25/22  1631   10/25/22  1304        Albumin   3.4       3.9       Alkaline Phosphatase   234       246       ALT   80       87       Anion Gap   7       5       AST   93       94       Baso #         0.06       Basophil %         0.7       BILIRUBIN TOTAL   0.2  Comment: For infants and newborns, interpretation of results should be based  on gestational age, weight and in agreement with clinical  observations.    Premature Infant recommended reference ranges:  Up to 24 hours.............<8.0 mg/dL  Up to 48 hours............<12.0 mg/dL  3-5 days..................<15.0 mg/dL  6-29 days.................<15.0 mg/dL    For patients on Eltrombopag therapy, use of Dimension Alpha TBIL is   not   recommended.         0.2  Comment: For infants and newborns, interpretation of results should be based  on gestational age, weight and in agreement with clinical  observations.    Premature Infant recommended reference ranges:  Up to 24 hours.............<8.0 mg/dL  Up to 48 hours............<12.0 mg/dL  3-5 days..................<15.0 mg/dL  6-29 days.................<15.0 mg/dL    For patients on Eltrombopag therapy, use of Dimension Alpha TBIL is   not   recommended.         BUN   13       11       Calcium   9.3       9.5       Chloride   110       109       CO2   25       26       Creatinine   0.8       0.8       Differential Method         Automated       eGFR   >60.0       >60.0       Eos #         0.2       Eosinophil %         1.9        Glucose   121       113       Gran # (ANC)         4.1       Gran %         45.8       Hematocrit         36.8       Hemoglobin         11.3       Immature Grans (Abs)         0.02  Comment: Mild elevation in immature granulocytes is non specific and   can be seen in a variety of conditions including stress response,   acute inflammation, trauma and pregnancy. Correlation with other   laboratory and clinical findings is essential.         Immature Granulocytes         0.2       Lactate, Julio C         1.5       Lymph #         3.7       Lymph %         41.6       MCH         24.7       MCHC         30.7       MCV         80       Mono #         0.9       Mono %         9.8       MPV         11.0       nRBC         0       Platelets         221       POCT Glucose 111     116   84         Potassium   3.5       3.3       PROTEIN TOTAL   7.9       8.7       RBC         4.58       RDW         15.8       Sodium   142       140       WBC         8.88                              Significant Imaging: I have reviewed all pertinent imaging results/findings within the past 24 hours.    Assessment/Plan:     * ESBL (extended spectrum beta-lactamase) producing bacteria infection  Urine cx showed esbl e coli - sent to merrem, invanz and amikacin only - needs a tleast 7-10 days iv abxs   Will check with her insurance to see if would have a feasible way to do abxs as outpt      Urinary tract infection without hematuria  Cont iv abxs      Diabetes mellitus without complication  Patient's FSGs are controlled on current medication regimen.  Last A1c reviewed-   Lab Results   Component Value Date    HGBA1C 6.4 (H) 09/02/2022     Most recent fingerstick glucose reviewed-   Recent Labs   Lab 10/25/22  1631 10/25/22  2023 10/26/22  0611   POCTGLUCOSE 84 116* 111*     Current correctional scale  High  Maintain anti-hyperglycemic dose as follows-   Antihyperglycemics (From admission, onward)    Start     Stop Route Frequency Ordered     10/25/22 1614  insulin aspart U-100 pen 0-5 Units         -- SubQ Before meals & nightly PRN 10/25/22 1614        Hold Oral hypoglycemics while patient is in the hospital.    Borderline personality disorder  cotn current meds= mood is good and pt is pleasant      JAMESON (nonalcoholic steatohepatitis)  lfts appear stable        VTE Risk Mitigation (From admission, onward)         Ordered     IP VTE LOW RISK PATIENT  Once         10/25/22 1614     Place sequential compression device  Until discontinued         10/25/22 1614                   Yasmin Hernandez MD  Department of Hospital Medicine   [facility]

## 2022-10-26 NOTE — ASSESSMENT & PLAN NOTE
Patient's FSGs are controlled on current medication regimen.  Last A1c reviewed-   Lab Results   Component Value Date    HGBA1C 6.4 (H) 09/02/2022     Most recent fingerstick glucose reviewed-   Recent Labs   Lab 10/25/22  1631 10/25/22  2023 10/26/22  0611   POCTGLUCOSE 84 116* 111*     Current correctional scale  High  Maintain anti-hyperglycemic dose as follows-   Antihyperglycemics (From admission, onward)    Start     Stop Route Frequency Ordered    10/25/22 1614  insulin aspart U-100 pen 0-5 Units         -- SubQ Before meals & nightly PRN 10/25/22 1614        Hold Oral hypoglycemics while patient is in the hospital.

## 2022-10-26 NOTE — PROGRESS NOTES
Home Medication Information  Pharmacy Name: [facility]  Pharmacy Address: [address] [address] [place] [address]  Prescription Number: [id number]  Medication: Trulicity (dulaglutide) 0.75 mg/0.5 ml  Directions For Use: Administer 0.75 mg under the skin every 7 days  Quantity: 4 pens    Has medication been identified? yes

## 2022-10-26 NOTE — PLAN OF CARE
[facility]  Initial Discharge Assessment       Primary Care Provider: Skip Celestin NP    Admission Diagnosis: ESBL (extended spectrum beta-lactamase) producing bacteria infection [A49.9, Z16.12]  Urinary tract infection without hematuria, site unspecified [N39.0]    Admission Date: 10/25/2022  Expected Discharge Date:     Discharge Barriers Identified: Transportation    Payor: MEDICAID / [facility] / Product Type: Managed Medicaid /     Extended Emergency Contact Information  Primary Emergency Contact: Yuliya Hidalgo   [facility]  Home Phone: [phone]  Mobile Phone: [phone]  Relation: Sister  Secondary Emergency Contact: Arnulfo Dang   [place]  Mobile Phone: [phone]  Relation: Spouse    Discharge Plan A: Home with family, Home Health  Discharge Plan B: Other      [id number] [address] [facility] [address] [facility] [address] [facility] [address] [place] [address] & [facility]  [facility] [address] [facility] [address]  Phone: [phone] Fax: [phone]    [facility] [address] [place] [address] [address] [place] [address] [address] [facility] [address]  Phone: [phone] Fax: [phone]      Initial Assessment (most recent)       Adult Discharge Assessment - 10/26/22 0802          Discharge Assessment    Assessment Type Discharge Planning Assessment     Confirmed/corrected address, phone number and insurance Yes     Confirmed Demographics Correct on Facesheet     Source of Information patient     Lives With spouse;child(mahesh), dependent;child(mahesh), adult   The patient lives with her family    Do you expect to return to your current living situation? --   TBD    Do you have help at home or someone to help you manage your care at home? --   The patient does not have a caregiver.    Prior to hospitilization cognitive status: Alert/Oriented     Current cognitive status: Alert/Oriented     Walking or Climbing Stairs  Difficulty none     Dressing/Bathing Difficulty none     Home Accessibility stairs to enter home;stairs within home   The patient lives in a mobile home.    Stairs, Within Home, Primary The patient stated that she has a step down into her kitchen     Number of Stairs, Main Entrance two   The patient has a porch attached to her home.    Home Layout Able to live on 1st floor     Equipment Currently Used at Home none     Readmission within 30 days? No     Patient currently being followed by outpatient case management? No     Do you currently have service(s) that help you manage your care at home? No     Do you take prescription medications? Yes     Do you have prescription coverage? Yes     Coverage [facility]ID [facility]     Do you have any problems affording any of your prescribed medications? No     Is the patient taking medications as prescribed? yes     How do you get to doctors appointments? car, drives self     Are you on dialysis? No     Do you take coumadin? No     Discharge Plan A Home with family;Home Health     Discharge Plan B Other     DME Needed Upon Discharge  --   TBD    Discharge Plan discussed with: Patient     Discharge Barriers Identified Transportation                 Initial discharge assessment is completed. Spoke to the patient in her room. She was awake alert, and oriented to the questions being asked. The patient lives with her family in a mobile home. She does not utilize any DME for her care. She stated that she works. Contact information as left in the patient's room. Case management will continue to monitor.

## 2022-10-26 NOTE — ASSESSMENT & PLAN NOTE
Urine cx showed esbl e coli - sent to [facility], invanz and amikacin only - needs a tleast 7-10 days iv abxs   Will check with her insurance to see if would have a feasible way to do abxs as outpt

## 2022-10-26 NOTE — HPI
43 yo female with ESBL UTI failed macrobid and bactrim as outpatient here for IV abx. No fever. No flank or abd pain. No N/V/D. Gradual onset. Similar to previous. Endorses dysuria, frequency.     Pt has recently had an acute event of renal failure which has resolved.  Pt reports not having transportation to be able to get her abxs as a oupt.  We will also look into other options for getting her abxs at home. Otherwise pt will need 7-10 days of iv abxs to ensue resolution of uti

## 2022-10-27 LAB
ALBUMIN SERPL BCP-MCNC: 3.3 G/DL (ref 3.5–5.2)
ALP SERPL-CCNC: 255 U/L (ref 55–135)
ALT SERPL W/O P-5'-P-CCNC: 102 U/L (ref 10–44)
ANION GAP SERPL CALC-SCNC: 5 MMOL/L (ref 8–16)
AST SERPL-CCNC: 122 U/L (ref 10–40)
BILIRUB SERPL-MCNC: 0.2 MG/DL (ref 0.1–1)
BUN SERPL-MCNC: 15 MG/DL (ref 6–20)
CALCIUM SERPL-MCNC: 9.3 MG/DL (ref 8.7–10.5)
CHLORIDE SERPL-SCNC: 111 MMOL/L (ref 95–110)
CO2 SERPL-SCNC: 27 MMOL/L (ref 23–29)
CREAT SERPL-MCNC: 0.7 MG/DL (ref 0.5–1.4)
EST. GFR  (NO RACE VARIABLE): >60 ML/MIN/1.73 M^2
GLUCOSE SERPL-MCNC: 127 MG/DL (ref 70–110)
POCT GLUCOSE: 108 MG/DL (ref 70–110)
POCT GLUCOSE: 125 MG/DL (ref 70–110)
POCT GLUCOSE: 139 MG/DL (ref 70–110)
POCT GLUCOSE: 174 MG/DL (ref 70–110)
POTASSIUM SERPL-SCNC: 3.8 MMOL/L (ref 3.5–5.1)
PROT SERPL-MCNC: 7.6 G/DL (ref 6–8.4)
SODIUM SERPL-SCNC: 143 MMOL/L (ref 136–145)

## 2022-10-27 PROCEDURE — 25000003 PHARM REV CODE 250: Performed by: INTERNAL MEDICINE

## 2022-10-27 PROCEDURE — 80053 COMPREHEN METABOLIC PANEL: CPT | Performed by: INTERNAL MEDICINE

## 2022-10-27 PROCEDURE — 25000242 PHARM REV CODE 250 ALT 637 W/ HCPCS: Performed by: INTERNAL MEDICINE

## 2022-10-27 PROCEDURE — 36415 COLL VENOUS BLD VENIPUNCTURE: CPT | Performed by: INTERNAL MEDICINE

## 2022-10-27 PROCEDURE — G0378 HOSPITAL OBSERVATION PER HR: HCPCS

## 2022-10-27 PROCEDURE — 63600175 PHARM REV CODE 636 W HCPCS: Performed by: INTERNAL MEDICINE

## 2022-10-27 PROCEDURE — 96366 THER/PROPH/DIAG IV INF ADDON: CPT

## 2022-10-27 RX ADMIN — SERTRALINE HYDROCHLORIDE 150 MG: 100 TABLET ORAL at 09:10

## 2022-10-27 RX ADMIN — METOPROLOL TARTRATE 100 MG: 100 TABLET, FILM COATED ORAL at 10:10

## 2022-10-27 RX ADMIN — ATORVASTATIN CALCIUM 40 MG: 40 TABLET, FILM COATED ORAL at 09:10

## 2022-10-27 RX ADMIN — LURASIDONE HYDROCHLORIDE 80 MG: 20 TABLET, FILM COATED ORAL at 10:10

## 2022-10-27 RX ADMIN — MIRTAZAPINE 30 MG: 15 TABLET, FILM COATED ORAL at 10:10

## 2022-10-27 RX ADMIN — MEROPENEM AND SODIUM CHLORIDE 1 G: 1 INJECTION, SOLUTION INTRAVENOUS at 05:10

## 2022-10-27 RX ADMIN — LUBIPROSTONE 24 MCG: 24 CAPSULE, GELATIN COATED ORAL at 10:10

## 2022-10-27 RX ADMIN — EMPAGLIFLOZIN 10 MG: 10 TABLET, FILM COATED ORAL at 09:10

## 2022-10-27 RX ADMIN — TOPIRAMATE 200 MG: 100 TABLET, FILM COATED ORAL at 10:10

## 2022-10-27 RX ADMIN — MEROPENEM AND SODIUM CHLORIDE 1 G: 1 INJECTION, SOLUTION INTRAVENOUS at 11:10

## 2022-10-27 RX ADMIN — BUSPIRONE HYDROCHLORIDE 30 MG: 7.5 TABLET ORAL at 10:10

## 2022-10-27 RX ADMIN — MEROPENEM AND SODIUM CHLORIDE 1 G: 1 INJECTION, SOLUTION INTRAVENOUS at 09:10

## 2022-10-27 RX ADMIN — BUSPIRONE HYDROCHLORIDE 30 MG: 7.5 TABLET ORAL at 09:10

## 2022-10-27 RX ADMIN — LUBIPROSTONE 24 MCG: 24 CAPSULE, GELATIN COATED ORAL at 09:10

## 2022-10-27 RX ADMIN — METOPROLOL TARTRATE 100 MG: 100 TABLET, FILM COATED ORAL at 09:10

## 2022-10-27 RX ADMIN — PREGABALIN 100 MG: 50 CAPSULE ORAL at 10:10

## 2022-10-27 RX ADMIN — FUROSEMIDE 20 MG: 20 TABLET ORAL at 09:10

## 2022-10-27 RX ADMIN — MONTELUKAST 10 MG: 10 TABLET, FILM COATED ORAL at 10:10

## 2022-10-27 RX ADMIN — TIZANIDINE 2 MG: 2 TABLET ORAL at 10:10

## 2022-10-27 RX ADMIN — PREGABALIN 100 MG: 50 CAPSULE ORAL at 09:10

## 2022-10-27 RX ADMIN — AMLODIPINE BESYLATE 10 MG: 10 TABLET ORAL at 09:10

## 2022-10-27 NOTE — PLAN OF CARE
Plan of care reviewed with pt., on IV antibiotics as ordered, takes meds whole, no complaints of pain or respiratory distress, call bell in reach, will continue to monitor.

## 2022-10-27 NOTE — PROGRESS NOTES
[facility] Medicine  Progress Note    Patient Name: Rohini Dang  MRN: [id number]  Patient Class: OP- Observation   Admission Date: 10/25/2022  Length of Stay: 0 days  Attending Physician: Yasmin Hernandez MD  Primary Care Provider: Skip Celestin NP        Subjective:     Principal Problem:ESBL (extended spectrum beta-lactamase) producing bacteria infection        HPI:  43 yo female with ESBL UTI failed macrobid and bactrim as outpatient here for IV abx. No fever. No flank or abd pain. No N/V/D. Gradual onset. Similar to previous. Endorses dysuria, frequency.     Pt has recently had an acute event of renal failure which has resolved.  Pt reports not having transportation to be able to get her abxs as a oupt.  We will also look into other options for getting her abxs at home. Otherwise pt will need 7-10 days of iv abxs to ensue resolution of uti      Overview/Hospital Course:  10/27 ND Pt is feelign well - no complaints today - tolerating iv abxs      Past Medical History:   Diagnosis Date    Anemia     Anxiety disorder     Arthritis     Bipolar 2 disorder     Borderline personality disorder     Chronic bilateral low back pain without sciatica     Depression with anxiety     Diabetes mellitus     Disorder of kidney and ureter     Elevated LFTs     Esophageal stenosis     Fatty liver     Fibromyalgia     Fibromyalgia     High cholesterol     Hypertension     Hypoglycemia     Intermittent explosive disorder     Liver disease     JAMESON (nonalcoholic steatohepatitis)        Past Surgical History:   Procedure Laterality Date    COLONOSCOPY N/A 3/20/2018    Procedure: COLONOSCOPY;  Surgeon: Janelle Meade MD;  Location: [facility];  Service: Endoscopy;  Laterality: N/A;    COLONOSCOPY N/A 5/11/2021    Procedure: COLONOSCOPY;  Surgeon: Janelle Meade MD;  Location: [facility];  Service: Endoscopy;  Laterality: N/A;    ESOPHAGEAL DILATION      x2     ESOPHAGOGASTRODUODENOSCOPY N/A 5/7/2019    Procedure: EGD (ESOPHAGOGASTRODUODENOSCOPY);  Surgeon: Janelle Meade MD;  Location: [facility];  Service: Endoscopy;  Laterality: N/A;    ESOPHAGOGASTRODUODENOSCOPY N/A 5/11/2021    Procedure: EGD (ESOPHAGOGASTRODUODENOSCOPY);  Surgeon: Janelle Meade MD;  Location: [facility];  Service: Endoscopy;  Laterality: N/A;  Rapid on arrival    HIATAL HERNIA REPAIR      HYSTERECTOMY      LIVER BIOPSY  2018    fibrosis stage 3 JAMESON    OOPHORECTOMY      TUBAL LIGATION      UPPER GASTROINTESTINAL ENDOSCOPY      2013? unable to obtain records       Review of patient's allergies indicates:   Allergen Reactions    Amoxicillin Hives    Avelox [moxifloxacin] Hives    Clarinex [desloratadine] Hives    Pcn [penicillins] Hives    Claritin [loratadine] Palpitations    Latex, natural rubber Rash       No current facility-administered medications on file prior to encounter.     Current Outpatient Medications on File Prior to Encounter   Medication Sig    amLODIPine (NORVASC) 10 MG tablet Take 1 tablet (10 mg total) by mouth once daily.    busPIRone (BUSPAR) 30 MG Tab Take 30 mg by mouth 2 (two) times daily.    dulaglutide (TRULICITY) 0.75 mg/0.5 mL pen injector Inject 0.75 mg into the skin every 7 days.    empagliflozin (JARDIANCE) 10 mg tablet Take 1 tablet (10 mg total) by mouth once daily.    furosemide (LASIX) 20 MG tablet Take 20 mg by mouth daily as needed.    LATUDA 80 mg Tab tablet Take 80 mg by mouth every evening.    lubiprostone (AMITIZA) 24 MCG Cap Take 1 capsule (24 mcg total) by mouth 2 (two) times daily.    metoprolol tartrate (LOPRESSOR) 100 MG tablet Take 100 mg by mouth 2 (two) times daily.    mirtazapine (REMERON) 30 MG tablet Take 30 mg by mouth every evening.     montelukast (SINGULAIR) 10 mg tablet Take 10 mg by mouth every evening.    nortriptyline (PAMELOR) 50 MG capsule TAKE 1 CAPSULE BY MOUTH IN THE EVENING    pregabalin (LYRICA)  100 MG capsule TAKE 1 CAPSULE(100 MG) BY MOUTH TWICE DAILY (Patient taking differently: 150 mg.)    promethazine (PHENERGAN) 25 MG tablet Take 1 tablet (25 mg total) by mouth every 4 to 6 hours as needed.    rosuvastatin (CRESTOR) 40 MG Tab Take 40 mg by mouth once daily.    sertraline (ZOLOFT) 100 MG tablet Take 150 mg by mouth once daily.    tiZANidine (ZANAFLEX) 2 MG tablet Take 2 mg by mouth 3 (three) times daily as needed.    topiramate (TOPAMAX) 200 MG Tab Take 200 mg by mouth nightly.    ACCU-CHEK KERRY PLUS TEST STRP Strp USE 1 STRIP TO CHECK GLUCOSE TWICE DAILY    albuterol (PROVENTIL/VENTOLIN HFA) 90 mcg/actuation inhaler Inhale 1-2 puffs into the lungs every 6 (six) hours as needed. Rescue    cyclobenzaprine (FLEXERIL) 10 MG tablet 1 tablet at bedtime as needed    diclofenac (VOLTAREN) 50 MG EC tablet Take 50 mg by mouth 3 (three) times daily as needed.    hydrOXYzine HCL (ATARAX) 25 MG tablet TAKE 1 TABLET BY MOUTH EVERY 6 HOURS FOR 7 DAYS AS NEEDED FOR ITCHING    LIDOcaine (LIDODERM) 5 % SMARTSIG:Topical    metFORMIN (GLUCOPHAGE) 1000 MG tablet TAKE 1 TABLET(1000 MG) BY MOUTH TWICE DAILY WITH MEALS    nitrofurantoin, macrocrystal-monohydrate, (MACROBID) 100 MG capsule Take 100 mg by mouth 2 (two) times daily.    sulfamethoxazole-trimethoprim 800-160mg (BACTRIM DS) 800-160 mg Tab Take 1 tablet by mouth every 12 (twelve) hours.     Family History       Problem Relation (Age of Onset)    Cancer Maternal Grandmother    Clotting disorder Father, Sister    Diabetes Maternal Aunt    Hypertension Mother    Lung cancer Paternal Grandmother    No Known Problems Daughter, Maternal Uncle, Paternal Aunt, Paternal Uncle, Maternal Grandfather, Paternal Grandfather    Ulcers Father          Tobacco Use    Smoking status: Never    Smokeless tobacco: Never   Substance and Sexual Activity    Alcohol use: No     Alcohol/week: 0.0 standard drinks    Drug use: No    Sexual activity: Not Currently      Partners: Male     Review of Systems   Constitutional:  Negative for activity change, chills, fatigue and fever.   HENT:  Negative for ear pain, postnasal drip, sinus pressure and sore throat.    Respiratory:  Negative for cough, shortness of breath and wheezing.    Cardiovascular:  Negative for chest pain, palpitations and leg swelling.   Gastrointestinal:  Negative for abdominal distention, abdominal pain, constipation, diarrhea, nausea and vomiting.   Genitourinary:  Positive for difficulty urinating, dysuria and frequency.   Musculoskeletal:  Negative for arthralgias and back pain.   Skin:  Negative for rash and wound.   Neurological:  Negative for tremors, syncope and weakness.   Objective:     Vital Signs (Most Recent):  Temp: 96.8 °F (36 °C) (10/27/22 1112)  Pulse: 102 (10/27/22 1112)  Resp: 20 (10/27/22 1112)  BP: 108/74 (10/27/22 1112)  SpO2: 96 % (10/27/22 1112) Vital Signs (24h Range):  Temp:  [96.8 °F (36 °C)-99.1 °F (37.3 °C)] 96.8 °F (36 °C)  Pulse:  [] 102  Resp:  [17-20] 20  SpO2:  [94 %-96 %] 96 %  BP: (103-121)/(65-74) 108/74     Weight: 75.3 kg (166 lb)  Body mass index is 28.49 kg/m².    Physical Exam  Constitutional:       Appearance: Normal appearance.   HENT:      Nose: Nose normal.      Mouth/Throat:      Mouth: Mucous membranes are moist.   Eyes:      Extraocular Movements: Extraocular movements intact.      Pupils: Pupils are equal, round, and reactive to light.   Cardiovascular:      Rate and Rhythm: Normal rate and regular rhythm.   Pulmonary:      Effort: Pulmonary effort is normal.      Breath sounds: Normal breath sounds.   Abdominal:      General: Bowel sounds are normal. There is no distension.      Palpations: Abdomen is soft.      Tenderness: There is no abdominal tenderness. There is no guarding.   Musculoskeletal:         General: Normal range of motion.   Skin:     General: Skin is warm and dry.      Capillary Refill: Capillary refill takes less than 2 seconds.    Neurological:      Mental Status: She is alert and oriented to person, place, and time.         CRANIAL NERVES     CN III, IV, VI   Pupils are equal, round, and reactive to light.     Significant Labs: All pertinent labs within the past 24 hours have been reviewed.  Recent Lab Results         10/27/22  0547   10/26/22  2122        Albumin 3.3         Alkaline Phosphatase 255                  Anion Gap 5                  BILIRUBIN TOTAL 0.2  Comment: For infants and newborns, interpretation of results should be based  on gestational age, weight and in agreement with clinical  observations.    Premature Infant recommended reference ranges:  Up to 24 hours.............<8.0 mg/dL  Up to 48 hours............<12.0 mg/dL  3-5 days..................<15.0 mg/dL  6-29 days.................<15.0 mg/dL    For patients on Eltrombopag therapy, use of Dimension [place] TBIL is   not   recommended.           BUN 15         Calcium 9.3         Chloride 111         CO2 27         Creatinine 0.7         eGFR >60.0         Glucose 127         POCT Glucose   106       Potassium 3.8         PROTEIN TOTAL 7.6         Sodium 143                 Significant Imaging: I have reviewed all pertinent imaging results/findings within the past 24 hours.      Assessment/Plan:      * ESBL (extended spectrum beta-lactamase) producing bacteria infection  Urine cx showed esbl e coli - sent to [facility], invanz and amikacin only - needs a tleast 7-10 days iv abxs   Will check with her insurance to see if would have a feasible way to do abxs as outpt  10/27 day #3 of abxs this evening      Urinary tract infection without hematuria  Cont iv abxs      Diabetes mellitus without complication  Patient's FSGs are controlled on current medication regimen.  Last A1c reviewed-   Lab Results   Component Value Date    HGBA1C 6.4 (H) 09/02/2022     Most recent fingerstick glucose reviewed-   Recent Labs   Lab 10/26/22  2122   POCTGLUCOSE 106     Current  correctional scale  High  Maintain anti-hyperglycemic dose as follows-   Antihyperglycemics (From admission, onward)    Start     Stop Route Frequency Ordered    11/05/22 0900  dulaglutide pen injector 0.75 mg         -- SubQ Every 7 days 10/26/22 1554    10/26/22 1315  empagliflozin 10 mg tablet 10 mg         -- Oral Daily 10/26/22 1203    10/25/22 1614  insulin aspart U-100 pen 0-5 Units         -- SubQ Before meals & nightly PRN 10/25/22 1614        Hold Oral hypoglycemics while patient is in the hospital.    Borderline personality disorder  cotn current meds= mood is good and pt is pleasant      JAMESON (nonalcoholic steatohepatitis)  lfts appear stable        VTE Risk Mitigation (From admission, onward)         Ordered     IP VTE LOW RISK PATIENT  Once         10/25/22 1614     Place sequential compression device  Until discontinued         10/25/22 1614                Discharge Planning   FERNANDO:      Code Status: Full Code   Is the patient medically ready for discharge?:     Reason for patient still in hospital (select all that apply): Treatment  Discharge Plan A: Home with family, Home Health   Discharge Delays: None known at this time              Yasmin Hernandez MD  Department of Hospital Medicine   [facility]

## 2022-10-27 NOTE — HOSPITAL COURSE
10/27 ND Pt is feelign well - no complaints today - tolerating iv abxs  10/28 ND pt feeling well - no longer having dysuria and frequency  10/29 [place] denies symptoms, ESBL UTI D5 [facility], tolerating well, afebrile  10/30 [place] No new complaints. Completing IV abx D6 [facility]  10/31 ND feelign good - no new problems - cont current iv abxs and plan to dc in am  11/1 ND doing well - ready to go home- completed 7 days of iv [facility] for esbl kleb

## 2022-10-27 NOTE — ASSESSMENT & PLAN NOTE
Patient's FSGs are controlled on current medication regimen.  Last A1c reviewed-   Lab Results   Component Value Date    HGBA1C 6.4 (H) 09/02/2022     Most recent fingerstick glucose reviewed-   Recent Labs   Lab 10/26/22  2122   POCTGLUCOSE 106     Current correctional scale  High  Maintain anti-hyperglycemic dose as follows-   Antihyperglycemics (From admission, onward)    Start     Stop Route Frequency Ordered    11/05/22 0900  dulaglutide pen injector 0.75 mg         -- SubQ Every 7 days 10/26/22 1554    10/26/22 1315  empagliflozin 10 mg tablet 10 mg         -- Oral Daily 10/26/22 1203    10/25/22 1614  insulin aspart U-100 pen 0-5 Units         -- SubQ Before meals & nightly PRN 10/25/22 1614        Hold Oral hypoglycemics while patient is in the hospital.

## 2022-10-27 NOTE — SUBJECTIVE & OBJECTIVE
Past Medical History:   Diagnosis Date    Anemia     Anxiety disorder     Arthritis     Bipolar 2 disorder     Borderline personality disorder     Chronic bilateral low back pain without sciatica     Depression with anxiety     Diabetes mellitus     Disorder of kidney and ureter     Elevated LFTs     Esophageal stenosis     Fatty liver     Fibromyalgia     Fibromyalgia     High cholesterol     Hypertension     Hypoglycemia     Intermittent explosive disorder     Liver disease     JAMESON (nonalcoholic steatohepatitis)        Past Surgical History:   Procedure Laterality Date    COLONOSCOPY N/A 3/20/2018    Procedure: COLONOSCOPY;  Surgeon: Janelle Meade MD;  Location: [facility];  Service: Endoscopy;  Laterality: N/A;    COLONOSCOPY N/A 5/11/2021    Procedure: COLONOSCOPY;  Surgeon: Janelle Meade MD;  Location: [facility];  Service: Endoscopy;  Laterality: N/A;    ESOPHAGEAL DILATION      x2    ESOPHAGOGASTRODUODENOSCOPY N/A 5/7/2019    Procedure: EGD (ESOPHAGOGASTRODUODENOSCOPY);  Surgeon: Janelle Meade MD;  Location: [facility];  Service: Endoscopy;  Laterality: N/A;    ESOPHAGOGASTRODUODENOSCOPY N/A 5/11/2021    Procedure: EGD (ESOPHAGOGASTRODUODENOSCOPY);  Surgeon: Janelle Meade MD;  Location: [facility];  Service: Endoscopy;  Laterality: N/A;  Rapid on arrival    HIATAL HERNIA REPAIR      HYSTERECTOMY      LIVER BIOPSY  2018    fibrosis stage 3 JAMESON    OOPHORECTOMY      TUBAL LIGATION      UPPER GASTROINTESTINAL ENDOSCOPY      2013? unable to obtain records       Review of patient's allergies indicates:   Allergen Reactions    Amoxicillin Hives    Avelox [moxifloxacin] Hives    Clarinex [desloratadine] Hives    Pcn [penicillins] Hives    Claritin [loratadine] Palpitations    Latex, natural rubber Rash       No current facility-administered medications on file prior to encounter.     Current Outpatient Medications on File Prior to Encounter   Medication Sig    amLODIPine (NORVASC) 10  MG tablet Take 1 tablet (10 mg total) by mouth once daily.    busPIRone (BUSPAR) 30 MG Tab Take 30 mg by mouth 2 (two) times daily.    dulaglutide (TRULICITY) 0.75 mg/0.5 mL pen injector Inject 0.75 mg into the skin every 7 days.    empagliflozin (JARDIANCE) 10 mg tablet Take 1 tablet (10 mg total) by mouth once daily.    furosemide (LASIX) 20 MG tablet Take 20 mg by mouth daily as needed.    LATUDA 80 mg Tab tablet Take 80 mg by mouth every evening.    lubiprostone (AMITIZA) 24 MCG Cap Take 1 capsule (24 mcg total) by mouth 2 (two) times daily.    metoprolol tartrate (LOPRESSOR) 100 MG tablet Take 100 mg by mouth 2 (two) times daily.    mirtazapine (REMERON) 30 MG tablet Take 30 mg by mouth every evening.     montelukast (SINGULAIR) 10 mg tablet Take 10 mg by mouth every evening.    nortriptyline (PAMELOR) 50 MG capsule TAKE 1 CAPSULE BY MOUTH IN THE EVENING    pregabalin (LYRICA) 100 MG capsule TAKE 1 CAPSULE(100 MG) BY MOUTH TWICE DAILY (Patient taking differently: 150 mg.)    promethazine (PHENERGAN) 25 MG tablet Take 1 tablet (25 mg total) by mouth every 4 to 6 hours as needed.    rosuvastatin (CRESTOR) 40 MG Tab Take 40 mg by mouth once daily.    sertraline (ZOLOFT) 100 MG tablet Take 150 mg by mouth once daily.    tiZANidine (ZANAFLEX) 2 MG tablet Take 2 mg by mouth 3 (three) times daily as needed.    topiramate (TOPAMAX) 200 MG Tab Take 200 mg by mouth nightly.    ACCU-CHEK KERRY PLUS TEST STRP Strp USE 1 STRIP TO CHECK GLUCOSE TWICE DAILY    albuterol (PROVENTIL/VENTOLIN HFA) 90 mcg/actuation inhaler Inhale 1-2 puffs into the lungs every 6 (six) hours as needed. Rescue    cyclobenzaprine (FLEXERIL) 10 MG tablet 1 tablet at bedtime as needed    diclofenac (VOLTAREN) 50 MG EC tablet Take 50 mg by mouth 3 (three) times daily as needed.    hydrOXYzine HCL (ATARAX) 25 MG tablet TAKE 1 TABLET BY MOUTH EVERY 6 HOURS FOR 7 DAYS AS NEEDED FOR ITCHING    LIDOcaine (LIDODERM) 5 % SMARTSIG:Topical    metFORMIN  (GLUCOPHAGE) 1000 MG tablet TAKE 1 TABLET(1000 MG) BY MOUTH TWICE DAILY WITH MEALS    nitrofurantoin, macrocrystal-monohydrate, (MACROBID) 100 MG capsule Take 100 mg by mouth 2 (two) times daily.    sulfamethoxazole-trimethoprim 800-160mg (BACTRIM DS) 800-160 mg Tab Take 1 tablet by mouth every 12 (twelve) hours.     Family History       Problem Relation (Age of Onset)    Cancer Maternal Grandmother    Clotting disorder Father, Sister    Diabetes Maternal Aunt    Hypertension Mother    Lung cancer Paternal Grandmother    No Known Problems Daughter, Maternal Uncle, Paternal Aunt, Paternal Uncle, Maternal Grandfather, Paternal Grandfather    Ulcers Father          Tobacco Use    Smoking status: Never    Smokeless tobacco: Never   Substance and Sexual Activity    Alcohol use: No     Alcohol/week: 0.0 standard drinks    Drug use: No    Sexual activity: Not Currently     Partners: Male     Review of Systems   Constitutional:  Negative for activity change, chills, fatigue and fever.   HENT:  Negative for ear pain, postnasal drip, sinus pressure and sore throat.    Respiratory:  Negative for cough, shortness of breath and wheezing.    Cardiovascular:  Negative for chest pain, palpitations and leg swelling.   Gastrointestinal:  Negative for abdominal distention, abdominal pain, constipation, diarrhea, nausea and vomiting.   Genitourinary:  Positive for difficulty urinating, dysuria and frequency.   Musculoskeletal:  Negative for arthralgias and back pain.   Skin:  Negative for rash and wound.   Neurological:  Negative for tremors, syncope and weakness.   Objective:     Vital Signs (Most Recent):  Temp: 96.8 °F (36 °C) (10/27/22 1112)  Pulse: 102 (10/27/22 1112)  Resp: 20 (10/27/22 1112)  BP: 108/74 (10/27/22 1112)  SpO2: 96 % (10/27/22 1112) Vital Signs (24h Range):  Temp:  [96.8 °F (36 °C)-99.1 °F (37.3 °C)] 96.8 °F (36 °C)  Pulse:  [] 102  Resp:  [17-20] 20  SpO2:  [94 %-96 %] 96 %  BP: (103-121)/(65-74) 108/74      Weight: 75.3 kg (166 lb)  Body mass index is 28.49 kg/m².    Physical Exam  Constitutional:       Appearance: Normal appearance.   HENT:      Nose: Nose normal.      Mouth/Throat:      Mouth: Mucous membranes are moist.   Eyes:      Extraocular Movements: Extraocular movements intact.      Pupils: Pupils are equal, round, and reactive to light.   Cardiovascular:      Rate and Rhythm: Normal rate and regular rhythm.   Pulmonary:      Effort: Pulmonary effort is normal.      Breath sounds: Normal breath sounds.   Abdominal:      General: Bowel sounds are normal. There is no distension.      Palpations: Abdomen is soft.      Tenderness: There is no abdominal tenderness. There is no guarding.   Musculoskeletal:         General: Normal range of motion.   Skin:     General: Skin is warm and dry.      Capillary Refill: Capillary refill takes less than 2 seconds.   Neurological:      Mental Status: She is alert and oriented to person, place, and time.         CRANIAL NERVES     CN III, IV, VI   Pupils are equal, round, and reactive to light.     Significant Labs: All pertinent labs within the past 24 hours have been reviewed.  Recent Lab Results         10/27/22  0547   10/26/22  2122        Albumin 3.3         Alkaline Phosphatase 255                  Anion Gap 5                  BILIRUBIN TOTAL 0.2  Comment: For infants and newborns, interpretation of results should be based  on gestational age, weight and in agreement with clinical  observations.    Premature Infant recommended reference ranges:  Up to 24 hours.............<8.0 mg/dL  Up to 48 hours............<12.0 mg/dL  3-5 days..................<15.0 mg/dL  6-29 days.................<15.0 mg/dL    For patients on Eltrombopag therapy, use of Dimension [place] TBIL is   not   recommended.           BUN 15         Calcium 9.3         Chloride 111         CO2 27         Creatinine 0.7         eGFR >60.0         Glucose 127         POCT Glucose   106        Potassium 3.8         PROTEIN TOTAL 7.6         Sodium 143                 Significant Imaging: I have reviewed all pertinent imaging results/findings within the past 24 hours.

## 2022-10-27 NOTE — ASSESSMENT & PLAN NOTE
Urine cx showed esbl e coli - sent to [facility], invanz and amikacin only - needs a tleast 7-10 days iv abxs   Will check with her insurance to see if would have a feasible way to do abxs as outpt  10/27 day #3 of abxs this evening

## 2022-10-28 LAB
ALBUMIN SERPL BCP-MCNC: 3.2 G/DL (ref 3.5–5.2)
ALP SERPL-CCNC: 288 U/L (ref 55–135)
ALT SERPL W/O P-5'-P-CCNC: 168 U/L (ref 10–44)
ANION GAP SERPL CALC-SCNC: 3 MMOL/L (ref 8–16)
AST SERPL-CCNC: 194 U/L (ref 10–40)
BACTERIA UR CULT: ABNORMAL
BASOPHILS # BLD AUTO: 0.05 K/UL (ref 0–0.2)
BASOPHILS NFR BLD: 0.6 % (ref 0–1.9)
BILIRUB SERPL-MCNC: 0.2 MG/DL (ref 0.1–1)
BUN SERPL-MCNC: 12 MG/DL (ref 6–20)
CALCIUM SERPL-MCNC: 9 MG/DL (ref 8.7–10.5)
CHLORIDE SERPL-SCNC: 111 MMOL/L (ref 95–110)
CO2 SERPL-SCNC: 27 MMOL/L (ref 23–29)
CREAT SERPL-MCNC: 0.7 MG/DL (ref 0.5–1.4)
DIFFERENTIAL METHOD: ABNORMAL
EOSINOPHIL # BLD AUTO: 0.2 K/UL (ref 0–0.5)
EOSINOPHIL NFR BLD: 2.5 % (ref 0–8)
ERYTHROCYTE [DISTWIDTH] IN BLOOD BY AUTOMATED COUNT: 15.7 % (ref 11.5–14.5)
EST. GFR  (NO RACE VARIABLE): >60 ML/MIN/1.73 M^2
GLUCOSE SERPL-MCNC: 124 MG/DL (ref 70–110)
HCT VFR BLD AUTO: 34.7 % (ref 37–48.5)
HGB BLD-MCNC: 10.7 G/DL (ref 12–16)
IMM GRANULOCYTES # BLD AUTO: 0.02 K/UL (ref 0–0.04)
IMM GRANULOCYTES NFR BLD AUTO: 0.2 % (ref 0–0.5)
LYMPHOCYTES # BLD AUTO: 4.3 K/UL (ref 1–4.8)
LYMPHOCYTES NFR BLD: 48 % (ref 18–48)
MCH RBC QN AUTO: 24.8 PG (ref 27–31)
MCHC RBC AUTO-ENTMCNC: 30.8 G/DL (ref 32–36)
MCV RBC AUTO: 80 FL (ref 82–98)
MONOCYTES # BLD AUTO: 1 K/UL (ref 0.3–1)
MONOCYTES NFR BLD: 11.4 % (ref 4–15)
NEUTROPHILS # BLD AUTO: 3.3 K/UL (ref 1.8–7.7)
NEUTROPHILS NFR BLD: 37.3 % (ref 38–73)
NRBC BLD-RTO: 0 /100 WBC
PLATELET # BLD AUTO: 226 K/UL (ref 150–450)
PMV BLD AUTO: 11.5 FL (ref 9.2–12.9)
POCT GLUCOSE: 133 MG/DL (ref 70–110)
POCT GLUCOSE: 179 MG/DL (ref 70–110)
POTASSIUM SERPL-SCNC: 3.7 MMOL/L (ref 3.5–5.1)
PROT SERPL-MCNC: 7.5 G/DL (ref 6–8.4)
RBC # BLD AUTO: 4.32 M/UL (ref 4–5.4)
SODIUM SERPL-SCNC: 141 MMOL/L (ref 136–145)
WBC # BLD AUTO: 8.92 K/UL (ref 3.9–12.7)

## 2022-10-28 PROCEDURE — 25000242 PHARM REV CODE 250 ALT 637 W/ HCPCS: Performed by: INTERNAL MEDICINE

## 2022-10-28 PROCEDURE — 25000003 PHARM REV CODE 250: Performed by: INTERNAL MEDICINE

## 2022-10-28 PROCEDURE — 63600175 PHARM REV CODE 636 W HCPCS: Performed by: INTERNAL MEDICINE

## 2022-10-28 PROCEDURE — 80053 COMPREHEN METABOLIC PANEL: CPT | Performed by: INTERNAL MEDICINE

## 2022-10-28 PROCEDURE — 96366 THER/PROPH/DIAG IV INF ADDON: CPT

## 2022-10-28 PROCEDURE — 25000003 PHARM REV CODE 250

## 2022-10-28 PROCEDURE — G0378 HOSPITAL OBSERVATION PER HR: HCPCS

## 2022-10-28 PROCEDURE — 85025 COMPLETE CBC W/AUTO DIFF WBC: CPT | Performed by: INTERNAL MEDICINE

## 2022-10-28 PROCEDURE — 36415 COLL VENOUS BLD VENIPUNCTURE: CPT | Performed by: INTERNAL MEDICINE

## 2022-10-28 RX ADMIN — MEROPENEM AND SODIUM CHLORIDE 1 G: 1 INJECTION, SOLUTION INTRAVENOUS at 08:10

## 2022-10-28 RX ADMIN — LUBIPROSTONE 24 MCG: 24 CAPSULE, GELATIN COATED ORAL at 09:10

## 2022-10-28 RX ADMIN — ATORVASTATIN CALCIUM 40 MG: 40 TABLET, FILM COATED ORAL at 09:10

## 2022-10-28 RX ADMIN — AMLODIPINE BESYLATE 10 MG: 10 TABLET ORAL at 09:10

## 2022-10-28 RX ADMIN — FUROSEMIDE 20 MG: 20 TABLET ORAL at 09:10

## 2022-10-28 RX ADMIN — BUSPIRONE HYDROCHLORIDE 30 MG: 7.5 TABLET ORAL at 09:10

## 2022-10-28 RX ADMIN — TOPIRAMATE 200 MG: 100 TABLET, FILM COATED ORAL at 09:10

## 2022-10-28 RX ADMIN — PREGABALIN 100 MG: 50 CAPSULE ORAL at 09:10

## 2022-10-28 RX ADMIN — MEROPENEM AND SODIUM CHLORIDE 1 G: 1 INJECTION, SOLUTION INTRAVENOUS at 03:10

## 2022-10-28 RX ADMIN — SERTRALINE HYDROCHLORIDE 150 MG: 100 TABLET ORAL at 09:10

## 2022-10-28 RX ADMIN — METOPROLOL TARTRATE 100 MG: 100 TABLET, FILM COATED ORAL at 09:10

## 2022-10-28 RX ADMIN — MONTELUKAST 10 MG: 10 TABLET, FILM COATED ORAL at 09:10

## 2022-10-28 RX ADMIN — LURASIDONE HYDROCHLORIDE 80 MG: 20 TABLET, FILM COATED ORAL at 09:10

## 2022-10-28 RX ADMIN — EMPAGLIFLOZIN 10 MG: 10 TABLET, FILM COATED ORAL at 09:10

## 2022-10-28 RX ADMIN — MIRTAZAPINE 30 MG: 15 TABLET, FILM COATED ORAL at 09:10

## 2022-10-28 NOTE — SUBJECTIVE & OBJECTIVE
Past Medical History:   Diagnosis Date    Anemia     Anxiety disorder     Arthritis     Bipolar 2 disorder     Borderline personality disorder     Chronic bilateral low back pain without sciatica     Depression with anxiety     Diabetes mellitus     Disorder of kidney and ureter     Elevated LFTs     Esophageal stenosis     Fatty liver     Fibromyalgia     Fibromyalgia     High cholesterol     Hypertension     Hypoglycemia     Intermittent explosive disorder     Liver disease     JAMESON (nonalcoholic steatohepatitis)        Past Surgical History:   Procedure Laterality Date    COLONOSCOPY N/A 3/20/2018    Procedure: COLONOSCOPY;  Surgeon: Janelle Meade MD;  Location: [facility];  Service: Endoscopy;  Laterality: N/A;    COLONOSCOPY N/A 5/11/2021    Procedure: COLONOSCOPY;  Surgeon: Janelle Meade MD;  Location: [facility];  Service: Endoscopy;  Laterality: N/A;    ESOPHAGEAL DILATION      x2    ESOPHAGOGASTRODUODENOSCOPY N/A 5/7/2019    Procedure: EGD (ESOPHAGOGASTRODUODENOSCOPY);  Surgeon: Janelle Meade MD;  Location: [facility];  Service: Endoscopy;  Laterality: N/A;    ESOPHAGOGASTRODUODENOSCOPY N/A 5/11/2021    Procedure: EGD (ESOPHAGOGASTRODUODENOSCOPY);  Surgeon: Janelle Meade MD;  Location: [facility];  Service: Endoscopy;  Laterality: N/A;  Rapid on arrival    HIATAL HERNIA REPAIR      HYSTERECTOMY      LIVER BIOPSY  2018    fibrosis stage 3 JAMESON    OOPHORECTOMY      TUBAL LIGATION      UPPER GASTROINTESTINAL ENDOSCOPY      2013? unable to obtain records       Review of patient's allergies indicates:   Allergen Reactions    Amoxicillin Hives    Avelox [moxifloxacin] Hives    Clarinex [desloratadine] Hives    Pcn [penicillins] Hives    Claritin [loratadine] Palpitations    Latex, natural rubber Rash       No current facility-administered medications on file prior to encounter.     Current Outpatient Medications on File Prior to Encounter   Medication Sig    amLODIPine (NORVASC) 10  MG tablet Take 1 tablet (10 mg total) by mouth once daily.    busPIRone (BUSPAR) 30 MG Tab Take 30 mg by mouth 2 (two) times daily.    dulaglutide (TRULICITY) 0.75 mg/0.5 mL pen injector Inject 0.75 mg into the skin every 7 days.    empagliflozin (JARDIANCE) 10 mg tablet Take 1 tablet (10 mg total) by mouth once daily.    furosemide (LASIX) 20 MG tablet Take 20 mg by mouth daily as needed.    LATUDA 80 mg Tab tablet Take 80 mg by mouth every evening.    lubiprostone (AMITIZA) 24 MCG Cap Take 1 capsule (24 mcg total) by mouth 2 (two) times daily.    metoprolol tartrate (LOPRESSOR) 100 MG tablet Take 100 mg by mouth 2 (two) times daily.    mirtazapine (REMERON) 30 MG tablet Take 30 mg by mouth every evening.     montelukast (SINGULAIR) 10 mg tablet Take 10 mg by mouth every evening.    nortriptyline (PAMELOR) 50 MG capsule TAKE 1 CAPSULE BY MOUTH IN THE EVENING    pregabalin (LYRICA) 100 MG capsule TAKE 1 CAPSULE(100 MG) BY MOUTH TWICE DAILY (Patient taking differently: 150 mg.)    promethazine (PHENERGAN) 25 MG tablet Take 1 tablet (25 mg total) by mouth every 4 to 6 hours as needed.    rosuvastatin (CRESTOR) 40 MG Tab Take 40 mg by mouth once daily.    sertraline (ZOLOFT) 100 MG tablet Take 150 mg by mouth once daily.    tiZANidine (ZANAFLEX) 2 MG tablet Take 2 mg by mouth 3 (three) times daily as needed.    topiramate (TOPAMAX) 200 MG Tab Take 200 mg by mouth nightly.    ACCU-CHEK KERRY PLUS TEST STRP Strp USE 1 STRIP TO CHECK GLUCOSE TWICE DAILY    albuterol (PROVENTIL/VENTOLIN HFA) 90 mcg/actuation inhaler Inhale 1-2 puffs into the lungs every 6 (six) hours as needed. Rescue    cyclobenzaprine (FLEXERIL) 10 MG tablet 1 tablet at bedtime as needed    diclofenac (VOLTAREN) 50 MG EC tablet Take 50 mg by mouth 3 (three) times daily as needed.    hydrOXYzine HCL (ATARAX) 25 MG tablet TAKE 1 TABLET BY MOUTH EVERY 6 HOURS FOR 7 DAYS AS NEEDED FOR ITCHING    LIDOcaine (LIDODERM) 5 % SMARTSIG:Topical    metFORMIN  (GLUCOPHAGE) 1000 MG tablet TAKE 1 TABLET(1000 MG) BY MOUTH TWICE DAILY WITH MEALS    nitrofurantoin, macrocrystal-monohydrate, (MACROBID) 100 MG capsule Take 100 mg by mouth 2 (two) times daily.    sulfamethoxazole-trimethoprim 800-160mg (BACTRIM DS) 800-160 mg Tab Take 1 tablet by mouth every 12 (twelve) hours.     Family History       Problem Relation (Age of Onset)    Cancer Maternal Grandmother    Clotting disorder Father, Sister    Diabetes Maternal Aunt    Hypertension Mother    Lung cancer Paternal Grandmother    No Known Problems Daughter, Maternal Uncle, Paternal Aunt, Paternal Uncle, Maternal Grandfather, Paternal Grandfather    Ulcers Father          Tobacco Use    Smoking status: Never    Smokeless tobacco: Never   Substance and Sexual Activity    Alcohol use: No     Alcohol/week: 0.0 standard drinks    Drug use: No    Sexual activity: Not Currently     Partners: Male     Review of Systems   Constitutional:  Negative for activity change, chills, fatigue and fever.   HENT:  Negative for ear pain, postnasal drip, sinus pressure and sore throat.    Respiratory:  Negative for cough, shortness of breath and wheezing.    Cardiovascular:  Negative for chest pain, palpitations and leg swelling.   Gastrointestinal:  Negative for abdominal distention, abdominal pain, constipation, diarrhea, nausea and vomiting.   Genitourinary:  Positive for difficulty urinating, dysuria and frequency.   Musculoskeletal:  Negative for arthralgias and back pain.   Skin:  Negative for rash and wound.   Neurological:  Negative for tremors, syncope and weakness.   Objective:     Vital Signs (Most Recent):  Temp: 98.4 °F (36.9 °C) (10/28/22 0751)  Pulse: 67 (10/28/22 0751)  Resp: 18 (10/28/22 0751)  BP: 118/61 (10/28/22 0751)  SpO2: 97 % (10/28/22 0751) Vital Signs (24h Range):  Temp:  [96.8 °F (36 °C)-99 °F (37.2 °C)] 98.4 °F (36.9 °C)  Pulse:  [] 67  Resp:  [18-20] 18  SpO2:  [95 %-97 %] 97 %  BP: (108-118)/(61-74) 118/61      Weight: 75.3 kg (166 lb)  Body mass index is 28.49 kg/m².    Physical Exam  Constitutional:       Appearance: Normal appearance.   HENT:      Nose: Nose normal.      Mouth/Throat:      Mouth: Mucous membranes are moist.   Eyes:      Extraocular Movements: Extraocular movements intact.      Pupils: Pupils are equal, round, and reactive to light.   Cardiovascular:      Rate and Rhythm: Normal rate and regular rhythm.   Pulmonary:      Effort: Pulmonary effort is normal.      Breath sounds: Normal breath sounds.   Abdominal:      General: Bowel sounds are normal. There is no distension.      Palpations: Abdomen is soft.      Tenderness: There is no abdominal tenderness. There is no guarding.   Musculoskeletal:         General: Normal range of motion.   Skin:     General: Skin is warm and dry.      Capillary Refill: Capillary refill takes less than 2 seconds.   Neurological:      Mental Status: She is alert and oriented to person, place, and time.         CRANIAL NERVES     CN III, IV, VI   Pupils are equal, round, and reactive to light.     Significant Labs: All pertinent labs within the past 24 hours have been reviewed.  Recent Lab Results         10/28/22  0602   10/27/22  2035   10/27/22  1550   10/27/22  1053        Albumin 3.2             Alkaline Phosphatase 288                          Anion Gap 3                          Baso # 0.05             Basophil % 0.6             BILIRUBIN TOTAL 0.2  Comment: For infants and newborns, interpretation of results should be based  on gestational age, weight and in agreement with clinical  observations.    Premature Infant recommended reference ranges:  Up to 24 hours.............<8.0 mg/dL  Up to 48 hours............<12.0 mg/dL  3-5 days..................<15.0 mg/dL  6-29 days.................<15.0 mg/dL    For patients on Eltrombopag therapy, use of Dimension [place] TBIL is   not   recommended.               BUN 12             Calcium 9.0              Chloride 111             CO2 27             Creatinine 0.7             Differential Method Automated             eGFR >60.0             Eos # 0.2             Eosinophil % 2.5             Glucose 124             Gran # (ANC) 3.3             Gran % 37.3             Hematocrit 34.7             Hemoglobin 10.7             Immature Grans (Abs) 0.02  Comment: Mild elevation in immature granulocytes is non specific and   can be seen in a variety of conditions including stress response,   acute inflammation, trauma and pregnancy. Correlation with other   laboratory and clinical findings is essential.               Immature Granulocytes 0.2             Lymph # 4.3             Lymph % 48.0             MCH 24.8             MCHC 30.8             MCV 80             Mono # 1.0             Mono % 11.4             MPV 11.5             nRBC 0             Platelets 226             POCT Glucose   139   174   108       Potassium 3.7             PROTEIN TOTAL 7.5             RBC 4.32             RDW 15.7             Sodium 141             WBC 8.92                     Significant Imaging: I have reviewed all pertinent imaging results/findings within the past 24 hours.

## 2022-10-28 NOTE — ASSESSMENT & PLAN NOTE
Patient's FSGs are controlled on current medication regimen.  Last A1c reviewed-   Lab Results   Component Value Date    HGBA1C 6.4 (H) 09/02/2022     Most recent fingerstick glucose reviewed-   Recent Labs   Lab 10/27/22  1053 10/27/22  1550 10/27/22  2035   POCTGLUCOSE 108 174* 139*     Current correctional scale  High  Maintain anti-hyperglycemic dose as follows-   Antihyperglycemics (From admission, onward)    Start     Stop Route Frequency Ordered    11/05/22 0900  dulaglutide pen injector 0.75 mg         -- SubQ Every 7 days 10/26/22 1554    10/26/22 1315  empagliflozin 10 mg tablet 10 mg         -- Oral Daily 10/26/22 1203    10/25/22 1614  insulin aspart U-100 pen 0-5 Units         -- SubQ Before meals & nightly PRN 10/25/22 1614        Hold Oral hypoglycemics while patient is in the hospital.

## 2022-10-28 NOTE — PROGRESS NOTES
[facility] Medicine  Progress Note    Patient Name: Rohini Dang  MRN: [id number]  Patient Class: OP- Observation   Admission Date: 10/25/2022  Length of Stay: 0 days  Attending Physician: Yasmin Hernandez MD  Primary Care Provider: Skip Celestin NP        Subjective:     Principal Problem:ESBL (extended spectrum beta-lactamase) producing bacteria infection        HPI:  43 yo female with ESBL UTI failed macrobid and bactrim as outpatient here for IV abx. No fever. No flank or abd pain. No N/V/D. Gradual onset. Similar to previous. Endorses dysuria, frequency.     Pt has recently had an acute event of renal failure which has resolved.  Pt reports not having transportation to be able to get her abxs as a oupt.  We will also look into other options for getting her abxs at home. Otherwise pt will need 7-10 days of iv abxs to ensue resolution of uti      Overview/Hospital Course:  10/27 ND Pt is feelign well - no complaints today - tolerating iv abxs  10/28 ND pt feeling well - no longer having dysuria and frequency      Past Medical History:   Diagnosis Date    Anemia     Anxiety disorder     Arthritis     Bipolar 2 disorder     Borderline personality disorder     Chronic bilateral low back pain without sciatica     Depression with anxiety     Diabetes mellitus     Disorder of kidney and ureter     Elevated LFTs     Esophageal stenosis     Fatty liver     Fibromyalgia     Fibromyalgia     High cholesterol     Hypertension     Hypoglycemia     Intermittent explosive disorder     Liver disease     JAMESON (nonalcoholic steatohepatitis)        Past Surgical History:   Procedure Laterality Date    COLONOSCOPY N/A 3/20/2018    Procedure: COLONOSCOPY;  Surgeon: Janelle Meade MD;  Location: [facility];  Service: Endoscopy;  Laterality: N/A;    COLONOSCOPY N/A 5/11/2021    Procedure: COLONOSCOPY;  Surgeon: Janelle Meade MD;  Location: [facility];  Service:  Endoscopy;  Laterality: N/A;    ESOPHAGEAL DILATION      x2    ESOPHAGOGASTRODUODENOSCOPY N/A 5/7/2019    Procedure: EGD (ESOPHAGOGASTRODUODENOSCOPY);  Surgeon: Janelle Meade MD;  Location: [facility];  Service: Endoscopy;  Laterality: N/A;    ESOPHAGOGASTRODUODENOSCOPY N/A 5/11/2021    Procedure: EGD (ESOPHAGOGASTRODUODENOSCOPY);  Surgeon: Janelle Meade MD;  Location: [facility];  Service: Endoscopy;  Laterality: N/A;  Rapid on arrival    HIATAL HERNIA REPAIR      HYSTERECTOMY      LIVER BIOPSY  2018    fibrosis stage 3 JAMESON    OOPHORECTOMY      TUBAL LIGATION      UPPER GASTROINTESTINAL ENDOSCOPY      2013? unable to obtain records       Review of patient's allergies indicates:   Allergen Reactions    Amoxicillin Hives    Avelox [moxifloxacin] Hives    Clarinex [desloratadine] Hives    Pcn [penicillins] Hives    Claritin [loratadine] Palpitations    Latex, natural rubber Rash       No current facility-administered medications on file prior to encounter.     Current Outpatient Medications on File Prior to Encounter   Medication Sig    amLODIPine (NORVASC) 10 MG tablet Take 1 tablet (10 mg total) by mouth once daily.    busPIRone (BUSPAR) 30 MG Tab Take 30 mg by mouth 2 (two) times daily.    dulaglutide (TRULICITY) 0.75 mg/0.5 mL pen injector Inject 0.75 mg into the skin every 7 days.    empagliflozin (JARDIANCE) 10 mg tablet Take 1 tablet (10 mg total) by mouth once daily.    furosemide (LASIX) 20 MG tablet Take 20 mg by mouth daily as needed.    LATUDA 80 mg Tab tablet Take 80 mg by mouth every evening.    lubiprostone (AMITIZA) 24 MCG Cap Take 1 capsule (24 mcg total) by mouth 2 (two) times daily.    metoprolol tartrate (LOPRESSOR) 100 MG tablet Take 100 mg by mouth 2 (two) times daily.    mirtazapine (REMERON) 30 MG tablet Take 30 mg by mouth every evening.     montelukast (SINGULAIR) 10 mg tablet Take 10 mg by mouth every evening.    nortriptyline (PAMELOR) 50 MG  capsule TAKE 1 CAPSULE BY MOUTH IN THE EVENING    pregabalin (LYRICA) 100 MG capsule TAKE 1 CAPSULE(100 MG) BY MOUTH TWICE DAILY (Patient taking differently: 150 mg.)    promethazine (PHENERGAN) 25 MG tablet Take 1 tablet (25 mg total) by mouth every 4 to 6 hours as needed.    rosuvastatin (CRESTOR) 40 MG Tab Take 40 mg by mouth once daily.    sertraline (ZOLOFT) 100 MG tablet Take 150 mg by mouth once daily.    tiZANidine (ZANAFLEX) 2 MG tablet Take 2 mg by mouth 3 (three) times daily as needed.    topiramate (TOPAMAX) 200 MG Tab Take 200 mg by mouth nightly.    ACCU-CHEK KERRY PLUS TEST STRP Strp USE 1 STRIP TO CHECK GLUCOSE TWICE DAILY    albuterol (PROVENTIL/VENTOLIN HFA) 90 mcg/actuation inhaler Inhale 1-2 puffs into the lungs every 6 (six) hours as needed. Rescue    cyclobenzaprine (FLEXERIL) 10 MG tablet 1 tablet at bedtime as needed    diclofenac (VOLTAREN) 50 MG EC tablet Take 50 mg by mouth 3 (three) times daily as needed.    hydrOXYzine HCL (ATARAX) 25 MG tablet TAKE 1 TABLET BY MOUTH EVERY 6 HOURS FOR 7 DAYS AS NEEDED FOR ITCHING    LIDOcaine (LIDODERM) 5 % SMARTSIG:Topical    metFORMIN (GLUCOPHAGE) 1000 MG tablet TAKE 1 TABLET(1000 MG) BY MOUTH TWICE DAILY WITH MEALS    nitrofurantoin, macrocrystal-monohydrate, (MACROBID) 100 MG capsule Take 100 mg by mouth 2 (two) times daily.    sulfamethoxazole-trimethoprim 800-160mg (BACTRIM DS) 800-160 mg Tab Take 1 tablet by mouth every 12 (twelve) hours.     Family History       Problem Relation (Age of Onset)    Cancer Maternal Grandmother    Clotting disorder Father, Sister    Diabetes Maternal Aunt    Hypertension Mother    Lung cancer Paternal Grandmother    No Known Problems Daughter, Maternal Uncle, Paternal Aunt, Paternal Uncle, Maternal Grandfather, Paternal Grandfather    Ulcers Father          Tobacco Use    Smoking status: Never    Smokeless tobacco: Never   Substance and Sexual Activity    Alcohol use: No     Alcohol/week: 0.0  standard drinks    Drug use: No    Sexual activity: Not Currently     Partners: Male     Review of Systems   Constitutional:  Negative for activity change, chills, fatigue and fever.   HENT:  Negative for ear pain, postnasal drip, sinus pressure and sore throat.    Respiratory:  Negative for cough, shortness of breath and wheezing.    Cardiovascular:  Negative for chest pain, palpitations and leg swelling.   Gastrointestinal:  Negative for abdominal distention, abdominal pain, constipation, diarrhea, nausea and vomiting.   Genitourinary:  Positive for difficulty urinating, dysuria and frequency.   Musculoskeletal:  Negative for arthralgias and back pain.   Skin:  Negative for rash and wound.   Neurological:  Negative for tremors, syncope and weakness.   Objective:     Vital Signs (Most Recent):  Temp: 98.4 °F (36.9 °C) (10/28/22 0751)  Pulse: 67 (10/28/22 0751)  Resp: 18 (10/28/22 0751)  BP: 118/61 (10/28/22 0751)  SpO2: 97 % (10/28/22 0751) Vital Signs (24h Range):  Temp:  [96.8 °F (36 °C)-99 °F (37.2 °C)] 98.4 °F (36.9 °C)  Pulse:  [] 67  Resp:  [18-20] 18  SpO2:  [95 %-97 %] 97 %  BP: (108-118)/(61-74) 118/61     Weight: 75.3 kg (166 lb)  Body mass index is 28.49 kg/m².    Physical Exam  Constitutional:       Appearance: Normal appearance.   HENT:      Nose: Nose normal.      Mouth/Throat:      Mouth: Mucous membranes are moist.   Eyes:      Extraocular Movements: Extraocular movements intact.      Pupils: Pupils are equal, round, and reactive to light.   Cardiovascular:      Rate and Rhythm: Normal rate and regular rhythm.   Pulmonary:      Effort: Pulmonary effort is normal.      Breath sounds: Normal breath sounds.   Abdominal:      General: Bowel sounds are normal. There is no distension.      Palpations: Abdomen is soft.      Tenderness: There is no abdominal tenderness. There is no guarding.   Musculoskeletal:         General: Normal range of motion.   Skin:     General: Skin is warm and dry.       Capillary Refill: Capillary refill takes less than 2 seconds.   Neurological:      Mental Status: She is alert and oriented to person, place, and time.         CRANIAL NERVES     CN III, IV, VI   Pupils are equal, round, and reactive to light.     Significant Labs: All pertinent labs within the past 24 hours have been reviewed.  Recent Lab Results         10/28/22  0602   10/27/22  2035   10/27/22  1550   10/27/22  1053        Albumin 3.2             Alkaline Phosphatase 288                          Anion Gap 3                          Baso # 0.05             Basophil % 0.6             BILIRUBIN TOTAL 0.2  Comment: For infants and newborns, interpretation of results should be based  on gestational age, weight and in agreement with clinical  observations.    Premature Infant recommended reference ranges:  Up to 24 hours.............<8.0 mg/dL  Up to 48 hours............<12.0 mg/dL  3-5 days..................<15.0 mg/dL  6-29 days.................<15.0 mg/dL    For patients on Eltrombopag therapy, use of Dimension [place] TBIL is   not   recommended.               BUN 12             Calcium 9.0             Chloride 111             CO2 27             Creatinine 0.7             Differential Method Automated             eGFR >60.0             Eos # 0.2             Eosinophil % 2.5             Glucose 124             Gran # (ANC) 3.3             Gran % 37.3             Hematocrit 34.7             Hemoglobin 10.7             Immature Grans (Abs) 0.02  Comment: Mild elevation in immature granulocytes is non specific and   can be seen in a variety of conditions including stress response,   acute inflammation, trauma and pregnancy. Correlation with other   laboratory and clinical findings is essential.               Immature Granulocytes 0.2             Lymph # 4.3             Lymph % 48.0             MCH 24.8             MCHC 30.8             MCV 80             Mono # 1.0             Mono % 11.4             MPV  11.5             nRBC 0             Platelets 226             POCT Glucose   139   174   108       Potassium 3.7             PROTEIN TOTAL 7.5             RBC 4.32             RDW 15.7             Sodium 141             WBC 8.92                     Significant Imaging: I have reviewed all pertinent imaging results/findings within the past 24 hours.      Assessment/Plan:      * ESBL (extended spectrum beta-lactamase) producing bacteria infection  Urine cx showed esbl e coli - sent to [facility], invanz and amikacin only - needs a tleast 7-10 days iv abxs   Will check with her insurance to see if would have a feasible way to do abxs as outpt  10/27 day #3 of abxs this evening  10/28 day #4 of iv abxs - new uc pending    Urinary tract infection without hematuria  Cont iv abxs      Diabetes mellitus without complication  Patient's FSGs are controlled on current medication regimen.  Last A1c reviewed-   Lab Results   Component Value Date    HGBA1C 6.4 (H) 09/02/2022     Most recent fingerstick glucose reviewed-   Recent Labs   Lab 10/27/22  1053 10/27/22  1550 10/27/22  2035   POCTGLUCOSE 108 174* 139*     Current correctional scale  High  Maintain anti-hyperglycemic dose as follows-   Antihyperglycemics (From admission, onward)    Start     Stop Route Frequency Ordered    11/05/22 0900  dulaglutide pen injector 0.75 mg         -- SubQ Every 7 days 10/26/22 1554    10/26/22 1315  empagliflozin 10 mg tablet 10 mg         -- Oral Daily 10/26/22 1203    10/25/22 1614  insulin aspart U-100 pen 0-5 Units         -- SubQ Before meals & nightly PRN 10/25/22 1614        Hold Oral hypoglycemics while patient is in the hospital.    Borderline personality disorder  cotn current meds= mood is good and pt is pleasant      JAMESON (nonalcoholic steatohepatitis)  lfts slightly elevated - monitor        VTE Risk Mitigation (From admission, onward)         Ordered     IP VTE LOW RISK PATIENT  Once         10/25/22 1614     Place sequential  compression device  Until discontinued         10/25/22 1614                Discharge Planning   FERNANDO:      Code Status: Full Code   Is the patient medically ready for discharge?:     Reason for patient still in hospital (select all that apply): Treatment  Discharge Plan A: Home with family, Home Health   Discharge Delays: None known at this time              Yasmin Hernandez MD  Department of Hospital Medicine   [facility]   No

## 2022-10-28 NOTE — ASSESSMENT & PLAN NOTE
Urine cx showed esbl e coli - sent to [facility], [facility] and amikacin only - needs a tleast 7-10 days iv abxs   Will check with her insurance to see if would have a feasible way to do abxs as outpt  10/27 day #3 of abxs this evening  10/28 day #4 of iv abxs - new  pending

## 2022-10-29 LAB
ALBUMIN SERPL BCP-MCNC: 3.2 G/DL (ref 3.5–5.2)
ALP SERPL-CCNC: 262 U/L (ref 55–135)
ALT SERPL W/O P-5'-P-CCNC: 192 U/L (ref 10–44)
ANION GAP SERPL CALC-SCNC: 7 MMOL/L (ref 8–16)
AST SERPL-CCNC: 183 U/L (ref 10–40)
BILIRUB SERPL-MCNC: 0.2 MG/DL (ref 0.1–1)
BUN SERPL-MCNC: 15 MG/DL (ref 6–20)
CALCIUM SERPL-MCNC: 9.1 MG/DL (ref 8.7–10.5)
CHLORIDE SERPL-SCNC: 111 MMOL/L (ref 95–110)
CO2 SERPL-SCNC: 24 MMOL/L (ref 23–29)
CREAT SERPL-MCNC: 0.6 MG/DL (ref 0.5–1.4)
EST. GFR  (NO RACE VARIABLE): >60 ML/MIN/1.73 M^2
GLUCOSE SERPL-MCNC: 132 MG/DL (ref 70–110)
POTASSIUM SERPL-SCNC: 3.6 MMOL/L (ref 3.5–5.1)
PROT SERPL-MCNC: 7.5 G/DL (ref 6–8.4)
SODIUM SERPL-SCNC: 142 MMOL/L (ref 136–145)

## 2022-10-29 PROCEDURE — 25000242 PHARM REV CODE 250 ALT 637 W/ HCPCS: Performed by: INTERNAL MEDICINE

## 2022-10-29 PROCEDURE — 99224 PR SUBSEQUENT OBSERVATION CARE,LEVEL I: CPT | Mod: ,,, | Performed by: STUDENT IN AN ORGANIZED HEALTH CARE EDUCATION/TRAINING PROGRAM

## 2022-10-29 PROCEDURE — 25000003 PHARM REV CODE 250: Performed by: INTERNAL MEDICINE

## 2022-10-29 PROCEDURE — 96366 THER/PROPH/DIAG IV INF ADDON: CPT

## 2022-10-29 PROCEDURE — 63600175 PHARM REV CODE 636 W HCPCS: Performed by: INTERNAL MEDICINE

## 2022-10-29 PROCEDURE — G0378 HOSPITAL OBSERVATION PER HR: HCPCS

## 2022-10-29 PROCEDURE — 63700000 PHARM REV CODE 250 ALT 637 W/O HCPCS: Performed by: INTERNAL MEDICINE

## 2022-10-29 PROCEDURE — 36415 COLL VENOUS BLD VENIPUNCTURE: CPT | Performed by: INTERNAL MEDICINE

## 2022-10-29 PROCEDURE — 25000003 PHARM REV CODE 250

## 2022-10-29 PROCEDURE — 80053 COMPREHEN METABOLIC PANEL: CPT | Performed by: INTERNAL MEDICINE

## 2022-10-29 PROCEDURE — 99224 PR SUBSEQUENT OBSERVATION CARE,LEVEL I: ICD-10-PCS | Mod: ,,, | Performed by: STUDENT IN AN ORGANIZED HEALTH CARE EDUCATION/TRAINING PROGRAM

## 2022-10-29 RX ADMIN — TOPIRAMATE 200 MG: 100 TABLET, FILM COATED ORAL at 09:10

## 2022-10-29 RX ADMIN — LURASIDONE HYDROCHLORIDE 80 MG: 20 TABLET, FILM COATED ORAL at 09:10

## 2022-10-29 RX ADMIN — LUBIPROSTONE 24 MCG: 24 CAPSULE, GELATIN COATED ORAL at 09:10

## 2022-10-29 RX ADMIN — PREGABALIN 100 MG: 50 CAPSULE ORAL at 09:10

## 2022-10-29 RX ADMIN — MEROPENEM AND SODIUM CHLORIDE 1 G: 1 INJECTION, SOLUTION INTRAVENOUS at 12:10

## 2022-10-29 RX ADMIN — DULAGLUTIDE 0.75 MG: 0.75 INJECTION, SOLUTION SUBCUTANEOUS at 05:10

## 2022-10-29 RX ADMIN — MIRTAZAPINE 30 MG: 15 TABLET, FILM COATED ORAL at 09:10

## 2022-10-29 RX ADMIN — SERTRALINE HYDROCHLORIDE 150 MG: 100 TABLET ORAL at 09:10

## 2022-10-29 RX ADMIN — MEROPENEM AND SODIUM CHLORIDE 1 G: 1 INJECTION, SOLUTION INTRAVENOUS at 07:10

## 2022-10-29 RX ADMIN — EMPAGLIFLOZIN 10 MG: 10 TABLET, FILM COATED ORAL at 09:10

## 2022-10-29 RX ADMIN — AMLODIPINE BESYLATE 10 MG: 10 TABLET ORAL at 09:10

## 2022-10-29 RX ADMIN — MONTELUKAST 10 MG: 10 TABLET, FILM COATED ORAL at 09:10

## 2022-10-29 RX ADMIN — ATORVASTATIN CALCIUM 40 MG: 40 TABLET, FILM COATED ORAL at 09:10

## 2022-10-29 RX ADMIN — FUROSEMIDE 20 MG: 20 TABLET ORAL at 09:10

## 2022-10-29 RX ADMIN — MEROPENEM AND SODIUM CHLORIDE 1 G: 1 INJECTION, SOLUTION INTRAVENOUS at 11:10

## 2022-10-29 RX ADMIN — BUSPIRONE HYDROCHLORIDE 30 MG: 7.5 TABLET ORAL at 09:10

## 2022-10-29 RX ADMIN — METOPROLOL TARTRATE 100 MG: 100 TABLET, FILM COATED ORAL at 09:10

## 2022-10-29 RX ADMIN — MEROPENEM AND SODIUM CHLORIDE 1 G: 1 INJECTION, SOLUTION INTRAVENOUS at 04:10

## 2022-10-29 NOTE — PLAN OF CARE
Problem: Adult Inpatient Plan of Care  Goal: Plan of Care Review  Outcome: Ongoing, Progressing  Goal: Patient-Specific Goal (Individualized)  Outcome: Ongoing, Progressing  Goal: Absence of Hospital-Acquired Illness or Injury  Outcome: Ongoing, Progressing  Goal: Optimal Comfort and Wellbeing  Outcome: Ongoing, Progressing  Goal: Readiness for Transition of Care  Outcome: Ongoing, Progressing     Problem: Fluid and Electrolyte Imbalance (Acute Kidney Injury/Impairment)  Goal: Fluid and Electrolyte Balance  Outcome: Ongoing, Progressing     Problem: Oral Intake Inadequate (Acute Kidney Injury/Impairment)  Goal: Optimal Nutrition Intake  Outcome: Ongoing, Progressing     Problem: Renal Function Impairment (Acute Kidney Injury/Impairment)  Goal: Effective Renal Function  Outcome: Ongoing, Progressing     Problem: Diabetes Comorbidity  Goal: Blood Glucose Level Within Targeted Range  Outcome: Ongoing, Progressing     Problem: Infection  Goal: Absence of Infection Signs and Symptoms  Outcome: Ongoing, Progressing

## 2022-10-30 PROBLEM — N90.89 LESION OF LABIA: Status: ACTIVE | Noted: 2022-10-30

## 2022-10-30 LAB
ALBUMIN SERPL BCP-MCNC: 3.2 G/DL (ref 3.5–5.2)
ALP SERPL-CCNC: 262 U/L (ref 55–135)
ALT SERPL W/O P-5'-P-CCNC: 201 U/L (ref 10–44)
ANION GAP SERPL CALC-SCNC: 5 MMOL/L (ref 8–16)
AST SERPL-CCNC: 176 U/L (ref 10–40)
BASOPHILS # BLD AUTO: 0.06 K/UL (ref 0–0.2)
BASOPHILS NFR BLD: 0.6 % (ref 0–1.9)
BILIRUB SERPL-MCNC: 0.2 MG/DL (ref 0.1–1)
BUN SERPL-MCNC: 17 MG/DL (ref 6–20)
CALCIUM SERPL-MCNC: 8.9 MG/DL (ref 8.7–10.5)
CHLORIDE SERPL-SCNC: 111 MMOL/L (ref 95–110)
CO2 SERPL-SCNC: 28 MMOL/L (ref 23–29)
CREAT SERPL-MCNC: 0.8 MG/DL (ref 0.5–1.4)
DIFFERENTIAL METHOD: ABNORMAL
EOSINOPHIL # BLD AUTO: 0.2 K/UL (ref 0–0.5)
EOSINOPHIL NFR BLD: 2.4 % (ref 0–8)
ERYTHROCYTE [DISTWIDTH] IN BLOOD BY AUTOMATED COUNT: 15.9 % (ref 11.5–14.5)
EST. GFR  (NO RACE VARIABLE): >60 ML/MIN/1.73 M^2
GLUCOSE SERPL-MCNC: 153 MG/DL (ref 70–110)
HCT VFR BLD AUTO: 33.2 % (ref 37–48.5)
HGB BLD-MCNC: 10.2 G/DL (ref 12–16)
IMM GRANULOCYTES # BLD AUTO: 0.03 K/UL (ref 0–0.04)
IMM GRANULOCYTES NFR BLD AUTO: 0.3 % (ref 0–0.5)
LYMPHOCYTES # BLD AUTO: 3.9 K/UL (ref 1–4.8)
LYMPHOCYTES NFR BLD: 39.5 % (ref 18–48)
MCH RBC QN AUTO: 24.7 PG (ref 27–31)
MCHC RBC AUTO-ENTMCNC: 30.7 G/DL (ref 32–36)
MCV RBC AUTO: 80 FL (ref 82–98)
MONOCYTES # BLD AUTO: 1.2 K/UL (ref 0.3–1)
MONOCYTES NFR BLD: 11.8 % (ref 4–15)
NEUTROPHILS # BLD AUTO: 4.4 K/UL (ref 1.8–7.7)
NEUTROPHILS NFR BLD: 45.4 % (ref 38–73)
NRBC BLD-RTO: 0 /100 WBC
PLATELET # BLD AUTO: 215 K/UL (ref 150–450)
PMV BLD AUTO: 11.6 FL (ref 9.2–12.9)
POCT GLUCOSE: 103 MG/DL (ref 70–110)
POCT GLUCOSE: 119 MG/DL (ref 70–110)
POCT GLUCOSE: 119 MG/DL (ref 70–110)
POCT GLUCOSE: 133 MG/DL (ref 70–110)
POCT GLUCOSE: 142 MG/DL (ref 70–110)
POTASSIUM SERPL-SCNC: 4.1 MMOL/L (ref 3.5–5.1)
PROT SERPL-MCNC: 7.3 G/DL (ref 6–8.4)
RBC # BLD AUTO: 4.13 M/UL (ref 4–5.4)
SODIUM SERPL-SCNC: 144 MMOL/L (ref 136–145)
WBC # BLD AUTO: 9.77 K/UL (ref 3.9–12.7)

## 2022-10-30 PROCEDURE — 25000003 PHARM REV CODE 250: Performed by: INTERNAL MEDICINE

## 2022-10-30 PROCEDURE — 25000242 PHARM REV CODE 250 ALT 637 W/ HCPCS: Performed by: INTERNAL MEDICINE

## 2022-10-30 PROCEDURE — 85025 COMPLETE CBC W/AUTO DIFF WBC: CPT | Performed by: INTERNAL MEDICINE

## 2022-10-30 PROCEDURE — 36415 COLL VENOUS BLD VENIPUNCTURE: CPT | Performed by: INTERNAL MEDICINE

## 2022-10-30 PROCEDURE — 99224 PR SUBSEQUENT OBSERVATION CARE,LEVEL I: ICD-10-PCS | Mod: ,,, | Performed by: STUDENT IN AN ORGANIZED HEALTH CARE EDUCATION/TRAINING PROGRAM

## 2022-10-30 PROCEDURE — 96365 THER/PROPH/DIAG IV INF INIT: CPT | Mod: 59

## 2022-10-30 PROCEDURE — G0378 HOSPITAL OBSERVATION PER HR: HCPCS

## 2022-10-30 PROCEDURE — 99224 PR SUBSEQUENT OBSERVATION CARE,LEVEL I: CPT | Mod: ,,, | Performed by: STUDENT IN AN ORGANIZED HEALTH CARE EDUCATION/TRAINING PROGRAM

## 2022-10-30 PROCEDURE — 96366 THER/PROPH/DIAG IV INF ADDON: CPT

## 2022-10-30 PROCEDURE — 80053 COMPREHEN METABOLIC PANEL: CPT | Performed by: INTERNAL MEDICINE

## 2022-10-30 PROCEDURE — 25000003 PHARM REV CODE 250

## 2022-10-30 PROCEDURE — 63600175 PHARM REV CODE 636 W HCPCS: Performed by: INTERNAL MEDICINE

## 2022-10-30 RX ADMIN — METOPROLOL TARTRATE 100 MG: 100 TABLET, FILM COATED ORAL at 09:10

## 2022-10-30 RX ADMIN — MEROPENEM AND SODIUM CHLORIDE 1 G: 1 INJECTION, SOLUTION INTRAVENOUS at 11:10

## 2022-10-30 RX ADMIN — PREGABALIN 100 MG: 50 CAPSULE ORAL at 09:10

## 2022-10-30 RX ADMIN — BUSPIRONE HYDROCHLORIDE 30 MG: 7.5 TABLET ORAL at 09:10

## 2022-10-30 RX ADMIN — EMPAGLIFLOZIN 10 MG: 10 TABLET, FILM COATED ORAL at 09:10

## 2022-10-30 RX ADMIN — LUBIPROSTONE 24 MCG: 24 CAPSULE, GELATIN COATED ORAL at 09:10

## 2022-10-30 RX ADMIN — TIZANIDINE 2 MG: 2 TABLET ORAL at 10:10

## 2022-10-30 RX ADMIN — TOPIRAMATE 200 MG: 100 TABLET, FILM COATED ORAL at 09:10

## 2022-10-30 RX ADMIN — MONTELUKAST 10 MG: 10 TABLET, FILM COATED ORAL at 09:10

## 2022-10-30 RX ADMIN — FUROSEMIDE 20 MG: 20 TABLET ORAL at 09:10

## 2022-10-30 RX ADMIN — LURASIDONE HYDROCHLORIDE 80 MG: 20 TABLET, FILM COATED ORAL at 09:10

## 2022-10-30 RX ADMIN — MIRTAZAPINE 30 MG: 15 TABLET, FILM COATED ORAL at 09:10

## 2022-10-30 RX ADMIN — ATORVASTATIN CALCIUM 40 MG: 40 TABLET, FILM COATED ORAL at 09:10

## 2022-10-30 RX ADMIN — MEROPENEM AND SODIUM CHLORIDE 1 G: 1 INJECTION, SOLUTION INTRAVENOUS at 07:10

## 2022-10-30 RX ADMIN — MEROPENEM AND SODIUM CHLORIDE 1 G: 1 INJECTION, SOLUTION INTRAVENOUS at 03:10

## 2022-10-30 RX ADMIN — SERTRALINE HYDROCHLORIDE 150 MG: 100 TABLET ORAL at 09:10

## 2022-10-30 NOTE — PROCEDURES
[facility]   General Surgery Department  Procedure Note      SUMMARY      Date of Procedure: 10/13/2022     Procedure:    Punch biopsy of lesion of L labia majora      Surgeon(s) and Role:  Sania Bass MD      Pre-Operative Diagnosis: Skin lesion of the left labia majora     Post-Operative Diagnosis: Same          Anesthesia: Local     Findings:  3mm punch biopsy of  lesion of the left labia majora     Indications for the Procedure:  41yo female who presents with exophytic skin lesion of the  lesion of the left labia majora.     Procedure in Detail:   After informed consent obtained, the vulva was prepped and draped in sterile fashion.  3cc of 1% lidocaine without epi was intradermally injected.  A 3mm punch biopsy tool was used to excuse ring of tissue at the lesion's border.  Direct pressure was held for hemostasis and the wound closed with single monocryl suture.  Sterile dressing applied and patient discharged from clinic in stable condition.     Complications: No     Estimated Blood Loss (EBL): Minimal               Specimens:   Lesion of L labial majora           Condition: Good           Sania Bass MD  General Surgery   [phone]

## 2022-10-30 NOTE — SUBJECTIVE & OBJECTIVE
Interval History: Patient seen and examined    Review of Systems   Constitutional:  Negative for activity change, appetite change, chills, fatigue and fever.   Respiratory:  Negative for cough, chest tightness, shortness of breath and wheezing.    Cardiovascular:  Negative for chest pain, palpitations and leg swelling.   Gastrointestinal:  Negative for abdominal pain, blood in stool, constipation, diarrhea, nausea and vomiting.   Genitourinary:  Negative for difficulty urinating, dysuria and flank pain.   Neurological:  Negative for dizziness and headaches.   Psychiatric/Behavioral:  Negative for agitation, behavioral problems and confusion. The patient is not nervous/anxious and is not hyperactive.    Objective:     Vital Signs (Most Recent):  Temp: 99.2 °F (37.3 °C) (10/30/22 1247)  Pulse: 70 (10/30/22 1247)  Resp: 19 (10/30/22 1247)  BP: (!) 91/57 (10/30/22 1247)  SpO2: 95 % (10/30/22 1247)   Vital Signs (24h Range):  Temp:  [97.6 °F (36.4 °C)-99.2 °F (37.3 °C)] 99.2 °F (37.3 °C)  Pulse:  [69-75] 70  Resp:  [16-20] 19  SpO2:  [94 %-100 %] 95 %  BP: ()/(57-75) 91/57     Weight: 75.3 kg (166 lb)  Body mass index is 28.49 kg/m².    Intake/Output Summary (Last 24 hours) at 10/30/2022 1423  Last data filed at 10/30/2022 0502  Gross per 24 hour   Intake 850 ml   Output 700 ml   Net 150 ml      Physical Exam  Constitutional:       General: She is not in acute distress.     Appearance: Normal appearance. She is not ill-appearing or toxic-appearing.   HENT:      Nose: Nose normal.      Mouth/Throat:      Mouth: Mucous membranes are moist.      Pharynx: Oropharynx is clear.   Cardiovascular:      Rate and Rhythm: Normal rate.      Pulses: Normal pulses.      Heart sounds: No murmur heard.  Pulmonary:      Effort: Pulmonary effort is normal.      Breath sounds: Normal breath sounds. No wheezing or rhonchi.   Chest:      Chest wall: No tenderness.   Abdominal:      General: Abdomen is flat. There is no distension.       Palpations: Abdomen is soft. There is no mass.      Tenderness: There is no abdominal tenderness. There is no right CVA tenderness, left CVA tenderness or guarding.   Musculoskeletal:         General: No tenderness. Normal range of motion.      Cervical back: Normal range of motion and neck supple.      Right lower leg: No edema.      Left lower leg: No edema.   Skin:     General: Skin is warm and dry.      Capillary Refill: Capillary refill takes less than 2 seconds.   Neurological:      General: No focal deficit present.      Mental Status: She is alert and oriented to person, place, and time. Mental status is at baseline.      Motor: No weakness.      Gait: Gait normal.   Psychiatric:         Mood and Affect: Mood normal.         Behavior: Behavior normal.         Thought Content: Thought content normal.         Judgment: Judgment normal.     Significant Labs: All pertinent labs within the past 24 hours have been reviewed.  BMP:   Recent Labs   Lab 10/30/22  0521   *      K 4.1   *   CO2 28   BUN 17   CREATININE 0.8   CALCIUM 8.9     CBC:   Recent Labs   Lab 10/30/22  0521   WBC 9.77   HGB 10.2*   HCT 33.2*        CMP:   Recent Labs   Lab 10/29/22  0528 10/30/22  0521    144   K 3.6 4.1   * 111*   CO2 24 28   * 153*   BUN 15 17   CREATININE 0.6 0.8   CALCIUM 9.1 8.9   PROT 7.5 7.3   ALBUMIN 3.2* 3.2*   BILITOT 0.2 0.2   ALKPHOS 262* 262*   * 176*   * 201*   ANIONGAP 7* 5*

## 2022-10-30 NOTE — ASSESSMENT & PLAN NOTE
Urine cx showed esbl e coli - sent to [facility], [facility] and [facility] only - needs a tleast 7-10 days iv abxs   Will check with her insurance to see if would have a feasible way to do abxs as outpt  10/27 day #3 of abxs this evening  10/28 day #4 of iv abxs - new uc pending  10/29 D5 [facility]  10/30 D6 merem, tolerating well

## 2022-10-30 NOTE — PROGRESS NOTES
[facility] Medicine  Progress Note    Patient Name: Rohini Dang  MRN: [id number]  Patient Class: OP- Observation   Admission Date: 10/25/2022  Length of Stay: 0 days  Attending Physician: Yasmin Hernandez MD  Primary Care Provider: Skip Celestin NP        Subjective:     Principal Problem:ESBL (extended spectrum beta-lactamase) producing bacteria infection        HPI:  41 yo female with ESBL UTI failed macrobid and bactrim as outpatient here for IV abx. No fever. No flank or abd pain. No N/V/D. Gradual onset. Similar to previous. Endorses dysuria, frequency.     Pt has recently had an acute event of renal failure which has resolved.  Pt reports not having transportation to be able to get her abxs as a oupt.  We will also look into other options for getting her abxs at home. Otherwise pt will need 7-10 days of iv abxs to ensue resolution of uti      Overview/Hospital Course:  10/27 [place] Pt is feelign well - no complaints today - tolerating iv abxs  10/28 ND pt feeling well - no longer having dysuria and frequency  10/29 [place] denies symptoms, ESBL UTI D5 merem, tolerating well, afebrile  10/30 [place] No new complaints. Completing IV abx D6 [facility]      Interval History: Patient seen and examined    Review of Systems   Constitutional:  Negative for activity change, appetite change, chills, fatigue and fever.   Respiratory:  Negative for cough, chest tightness, shortness of breath and wheezing.    Cardiovascular:  Negative for chest pain, palpitations and leg swelling.   Gastrointestinal:  Negative for abdominal pain, blood in stool, constipation, diarrhea, nausea and vomiting.   Genitourinary:  Negative for difficulty urinating, dysuria and flank pain.   Neurological:  Negative for dizziness and headaches.   Psychiatric/Behavioral:  Negative for agitation, behavioral problems and confusion. The patient is not nervous/anxious and is not hyperactive.    Objective:     Vital Signs (Most  Recent):  Temp: 99.2 °F (37.3 °C) (10/30/22 1247)  Pulse: 70 (10/30/22 1247)  Resp: 19 (10/30/22 1247)  BP: (!) 91/57 (10/30/22 1247)  SpO2: 95 % (10/30/22 1247)   Vital Signs (24h Range):  Temp:  [97.6 °F (36.4 °C)-99.2 °F (37.3 °C)] 99.2 °F (37.3 °C)  Pulse:  [69-75] 70  Resp:  [16-20] 19  SpO2:  [94 %-100 %] 95 %  BP: ()/(57-75) 91/57     Weight: 75.3 kg (166 lb)  Body mass index is 28.49 kg/m².    Intake/Output Summary (Last 24 hours) at 10/30/2022 1423  Last data filed at 10/30/2022 0502  Gross per 24 hour   Intake 850 ml   Output 700 ml   Net 150 ml      Physical Exam  Constitutional:       General: She is not in acute distress.     Appearance: Normal appearance. She is not ill-appearing or toxic-appearing.   HENT:      Nose: Nose normal.      Mouth/Throat:      Mouth: Mucous membranes are moist.      Pharynx: Oropharynx is clear.   Cardiovascular:      Rate and Rhythm: Normal rate.      Pulses: Normal pulses.      Heart sounds: No murmur heard.  Pulmonary:      Effort: Pulmonary effort is normal.      Breath sounds: Normal breath sounds. No wheezing or rhonchi.   Chest:      Chest wall: No tenderness.   Abdominal:      General: Abdomen is flat. There is no distension.      Palpations: Abdomen is soft. There is no mass.      Tenderness: There is no abdominal tenderness. There is no right CVA tenderness, left CVA tenderness or guarding.   Musculoskeletal:         General: No tenderness. Normal range of motion.      Cervical back: Normal range of motion and neck supple.      Right lower leg: No edema.      Left lower leg: No edema.   Skin:     General: Skin is warm and dry.      Capillary Refill: Capillary refill takes less than 2 seconds.   Neurological:      General: No focal deficit present.      Mental Status: She is alert and oriented to person, place, and time. Mental status is at baseline.      Motor: No weakness.      Gait: Gait normal.   Psychiatric:         Mood and Affect: Mood normal.          Behavior: Behavior normal.         Thought Content: Thought content normal.         Judgment: Judgment normal.     Significant Labs: All pertinent labs within the past 24 hours have been reviewed.  BMP:   Recent Labs   Lab 10/30/22  0521   *      K 4.1   *   CO2 28   BUN 17   CREATININE 0.8   CALCIUM 8.9     CBC:   Recent Labs   Lab 10/30/22  0521   WBC 9.77   HGB 10.2*   HCT 33.2*        CMP:   Recent Labs   Lab 10/29/22  0528 10/30/22  0521    144   K 3.6 4.1   * 111*   CO2 24 28   * 153*   BUN 15 17   CREATININE 0.6 0.8   CALCIUM 9.1 8.9   PROT 7.5 7.3   ALBUMIN 3.2* 3.2*   BILITOT 0.2 0.2   ALKPHOS 262* 262*   * 176*   * 201*   ANIONGAP 7* 5*           Assessment/Plan:      * ESBL (extended spectrum beta-lactamase) producing bacteria infection  Urine cx showed esbl e coli - sent to [facility], [facility] and am[facility] only - needs a tleast 7-10 days iv abxs   Will check with her insurance to see if would have a feasible way to do abxs as outpt  10/27 day #3 of abxs this evening  10/28 day #4 of iv abxs - new  pending  10/29 D5 [facility]  10/30 D6 [facility], tolerating well     Urinary tract infection without hematuria  Cont iv abxs      Diabetes mellitus without complication  Patient's FSGs are controlled on current medication regimen.  Last A1c reviewed-   Lab Results   Component Value Date    HGBA1C 6.4 (H) 09/02/2022     Most recent fingerstick glucose reviewed-   Recent Labs   Lab 10/30/22  1130   POCTGLUCOSE 119*     Current correctional scale  High  Maintain anti-hyperglycemic dose as follows-   Antihyperglycemics (From admission, onward)    Start     Stop Route Frequency Ordered    10/29/22 1730  dulaglutide pen injector 0.75 mg         -- SubQ Every 7 days 10/29/22 1701    10/26/22 1315  empagliflozin 10 mg tablet 10 mg         -- Oral Daily 10/26/22 1203    10/25/22 1614  insulin aspart U-100 pen 0-5 Units         -- SubQ Before meals & nightly PRN 10/25/22  1614        Hold Oral hypoglycemics while patient is in the hospital.    Borderline personality disorder  cotn current meds= mood is good and pt is pleasant       JAMESON (nonalcoholic steatohepatitis)  lfts slightly elevated, down trending continue to monitor        VTE Risk Mitigation (From admission, onward)         Ordered     IP VTE LOW RISK PATIENT  Once         10/25/22 1614     Place sequential compression device  Until discontinued         10/25/22 1614                Discharge Planning   FERNANDO:      Code Status: Full Code   Is the patient medically ready for discharge?:     Reason for patient still in hospital (select all that apply): Treatment  Discharge Plan A: Home with family, Home Health   Discharge Delays: None known at this time              Charlotte Roy, DO  Department of Hospital Medicine   [facility]

## 2022-10-30 NOTE — PROGRESS NOTES
[facility] Medicine  Progress Note    Patient Name: Rohini Dang  MRN: [id number]  Patient Class: OP- Observation   Admission Date: 10/25/2022  Length of Stay: 0 days  Attending Physician: Yasmin Hernandez MD  Primary Care Provider: Skip Celestin NP        Subjective:     Principal Problem:ESBL (extended spectrum beta-lactamase) producing bacteria infection        HPI:  41 yo female with ESBL UTI failed macrobid and bactrim as outpatient here for IV abx. No fever. No flank or abd pain. No N/V/D. Gradual onset. Similar to previous. Endorses dysuria, frequency.     Pt has recently had an acute event of renal failure which has resolved.  Pt reports not having transportation to be able to get her abxs as a oupt.  We will also look into other options for getting her abxs at home. Otherwise pt will need 7-10 days of iv abxs to ensue resolution of uti      Overview/Hospital Course:  10/27 ND Pt is feelign well - no complaints today - tolerating iv abxs  10/28 ND pt feeling well - no longer having dysuria and frequency  10/29 [place] denies symptoms, ESBL UTI D5 [facility], tolerating well, afebrile      Interval History: Patient seen and examined. Endorses no new symptoms or concerns.     Review of Systems   Constitutional:  Negative for activity change, appetite change, chills, fatigue and fever.   Respiratory:  Negative for cough, chest tightness, shortness of breath and wheezing.    Cardiovascular:  Negative for chest pain, palpitations and leg swelling.   Gastrointestinal:  Negative for abdominal pain, blood in stool, constipation, diarrhea, nausea and vomiting.   Genitourinary:  Negative for difficulty urinating, dysuria and flank pain.   Neurological:  Negative for dizziness and headaches.   Psychiatric/Behavioral:  Negative for agitation, behavioral problems and confusion. The patient is not nervous/anxious and is not hyperactive.    Objective:     Vital Signs (Most Recent):  Temp: 98.1 °F  (36.7 °C) (10/29/22 2010)  Pulse: 73 (10/29/22 2010)  Resp: 18 (10/29/22 2010)  BP: 122/69 (10/29/22 2010)  SpO2: 100 % (10/29/22 2010) Vital Signs (24h Range):  Temp:  [97.5 °F (36.4 °C)-98.7 °F (37.1 °C)] 98.1 °F (36.7 °C)  Pulse:  [73-86] 73  Resp:  [16-19] 18  SpO2:  [92 %-100 %] 100 %  BP: (106-122)/(68-74) 122/69     Weight: 75.3 kg (166 lb)  Body mass index is 28.49 kg/m².    Intake/Output Summary (Last 24 hours) at 10/29/2022 2112  Last data filed at 10/29/2022 0635  Gross per 24 hour   Intake 400 ml   Output 1300 ml   Net -900 ml      Physical Exam  Constitutional:       General: She is not in acute distress.     Appearance: Normal appearance. She is not ill-appearing or toxic-appearing.   HENT:      Nose: Nose normal.      Mouth/Throat:      Mouth: Mucous membranes are moist.      Pharynx: Oropharynx is clear.   Cardiovascular:      Rate and Rhythm: Normal rate.      Pulses: Normal pulses.      Heart sounds: No murmur heard.  Pulmonary:      Effort: Pulmonary effort is normal.      Breath sounds: Normal breath sounds. No wheezing or rhonchi.   Chest:      Chest wall: No tenderness.   Abdominal:      General: Abdomen is flat. There is no distension.      Palpations: Abdomen is soft. There is no mass.      Tenderness: There is no abdominal tenderness. There is no right CVA tenderness, left CVA tenderness or guarding.   Musculoskeletal:         General: No tenderness. Normal range of motion.      Cervical back: Normal range of motion and neck supple.      Right lower leg: No edema.      Left lower leg: No edema.   Skin:     General: Skin is warm and dry.      Capillary Refill: Capillary refill takes less than 2 seconds.   Neurological:      General: No focal deficit present.      Mental Status: She is alert and oriented to person, place, and time. Mental status is at baseline.      Motor: No weakness.      Gait: Gait normal.   Psychiatric:         Mood and Affect: Mood normal.         Behavior: Behavior  normal.         Thought Content: Thought content normal.         Judgment: Judgment normal.     Significant Labs: All pertinent labs within the past 24 hours have been reviewed.  BMP:   Recent Labs   Lab 10/29/22  0528   *      K 3.6   *   CO2 24   BUN 15   CREATININE 0.6   CALCIUM 9.1     CBC:   Recent Labs   Lab 10/28/22  0602   WBC 8.92   HGB 10.7*   HCT 34.7*        CMP:   Recent Labs   Lab 10/28/22  0602 10/29/22  0528    142   K 3.7 3.6   * 111*   CO2 27 24   * 132*   BUN 12 15   CREATININE 0.7 0.6   CALCIUM 9.0 9.1   PROT 7.5 7.5   ALBUMIN 3.2* 3.2*   BILITOT 0.2 0.2   ALKPHOS 288* 262*   * 183*   * 192*   ANIONGAP 3* 7*     Significant Imaging: None to review.      Assessment/Plan:      * ESBL (extended spectrum beta-lactamase) producing bacteria infection  Urine cx showed esbl e coli - sent to [facility], [facility] and am[facility] only - needs a tleast 7-10 days iv abxs   Will check with her insurance to see if would have a feasible way to do abxs as outpt  10/27 day #3 of abxs this evening  10/28 day #4 of iv abxs - new  pending  10/29 D5 me[facility]    Urinary tract infection without hematuria  Cont iv abxs      Diabetes mellitus without complication  Patient's FSGs are controlled on current medication regimen.  Last A1c reviewed-   Lab Results   Component Value Date    HGBA1C 6.4 (H) 09/02/2022     Most recent fingerstick glucose reviewed-   No results for input(s): POCTGLUCOSE in the last 24 hours.  Current correctional scale  High  Maintain anti-hyperglycemic dose as follows-   Antihyperglycemics (From admission, onward)    Start     Stop Route Frequency Ordered    10/29/22 1730  dulaglutide pen injector 0.75 mg         -- SubQ Every 7 days 10/29/22 1701    10/26/22 1315  empagliflozin 10 mg tablet 10 mg         -- Oral Daily 10/26/22 1203    10/25/22 1614  insulin aspart U-100 pen 0-5 Units         -- SubQ Before meals & nightly PRN 10/25/22 1614         Hold Oral hypoglycemics while patient is in the hospital.    Borderline personality disorder  cotn current meds= mood is good and pt is pleasant      JAMESON (nonalcoholic steatohepatitis)  lfts slightly elevated, down trending continue to monitor        VTE Risk Mitigation (From admission, onward)         Ordered     IP VTE LOW RISK PATIENT  Once         10/25/22 1614     Place sequential compression device  Until discontinued         10/25/22 1614                Discharge Planning   FERNANDO:      Code Status: Full Code   Is the patient medically ready for discharge?:     Reason for patient still in hospital (select all that apply): Treatment  Discharge Plan A: Home with family, Home Health   Discharge Delays: None known at this time              Charlotte Roy, DO  Department of Hospital Medicine   [facility]

## 2022-10-30 NOTE — SUBJECTIVE & OBJECTIVE
Interval History: Patient seen and examined. Endorses no new symptoms or concerns.     Review of Systems   Constitutional:  Negative for activity change, appetite change, chills, fatigue and fever.   Respiratory:  Negative for cough, chest tightness, shortness of breath and wheezing.    Cardiovascular:  Negative for chest pain, palpitations and leg swelling.   Gastrointestinal:  Negative for abdominal pain, blood in stool, constipation, diarrhea, nausea and vomiting.   Genitourinary:  Negative for difficulty urinating, dysuria and flank pain.   Neurological:  Negative for dizziness and headaches.   Psychiatric/Behavioral:  Negative for agitation, behavioral problems and confusion. The patient is not nervous/anxious and is not hyperactive.    Objective:     Vital Signs (Most Recent):  Temp: 98.1 °F (36.7 °C) (10/29/22 2010)  Pulse: 73 (10/29/22 2010)  Resp: 18 (10/29/22 2010)  BP: 122/69 (10/29/22 2010)  SpO2: 100 % (10/29/22 2010) Vital Signs (24h Range):  Temp:  [97.5 °F (36.4 °C)-98.7 °F (37.1 °C)] 98.1 °F (36.7 °C)  Pulse:  [73-86] 73  Resp:  [16-19] 18  SpO2:  [92 %-100 %] 100 %  BP: (106-122)/(68-74) 122/69     Weight: 75.3 kg (166 lb)  Body mass index is 28.49 kg/m².    Intake/Output Summary (Last 24 hours) at 10/29/2022 2112  Last data filed at 10/29/2022 0635  Gross per 24 hour   Intake 400 ml   Output 1300 ml   Net -900 ml      Physical Exam  Constitutional:       General: She is not in acute distress.     Appearance: Normal appearance. She is not ill-appearing or toxic-appearing.   HENT:      Nose: Nose normal.      Mouth/Throat:      Mouth: Mucous membranes are moist.      Pharynx: Oropharynx is clear.   Cardiovascular:      Rate and Rhythm: Normal rate.      Pulses: Normal pulses.      Heart sounds: No murmur heard.  Pulmonary:      Effort: Pulmonary effort is normal.      Breath sounds: Normal breath sounds. No wheezing or rhonchi.   Chest:      Chest wall: No tenderness.   Abdominal:      General:  Abdomen is flat. There is no distension.      Palpations: Abdomen is soft. There is no mass.      Tenderness: There is no abdominal tenderness. There is no right CVA tenderness, left CVA tenderness or guarding.   Musculoskeletal:         General: No tenderness. Normal range of motion.      Cervical back: Normal range of motion and neck supple.      Right lower leg: No edema.      Left lower leg: No edema.   Skin:     General: Skin is warm and dry.      Capillary Refill: Capillary refill takes less than 2 seconds.   Neurological:      General: No focal deficit present.      Mental Status: She is alert and oriented to person, place, and time. Mental status is at baseline.      Motor: No weakness.      Gait: Gait normal.   Psychiatric:         Mood and Affect: Mood normal.         Behavior: Behavior normal.         Thought Content: Thought content normal.         Judgment: Judgment normal.     Significant Labs: All pertinent labs within the past 24 hours have been reviewed.  BMP:   Recent Labs   Lab 10/29/22  0528   *      K 3.6   *   CO2 24   BUN 15   CREATININE 0.6   CALCIUM 9.1     CBC:   Recent Labs   Lab 10/28/22  0602   WBC 8.92   HGB 10.7*   HCT 34.7*        CMP:   Recent Labs   Lab 10/28/22  0602 10/29/22  0528    142   K 3.7 3.6   * 111*   CO2 27 24   * 132*   BUN 12 15   CREATININE 0.7 0.6   CALCIUM 9.0 9.1   PROT 7.5 7.5   ALBUMIN 3.2* 3.2*   BILITOT 0.2 0.2   ALKPHOS 288* 262*   * 183*   * 192*   ANIONGAP 3* 7*     Significant Imaging: None to review.

## 2022-10-30 NOTE — NURSING
No acute events overnight, pt rested quietly through out shift without complaints. Pt denies needs at this time, CB in reach, bed in lowest position, will continue to monitor.

## 2022-10-30 NOTE — ASSESSMENT & PLAN NOTE
Patient's FSGs are controlled on current medication regimen.  Last A1c reviewed-   Lab Results   Component Value Date    HGBA1C 6.4 (H) 09/02/2022     Most recent fingerstick glucose reviewed-   No results for input(s): POCTGLUCOSE in the last 24 hours.  Current correctional scale  High  Maintain anti-hyperglycemic dose as follows-   Antihyperglycemics (From admission, onward)    Start     Stop Route Frequency Ordered    10/29/22 1730  dulaglutide pen injector 0.75 mg         -- SubQ Every 7 days 10/29/22 1701    10/26/22 1315  empagliflozin 10 mg tablet 10 mg         -- Oral Daily 10/26/22 1203    10/25/22 1614  insulin aspart U-100 pen 0-5 Units         -- SubQ Before meals & nightly PRN 10/25/22 1614        Hold Oral hypoglycemics while patient is in the hospital.

## 2022-10-30 NOTE — H&P
[facility] H&P      Consult: R labial lesion  Consulting Service: Dr. Rasmussen    Chief Complaint: None    HPI: Pt is a 42 y.o. female with PMH sig for DMII, HTN,  and Stg III liver cirrhosis who presents with nonpainful, raised lesion along the left labia majora.  First noticed leision 3-4 weeks ago, and believes it has decreased in size.  Hurts when wiping but denies blood.  No immediate family history of skin, breast, ovarian, or cervical cancer.    PMH:   Past Medical History:   Diagnosis Date    Anemia     Anxiety disorder     Arthritis     Bipolar 2 disorder     Borderline personality disorder     Chronic bilateral low back pain without sciatica     Depression with anxiety     Diabetes mellitus     Disorder of kidney and ureter     Elevated LFTs     Esophageal stenosis     Fatty liver     Fibromyalgia     Fibromyalgia     High cholesterol     Hypertension     Hypoglycemia     Intermittent explosive disorder     Liver disease     JAMESON (nonalcoholic steatohepatitis)      PSH:   Past Surgical History:   Procedure Laterality Date    COLONOSCOPY N/A 3/20/2018    Procedure: COLONOSCOPY;  Surgeon: Janelle Meade MD;  Location: [facility];  Service: Endoscopy;  Laterality: N/A;    COLONOSCOPY N/A 5/11/2021    Procedure: COLONOSCOPY;  Surgeon: Janelle Meade MD;  Location: [facility];  Service: Endoscopy;  Laterality: N/A;    ESOPHAGEAL DILATION      x2    ESOPHAGOGASTRODUODENOSCOPY N/A 5/7/2019    Procedure: EGD (ESOPHAGOGASTRODUODENOSCOPY);  Surgeon: Janelle Meade MD;  Location: [facility];  Service: Endoscopy;  Laterality: N/A;    ESOPHAGOGASTRODUODENOSCOPY N/A 5/11/2021    Procedure: EGD (ESOPHAGOGASTRODUODENOSCOPY);  Surgeon: Janelle Meade MD;  Location: [facility];  Service: Endoscopy;  Laterality: N/A;  Rapid on arrival    HIATAL HERNIA REPAIR      HYSTERECTOMY      LIVER BIOPSY  2018    fibrosis stage 3 JAMESON    OOPHORECTOMY      TUBAL LIGATION       "UPPER GASTROINTESTINAL ENDOSCOPY      2013? unable to obtain records     Meds: See medication list;  No ASA or anticoagulation    ALL: Amoxicillin; Avelox [moxifloxacin]; Clarinex [desloratadine]; Pcn [penicillins]; Claritin [loratadine]; and Latex, natural rubber    FHX: non contributory    SOC:   Social History     Socioeconomic History    Marital status:    Tobacco Use    Smoking status: Never    Smokeless tobacco: Never   Substance and Sexual Activity    Alcohol use: No     Alcohol/week: 0.0 standard drinks    Drug use: No    Sexual activity: Not Currently     Partners: Male       ROS: Review of Systems   Constitutional:  Negative for chills, fever, malaise/fatigue and weight loss.   Respiratory:  Negative for cough and shortness of breath.    Cardiovascular:  Negative for chest pain.   Gastrointestinal: Negative.    Genitourinary:  Negative for dysuria, frequency and urgency.   Skin:         Red lesion to L labia    All other systems reviewed and are negative.    Physical Exam:  BP 96/63 (BP Location: Right arm, Patient Position: Sitting, BP Method: Large (Automatic))   Pulse 74   Ht 5' 4" (1.626 m)   Wt 76.1 kg (167 lb 14.1 oz)   SpO2 96%   BMI 28.82 kg/m²   Physical Exam  Constitutional:       General: She is not in acute distress.     Appearance: She is obese. She is not ill-appearing or toxic-appearing.   Cardiovascular:      Rate and Rhythm: Normal rate and regular rhythm.   Pulmonary:      Effort: Pulmonary effort is normal. No respiratory distress.   Abdominal:      General: There is distension (soft with small fluid wave).      Palpations: Abdomen is soft.      Tenderness: There is no abdominal tenderness. There is no guarding or rebound.   Genitourinary:     Comments: Small, round, dark red, sub-centimeter lesion to L labia majora with no signs of surrounding skin changes, erythema, or induration  Skin:     General: Skin is warm and dry.      Capillary Refill: Capillary refill takes less " than 2 seconds.   Neurological:      Mental Status: She is alert.         A/P: Pt is a 42 y.o. female who presents with skin lesion of the L labia majora  -Lesion resemble small hemangioma;  malignancy among differential   -Punch bx performed   -RTC 2 weeks       Sania Bass MD  [phone]

## 2022-10-30 NOTE — ASSESSMENT & PLAN NOTE
Patient's FSGs are controlled on current medication regimen.  Last A1c reviewed-   Lab Results   Component Value Date    HGBA1C 6.4 (H) 09/02/2022     Most recent fingerstick glucose reviewed-   Recent Labs   Lab 10/30/22  1130   POCTGLUCOSE 119*     Current correctional scale  High  Maintain anti-hyperglycemic dose as follows-   Antihyperglycemics (From admission, onward)    Start     Stop Route Frequency Ordered    10/29/22 1730  dulaglutide pen injector 0.75 mg         -- SubQ Every 7 days 10/29/22 1701    10/26/22 1315  empagliflozin 10 mg tablet 10 mg         -- Oral Daily 10/26/22 1203    10/25/22 1614  insulin aspart U-100 pen 0-5 Units         -- SubQ Before meals & nightly PRN 10/25/22 1614        Hold Oral hypoglycemics while patient is in the hospital.

## 2022-10-30 NOTE — ASSESSMENT & PLAN NOTE
Urine cx showed esbl e coli - sent to [facility], [facility] and [facility] only - needs a tleast 7-10 days iv abxs   Will check with her insurance to see if would have a feasible way to do abxs as outpt  10/27 day #3 of abxs this evening  10/28 day #4 of iv abxs - new  pending  10/29 D5 [facility]

## 2022-10-31 LAB
ALBUMIN SERPL BCP-MCNC: 3.2 G/DL (ref 3.5–5.2)
ALP SERPL-CCNC: 250 U/L (ref 55–135)
ALT SERPL W/O P-5'-P-CCNC: 189 U/L (ref 10–44)
ANION GAP SERPL CALC-SCNC: 7 MMOL/L (ref 8–16)
AST SERPL-CCNC: 146 U/L (ref 10–40)
BILIRUB SERPL-MCNC: 0.2 MG/DL (ref 0.1–1)
BUN SERPL-MCNC: 17 MG/DL (ref 6–20)
CALCIUM SERPL-MCNC: 9.1 MG/DL (ref 8.7–10.5)
CHLORIDE SERPL-SCNC: 111 MMOL/L (ref 95–110)
CO2 SERPL-SCNC: 25 MMOL/L (ref 23–29)
CREAT SERPL-MCNC: 0.6 MG/DL (ref 0.5–1.4)
EST. GFR  (NO RACE VARIABLE): >60 ML/MIN/1.73 M^2
GLUCOSE SERPL-MCNC: 102 MG/DL (ref 70–110)
POCT GLUCOSE: 130 MG/DL (ref 70–110)
POCT GLUCOSE: 164 MG/DL (ref 70–110)
POCT GLUCOSE: 98 MG/DL (ref 70–110)
POTASSIUM SERPL-SCNC: 3.3 MMOL/L (ref 3.5–5.1)
PROT SERPL-MCNC: 7.4 G/DL (ref 6–8.4)
SODIUM SERPL-SCNC: 143 MMOL/L (ref 136–145)

## 2022-10-31 PROCEDURE — A4216 STERILE WATER/SALINE, 10 ML: HCPCS | Performed by: EMERGENCY MEDICINE

## 2022-10-31 PROCEDURE — 25000003 PHARM REV CODE 250: Performed by: EMERGENCY MEDICINE

## 2022-10-31 PROCEDURE — 25000003 PHARM REV CODE 250: Performed by: INTERNAL MEDICINE

## 2022-10-31 PROCEDURE — 63600175 PHARM REV CODE 636 W HCPCS: Performed by: INTERNAL MEDICINE

## 2022-10-31 PROCEDURE — 36415 COLL VENOUS BLD VENIPUNCTURE: CPT | Performed by: INTERNAL MEDICINE

## 2022-10-31 PROCEDURE — 25000242 PHARM REV CODE 250 ALT 637 W/ HCPCS: Performed by: INTERNAL MEDICINE

## 2022-10-31 PROCEDURE — 96366 THER/PROPH/DIAG IV INF ADDON: CPT

## 2022-10-31 PROCEDURE — 80053 COMPREHEN METABOLIC PANEL: CPT | Performed by: INTERNAL MEDICINE

## 2022-10-31 PROCEDURE — G0378 HOSPITAL OBSERVATION PER HR: HCPCS

## 2022-10-31 RX ORDER — POTASSIUM CHLORIDE 20 MEQ/1
40 TABLET, EXTENDED RELEASE ORAL ONCE
Status: COMPLETED | OUTPATIENT
Start: 2022-10-31 | End: 2022-10-31

## 2022-10-31 RX ADMIN — POTASSIUM CHLORIDE 40 MEQ: 1500 TABLET, EXTENDED RELEASE ORAL at 10:10

## 2022-10-31 RX ADMIN — EMPAGLIFLOZIN 10 MG: 10 TABLET, FILM COATED ORAL at 08:10

## 2022-10-31 RX ADMIN — MONTELUKAST 10 MG: 10 TABLET, FILM COATED ORAL at 09:10

## 2022-10-31 RX ADMIN — MEROPENEM AND SODIUM CHLORIDE 1 G: 1 INJECTION, SOLUTION INTRAVENOUS at 11:10

## 2022-10-31 RX ADMIN — LUBIPROSTONE 24 MCG: 24 CAPSULE, GELATIN COATED ORAL at 08:10

## 2022-10-31 RX ADMIN — METOPROLOL TARTRATE 100 MG: 100 TABLET, FILM COATED ORAL at 09:10

## 2022-10-31 RX ADMIN — Medication 10 ML: at 05:10

## 2022-10-31 RX ADMIN — BUSPIRONE HYDROCHLORIDE 30 MG: 7.5 TABLET ORAL at 08:10

## 2022-10-31 RX ADMIN — BUSPIRONE HYDROCHLORIDE 30 MG: 7.5 TABLET ORAL at 09:10

## 2022-10-31 RX ADMIN — FUROSEMIDE 20 MG: 20 TABLET ORAL at 08:10

## 2022-10-31 RX ADMIN — MIRTAZAPINE 30 MG: 15 TABLET, FILM COATED ORAL at 09:10

## 2022-10-31 RX ADMIN — AMLODIPINE BESYLATE 10 MG: 10 TABLET ORAL at 08:10

## 2022-10-31 RX ADMIN — LUBIPROSTONE 24 MCG: 24 CAPSULE, GELATIN COATED ORAL at 09:10

## 2022-10-31 RX ADMIN — LURASIDONE HYDROCHLORIDE 80 MG: 20 TABLET, FILM COATED ORAL at 09:10

## 2022-10-31 RX ADMIN — TOPIRAMATE 200 MG: 100 TABLET, FILM COATED ORAL at 09:10

## 2022-10-31 RX ADMIN — Medication 10 ML: at 08:10

## 2022-10-31 RX ADMIN — MEROPENEM AND SODIUM CHLORIDE 1 G: 1 INJECTION, SOLUTION INTRAVENOUS at 05:10

## 2022-10-31 RX ADMIN — MEROPENEM AND SODIUM CHLORIDE 1 G: 1 INJECTION, SOLUTION INTRAVENOUS at 07:10

## 2022-10-31 RX ADMIN — PREGABALIN 100 MG: 50 CAPSULE ORAL at 08:10

## 2022-10-31 RX ADMIN — SERTRALINE HYDROCHLORIDE 150 MG: 100 TABLET ORAL at 08:10

## 2022-10-31 RX ADMIN — METOPROLOL TARTRATE 100 MG: 100 TABLET, FILM COATED ORAL at 08:10

## 2022-10-31 RX ADMIN — PREGABALIN 100 MG: 50 CAPSULE ORAL at 09:10

## 2022-10-31 RX ADMIN — ATORVASTATIN CALCIUM 40 MG: 40 TABLET, FILM COATED ORAL at 08:10

## 2022-10-31 NOTE — PROGRESS NOTES
[facility] Medicine  Progress Note    Patient Name: Rohini Dang  MRN: [id number]  Patient Class: OP- Observation   Admission Date: 10/25/2022  Length of Stay: 0 days  Attending Physician: Yasmin Hernandez MD  Primary Care Provider: Skip Celestin NP        Subjective:     Principal Problem:ESBL (extended spectrum beta-lactamase) producing bacteria infection        HPI:  41 yo female with ESBL UTI failed macrobid and bactrim as outpatient here for IV abx. No fever. No flank or abd pain. No N/V/D. Gradual onset. Similar to previous. Endorses dysuria, frequency.     Pt has recently had an acute event of renal failure which has resolved.  Pt reports not having transportation to be able to get her abxs as a oupt.  We will also look into other options for getting her abxs at home. Otherwise pt will need 7-10 days of iv abxs to ensue resolution of uti      Overview/Hospital Course:  10/27 [place] Pt is feelign well - no complaints today - tolerating iv abxs  10/28 [place] pt feeling well - no longer having dysuria and frequency  10/29 [place] denies symptoms, ESBL UTI D5 [facility], tolerating well, afebrile  10/30 [place] No new complaints. Completing IV abx D6 [facility]  10/31 [place] feelign good - no new problems - cont current iv abxs and plan to dc in am      Interval History: Patient seen and examined    Review of Systems   Constitutional:  Negative for activity change, appetite change, chills, fatigue and fever.   Respiratory:  Negative for cough, chest tightness, shortness of breath and wheezing.    Cardiovascular:  Negative for chest pain, palpitations and leg swelling.   Gastrointestinal:  Negative for abdominal pain, blood in stool, constipation, diarrhea, nausea and vomiting.   Genitourinary:  Negative for difficulty urinating, dysuria and flank pain.   Neurological:  Negative for dizziness and headaches.   Psychiatric/Behavioral:  Negative for agitation, behavioral problems and confusion. The patient is not  nervous/anxious and is not hyperactive.    Objective:     Vital Signs (Most Recent):  Temp: 96.5 °F (35.8 °C) (10/31/22 0742)  Pulse: 70 (10/31/22 0802)  Resp: 20 (10/31/22 0742)  BP: 124/78 (10/31/22 0802)  SpO2: (!) 93 % (10/31/22 0742)   Vital Signs (24h Range):  Temp:  [96.5 °F (35.8 °C)-99.7 °F (37.6 °C)] 96.5 °F (35.8 °C)  Pulse:  [64-72] 70  Resp:  [18-20] 20  SpO2:  [93 %-97 %] 93 %  BP: ()/(52-78) 124/78     Weight: 75.3 kg (166 lb)  Body mass index is 28.49 kg/m².    Intake/Output Summary (Last 24 hours) at 10/31/2022 0844  Last data filed at 10/31/2022 0602  Gross per 24 hour   Intake 1350 ml   Output 2850 ml   Net -1500 ml        Physical Exam  Constitutional:       General: She is not in acute distress.     Appearance: Normal appearance. She is not ill-appearing or toxic-appearing.   HENT:      Nose: Nose normal.      Mouth/Throat:      Mouth: Mucous membranes are moist.      Pharynx: Oropharynx is clear.   Cardiovascular:      Rate and Rhythm: Normal rate.      Pulses: Normal pulses.      Heart sounds: No murmur heard.  Pulmonary:      Effort: Pulmonary effort is normal.      Breath sounds: Normal breath sounds. No wheezing or rhonchi.   Chest:      Chest wall: No tenderness.   Abdominal:      General: Abdomen is flat. There is no distension.      Palpations: Abdomen is soft. There is no mass.      Tenderness: There is no abdominal tenderness. There is no right CVA tenderness, left CVA tenderness or guarding.   Musculoskeletal:         General: No tenderness. Normal range of motion.      Cervical back: Normal range of motion and neck supple.      Right lower leg: No edema.      Left lower leg: No edema.   Skin:     General: Skin is warm and dry.      Capillary Refill: Capillary refill takes less than 2 seconds.   Neurological:      General: No focal deficit present.      Mental Status: She is alert and oriented to person, place, and time. Mental status is at baseline.      Motor: No weakness.       Gait: Gait normal.   Psychiatric:         Mood and Affect: Mood normal.         Behavior: Behavior normal.         Thought Content: Thought content normal.         Judgment: Judgment normal.     Significant Labs: All pertinent labs within the past 24 hours have been reviewed.  BMP:   Recent Labs   Lab 10/31/22  0537         K 3.3*   *   CO2 25   BUN 17   CREATININE 0.6   CALCIUM 9.1       CBC:   Recent Labs   Lab 10/30/22  0521   WBC 9.77   HGB 10.2*   HCT 33.2*          CMP:   Recent Labs   Lab 10/30/22  0521 10/31/22  0537    143   K 4.1 3.3*   * 111*   CO2 28 25   * 102   BUN 17 17   CREATININE 0.8 0.6   CALCIUM 8.9 9.1   PROT 7.3 7.4   ALBUMIN 3.2* 3.2*   BILITOT 0.2 0.2   ALKPHOS 262* 250*   * 146*   * 189*   ANIONGAP 5* 7*             Assessment/Plan:      * ESBL (extended spectrum beta-lactamase) producing bacteria infection  Urine cx showed esbl e coli - sent to [facility], [facility] and am[facility] only - needs a tleast 7-10 days iv abxs   Will check with her insurance to see if would have a feasible way to do abxs as outpt  10/27 day #3 of abxs this evening  10/28 day #4 of iv abxs - new uc pending  10/29 [facility]5 [facility]  10/30 D6 [facility], tolerating well   10/31 D7 [facility] - plan to dc in am    Urinary tract infection without hematuria  Cont iv abxs      Diabetes mellitus without complication  Patient's FSGs are controlled on current medication regimen.  Last A1c reviewed-   Lab Results   Component Value Date    HGBA1C 6.4 (H) 09/02/2022     Most recent fingerstick glucose reviewed-   Recent Labs   Lab 10/30/22  1130 10/30/22  1557 10/30/22  1649 10/30/22  2049   POCTGLUCOSE 119* 133* 119* 103     Current correctional scale  High  Maintain anti-hyperglycemic dose as follows-   Antihyperglycemics (From admission, onward)    Start     Stop Route Frequency Ordered    10/29/22 1730  dulaglutide pen injector 0.75 mg         -- SubQ Every 7 days 10/29/22 1701    10/26/22  1315  empagliflozin 10 mg tablet 10 mg         -- Oral Daily 10/26/22 1203    10/25/22 1614  insulin aspart U-100 pen 0-5 Units         -- SubQ Before meals & nightly PRN 10/25/22 1614        Hold Oral hypoglycemics while patient is in the hospital.    Borderline personality disorder  cotn current meds= mood is good and pt is pleasant       JAMESON (nonalcoholic steatohepatitis)  lfts slightly elevated,  continue to monitor        VTE Risk Mitigation (From admission, onward)         Ordered     IP VTE LOW RISK PATIENT  Once         10/25/22 1614     Place sequential compression device  Until discontinued         10/25/22 1614                Discharge Planning   FERNANDO:      Code Status: Full Code   Is the patient medically ready for discharge?:     Reason for patient still in hospital (select all that apply): Treatment  Discharge Plan A: Home with family, Home Health   Discharge Delays: None known at this time              Yasmin Hernandez MD  Department of Hospital Medicine   [facility]

## 2022-10-31 NOTE — SUBJECTIVE & OBJECTIVE
Interval History: Patient seen and examined    Review of Systems   Constitutional:  Negative for activity change, appetite change, chills, fatigue and fever.   Respiratory:  Negative for cough, chest tightness, shortness of breath and wheezing.    Cardiovascular:  Negative for chest pain, palpitations and leg swelling.   Gastrointestinal:  Negative for abdominal pain, blood in stool, constipation, diarrhea, nausea and vomiting.   Genitourinary:  Negative for difficulty urinating, dysuria and flank pain.   Neurological:  Negative for dizziness and headaches.   Psychiatric/Behavioral:  Negative for agitation, behavioral problems and confusion. The patient is not nervous/anxious and is not hyperactive.    Objective:     Vital Signs (Most Recent):  Temp: 96.5 °F (35.8 °C) (10/31/22 0742)  Pulse: 70 (10/31/22 0802)  Resp: 20 (10/31/22 0742)  BP: 124/78 (10/31/22 0802)  SpO2: (!) 93 % (10/31/22 0742)   Vital Signs (24h Range):  Temp:  [96.5 °F (35.8 °C)-99.7 °F (37.6 °C)] 96.5 °F (35.8 °C)  Pulse:  [64-72] 70  Resp:  [18-20] 20  SpO2:  [93 %-97 %] 93 %  BP: ()/(52-78) 124/78     Weight: 75.3 kg (166 lb)  Body mass index is 28.49 kg/m².    Intake/Output Summary (Last 24 hours) at 10/31/2022 0844  Last data filed at 10/31/2022 0602  Gross per 24 hour   Intake 1350 ml   Output 2850 ml   Net -1500 ml        Physical Exam  Constitutional:       General: She is not in acute distress.     Appearance: Normal appearance. She is not ill-appearing or toxic-appearing.   HENT:      Nose: Nose normal.      Mouth/Throat:      Mouth: Mucous membranes are moist.      Pharynx: Oropharynx is clear.   Cardiovascular:      Rate and Rhythm: Normal rate.      Pulses: Normal pulses.      Heart sounds: No murmur heard.  Pulmonary:      Effort: Pulmonary effort is normal.      Breath sounds: Normal breath sounds. No wheezing or rhonchi.   Chest:      Chest wall: No tenderness.   Abdominal:      General: Abdomen is flat. There is no  distension.      Palpations: Abdomen is soft. There is no mass.      Tenderness: There is no abdominal tenderness. There is no right CVA tenderness, left CVA tenderness or guarding.   Musculoskeletal:         General: No tenderness. Normal range of motion.      Cervical back: Normal range of motion and neck supple.      Right lower leg: No edema.      Left lower leg: No edema.   Skin:     General: Skin is warm and dry.      Capillary Refill: Capillary refill takes less than 2 seconds.   Neurological:      General: No focal deficit present.      Mental Status: She is alert and oriented to person, place, and time. Mental status is at baseline.      Motor: No weakness.      Gait: Gait normal.   Psychiatric:         Mood and Affect: Mood normal.         Behavior: Behavior normal.         Thought Content: Thought content normal.         Judgment: Judgment normal.     Significant Labs: All pertinent labs within the past 24 hours have been reviewed.  BMP:   Recent Labs   Lab 10/31/22  0537         K 3.3*   *   CO2 25   BUN 17   CREATININE 0.6   CALCIUM 9.1       CBC:   Recent Labs   Lab 10/30/22  0521   WBC 9.77   HGB 10.2*   HCT 33.2*          CMP:   Recent Labs   Lab 10/30/22  0521 10/31/22  0537    143   K 4.1 3.3*   * 111*   CO2 28 25   * 102   BUN 17 17   CREATININE 0.8 0.6   CALCIUM 8.9 9.1   PROT 7.3 7.4   ALBUMIN 3.2* 3.2*   BILITOT 0.2 0.2   ALKPHOS 262* 250*   * 146*   * 189*   ANIONGAP 5* 7*

## 2022-10-31 NOTE — ASSESSMENT & PLAN NOTE
Patient's FSGs are controlled on current medication regimen.  Last A1c reviewed-   Lab Results   Component Value Date    HGBA1C 6.4 (H) 09/02/2022     Most recent fingerstick glucose reviewed-   Recent Labs   Lab 10/30/22  1130 10/30/22  1557 10/30/22  1649 10/30/22  2049   POCTGLUCOSE 119* 133* 119* 103     Current correctional scale  High  Maintain anti-hyperglycemic dose as follows-   Antihyperglycemics (From admission, onward)    Start     Stop Route Frequency Ordered    10/29/22 1730  dulaglutide pen injector 0.75 mg         -- SubQ Every 7 days 10/29/22 1701    10/26/22 1315  empagliflozin 10 mg tablet 10 mg         -- Oral Daily 10/26/22 1203    10/25/22 1614  insulin aspart U-100 pen 0-5 Units         -- SubQ Before meals & nightly PRN 10/25/22 1614        Hold Oral hypoglycemics while patient is in the hospital.

## 2022-10-31 NOTE — ASSESSMENT & PLAN NOTE
Urine cx showed esbl e coli - sent to [facility], [facility] and [facility] only - needs a tleast 7-10 days iv abxs   Will check with her insurance to see if would have a feasible way to do abxs as outpt  10/27 day #3 of abxs this evening  10/28 day #4 of iv abxs - new uc pending  10/29 D5 [facility]  10/30 D6 [facility], tolerating well   10/31 D7 [facility] - plan to dc in am

## 2022-11-01 VITALS
TEMPERATURE: 98 F | RESPIRATION RATE: 18 BRPM | WEIGHT: 166 LBS | OXYGEN SATURATION: 95 % | SYSTOLIC BLOOD PRESSURE: 113 MMHG | HEART RATE: 69 BPM | DIASTOLIC BLOOD PRESSURE: 69 MMHG | HEIGHT: 64 IN | BODY MASS INDEX: 28.34 KG/M2

## 2022-11-01 LAB
ALBUMIN SERPL BCP-MCNC: 3.3 G/DL (ref 3.5–5.2)
ALP SERPL-CCNC: 273 U/L (ref 55–135)
ALT SERPL W/O P-5'-P-CCNC: 188 U/L (ref 10–44)
ANION GAP SERPL CALC-SCNC: 5 MMOL/L (ref 8–16)
AST SERPL-CCNC: 160 U/L (ref 10–40)
BASOPHILS # BLD AUTO: 0.07 K/UL (ref 0–0.2)
BASOPHILS NFR BLD: 0.7 % (ref 0–1.9)
BILIRUB SERPL-MCNC: 0.2 MG/DL (ref 0.1–1)
BUN SERPL-MCNC: 19 MG/DL (ref 6–20)
CALCIUM SERPL-MCNC: 9.5 MG/DL (ref 8.7–10.5)
CHLORIDE SERPL-SCNC: 110 MMOL/L (ref 95–110)
CO2 SERPL-SCNC: 26 MMOL/L (ref 23–29)
CREAT SERPL-MCNC: 0.5 MG/DL (ref 0.5–1.4)
DIFFERENTIAL METHOD: ABNORMAL
EOSINOPHIL # BLD AUTO: 0.2 K/UL (ref 0–0.5)
EOSINOPHIL NFR BLD: 2.2 % (ref 0–8)
ERYTHROCYTE [DISTWIDTH] IN BLOOD BY AUTOMATED COUNT: 16.1 % (ref 11.5–14.5)
EST. GFR  (NO RACE VARIABLE): >60 ML/MIN/1.73 M^2
GLUCOSE SERPL-MCNC: 110 MG/DL (ref 70–110)
HCT VFR BLD AUTO: 33.3 % (ref 37–48.5)
HGB BLD-MCNC: 10.5 G/DL (ref 12–16)
IMM GRANULOCYTES # BLD AUTO: 0.03 K/UL (ref 0–0.04)
IMM GRANULOCYTES NFR BLD AUTO: 0.3 % (ref 0–0.5)
LYMPHOCYTES # BLD AUTO: 4.6 K/UL (ref 1–4.8)
LYMPHOCYTES NFR BLD: 42.5 % (ref 18–48)
MCH RBC QN AUTO: 24.8 PG (ref 27–31)
MCHC RBC AUTO-ENTMCNC: 31.5 G/DL (ref 32–36)
MCV RBC AUTO: 79 FL (ref 82–98)
MONOCYTES # BLD AUTO: 1 K/UL (ref 0.3–1)
MONOCYTES NFR BLD: 9.4 % (ref 4–15)
NEUTROPHILS # BLD AUTO: 4.8 K/UL (ref 1.8–7.7)
NEUTROPHILS NFR BLD: 44.9 % (ref 38–73)
NRBC BLD-RTO: 0 /100 WBC
PLATELET # BLD AUTO: 243 K/UL (ref 150–450)
PMV BLD AUTO: 11.5 FL (ref 9.2–12.9)
POTASSIUM SERPL-SCNC: 3.7 MMOL/L (ref 3.5–5.1)
PROT SERPL-MCNC: 7.8 G/DL (ref 6–8.4)
RBC # BLD AUTO: 4.24 M/UL (ref 4–5.4)
SODIUM SERPL-SCNC: 141 MMOL/L (ref 136–145)
WBC # BLD AUTO: 10.76 K/UL (ref 3.9–12.7)

## 2022-11-01 PROCEDURE — 63600175 PHARM REV CODE 636 W HCPCS: Performed by: INTERNAL MEDICINE

## 2022-11-01 PROCEDURE — 25000003 PHARM REV CODE 250: Performed by: INTERNAL MEDICINE

## 2022-11-01 PROCEDURE — G0378 HOSPITAL OBSERVATION PER HR: HCPCS

## 2022-11-01 PROCEDURE — 96366 THER/PROPH/DIAG IV INF ADDON: CPT

## 2022-11-01 PROCEDURE — A4216 STERILE WATER/SALINE, 10 ML: HCPCS | Performed by: EMERGENCY MEDICINE

## 2022-11-01 PROCEDURE — 25000003 PHARM REV CODE 250: Performed by: EMERGENCY MEDICINE

## 2022-11-01 PROCEDURE — 80053 COMPREHEN METABOLIC PANEL: CPT | Performed by: INTERNAL MEDICINE

## 2022-11-01 PROCEDURE — 25000242 PHARM REV CODE 250 ALT 637 W/ HCPCS: Performed by: INTERNAL MEDICINE

## 2022-11-01 PROCEDURE — 36415 COLL VENOUS BLD VENIPUNCTURE: CPT | Performed by: INTERNAL MEDICINE

## 2022-11-01 PROCEDURE — 85025 COMPLETE CBC W/AUTO DIFF WBC: CPT | Performed by: INTERNAL MEDICINE

## 2022-11-01 RX ORDER — NITROFURANTOIN 25; 75 MG/1; MG/1
100 CAPSULE ORAL NIGHTLY
Qty: 90 CAPSULE | Refills: 3 | Status: SHIPPED | OUTPATIENT
Start: 2022-11-01

## 2022-11-01 RX ADMIN — ATORVASTATIN CALCIUM 40 MG: 40 TABLET, FILM COATED ORAL at 08:11

## 2022-11-01 RX ADMIN — EMPAGLIFLOZIN 10 MG: 10 TABLET, FILM COATED ORAL at 08:11

## 2022-11-01 RX ADMIN — LUBIPROSTONE 24 MCG: 24 CAPSULE, GELATIN COATED ORAL at 08:11

## 2022-11-01 RX ADMIN — MEROPENEM AND SODIUM CHLORIDE 1 G: 1 INJECTION, SOLUTION INTRAVENOUS at 07:11

## 2022-11-01 RX ADMIN — AMLODIPINE BESYLATE 10 MG: 10 TABLET ORAL at 08:11

## 2022-11-01 RX ADMIN — FUROSEMIDE 20 MG: 20 TABLET ORAL at 08:11

## 2022-11-01 RX ADMIN — SERTRALINE HYDROCHLORIDE 150 MG: 100 TABLET ORAL at 08:11

## 2022-11-01 RX ADMIN — METOPROLOL TARTRATE 100 MG: 100 TABLET, FILM COATED ORAL at 08:11

## 2022-11-01 RX ADMIN — PREGABALIN 100 MG: 50 CAPSULE ORAL at 08:11

## 2022-11-01 RX ADMIN — BUSPIRONE HYDROCHLORIDE 30 MG: 7.5 TABLET ORAL at 08:11

## 2022-11-01 RX ADMIN — Medication 10 ML: at 08:11

## 2022-11-01 NOTE — DISCHARGE SUMMARY
[facility] Medicine  Discharge Summary      Patient Name: Rohini Dang  MRN: [id number]  Patient Class: OP- Observation  Admission Date: 10/25/2022  Hospital Length of Stay: 0 days  Discharge Date and Time:  11/01/2022 8:49 AM  Attending Physician: Yasmin Hernandez MD   Discharging Provider: Yasmin Hernandez MD  Primary Care Provider: Skip Celestin NP      HPI:   43 yo female with ESBL UTI failed macrobid and bactrim as outpatient here for IV abx. No fever. No flank or abd pain. No N/V/D. Gradual onset. Similar to previous. Endorses dysuria, frequency.     Pt has recently had an acute event of renal failure which has resolved.  Pt reports not having transportation to be able to get her abxs as a oupt.  We will also look into other options for getting her abxs at home. Otherwise pt will need 7-10 days of iv abxs to ensue resolution of uti      * No surgery found *      Hospital Course:   10/27 [place] Pt is feelign well - no complaints today - tolerating iv abxs  10/28 ND pt feeling well - no longer having dysuria and frequency  10/29 [place] denies symptoms, ESBL UTI D5 merem, tolerating well, afebrile  10/30 [place] No new complaints. Completing IV abx D6 [facility]  10/31 ND feelign good - no new problems - cont current iv abxs and plan to dc in am  11/1 ND doing well - ready to go home- completed 7 days of iv [facility] for esbl kleb       Goals of Care Treatment Preferences:  Code Status: Full Code      Consults:     * ESBL (extended spectrum beta-lactamase) producing bacteria infection  Urine cx showed esbl e coli - sent to [facility], invanz and amikacin only - needs a tleast 7-10 days iv abxs   Will check with her insurance to see if would have a feasible way to do abxs as outpt  10/27 day #3 of abxs this evening  10/28 day #4 of iv abxs - new uc pending  10/29 D5 [facility]  10/30 D6 [facility], tolerating well   10/31 D7 [facility] - plan to dc in am  11/1 completed 7 days of iv [facility] - dc home - macrobid qhs  for uti prevention    Urinary tract infection without hematuria  complete abxs      Diabetes mellitus without complication  Patient's FSGs are controlled on current medication regimen.  Last A1c reviewed-   Lab Results   Component Value Date    HGBA1C 6.4 (H) 09/02/2022     Most recent fingerstick glucose reviewed-   Recent Labs   Lab 10/31/22  1051 10/31/22  1548 10/31/22  1921   POCTGLUCOSE 98 130* 164*     Current correctional scale  High  Maintain anti-hyperglycemic dose as follows-   Antihyperglycemics (From admission, onward)    Start     Stop Route Frequency Ordered    10/29/22 1730  dulaglutide pen injector 0.75 mg         -- SubQ Every 7 days 10/29/22 1701    10/26/22 1315  empagliflozin 10 mg tablet 10 mg         -- Oral Daily 10/26/22 1203    10/25/22 1614  insulin aspart U-100 pen 0-5 Units         -- SubQ Before meals & nightly PRN 10/25/22 1614        Hold Oral hypoglycemics while patient is in the hospital.    Borderline personality disorder  cotn current meds= mood is good and pt is pleasant       JAMESON (nonalcoholic steatohepatitis)  lfts slightly elevated,  continue to monitor        Final Active Diagnoses:    Diagnosis Date Noted POA    PRINCIPAL PROBLEM:  ESBL (extended spectrum beta-lactamase) producing bacteria infection [A49.9, Z16.12] 10/26/2022 Yes    Urinary tract infection without hematuria [N39.0] 10/26/2022 Yes    Diabetes mellitus without complication [E11.9] 07/15/2022 Yes    Borderline personality disorder [F60.3] 07/14/2022 Yes    JAMESON (nonalcoholic steatohepatitis) [K75.81] 04/07/2016 Yes      Problems Resolved During this Admission:       Discharged Condition: good    Disposition: Home or Self Care    Follow Up:   Follow-up Information     Skip Celestin NP Follow up in 2 week(s).    Specialty: Emergency Medicine  Contact information:  [address]  [place] [address] [phone]                       Patient Instructions:      Diet diabetic     Activity as  tolerated       Significant Diagnostic Studies: Labs:   CMP   Recent Labs   Lab 10/31/22  0537 11/01/22  0527    141   K 3.3* 3.7   * 110   CO2 25 26    110   BUN 17 19   CREATININE 0.6 0.5   CALCIUM 9.1 9.5   PROT 7.4 7.8   ALBUMIN 3.2* 3.3*   BILITOT 0.2 0.2   ALKPHOS 250* 273*   * 160*   * 188*   ANIONGAP 7* 5*   , CBC   Recent Labs   Lab 11/01/22  0528   WBC 10.76   HGB 10.5*   HCT 33.3*       and All labs within the past 24 hours have been reviewed    Pending Diagnostic Studies:     None         Medications:  Reconciled Home Medications:      Medication List      CHANGE how you take these medications    nitrofurantoin (macrocrystal-monohydrate) 100 MG capsule  Commonly known as: MACROBID  Take 1 capsule (100 mg total) by mouth every evening.  What changed: when to take this        CONTINUE taking these medications    ACCU-CHEK KERRY PLUS TEST STRP Strp  Generic drug: blood sugar diagnostic  USE 1 STRIP TO CHECK GLUCOSE TWICE DAILY     albuterol 90 mcg/actuation inhaler  Commonly known as: PROVENTIL/VENTOLIN HFA  Inhale 1-2 puffs into the lungs every 6 (six) hours as needed. Rescue     amLODIPine 10 MG tablet  Commonly known as: NORVASC  Take 1 tablet (10 mg total) by mouth once daily.     busPIRone 30 MG Tab  Commonly known as: BUSPAR  Take 30 mg by mouth 2 (two) times daily.     diclofenac 50 MG EC tablet  Commonly known as: VOLTAREN  Take 50 mg by mouth 3 (three) times daily as needed.     empagliflozin 10 mg tablet  Commonly known as: JARDIANCE  Take 1 tablet (10 mg total) by mouth once daily.     furosemide 20 MG tablet  Commonly known as: LASIX  Take 20 mg by mouth daily as needed.     hydrOXYzine HCL 25 MG tablet  Commonly known as: ATARAX  TAKE 1 TABLET BY MOUTH EVERY 6 HOURS FOR 7 DAYS AS NEEDED FOR ITCHING     LATUDA 80 mg Tab tablet  Generic drug: lurasidone  Take 80 mg by mouth every evening.     LIDOcaine 5 %  Commonly known as: LIDODERM  SMARTSIG:Topical      lubiprostone 24 MCG Cap  Commonly known as: AMITIZA  Take 1 capsule (24 mcg total) by mouth 2 (two) times daily.     metFORMIN 1000 MG tablet  Commonly known as: GLUCOPHAGE  TAKE 1 TABLET(1000 MG) BY MOUTH TWICE DAILY WITH MEALS     metoprolol tartrate 100 MG tablet  Commonly known as: LOPRESSOR  Take 100 mg by mouth 2 (two) times daily.     mirtazapine 30 MG tablet  Commonly known as: REMERON  Take 30 mg by mouth every evening.     montelukast 10 mg tablet  Commonly known as: SINGULAIR  Take 10 mg by mouth every evening.     nortriptyline 50 MG capsule  Commonly known as: PAMELOR  TAKE 1 CAPSULE BY MOUTH IN THE EVENING     promethazine 25 MG tablet  Commonly known as: PHENERGAN  Take 1 tablet (25 mg total) by mouth every 4 to 6 hours as needed.     rosuvastatin 40 MG Tab  Commonly known as: CRESTOR  Take 40 mg by mouth once daily.     sertraline 100 MG tablet  Commonly known as: ZOLOFT  Take 150 mg by mouth once daily.     tiZANidine 2 MG tablet  Commonly known as: ZANAFLEX  Take 2 mg by mouth 3 (three) times daily as needed.     topiramate 200 MG Tab  Commonly known as: TOPAMAX  Take 200 mg by mouth nightly.     TRULICITY 0.75 mg/0.5 mL pen injector  Generic drug: dulaglutide  Inject 0.75 mg into the skin every 7 days.        STOP taking these medications    cyclobenzaprine 10 MG tablet  Commonly known as: FLEXERIL     sulfamethoxazole-trimethoprim 800-160mg 800-160 mg Tab  Commonly known as: BACTRIM DS        ASK your doctor about these medications    pregabalin 100 MG capsule  Commonly known as: LYRICA  TAKE 1 CAPSULE(100 MG) BY MOUTH TWICE DAILY            Indwelling Lines/Drains at time of discharge:   Lines/Drains/Airways     None                 Time spent on the discharge of patient: 25 minutes         Yasmin Hernandez MD  Department of Hospital Medicine  [facility]

## 2022-11-01 NOTE — ASSESSMENT & PLAN NOTE
Patient's FSGs are controlled on current medication regimen.  Last A1c reviewed-   Lab Results   Component Value Date    HGBA1C 6.4 (H) 09/02/2022     Most recent fingerstick glucose reviewed-   Recent Labs   Lab 10/31/22  1051 10/31/22  1548 10/31/22  1921   POCTGLUCOSE 98 130* 164*     Current correctional scale  High  Maintain anti-hyperglycemic dose as follows-   Antihyperglycemics (From admission, onward)    Start     Stop Route Frequency Ordered    10/29/22 1730  dulaglutide pen injector 0.75 mg         -- SubQ Every 7 days 10/29/22 1701    10/26/22 1315  empagliflozin 10 mg tablet 10 mg         -- Oral Daily 10/26/22 1203    10/25/22 1614  insulin aspart U-100 pen 0-5 Units         -- SubQ Before meals & nightly PRN 10/25/22 1614        Hold Oral hypoglycemics while patient is in the hospital.

## 2022-11-01 NOTE — ASSESSMENT & PLAN NOTE
Urine cx showed esbl e coli - sent to [facility], invanz and amikacin only - needs a tleast 7-10 days iv abxs   Will check with her insurance to see if would have a feasible way to do abxs as outpt  10/27 day #3 of abxs this evening  10/28 day #4 of iv abxs - new uc pending  10/29 D5 [facility]  10/30 D6 [facility], tolerating well   10/31 D7 [facility] - plan to dc in am  11/1 completed 7 days of iv [facility] - dc home - macrobid qhs for uti prevention

## 2022-11-16 ENCOUNTER — HOSPITAL ENCOUNTER (OUTPATIENT)
Dept: RADIOLOGY | Facility: HOSPITAL | Age: 42
Discharge: HOME | End: 2022-11-16
Attending: PHYSICIAN ASSISTANT
Payer: MEDICAID

## 2022-11-16 DIAGNOSIS — K75.81 NASH (NONALCOHOLIC STEATOHEPATITIS): ICD-10-CM

## 2022-11-16 PROCEDURE — 76705 ECHO EXAM OF ABDOMEN: CPT | Mod: TC

## 2022-11-18 ENCOUNTER — OFFICE VISIT (OUTPATIENT)
Dept: OBSTETRICS AND GYNECOLOGY | Facility: CLINIC | Age: 42
End: 2022-11-18
Payer: MEDICAID

## 2022-11-18 DIAGNOSIS — Z78.0 POSTMENOPAUSAL: ICD-10-CM

## 2022-11-18 DIAGNOSIS — R23.2 HOT FLASHES: Primary | ICD-10-CM

## 2022-11-18 LAB — TSH SERPL DL<=0.005 MIU/L-ACNC: 1.44 UIU/ML (ref 0.4–4)

## 2022-11-18 PROCEDURE — 99213 OFFICE O/P EST LOW 20 MIN: CPT | Mod: PBBFAC | Performed by: OBSTETRICS & GYNECOLOGY

## 2022-11-18 PROCEDURE — 99213 PR OFFICE/OUTPT VISIT, EST, LEVL III, 20-29 MIN: ICD-10-PCS | Mod: S$PBB,,, | Performed by: OBSTETRICS & GYNECOLOGY

## 2022-11-18 PROCEDURE — 3066F NEPHROPATHY DOC TX: CPT | Mod: CPTII,,, | Performed by: OBSTETRICS & GYNECOLOGY

## 2022-11-18 PROCEDURE — 3066F PR DOCUMENTATION OF TREATMENT FOR NEPHROPATHY: ICD-10-PCS | Mod: CPTII,,, | Performed by: OBSTETRICS & GYNECOLOGY

## 2022-11-18 PROCEDURE — 1159F MED LIST DOCD IN RCRD: CPT | Mod: CPTII,,, | Performed by: OBSTETRICS & GYNECOLOGY

## 2022-11-18 PROCEDURE — 99213 OFFICE O/P EST LOW 20 MIN: CPT | Mod: S$PBB,,, | Performed by: OBSTETRICS & GYNECOLOGY

## 2022-11-18 PROCEDURE — 3044F HG A1C LEVEL LT 7.0%: CPT | Mod: CPTII,,, | Performed by: OBSTETRICS & GYNECOLOGY

## 2022-11-18 PROCEDURE — 84144 ASSAY OF PROGESTERONE: CPT | Performed by: OBSTETRICS & GYNECOLOGY

## 2022-11-18 PROCEDURE — 1159F PR MEDICATION LIST DOCUMENTED IN MEDICAL RECORD: ICD-10-PCS | Mod: CPTII,,, | Performed by: OBSTETRICS & GYNECOLOGY

## 2022-11-18 PROCEDURE — 3044F PR MOST RECENT HEMOGLOBIN A1C LEVEL <7.0%: ICD-10-PCS | Mod: CPTII,,, | Performed by: OBSTETRICS & GYNECOLOGY

## 2022-11-18 PROCEDURE — 1160F RVW MEDS BY RX/DR IN RCRD: CPT | Mod: CPTII,,, | Performed by: OBSTETRICS & GYNECOLOGY

## 2022-11-18 PROCEDURE — 83001 ASSAY OF GONADOTROPIN (FSH): CPT | Performed by: OBSTETRICS & GYNECOLOGY

## 2022-11-18 PROCEDURE — 3060F PR POS MICROALBUMINURIA RESULT DOCUMENTED/REVIEW: ICD-10-PCS | Mod: CPTII,,, | Performed by: OBSTETRICS & GYNECOLOGY

## 2022-11-18 PROCEDURE — 99999 PR PBB SHADOW E&M-EST. PATIENT-LVL III: ICD-10-PCS | Mod: PBBFAC,,, | Performed by: OBSTETRICS & GYNECOLOGY

## 2022-11-18 PROCEDURE — 84443 ASSAY THYROID STIM HORMONE: CPT | Performed by: OBSTETRICS & GYNECOLOGY

## 2022-11-18 PROCEDURE — 99999 PR PBB SHADOW E&M-EST. PATIENT-LVL III: CPT | Mod: PBBFAC,,, | Performed by: OBSTETRICS & GYNECOLOGY

## 2022-11-18 PROCEDURE — 3060F POS MICROALBUMINURIA REV: CPT | Mod: CPTII,,, | Performed by: OBSTETRICS & GYNECOLOGY

## 2022-11-18 PROCEDURE — 1160F PR REVIEW ALL MEDS BY PRESCRIBER/CLIN PHARMACIST DOCUMENTED: ICD-10-PCS | Mod: CPTII,,, | Performed by: OBSTETRICS & GYNECOLOGY

## 2022-11-18 PROCEDURE — 82670 ASSAY OF TOTAL ESTRADIOL: CPT | Performed by: OBSTETRICS & GYNECOLOGY

## 2022-11-18 RX ORDER — ESTRADIOL 1 MG/1
1 TABLET ORAL DAILY
Qty: 30 TABLET | Refills: 11 | Status: SHIPPED | OUTPATIENT
Start: 2022-11-18 | End: 2023-04-05 | Stop reason: SDUPTHER

## 2022-11-18 NOTE — PROGRESS NOTES
Subjective:       Patient ID: Rohini Dang is a 42 y.o. female.    Chief Complaint:  No chief complaint on file.      History of Present Illness  Patient with a h/o a CHAVA/BSO and stopped her HRT 1 year ago is c/o hot flashes and was on Premarin before and said she still had hot flashes.  She said she had her hysterectomy/BSO at 30 yrs of age.     Menstrual History:  OB History          3    Para   3    Term   3            AB        Living             SAB        IAB        Ectopic        Multiple        Live Births                    Menarche age:   No LMP recorded. Patient has had a hysterectomy.         Review of Systems  Review of Systems   Constitutional:  Negative for chills and fever.   HENT:  Negative for sore throat.    Respiratory:  Negative for cough and shortness of breath.    Cardiovascular:  Negative for chest pain.   Gastrointestinal:  Negative for constipation, diarrhea, nausea and vomiting.   Endocrine: Positive for heat intolerance. Negative for cold intolerance.   Genitourinary:  Negative for dysuria, pelvic pain, urgency, vaginal bleeding and vaginal discharge.   Musculoskeletal:  Negative for arthralgias.   Neurological:  Negative for syncope and headaches.   Psychiatric/Behavioral:  Negative for suicidal ideas.          Objective:      Physical Exam  Vitals and nursing note reviewed.   Constitutional:       Appearance: Normal appearance. She is obese.   HENT:      Head: Normocephalic and atraumatic.   Pulmonary:      Effort: Pulmonary effort is normal.   Musculoskeletal:      Cervical back: Normal range of motion.   Skin:     General: Skin is warm and dry.   Neurological:      General: No focal deficit present.      Mental Status: She is alert and oriented to person, place, and time.      Gait: Gait normal.   Psychiatric:         Mood and Affect: Mood normal.         Behavior: Behavior normal.         Judgment: Judgment normal.           Assessment:        1. Hot flashes     2. Postmenopausal                Plan:         Diagnoses and all orders for this visit:    Hot flashes  -     Follicle Stimulating Hormone; Future  -     Progesterone; Future  -     Estradiol; Future  -     TSH; Future  -     TSH  -     Estradiol  -     Progesterone  -     Follicle Stimulating Hormone  -     estradioL (ESTRACE) 1 MG tablet; Take 1 tablet (1 mg total) by mouth once daily.    Postmenopausal  -     estradioL (ESTRACE) 1 MG tablet; Take 1 tablet (1 mg total) by mouth once daily.     - Discussed HRT and the risks and sent estrogen to her pharmacy today as well as mukund labs to make sure she is for sure postmenopausal. Will request her records from surgery as well.

## 2022-11-19 LAB
ESTRADIOL SERPL-MCNC: 11 PG/ML
FSH SERPL-ACNC: 56.36 MIU/ML
PROGEST SERPL-MCNC: <0.1 NG/ML

## 2022-11-22 NOTE — PROGRESS NOTES
Please call patient regarding results.  Labs suggest postmenopausal range. Continue estradiol tablets that were previously prescribed

## 2022-11-28 ENCOUNTER — LAB VISIT (OUTPATIENT)
Dept: LAB | Facility: HOSPITAL | Age: 42
End: 2022-11-28
Attending: INTERNAL MEDICINE
Payer: MEDICAID

## 2022-11-28 DIAGNOSIS — N17.9 ACUTE KIDNEY FAILURE, UNSPECIFIED: ICD-10-CM

## 2022-11-28 DIAGNOSIS — N18.6 TYPE 2 DIABETES MELLITUS WITH END-STAGE RENAL DISEASE: ICD-10-CM

## 2022-11-28 DIAGNOSIS — E11.22 TYPE 2 DIABETES MELLITUS WITH END-STAGE RENAL DISEASE: ICD-10-CM

## 2022-11-28 DIAGNOSIS — I12.9 PARENCHYMAL RENAL HYPERTENSION: ICD-10-CM

## 2022-11-28 DIAGNOSIS — N39.0 URINARY TRACT INFECTION, SITE NOT SPECIFIED: Primary | ICD-10-CM

## 2022-11-28 LAB
ALBUMIN SERPL BCP-MCNC: 3.7 G/DL (ref 3.5–5.2)
ALP SERPL-CCNC: 211 U/L (ref 55–135)
ALT SERPL W/O P-5'-P-CCNC: 110 U/L (ref 10–44)
ANION GAP SERPL CALC-SCNC: 3 MMOL/L (ref 8–16)
AST SERPL-CCNC: 91 U/L (ref 10–40)
BACTERIA #/AREA URNS HPF: NEGATIVE /HPF
BASOPHILS # BLD AUTO: 0.05 K/UL (ref 0–0.2)
BASOPHILS NFR BLD: 0.5 % (ref 0–1.9)
BILIRUB SERPL-MCNC: 0.2 MG/DL (ref 0.1–1)
BILIRUB UR QL STRIP: NEGATIVE
BUN SERPL-MCNC: 18 MG/DL (ref 6–20)
CALCIUM SERPL-MCNC: 9.1 MG/DL (ref 8.7–10.5)
CHLORIDE SERPL-SCNC: 113 MMOL/L (ref 95–110)
CLARITY UR: ABNORMAL
CO2 SERPL-SCNC: 25 MMOL/L (ref 23–29)
COLOR UR: YELLOW
CREAT SERPL-MCNC: 1 MG/DL (ref 0.5–1.4)
DIFFERENTIAL METHOD: ABNORMAL
EOSINOPHIL # BLD AUTO: 0.2 K/UL (ref 0–0.5)
EOSINOPHIL NFR BLD: 1.6 % (ref 0–8)
ERYTHROCYTE [DISTWIDTH] IN BLOOD BY AUTOMATED COUNT: 16.5 % (ref 11.5–14.5)
EST. GFR  (NO RACE VARIABLE): >60 ML/MIN/1.73 M^2
GLUCOSE SERPL-MCNC: 110 MG/DL (ref 70–110)
GLUCOSE UR QL STRIP: ABNORMAL
HCT VFR BLD AUTO: 34.9 % (ref 37–48.5)
HGB BLD-MCNC: 10.7 G/DL (ref 12–16)
HGB UR QL STRIP: NEGATIVE
HYALINE CASTS #/AREA URNS LPF: 2.3 /LPF
IMM GRANULOCYTES # BLD AUTO: 0.02 K/UL (ref 0–0.04)
IMM GRANULOCYTES NFR BLD AUTO: 0.2 % (ref 0–0.5)
KETONES UR QL STRIP: NEGATIVE
LEUKOCYTE ESTERASE UR QL STRIP: NEGATIVE
LYMPHOCYTES # BLD AUTO: 4 K/UL (ref 1–4.8)
LYMPHOCYTES NFR BLD: 41.3 % (ref 18–48)
MCH RBC QN AUTO: 24.2 PG (ref 27–31)
MCHC RBC AUTO-ENTMCNC: 30.7 G/DL (ref 32–36)
MCV RBC AUTO: 79 FL (ref 82–98)
MICROSCOPIC COMMENT: ABNORMAL
MONOCYTES # BLD AUTO: 0.8 K/UL (ref 0.3–1)
MONOCYTES NFR BLD: 8.5 % (ref 4–15)
NEUTROPHILS # BLD AUTO: 4.7 K/UL (ref 1.8–7.7)
NEUTROPHILS NFR BLD: 47.9 % (ref 38–73)
NITRITE UR QL STRIP: NEGATIVE
NRBC BLD-RTO: 0 /100 WBC
PH UR STRIP: 7 [PH] (ref 5–8)
PLATELET # BLD AUTO: 232 K/UL (ref 150–450)
PMV BLD AUTO: 11.6 FL (ref 9.2–12.9)
POTASSIUM SERPL-SCNC: 4.2 MMOL/L (ref 3.5–5.1)
PROT SERPL-MCNC: 8.1 G/DL (ref 6–8.4)
PROT UR QL STRIP: ABNORMAL
RBC # BLD AUTO: 4.42 M/UL (ref 4–5.4)
RBC #/AREA URNS HPF: 1 /HPF (ref 0–4)
SODIUM SERPL-SCNC: 141 MMOL/L (ref 136–145)
SP GR UR STRIP: 1.02 (ref 1–1.03)
SQUAMOUS #/AREA URNS HPF: 5 /HPF
URN SPEC COLLECT METH UR: ABNORMAL
UROBILINOGEN UR STRIP-ACNC: 1 EU/DL
WBC # BLD AUTO: 9.76 K/UL (ref 3.9–12.7)
WBC #/AREA URNS HPF: 1 /HPF (ref 0–5)
YEAST URNS QL MICRO: ABNORMAL

## 2022-11-28 PROCEDURE — 36415 COLL VENOUS BLD VENIPUNCTURE: CPT | Performed by: INTERNAL MEDICINE

## 2022-11-28 PROCEDURE — 80053 COMPREHEN METABOLIC PANEL: CPT | Performed by: INTERNAL MEDICINE

## 2022-11-28 PROCEDURE — 85025 COMPLETE CBC W/AUTO DIFF WBC: CPT | Performed by: INTERNAL MEDICINE

## 2022-11-28 PROCEDURE — 81000 URINALYSIS NONAUTO W/SCOPE: CPT | Performed by: INTERNAL MEDICINE

## 2023-01-09 ENCOUNTER — HOSPITAL ENCOUNTER (EMERGENCY)
Facility: HOSPITAL | Age: 43
Discharge: HOME | End: 2023-01-09
Attending: EMERGENCY MEDICINE
Payer: MEDICAID

## 2023-01-09 VITALS
WEIGHT: 176 LBS | RESPIRATION RATE: 18 BRPM | HEART RATE: 105 BPM | TEMPERATURE: 99 F | OXYGEN SATURATION: 100 % | SYSTOLIC BLOOD PRESSURE: 124 MMHG | BODY MASS INDEX: 30.21 KG/M2 | DIASTOLIC BLOOD PRESSURE: 95 MMHG

## 2023-01-09 DIAGNOSIS — R19.7 DIARRHEA, UNSPECIFIED TYPE: ICD-10-CM

## 2023-01-09 DIAGNOSIS — J32.9 SINUSITIS, UNSPECIFIED CHRONICITY, UNSPECIFIED LOCATION: Primary | ICD-10-CM

## 2023-01-09 LAB
CTP QC/QA: YES
CTP QC/QA: YES
POC MOLECULAR INFLUENZA A AGN: NEGATIVE
POC MOLECULAR INFLUENZA B AGN: NEGATIVE
SARS-COV-2 RDRP RESP QL NAA+PROBE: NEGATIVE

## 2023-01-09 PROCEDURE — 99284 EMERGENCY DEPT VISIT MOD MDM: CPT

## 2023-01-09 PROCEDURE — 63600175 PHARM REV CODE 636 W HCPCS: Performed by: CLINICAL NURSE SPECIALIST

## 2023-01-09 PROCEDURE — 87635 SARS-COV-2 COVID-19 AMP PRB: CPT | Performed by: CLINICAL NURSE SPECIALIST

## 2023-01-09 PROCEDURE — 96372 THER/PROPH/DIAG INJ SC/IM: CPT | Performed by: CLINICAL NURSE SPECIALIST

## 2023-01-09 PROCEDURE — 87502 INFLUENZA DNA AMP PROBE: CPT

## 2023-01-09 RX ORDER — DIPHENOXYLATE HYDROCHLORIDE AND ATROPINE SULFATE 2.5; .025 MG/1; MG/1
1 TABLET ORAL 4 TIMES DAILY PRN
Qty: 10 TABLET | Refills: 0 | Status: SHIPPED | OUTPATIENT
Start: 2023-01-09 | End: 2023-01-19

## 2023-01-09 RX ORDER — AZITHROMYCIN 250 MG/1
250 TABLET, FILM COATED ORAL DAILY
Qty: 6 TABLET | Refills: 0 | Status: SHIPPED | OUTPATIENT
Start: 2023-01-09 | End: 2023-03-21

## 2023-01-09 RX ORDER — DEXAMETHASONE SODIUM PHOSPHATE 4 MG/ML
4 INJECTION, SOLUTION INTRA-ARTICULAR; INTRALESIONAL; INTRAMUSCULAR; INTRAVENOUS; SOFT TISSUE ONCE
Status: COMPLETED | OUTPATIENT
Start: 2023-01-09 | End: 2023-01-09

## 2023-01-09 RX ADMIN — DEXAMETHASONE SODIUM PHOSPHATE 4 MG: 4 INJECTION, SOLUTION INTRA-ARTICULAR; INTRALESIONAL; INTRAMUSCULAR; INTRAVENOUS; SOFT TISSUE at 08:01

## 2023-01-09 NOTE — Clinical Note
"Rohini Mcnally"Laurence was seen and treated in our emergency department on 1/9/2023.  She may return to work on 01/11/2023.       If you have any questions or concerns, please don't hesitate to call.      elizabeth DUNN    "

## 2023-01-10 NOTE — ED PROVIDER NOTES
Encounter Date: 1/9/2023       History     Chief Complaint   Patient presents with    Sinusitis     Sinus headache with diarrhea x3 days     42-year-old female presents to the emergency room with sinus headache, body aches, nausea, diarrhea for the last 3 days.  History of anemia, anxiety, bipolar, personality disorder.    Review of patient's allergies indicates:   Allergen Reactions    Amoxicillin Hives    Avelox [moxifloxacin] Hives    Clarinex [desloratadine] Hives    Pcn [penicillins] Hives    Claritin [loratadine] Palpitations    Latex, natural rubber Rash     Past Medical History:   Diagnosis Date    Anemia     Anxiety disorder     Arthritis     Bipolar 2 disorder     Borderline personality disorder     Chronic bilateral low back pain without sciatica     Depression with anxiety     Diabetes mellitus     Disorder of kidney and ureter     Elevated LFTs     Esophageal stenosis     Fatty liver     Fibromyalgia     Fibromyalgia     High cholesterol     Hypertension     Hypoglycemia     Intermittent explosive disorder     Liver disease     JAMESON (nonalcoholic steatohepatitis)      Past Surgical History:   Procedure Laterality Date    COLONOSCOPY N/A 3/20/2018    Procedure: COLONOSCOPY;  Surgeon: Janelle Meade MD;  Location: [facility];  Service: Endoscopy;  Laterality: N/A;    COLONOSCOPY N/A 5/11/2021    Procedure: COLONOSCOPY;  Surgeon: Janelle Meade MD;  Location: [facility];  Service: Endoscopy;  Laterality: N/A;    ESOPHAGEAL DILATION      x2    ESOPHAGOGASTRODUODENOSCOPY N/A 5/7/2019    Procedure: EGD (ESOPHAGOGASTRODUODENOSCOPY);  Surgeon: Janelle Meade MD;  Location: [facility];  Service: Endoscopy;  Laterality: N/A;    ESOPHAGOGASTRODUODENOSCOPY N/A 5/11/2021    Procedure: EGD (ESOPHAGOGASTRODUODENOSCOPY);  Surgeon: Janelle Meade MD;  Location: [facility];  Service: Endoscopy;  Laterality: N/A;  Rapid on arrival    HIATAL HERNIA REPAIR      HYSTERECTOMY      LIVER BIOPSY   "2018    fibrosis stage 3 JAMESON    OOPHORECTOMY      TUBAL LIGATION      UPPER GASTROINTESTINAL ENDOSCOPY      2013? unable to obtain records     Family History   Problem Relation Age of Onset    Hypertension Mother     Clotting disorder Father     Ulcers Father     Clotting disorder Sister     Cancer Maternal Grandmother         "female Cancer"    Lung cancer Paternal Grandmother     Diabetes Maternal Aunt     No Known Problems Daughter     No Known Problems Maternal Uncle     No Known Problems Paternal Aunt     No Known Problems Paternal Uncle     No Known Problems Maternal Grandfather     No Known Problems Paternal Grandfather     Breast cancer Neg Hx     Ovarian cancer Neg Hx     BRCA 1/2 Neg Hx      Social History     Tobacco Use    Smoking status: Never    Smokeless tobacco: Never   Substance Use Topics    Alcohol use: No     Alcohol/week: 0.0 standard drinks    Drug use: No     Review of Systems   Constitutional:  Negative for fever.   HENT:  Positive for congestion. Negative for sore throat.    Respiratory:  Negative for shortness of breath.    Cardiovascular:  Negative for chest pain.   Gastrointestinal:  Positive for diarrhea and nausea.   Genitourinary:  Negative for dysuria.   Musculoskeletal:  Positive for arthralgias. Negative for back pain.   Skin:  Negative for rash.   Neurological:  Positive for headaches. Negative for weakness.   Hematological:  Does not bruise/bleed easily.   All other systems reviewed and are negative.    Physical Exam     Initial Vitals [01/09/23 2008]   BP Pulse Resp Temp SpO2   (!) 124/95 105 18 98.5 °F (36.9 °C) 100 %      MAP       --         Physical Exam    Nursing note and vitals reviewed.  Constitutional: She appears well-developed and well-nourished.   HENT:   Head: Normocephalic and atraumatic.   Eyes: Pupils are equal, round, and reactive to light.   Neck:   Normal range of motion.  Cardiovascular:  Normal rate and regular rhythm.           Pulmonary/Chest: Breath " sounds normal.   Abdominal: Abdomen is soft. Bowel sounds are normal.   Musculoskeletal:         General: Normal range of motion.      Cervical back: Normal range of motion.     Neurological: She is alert and oriented to person, place, and time.   Skin: Skin is warm and dry.   Psychiatric: She has a normal mood and affect.       ED Course   Procedures  Labs Reviewed   SARS-COV-2 RDRP GENE    Narrative:     ..This test utilizes isothermal nucleic acid amplification technology to detect the SARS-CoV-2 RdRp nucleic acid segment. The analytical sensitivity (limit of detection) is 500 copies/swab.     A POSITIVE result is indicative of the presence of SARS-CoV-2 RNA; clinical correlation with patient history and other diagnostic information is necessary to determine patient infection status.    A NEGATIVE result means that SARS-CoV-2 nucleic acids are not present above the limit of detection. A NEGATIVE result should be treated as presumptive. It does not rule out the possibility of COVID-19 and should not be the sole basis for treatment decisions. If COVID-19 is strongly suspected based on clinical and exposure history, re-testing using an alternate molecular assay should be considered.     This test is only for use under the Food and Drug Administration s Emergency Use Authorization (EUA).     Commercial kits are provided by [employer]. Performance characteristics of the EUA have been independently verified by [facility] Department of Pathology and Laboratory Medicine.   _________________________________________________________________   The authorized Fact Sheet for Healthcare Providers and the authorized Fact Sheet for Patients of the ID NOW COVID-19 are available on the FDA website:    https://www.fda.gov/media/161335/download      https://www.fda.gov/media/186513/download       POCT INFLUENZA A/B MOLECULAR          Imaging Results    None          Medications   dexAMETHasone injection 4 mg (4 mg  Intramuscular Given 1/9/23 2021)     Medical Decision Making:   Differential Diagnosis:   URI, sinusitis, flu, COVID  Clinical Tests:   Lab Tests: Ordered and Reviewed                        Clinical Impression:   Final diagnoses:  [J32.9] Sinusitis, unspecified chronicity, unspecified location (Primary)  [R19.7] Diarrhea, unspecified type        ED Disposition Condition    Discharge Stable          ED Prescriptions       Medication Sig Dispense Start Date End Date Auth. Provider    azithromycin (Z-WILFRID) 250 MG tablet Take 1 tablet (250 mg total) by mouth once daily. Take first 2 tablets together, then 1 every day until finished. 6 tablet 1/9/2023 -- Ana Yo NP    diphenoxylate-atropine 2.5-0.025 mg (LOMOTIL) 2.5-0.025 mg per tablet Take 1 tablet by mouth 4 (four) times daily as needed for Diarrhea. 10 tablet 1/9/2023 1/19/2023 Ana Yo NP          Follow-up Information       Follow up With Specialties Details Why Contact Info    Skip Celestin NP Emergency Medicine  As needed [address]  [place] [address]  [phone]               Ana Yo NP  01/09/23 2051

## 2023-01-26 ENCOUNTER — HOSPITAL ENCOUNTER (EMERGENCY)
Facility: HOSPITAL | Age: 43
Discharge: HOME | End: 2023-01-26
Attending: EMERGENCY MEDICINE
Payer: MEDICAID

## 2023-01-26 VITALS
WEIGHT: 173 LBS | BODY MASS INDEX: 29.53 KG/M2 | SYSTOLIC BLOOD PRESSURE: 138 MMHG | TEMPERATURE: 98 F | DIASTOLIC BLOOD PRESSURE: 94 MMHG | OXYGEN SATURATION: 97 % | HEART RATE: 84 BPM | HEIGHT: 64 IN | RESPIRATION RATE: 16 BRPM

## 2023-01-26 DIAGNOSIS — M54.32 SCIATICA OF LEFT SIDE: Primary | ICD-10-CM

## 2023-01-26 PROCEDURE — 96372 THER/PROPH/DIAG INJ SC/IM: CPT | Performed by: NURSE PRACTITIONER

## 2023-01-26 PROCEDURE — 63600175 PHARM REV CODE 636 W HCPCS: Performed by: NURSE PRACTITIONER

## 2023-01-26 PROCEDURE — 99284 EMERGENCY DEPT VISIT MOD MDM: CPT

## 2023-01-26 RX ORDER — HYDROCODONE BITARTRATE AND ACETAMINOPHEN 5; 325 MG/1; MG/1
1 TABLET ORAL EVERY 8 HOURS PRN
Qty: 6 TABLET | Refills: 0 | Status: SHIPPED | OUTPATIENT
Start: 2023-01-26 | End: 2023-01-28

## 2023-01-26 RX ORDER — KETOROLAC TROMETHAMINE 30 MG/ML
60 INJECTION, SOLUTION INTRAMUSCULAR; INTRAVENOUS
Status: COMPLETED | OUTPATIENT
Start: 2023-01-26 | End: 2023-01-26

## 2023-01-26 RX ADMIN — KETOROLAC TROMETHAMINE 60 MG: 30 INJECTION, SOLUTION INTRAMUSCULAR at 02:01

## 2023-01-26 NOTE — DISCHARGE INSTRUCTIONS
Take medications as directed.  Follow-up with your neurologist if symptoms persist.  Return to the emergency department for worsening condition.

## 2023-01-26 NOTE — Clinical Note
"Rohini"Mariya Dang was seen and treated in our emergency department on 1/26/2023.  She may return to work on 01/28/2023.       If you have any questions or concerns, please don't hesitate to call.      Krista Vences NP"

## 2023-01-26 NOTE — ED PROVIDER NOTES
"Encounter Date: 1/26/2023       History     Chief Complaint   Patient presents with    Hip Pain     Complains of pain to left hip x 3 days.  Pain 10. "Shooing a puppy out of the bedroom with my leg."     This is a 42-year-old white female with a history of sciatica, anxiety, and fibromyalgia who presents to the emergency department with complaints of left hip pain began 2 days ago.  Patient reports that she was attempting to direct a dog out of her home using her leg upon turning around she developed immediate onset left lower back pain radiating around the left lateral hip and into the left lower extremity.  She states pain is intermittent, exacerbated by movement and relieved with rest.  She denies numbness/tingling, bladder/bowel dysfunction, or any other relative symptoms at this time.    Review of patient's allergies indicates:   Allergen Reactions    Amoxicillin Hives    Avelox [moxifloxacin] Hives    Clarinex [desloratadine] Hives    Pcn [penicillins] Hives    Claritin [loratadine] Palpitations    Latex, natural rubber Rash     Past Medical History:   Diagnosis Date    Anemia     Anxiety disorder     Arthritis     Bipolar 2 disorder     Borderline personality disorder     Chronic bilateral low back pain without sciatica     Depression with anxiety     Diabetes mellitus     Disorder of kidney and ureter     Elevated LFTs     Esophageal stenosis     Fatty liver     Fibromyalgia     Fibromyalgia     High cholesterol     Hypertension     Hypoglycemia     Intermittent explosive disorder     Liver disease     JAMESON (nonalcoholic steatohepatitis)      Past Surgical History:   Procedure Laterality Date    COLONOSCOPY N/A 3/20/2018    Procedure: COLONOSCOPY;  Surgeon: Janelle Meade MD;  Location: [facility];  Service: Endoscopy;  Laterality: N/A;    COLONOSCOPY N/A 5/11/2021    Procedure: COLONOSCOPY;  Surgeon: Janelle Meade MD;  Location: [facility];  Service: Endoscopy;  Laterality: N/A;    ESOPHAGEAL " "DILATION      x2    ESOPHAGOGASTRODUODENOSCOPY N/A 5/7/2019    Procedure: EGD (ESOPHAGOGASTRODUODENOSCOPY);  Surgeon: Janelle Meade MD;  Location: [facility];  Service: Endoscopy;  Laterality: N/A;    ESOPHAGOGASTRODUODENOSCOPY N/A 5/11/2021    Procedure: EGD (ESOPHAGOGASTRODUODENOSCOPY);  Surgeon: Janelle Meade MD;  Location: [facility];  Service: Endoscopy;  Laterality: N/A;  Rapid on arrival    HIATAL HERNIA REPAIR      HYSTERECTOMY      LIVER BIOPSY  2018    fibrosis stage 3 JAMESON    OOPHORECTOMY      TUBAL LIGATION      UPPER GASTROINTESTINAL ENDOSCOPY      2013? unable to obtain records     Family History   Problem Relation Age of Onset    Hypertension Mother     Clotting disorder Father     Ulcers Father     Clotting disorder Sister     Cancer Maternal Grandmother         "female Cancer"    Lung cancer Paternal Grandmother     Diabetes Maternal Aunt     No Known Problems Daughter     No Known Problems Maternal Uncle     No Known Problems Paternal Aunt     No Known Problems Paternal Uncle     No Known Problems Maternal Grandfather     No Known Problems Paternal Grandfather     Breast cancer Neg Hx     Ovarian cancer Neg Hx     BRCA 1/2 Neg Hx      Social History     Tobacco Use    Smoking status: Never    Smokeless tobacco: Never   Substance Use Topics    Alcohol use: No     Alcohol/week: 0.0 standard drinks    Drug use: No     Review of Systems   Musculoskeletal:  Positive for back pain and gait problem.   Neurological:  Negative for numbness.     Physical Exam     Initial Vitals [01/26/23 1347]   BP Pulse Resp Temp SpO2   (!) 138/94 84 16 98.3 °F (36.8 °C) 97 %      MAP       --         Physical Exam    Nursing note and vitals reviewed.  Constitutional: She appears well-developed and well-nourished. She is active. No distress.   HENT:   Head: Normocephalic and atraumatic.   Eyes: EOM are normal. Pupils are equal, round, and reactive to light.   Neck: Neck supple.   Normal range of " motion.  Cardiovascular:  Normal rate.           Pulmonary/Chest: No respiratory distress.   Musculoskeletal:         General: Normal range of motion.      Cervical back: Normal range of motion and neck supple.      Comments: The left lower back, hip appear normal upon inspection; no rash or obvious dislocation. Pt able to perform abduction/adduction without difficulty and demonstrate how the injury occurred. Distally nvi.      Neurological: She is alert and oriented to person, place, and time. GCS score is 15. GCS eye subscore is 4. GCS verbal subscore is 5. GCS motor subscore is 6.   Skin: Skin is warm and dry. Capillary refill takes less than 2 seconds.   Psychiatric: She has a normal mood and affect. Her behavior is normal. Thought content normal.       ED Course   Procedures  Labs Reviewed - No data to display       Imaging Results    None          Medications   ketorolac injection 60 mg (60 mg Intramuscular Given 1/26/23 4183)                              Clinical Impression:   Final diagnoses:  [M54.32] Sciatica of left side (Primary)        ED Disposition Condition    Discharge Stable          ED Prescriptions       Medication Sig Dispense Start Date End Date Auth. Provider    HYDROcodone-acetaminophen (NORCO) 5-325 mg per tablet Take 1 tablet by mouth every 8 (eight) hours as needed for Pain. 6 tablet 1/26/2023 1/28/2023 Krista Vences NP          Follow-up Information       Follow up With Specialties Details Why Contact Info    Skip Celestin NP Emergency Medicine Schedule an appointment as soon as possible for a visit in 2 days for re-evaluation of today's complaint [address] [place] [address]  [place] [address]  [phone]               Krista Vences NP  01/26/23 3180

## 2023-01-31 ENCOUNTER — HOSPITAL ENCOUNTER (OUTPATIENT)
Dept: RADIOLOGY | Facility: HOSPITAL | Age: 43
Discharge: HOME | End: 2023-01-31
Attending: NURSE PRACTITIONER
Payer: MEDICAID

## 2023-01-31 DIAGNOSIS — M54.42 LUMBAGO WITH SCIATICA, LEFT SIDE: ICD-10-CM

## 2023-01-31 DIAGNOSIS — M54.41 LUMBAGO WITH SCIATICA, RIGHT SIDE: ICD-10-CM

## 2023-01-31 DIAGNOSIS — M54.41 LUMBAGO WITH SCIATICA, RIGHT SIDE: Primary | ICD-10-CM

## 2023-01-31 PROCEDURE — 72110 X-RAY EXAM L-2 SPINE 4/>VWS: CPT | Mod: TC

## 2023-01-31 PROCEDURE — 72170 X-RAY EXAM OF PELVIS: CPT | Mod: TC

## 2023-02-06 PROBLEM — N39.0 URINARY TRACT INFECTION WITHOUT HEMATURIA: Status: RESOLVED | Noted: 2022-10-26 | Resolved: 2023-02-06

## 2023-02-07 ENCOUNTER — LAB VISIT (OUTPATIENT)
Dept: LAB | Facility: HOSPITAL | Age: 43
End: 2023-02-07
Attending: NURSE PRACTITIONER
Payer: MEDICAID

## 2023-02-07 DIAGNOSIS — D50.9 MICROCYTIC HYPOCHROMIC ANEMIA: ICD-10-CM

## 2023-02-07 DIAGNOSIS — E11.49 TYPE 2 DIABETES MELLITUS WITH OTHER NEUROLOGIC COMPLICATION, WITHOUT LONG-TERM CURRENT USE OF INSULIN: ICD-10-CM

## 2023-02-07 DIAGNOSIS — K75.81 LIVER CIRRHOSIS SECONDARY TO NONALCOHOLIC STEATOHEPATITIS (NASH): ICD-10-CM

## 2023-02-07 DIAGNOSIS — K74.60 LIVER CIRRHOSIS SECONDARY TO NONALCOHOLIC STEATOHEPATITIS (NASH): ICD-10-CM

## 2023-02-07 LAB
ALBUMIN SERPL BCP-MCNC: 3.6 G/DL (ref 3.5–5.2)
ALP SERPL-CCNC: 241 U/L (ref 55–135)
ALT SERPL W/O P-5'-P-CCNC: 139 U/L (ref 10–44)
ANION GAP SERPL CALC-SCNC: 9 MMOL/L (ref 8–16)
AST SERPL-CCNC: 72 U/L (ref 10–40)
BASOPHILS # BLD AUTO: 0.06 K/UL (ref 0–0.2)
BASOPHILS NFR BLD: 0.6 % (ref 0–1.9)
BILIRUB SERPL-MCNC: 0.2 MG/DL (ref 0.1–1)
BUN SERPL-MCNC: 13 MG/DL (ref 6–20)
CALCIUM SERPL-MCNC: 8.9 MG/DL (ref 8.7–10.5)
CHLORIDE SERPL-SCNC: 106 MMOL/L (ref 95–110)
CO2 SERPL-SCNC: 25 MMOL/L (ref 23–29)
CREAT SERPL-MCNC: 0.9 MG/DL (ref 0.5–1.4)
DIFFERENTIAL METHOD: ABNORMAL
EOSINOPHIL # BLD AUTO: 0.2 K/UL (ref 0–0.5)
EOSINOPHIL NFR BLD: 2.1 % (ref 0–8)
ERYTHROCYTE [DISTWIDTH] IN BLOOD BY AUTOMATED COUNT: 16.4 % (ref 11.5–14.5)
EST. GFR  (NO RACE VARIABLE): >60 ML/MIN/1.73 M^2
FERRITIN SERPL-MCNC: 13 NG/ML (ref 20–300)
FOLATE SERPL-MCNC: 8 NG/ML (ref 4–24)
GLUCOSE SERPL-MCNC: 145 MG/DL (ref 70–110)
HCT VFR BLD AUTO: 39.2 % (ref 37–48.5)
HGB BLD-MCNC: 12.5 G/DL (ref 12–16)
IMM GRANULOCYTES # BLD AUTO: 0.03 K/UL (ref 0–0.04)
IMM GRANULOCYTES NFR BLD AUTO: 0.3 % (ref 0–0.5)
IRON SATN MFR SERPL: 11 % (ref 20–50)
IRON SERPL-MCNC: 50 UG/DL (ref 30–160)
LYMPHOCYTES # BLD AUTO: 3.9 K/UL (ref 1–4.8)
LYMPHOCYTES NFR BLD: 40.3 % (ref 18–48)
MCH RBC QN AUTO: 25.3 PG (ref 27–31)
MCHC RBC AUTO-ENTMCNC: 31.9 G/DL (ref 32–36)
MCV RBC AUTO: 79 FL (ref 82–98)
MONOCYTES # BLD AUTO: 0.7 K/UL (ref 0.3–1)
MONOCYTES NFR BLD: 7.4 % (ref 4–15)
NEUTROPHILS # BLD AUTO: 4.8 K/UL (ref 1.8–7.7)
NEUTROPHILS NFR BLD: 49.3 % (ref 38–73)
NRBC BLD-RTO: 0 /100 WBC
PLATELET # BLD AUTO: 230 K/UL (ref 150–450)
PMV BLD AUTO: 11 FL (ref 9.2–12.9)
POTASSIUM SERPL-SCNC: 4 MMOL/L (ref 3.5–5.1)
PROT SERPL-MCNC: 8 G/DL (ref 6–8.4)
RBC # BLD AUTO: 4.95 M/UL (ref 4–5.4)
SODIUM SERPL-SCNC: 140 MMOL/L (ref 136–145)
TOTAL IRON BINDING CAPACITY: 453 UG/DL (ref 250–450)
VIT B12 SERPL-MCNC: 382 PG/ML (ref 210–950)
WBC # BLD AUTO: 9.63 K/UL (ref 3.9–12.7)

## 2023-02-07 PROCEDURE — 83020 HEMOGLOBIN ELECTROPHORESIS: CPT | Performed by: NURSE PRACTITIONER

## 2023-02-07 PROCEDURE — 82746 ASSAY OF FOLIC ACID SERUM: CPT | Performed by: NURSE PRACTITIONER

## 2023-02-07 PROCEDURE — 83540 ASSAY OF IRON: CPT | Performed by: NURSE PRACTITIONER

## 2023-02-07 PROCEDURE — 83550 IRON BINDING TEST: CPT | Performed by: NURSE PRACTITIONER

## 2023-02-07 PROCEDURE — 82728 ASSAY OF FERRITIN: CPT | Performed by: NURSE PRACTITIONER

## 2023-02-07 PROCEDURE — 85025 COMPLETE CBC W/AUTO DIFF WBC: CPT | Performed by: NURSE PRACTITIONER

## 2023-02-07 PROCEDURE — 82607 VITAMIN B-12: CPT | Performed by: NURSE PRACTITIONER

## 2023-02-07 PROCEDURE — 80053 COMPREHEN METABOLIC PANEL: CPT | Performed by: NURSE PRACTITIONER

## 2023-02-10 LAB
HB ELECTROPHORESIS INTERP CANCEL: NORMAL
HB ELECTROPHORESIS INTERPRETATION: NORMAL
HGB A MFR BLD ELPH: 97.7 % (ref 95.8–98)
HGB A2 MFR BLD: 2.3 % (ref 2–3.3)
HGB A2+XXX MFR BLD ELPH: NORMAL %
HGB F MFR BLD: 0 % (ref 0–0.9)
HGB XXX MFR BLD ELPH: NORMAL %
HPLC HB VARIANT: NORMAL

## 2023-03-20 ENCOUNTER — HOSPITAL ENCOUNTER (OUTPATIENT)
Dept: RADIOLOGY | Facility: HOSPITAL | Age: 43
Discharge: HOME | End: 2023-03-20
Attending: INTERNAL MEDICINE
Payer: MEDICAID

## 2023-03-20 DIAGNOSIS — R76.8 POSITIVE ANA (ANTINUCLEAR ANTIBODY): ICD-10-CM

## 2023-03-20 PROCEDURE — 72200 X-RAY EXAM SI JOINTS: CPT | Mod: TC

## 2023-03-20 PROCEDURE — 72100 X-RAY EXAM L-S SPINE 2/3 VWS: CPT | Mod: TC

## 2023-03-20 PROCEDURE — 77077 JOINT SURVEY SINGLE VIEW: CPT | Mod: TC

## 2023-03-21 PROBLEM — D50.9 IRON DEFICIENCY ANEMIA: Status: ACTIVE | Noted: 2023-03-21

## 2023-04-05 ENCOUNTER — OFFICE VISIT (OUTPATIENT)
Dept: OBSTETRICS AND GYNECOLOGY | Facility: CLINIC | Age: 43
End: 2023-04-05
Payer: MEDICAID

## 2023-04-05 ENCOUNTER — LAB VISIT (OUTPATIENT)
Dept: LAB | Facility: HOSPITAL | Age: 43
End: 2023-04-05
Attending: OBSTETRICS & GYNECOLOGY
Payer: MEDICAID

## 2023-04-05 VITALS
SYSTOLIC BLOOD PRESSURE: 131 MMHG | DIASTOLIC BLOOD PRESSURE: 97 MMHG | BODY MASS INDEX: 29.88 KG/M2 | HEART RATE: 73 BPM | WEIGHT: 175 LBS | HEIGHT: 64 IN

## 2023-04-05 DIAGNOSIS — Z83.2 FAMILY HISTORY OF CLOTTING DISORDER: ICD-10-CM

## 2023-04-05 DIAGNOSIS — Z78.0 POSTMENOPAUSAL: ICD-10-CM

## 2023-04-05 DIAGNOSIS — R23.2 HOT FLASHES: ICD-10-CM

## 2023-04-05 DIAGNOSIS — I10 PRIMARY HYPERTENSION: ICD-10-CM

## 2023-04-05 DIAGNOSIS — E88.810 METABOLIC SYNDROME: ICD-10-CM

## 2023-04-05 DIAGNOSIS — Z12.31 SCREENING MAMMOGRAM, ENCOUNTER FOR: ICD-10-CM

## 2023-04-05 DIAGNOSIS — Z12.72 SPECIAL SCREENING FOR MALIGNANT NEOPLASMS, VAGINA: ICD-10-CM

## 2023-04-05 DIAGNOSIS — Z01.419 ENCOUNTER FOR ANNUAL ROUTINE GYNECOLOGICAL EXAMINATION: Primary | ICD-10-CM

## 2023-04-05 DIAGNOSIS — Z90.710 HISTORY OF HYSTERECTOMY: ICD-10-CM

## 2023-04-05 PROCEDURE — 85305 CLOT INHIBIT PROT S TOTAL: CPT | Performed by: OBSTETRICS & GYNECOLOGY

## 2023-04-05 PROCEDURE — 3075F PR MOST RECENT SYSTOLIC BLOOD PRESS GE 130-139MM HG: ICD-10-PCS | Mod: CPTII,,, | Performed by: OBSTETRICS & GYNECOLOGY

## 2023-04-05 PROCEDURE — 3075F SYST BP GE 130 - 139MM HG: CPT | Mod: CPTII,,, | Performed by: OBSTETRICS & GYNECOLOGY

## 2023-04-05 PROCEDURE — 3051F HG A1C>EQUAL 7.0%<8.0%: CPT | Mod: CPTII,,, | Performed by: OBSTETRICS & GYNECOLOGY

## 2023-04-05 PROCEDURE — 3008F BODY MASS INDEX DOCD: CPT | Mod: CPTII,,, | Performed by: OBSTETRICS & GYNECOLOGY

## 2023-04-05 PROCEDURE — 81241 F5 GENE: CPT | Performed by: OBSTETRICS & GYNECOLOGY

## 2023-04-05 PROCEDURE — 1160F PR REVIEW ALL MEDS BY PRESCRIBER/CLIN PHARMACIST DOCUMENTED: ICD-10-PCS | Mod: CPTII,,, | Performed by: OBSTETRICS & GYNECOLOGY

## 2023-04-05 PROCEDURE — 3080F DIAST BP >= 90 MM HG: CPT | Mod: CPTII,,, | Performed by: OBSTETRICS & GYNECOLOGY

## 2023-04-05 PROCEDURE — 1159F MED LIST DOCD IN RCRD: CPT | Mod: CPTII,,, | Performed by: OBSTETRICS & GYNECOLOGY

## 2023-04-05 PROCEDURE — 99215 OFFICE O/P EST HI 40 MIN: CPT | Mod: PBBFAC | Performed by: OBSTETRICS & GYNECOLOGY

## 2023-04-05 PROCEDURE — 3080F PR MOST RECENT DIASTOLIC BLOOD PRESSURE >= 90 MM HG: ICD-10-PCS | Mod: CPTII,,, | Performed by: OBSTETRICS & GYNECOLOGY

## 2023-04-05 PROCEDURE — 4010F PR ACE/ARB THEARPY RXD/TAKEN: ICD-10-PCS | Mod: CPTII,,, | Performed by: OBSTETRICS & GYNECOLOGY

## 2023-04-05 PROCEDURE — 1159F PR MEDICATION LIST DOCUMENTED IN MEDICAL RECORD: ICD-10-PCS | Mod: CPTII,,, | Performed by: OBSTETRICS & GYNECOLOGY

## 2023-04-05 PROCEDURE — 85300 ANTITHROMBIN III ACTIVITY: CPT | Performed by: OBSTETRICS & GYNECOLOGY

## 2023-04-05 PROCEDURE — 3051F PR MOST RECENT HEMOGLOBIN A1C LEVEL 7.0 - < 8.0%: ICD-10-PCS | Mod: CPTII,,, | Performed by: OBSTETRICS & GYNECOLOGY

## 2023-04-05 PROCEDURE — 99999 PR PBB SHADOW E&M-EST. PATIENT-LVL V: CPT | Mod: PBBFAC,,, | Performed by: OBSTETRICS & GYNECOLOGY

## 2023-04-05 PROCEDURE — 99396 PREV VISIT EST AGE 40-64: CPT | Mod: S$PBB,,, | Performed by: OBSTETRICS & GYNECOLOGY

## 2023-04-05 PROCEDURE — 81240 F2 GENE: CPT | Performed by: OBSTETRICS & GYNECOLOGY

## 2023-04-05 PROCEDURE — 1160F RVW MEDS BY RX/DR IN RCRD: CPT | Mod: CPTII,,, | Performed by: OBSTETRICS & GYNECOLOGY

## 2023-04-05 PROCEDURE — 3008F PR BODY MASS INDEX (BMI) DOCUMENTED: ICD-10-PCS | Mod: CPTII,,, | Performed by: OBSTETRICS & GYNECOLOGY

## 2023-04-05 PROCEDURE — 85303 CLOT INHIBIT PROT C ACTIVITY: CPT | Performed by: OBSTETRICS & GYNECOLOGY

## 2023-04-05 PROCEDURE — 99396 PR PREVENTIVE VISIT,EST,40-64: ICD-10-PCS | Mod: S$PBB,,, | Performed by: OBSTETRICS & GYNECOLOGY

## 2023-04-05 PROCEDURE — 36415 COLL VENOUS BLD VENIPUNCTURE: CPT | Performed by: OBSTETRICS & GYNECOLOGY

## 2023-04-05 PROCEDURE — 99999 PR PBB SHADOW E&M-EST. PATIENT-LVL V: ICD-10-PCS | Mod: PBBFAC,,, | Performed by: OBSTETRICS & GYNECOLOGY

## 2023-04-05 PROCEDURE — 86147 CARDIOLIPIN ANTIBODY EA IG: CPT | Mod: 59 | Performed by: OBSTETRICS & GYNECOLOGY

## 2023-04-05 PROCEDURE — 4010F ACE/ARB THERAPY RXD/TAKEN: CPT | Mod: CPTII,,, | Performed by: OBSTETRICS & GYNECOLOGY

## 2023-04-05 RX ORDER — ESTRADIOL 1 MG/1
1 TABLET ORAL DAILY
Qty: 30 TABLET | Refills: 11 | Status: SHIPPED | OUTPATIENT
Start: 2023-04-05 | End: 2024-04-04

## 2023-04-05 NOTE — PROGRESS NOTES
SUBJECTIVE:   43 y.o. female for annual routine Pap and checkup.  Patient requesting to be evaluated for APS.  She says her daughter has it and she had issues and complications with 1 of her pregnancies and wants to know if she has it or not.  Current Outpatient Medications   Medication Sig Dispense Refill    ACCU-CHEK KERRY PLUS TEST STRP Strp USE 1 STRIP TO CHECK GLUCOSE TWICE DAILY      albuterol (PROVENTIL/VENTOLIN HFA) 90 mcg/actuation inhaler Inhale 1-2 puffs into the lungs every 6 (six) hours as needed. Rescue 18 g 0    amLODIPine (NORVASC) 10 MG tablet Take 1 tablet (10 mg total) by mouth once daily. 30 tablet 11    busPIRone (BUSPAR) 30 MG Tab Take 30 mg by mouth 2 (two) times daily.      diclofenac (VOLTAREN) 50 MG EC tablet Take 50 mg by mouth 3 (three) times daily as needed.      dulaglutide (TRULICITY) 1.5 mg/0.5 mL pen injector Inject 1.5 mg into the skin every 7 days. 12 pen 3    empagliflozin (JARDIANCE) 25 mg tablet Take 1 tablet (25 mg total) by mouth once daily. 90 tablet 1    estradioL (ESTRACE) 1 MG tablet Take 1 tablet (1 mg total) by mouth once daily. 30 tablet 11    furosemide (LASIX) 20 MG tablet Take 20 mg by mouth daily as needed.      hydrOXYzine HCL (ATARAX) 25 MG tablet TAKE 1 TABLET BY MOUTH EVERY 6 HOURS FOR 7 DAYS AS NEEDED FOR ITCHING      k phos di & mono-sod phos mono (PHOSPHA 250 NEUTRAL) 250 mg Tab Take 2 tablets (500mg) by mouth once daily, Starting on the 1st day of iron infusion and for following 4 days afterwards; repeat for 2nd iron infusion 60 tablet 0    LATUDA 80 mg Tab tablet Take 80 mg by mouth every evening.      LIDOcaine (LIDODERM) 5 % SMARTSIG:Topical      lubiprostone (AMITIZA) 24 MCG Cap Take 1 capsule (24 mcg total) by mouth 2 (two) times daily. 60 capsule 11    metoprolol tartrate (LOPRESSOR) 100 MG tablet Take 100 mg by mouth 2 (two) times daily.      mirtazapine (REMERON) 30 MG tablet Take 30 mg by mouth every evening.       montelukast (SINGULAIR) 10 mg  "tablet Take 10 mg by mouth every evening.      nitrofurantoin, macrocrystal-monohydrate, (MACROBID) 100 MG capsule Take 1 capsule (100 mg total) by mouth every evening. 90 capsule 3    nortriptyline (PAMELOR) 50 MG capsule TAKE 1 CAPSULE BY MOUTH IN THE EVENING 90 capsule 1    pregabalin (LYRICA) 100 MG capsule TAKE 1 CAPSULE(100 MG) BY MOUTH TWICE DAILY (Patient taking differently: 150 mg.) 60 capsule 11    promethazine (PHENERGAN) 25 MG tablet Take 1 tablet (25 mg total) by mouth every 4 to 6 hours as needed. 60 tablet 1    rosuvastatin (CRESTOR) 40 MG Tab Take 40 mg by mouth once daily.      sertraline (ZOLOFT) 100 MG tablet Take 150 mg by mouth once daily.      tiZANidine (ZANAFLEX) 4 MG tablet Take 1 tablet (4 mg total) by mouth every 8 (eight) hours. 90 tablet 5    topiramate (TOPAMAX) 200 MG Tab Take 200 mg by mouth nightly.      traMADoL (ULTRAM) 50 mg tablet Take 1 tablet (50 mg total) by mouth every 12 (twelve) hours as needed for Pain. Medically necessary for more than 7 days. 60 tablet 3     No current facility-administered medications for this visit.     Allergies: Amoxicillin; Avelox [moxifloxacin]; Clarinex [desloratadine]; Pcn [penicillins]; Claritin [loratadine]; and Latex, natural rubber   No LMP recorded. Patient has had a hysterectomy.    ROS:  Feeling well. No dyspnea or chest pain on exertion.  No abdominal pain, change in bowel habits, black or bloody stools.  No urinary tract symptoms. GYN ROS: no breast pain or new or enlarging lumps on self exam, no vaginal bleeding, no discharge or pelvic pain. No neurological complaints.    OBJECTIVE:   The patient appears well, alert, oriented x 3, in no distress.  BP (!) 131/97   Pulse 73   Ht 5' 4" (1.626 m)   Wt 79.4 kg (175 lb)   BMI 30.04 kg/m²   ENT normal.  Neck supple. No adenopathy or thyromegaly. BRIANNE. Lungs are clear, good air entry, no wheezes, rhonchi or rales. S1 and S2 normal, no murmurs, regular rate and rhythm. Abdomen soft without " tenderness, guarding, mass or organomegaly. Extremities show no edema, normal peripheral pulses. Neurological is normal, no focal findings.    BREAST EXAM: breasts appear normal, no suspicious masses, no skin or nipple changes or axillary nodes    PELVIC EXAM: VULVA: normal appearing vulva with no masses, tenderness or lesions, VAGINA: normal appearing vagina with normal color and discharge, no lesions, CERVIX: surgically absent, UTERUS: surgically absent, vaginal cuff well healed, ADNEXA: normal adnexa in size, nontender and no masses, PAP: Pap smear done today, thin-prep method    ASSESSMENT:   well woman  no contraindication to continue hormonal therapy  Family history of APS  History of hysterectomy    PLAN:   mammogram  pap smear  additional lab tests per orders  return annually or prn

## 2023-04-06 LAB — AT III ACT/NOR PPP CHRO: 118 % (ref 83–118)

## 2023-04-07 LAB
PROT C ACT/NOR PPP CHRO: 107 % (ref 70–150)
PROT S ACT/NOR PPP: 139 % (ref 50–160)

## 2023-04-10 LAB
CARDIOLIPIN IGG SER IA-ACNC: <9.4 GPL (ref 0–14.99)
CARDIOLIPIN IGM SER IA-ACNC: <9.4 MPL (ref 0–12.49)
F2 C.20210G>A GENO BLD/T: NEGATIVE
F5 P.R506Q BLD/T QL: NEGATIVE

## 2023-04-13 ENCOUNTER — HOSPITAL ENCOUNTER (OUTPATIENT)
Dept: RADIOLOGY | Facility: HOSPITAL | Age: 43
Discharge: HOME | End: 2023-04-13
Attending: OBSTETRICS & GYNECOLOGY
Payer: MEDICAID

## 2023-04-13 VITALS — WEIGHT: 175 LBS | BODY MASS INDEX: 29.88 KG/M2 | HEIGHT: 64 IN

## 2023-04-13 DIAGNOSIS — Z12.31 SCREENING MAMMOGRAM, ENCOUNTER FOR: ICD-10-CM

## 2023-04-13 LAB
HPV16+18+H RISK 12 DNA CVX-IMP: NORMAL
LIQUID BASED PAP SMEAR, SCREENING: NORMAL

## 2023-04-13 PROCEDURE — 77067 SCR MAMMO BI INCL CAD: CPT | Mod: TC

## 2023-06-02 PROBLEM — K59.04 CHRONIC IDIOPATHIC CONSTIPATION: Status: ACTIVE | Noted: 2023-06-02

## 2023-07-27 ENCOUNTER — HOSPITAL ENCOUNTER (EMERGENCY)
Facility: HOSPITAL | Age: 43
Discharge: HOME | End: 2023-07-27
Attending: EMERGENCY MEDICINE
Payer: MEDICAID

## 2023-07-27 VITALS
TEMPERATURE: 98 F | HEIGHT: 64 IN | SYSTOLIC BLOOD PRESSURE: 148 MMHG | HEART RATE: 80 BPM | BODY MASS INDEX: 29.88 KG/M2 | RESPIRATION RATE: 16 BRPM | DIASTOLIC BLOOD PRESSURE: 86 MMHG | WEIGHT: 175 LBS | OXYGEN SATURATION: 96 %
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
## 2023-07-27 DIAGNOSIS — S30.0XXA CONTUSION OF SACRUM, INITIAL ENCOUNTER: Primary | ICD-10-CM

## 2023-07-27 PROCEDURE — 99284 EMERGENCY DEPT VISIT MOD MDM: CPT

## 2023-07-27 PROCEDURE — 25000003 PHARM REV CODE 250: Performed by: CLINICAL NURSE SPECIALIST

## 2023-07-27 PROCEDURE — 96372 THER/PROPH/DIAG INJ SC/IM: CPT | Performed by: CLINICAL NURSE SPECIALIST

## 2023-07-27 PROCEDURE — 63600175 PHARM REV CODE 636 W HCPCS: Performed by: CLINICAL NURSE SPECIALIST

## 2023-07-27 RX ORDER — KETOROLAC TROMETHAMINE 10 MG/1
10 TABLET, FILM COATED ORAL EVERY 6 HOURS
Qty: 20 TABLET | Refills: 0 | Status: SHIPPED | OUTPATIENT
Start: 2023-07-27 | End: 2023-08-01

## 2023-07-27 RX ORDER — DEXAMETHASONE SODIUM PHOSPHATE 4 MG/ML
8 INJECTION, SOLUTION INTRA-ARTICULAR; INTRALESIONAL; INTRAMUSCULAR; INTRAVENOUS; SOFT TISSUE
Status: COMPLETED | OUTPATIENT
Start: 2023-07-27 | End: 2023-07-27

## 2023-07-27 RX ORDER — CYCLOBENZAPRINE HCL 10 MG
10 TABLET ORAL
Status: COMPLETED | OUTPATIENT
Start: 2023-07-27 | End: 2023-07-27

## 2023-07-27 RX ORDER — TIZANIDINE 4 MG/1
4 TABLET ORAL EVERY 8 HOURS
Qty: 14 TABLET | Refills: 0 | Status: SHIPPED | OUTPATIENT
Start: 2023-07-27

## 2023-07-27 RX ORDER — KETOROLAC TROMETHAMINE 30 MG/ML
60 INJECTION, SOLUTION INTRAMUSCULAR; INTRAVENOUS ONCE
Status: COMPLETED | OUTPATIENT
Start: 2023-07-27 | End: 2023-07-27

## 2023-07-27 RX ADMIN — KETOROLAC TROMETHAMINE 60 MG: 30 INJECTION INTRAMUSCULAR; INTRAVENOUS at 04:07

## 2023-07-27 RX ADMIN — DEXAMETHASONE SODIUM PHOSPHATE 8 MG: 4 INJECTION, SOLUTION INTRA-ARTICULAR; INTRALESIONAL; INTRAMUSCULAR; INTRAVENOUS; SOFT TISSUE at 04:07

## 2023-07-27 RX ADMIN — CYCLOBENZAPRINE HYDROCHLORIDE 10 MG: 10 TABLET, FILM COATED ORAL at 04:07

## 2023-07-27 NOTE — ED PROVIDER NOTES
Encounter Date: 7/27/2023       History     Chief Complaint   Patient presents with    Back Pain     Left lower back pain radiating down leg x 2 days. Reports fall yesterday. Did not hit head, denies loc.      43-year-old female presents to the emergency room with sacral pain going down her left lower extremity the last 2 days.  Reports fall yesterday.  Ambulatory in triage.  History of anxiety, bipolar, borderline personality disorder      Review of patient's allergies indicates:   Allergen Reactions    Amoxicillin Hives    Avelox [moxifloxacin] Hives    Clarinex [desloratadine] Hives    Pcn [penicillins] Hives    Vimpat [lacosamide] Hallucinations    Claritin [loratadine] Palpitations    Latex, natural rubber Rash     Past Medical History:   Diagnosis Date    Anemia     Anxiety disorder     Arthritis     Bipolar 2 disorder     Borderline personality disorder     Chronic bilateral low back pain without sciatica     Depression with anxiety     Diabetes mellitus     Disorder of kidney and ureter     Elevated LFTs     Esophageal stenosis     Fatty liver     Fibromyalgia     Fibromyalgia     High cholesterol     Hypertension     Hypoglycemia     Intermittent explosive disorder     Liver disease     JAMESON (nonalcoholic steatohepatitis)      Past Surgical History:   Procedure Laterality Date    COLONOSCOPY N/A 3/20/2018    Procedure: COLONOSCOPY;  Surgeon: Janelle Meade MD;  Location: [facility]placed by [facility];  Service: Endoscopy;  Laterality: N/A;    COLONOSCOPY N/A 5/11/2021    Procedure: COLONOSCOPY;  Surgeon: Janelle Meade MD;  Location: Replaced by [facility];  Service: Endoscopy;  Laterality: N/A;    ESOPHAGEAL DILATION      x2    ESOPHAGOGASTRODUODENOSCOPY N/A 5/7/2019    Procedure: EGD (ESOPHAGOGASTRODUODENOSCOPY);  Surgeon: Janelle Meade MD;  Location: Replaced by [facility];  Service: Endoscopy;  Laterality: N/A;    ESOPHAGOGASTRODUODENOSCOPY N/A 5/11/2021    Procedure: EGD (ESOPHAGOGASTRODUODENOSCOPY);  Surgeon: Janelle  "MD Halina;  Location: [facility];  Service: Endoscopy;  Laterality: N/A;  Rapid on arrival    HIATAL HERNIA REPAIR      HYSTERECTOMY      LIVER BIOPSY  2018    fibrosis stage 3 JAMESON    OOPHORECTOMY      TUBAL LIGATION      UPPER GASTROINTESTINAL ENDOSCOPY      2013? unable to obtain records     Family History   Problem Relation Age of Onset    Hypertension Mother     Clotting disorder Father     Ulcers Father     Clotting disorder Sister     Cancer Maternal Grandmother         "female Cancer"    Lung cancer Paternal Grandmother     Diabetes Maternal Aunt     No Known Problems Daughter     No Known Problems Maternal Uncle     No Known Problems Paternal Aunt     No Known Problems Paternal Uncle     No Known Problems Maternal Grandfather     No Known Problems Paternal Grandfather     Breast cancer Neg Hx     Ovarian cancer Neg Hx     BRCA 1/2 Neg Hx      Social History     Tobacco Use    Smoking status: Never    Smokeless tobacco: Never   Substance Use Topics    Alcohol use: No     Alcohol/week: 0.0 standard drinks    Drug use: No     Review of Systems   Constitutional:  Negative for fever.   HENT:  Negative for sore throat.    Respiratory:  Negative for shortness of breath.    Cardiovascular:  Negative for chest pain.   Gastrointestinal:  Negative for nausea.   Genitourinary:  Negative for dysuria.   Musculoskeletal:  Positive for arthralgias, back pain and myalgias.   Skin:  Negative for rash.   Neurological:  Negative for weakness.   Hematological:  Does not bruise/bleed easily.   All other systems reviewed and are negative.    Physical Exam     Initial Vitals [07/27/23 1558]   BP Pulse Resp Temp SpO2   (!) 148/86 80 16 98 °F (36.7 °C) 96 %      MAP       --         Physical Exam    Nursing note and vitals reviewed.  Constitutional: She appears well-developed and well-nourished.   HENT:   Head: Normocephalic and atraumatic.   Eyes: Pupils are equal, round, and reactive to light.   Neck:   Normal range of " motion.  Musculoskeletal:         General: Normal range of motion.      Cervical back: Normal range of motion.     Neurological: She is alert and oriented to person, place, and time.   Skin: Skin is warm and dry.   No bruising, abrasions, redness noted to sacral area   Psychiatric: She has a normal mood and affect.       ED Course   Procedures  Labs Reviewed - No data to display       Imaging Results              X-Ray Sacrum And Coccyx (In process)                      Medications   cyclobenzaprine tablet 10 mg (10 mg Oral Given 7/27/23 1609)   dexAMETHasone injection 8 mg (8 mg Intramuscular Given 7/27/23 1609)   ketorolac injection 60 mg (60 mg Intramuscular Given 7/27/23 1609)     Medical Decision Making:   Differential Diagnosis:   Fall, sacral contusion, sciatica  Clinical Tests:   Radiological Study: Ordered and Reviewed                        Clinical Impression:   Final diagnoses:  [W19.XXXA] Fall  [S30.0XXA] Contusion of sacrum, initial encounter (Primary)        ED Disposition Condition    Discharge Stable          ED Prescriptions       Medication Sig Dispense Start Date End Date Auth. Provider    ketorolac (TORADOL) 10 mg tablet Take 1 tablet (10 mg total) by mouth every 6 (six) hours. for 5 days 20 tablet 7/27/2023 8/1/2023 Ana Yo NP    tiZANidine (ZANAFLEX) 4 MG tablet Take 1 tablet (4 mg total) by mouth every 8 (eight) hours. 14 tablet 7/27/2023 -- Ana Yo NP          Follow-up Information       Follow up With Specialties Details Why Contact Info    Skip Celestin NP Emergency Medicine  As needed [address] [facility] [address]  [place] [address]  [phone]               Ana Yo NP  07/27/23 0736

## 2023-07-28 ENCOUNTER — OFFICE VISIT (OUTPATIENT)
Dept: PRIMARY CARE CLINIC | Facility: CLINIC | Age: 43
End: 2023-07-28
Payer: MEDICAID

## 2023-07-28 VITALS
HEART RATE: 60 BPM | OXYGEN SATURATION: 98 % | WEIGHT: 180 LBS | BODY MASS INDEX: 30.73 KG/M2 | SYSTOLIC BLOOD PRESSURE: 159 MMHG | DIASTOLIC BLOOD PRESSURE: 91 MMHG | RESPIRATION RATE: 16 BRPM | HEIGHT: 64 IN | TEMPERATURE: 98 F

## 2023-07-28 DIAGNOSIS — K75.81 NASH (NONALCOHOLIC STEATOHEPATITIS): ICD-10-CM

## 2023-07-28 DIAGNOSIS — E78.5 DYSLIPIDEMIA ASSOCIATED WITH TYPE 2 DIABETES MELLITUS: ICD-10-CM

## 2023-07-28 DIAGNOSIS — N90.89 LESION OF LABIA: ICD-10-CM

## 2023-07-28 DIAGNOSIS — Z02.9 ADMINISTRATIVE ENCOUNTER: ICD-10-CM

## 2023-07-28 DIAGNOSIS — D64.9 ANEMIA, UNSPECIFIED TYPE: ICD-10-CM

## 2023-07-28 DIAGNOSIS — I10 PRIMARY HYPERTENSION: Primary | ICD-10-CM

## 2023-07-28 DIAGNOSIS — E66.9 CLASS 1 OBESITY WITH SERIOUS COMORBIDITY AND BODY MASS INDEX (BMI) OF 30.0 TO 30.9 IN ADULT, UNSPECIFIED OBESITY TYPE: ICD-10-CM

## 2023-07-28 DIAGNOSIS — E11.69 DYSLIPIDEMIA ASSOCIATED WITH TYPE 2 DIABETES MELLITUS: ICD-10-CM

## 2023-07-28 PROBLEM — E66.811 CLASS 1 OBESITY WITH SERIOUS COMORBIDITY AND BODY MASS INDEX (BMI) OF 30.0 TO 30.9 IN ADULT: Status: ACTIVE | Noted: 2023-07-28

## 2023-07-28 PROCEDURE — 3077F SYST BP >= 140 MM HG: CPT | Mod: CPTII,,, | Performed by: STUDENT IN AN ORGANIZED HEALTH CARE EDUCATION/TRAINING PROGRAM

## 2023-07-28 PROCEDURE — 3051F HG A1C>EQUAL 7.0%<8.0%: CPT | Mod: CPTII,,, | Performed by: STUDENT IN AN ORGANIZED HEALTH CARE EDUCATION/TRAINING PROGRAM

## 2023-07-28 PROCEDURE — 3008F BODY MASS INDEX DOCD: CPT | Mod: CPTII,,, | Performed by: STUDENT IN AN ORGANIZED HEALTH CARE EDUCATION/TRAINING PROGRAM

## 2023-07-28 PROCEDURE — 99204 OFFICE O/P NEW MOD 45 MIN: CPT | Mod: S$PBB,,, | Performed by: STUDENT IN AN ORGANIZED HEALTH CARE EDUCATION/TRAINING PROGRAM

## 2023-07-28 PROCEDURE — 1159F PR MEDICATION LIST DOCUMENTED IN MEDICAL RECORD: ICD-10-PCS | Mod: CPTII,,, | Performed by: STUDENT IN AN ORGANIZED HEALTH CARE EDUCATION/TRAINING PROGRAM

## 2023-07-28 PROCEDURE — 1160F PR REVIEW ALL MEDS BY PRESCRIBER/CLIN PHARMACIST DOCUMENTED: ICD-10-PCS | Mod: CPTII,,, | Performed by: STUDENT IN AN ORGANIZED HEALTH CARE EDUCATION/TRAINING PROGRAM

## 2023-07-28 PROCEDURE — 3051F PR MOST RECENT HEMOGLOBIN A1C LEVEL 7.0 - < 8.0%: ICD-10-PCS | Mod: CPTII,,, | Performed by: STUDENT IN AN ORGANIZED HEALTH CARE EDUCATION/TRAINING PROGRAM

## 2023-07-28 PROCEDURE — 3008F PR BODY MASS INDEX (BMI) DOCUMENTED: ICD-10-PCS | Mod: CPTII,,, | Performed by: STUDENT IN AN ORGANIZED HEALTH CARE EDUCATION/TRAINING PROGRAM

## 2023-07-28 PROCEDURE — 99999 PR PBB SHADOW E&M-EST. PATIENT-LVL V: ICD-10-PCS | Mod: PBBFAC,,, | Performed by: STUDENT IN AN ORGANIZED HEALTH CARE EDUCATION/TRAINING PROGRAM

## 2023-07-28 PROCEDURE — 1160F RVW MEDS BY RX/DR IN RCRD: CPT | Mod: CPTII,,, | Performed by: STUDENT IN AN ORGANIZED HEALTH CARE EDUCATION/TRAINING PROGRAM

## 2023-07-28 PROCEDURE — 99999 PR PBB SHADOW E&M-EST. PATIENT-LVL V: CPT | Mod: PBBFAC,,, | Performed by: STUDENT IN AN ORGANIZED HEALTH CARE EDUCATION/TRAINING PROGRAM

## 2023-07-28 PROCEDURE — 4010F ACE/ARB THERAPY RXD/TAKEN: CPT | Mod: CPTII,,, | Performed by: STUDENT IN AN ORGANIZED HEALTH CARE EDUCATION/TRAINING PROGRAM

## 2023-07-28 PROCEDURE — 1159F MED LIST DOCD IN RCRD: CPT | Mod: CPTII,,, | Performed by: STUDENT IN AN ORGANIZED HEALTH CARE EDUCATION/TRAINING PROGRAM

## 2023-07-28 PROCEDURE — 3080F DIAST BP >= 90 MM HG: CPT | Mod: CPTII,,, | Performed by: STUDENT IN AN ORGANIZED HEALTH CARE EDUCATION/TRAINING PROGRAM

## 2023-07-28 PROCEDURE — 3080F PR MOST RECENT DIASTOLIC BLOOD PRESSURE >= 90 MM HG: ICD-10-PCS | Mod: CPTII,,, | Performed by: STUDENT IN AN ORGANIZED HEALTH CARE EDUCATION/TRAINING PROGRAM

## 2023-07-28 PROCEDURE — 99204 PR OFFICE/OUTPT VISIT, NEW, LEVL IV, 45-59 MIN: ICD-10-PCS | Mod: S$PBB,,, | Performed by: STUDENT IN AN ORGANIZED HEALTH CARE EDUCATION/TRAINING PROGRAM

## 2023-07-28 PROCEDURE — 99215 OFFICE O/P EST HI 40 MIN: CPT | Mod: PBBFAC,PN | Performed by: STUDENT IN AN ORGANIZED HEALTH CARE EDUCATION/TRAINING PROGRAM

## 2023-07-28 PROCEDURE — 3077F PR MOST RECENT SYSTOLIC BLOOD PRESSURE >= 140 MM HG: ICD-10-PCS | Mod: CPTII,,, | Performed by: STUDENT IN AN ORGANIZED HEALTH CARE EDUCATION/TRAINING PROGRAM

## 2023-07-28 PROCEDURE — 4010F PR ACE/ARB THEARPY RXD/TAKEN: ICD-10-PCS | Mod: CPTII,,, | Performed by: STUDENT IN AN ORGANIZED HEALTH CARE EDUCATION/TRAINING PROGRAM

## 2023-07-28 RX ORDER — MIRTAZAPINE 15 MG/1
15 TABLET, FILM COATED ORAL NIGHTLY
COMMUNITY
Start: 2023-05-18

## 2023-07-28 RX ORDER — PREGABALIN 200 MG/1
200 CAPSULE ORAL 2 TIMES DAILY
COMMUNITY
Start: 2023-07-12

## 2023-07-28 RX ORDER — LUBIPROSTONE 24 UG/1
24 CAPSULE ORAL 2 TIMES DAILY
COMMUNITY

## 2023-07-28 RX ORDER — VALSARTAN 80 MG/1
TABLET ORAL
COMMUNITY
Start: 2023-07-03

## 2023-07-28 RX ORDER — CHLORTHALIDONE 25 MG/1
12.5 TABLET ORAL DAILY
Qty: 15 TABLET | Refills: 2 | Status: SHIPPED | OUTPATIENT
Start: 2023-07-28 | End: 2023-10-26

## 2023-07-28 RX ORDER — RIZATRIPTAN BENZOATE 10 MG/1
TABLET ORAL
COMMUNITY
Start: 2023-05-24

## 2023-07-28 RX ORDER — METHOCARBAMOL 750 MG/1
750 TABLET, FILM COATED ORAL
COMMUNITY
Start: 2023-07-19

## 2023-07-28 NOTE — ASSESSMENT & PLAN NOTE
Iron deficiency anemia addressed per hematology.   Lab Results   Component Value Date    WBC 11.04 06/19/2023    HGB 13.7 06/19/2023    HCT 41.3 06/19/2023    MCV 86 06/19/2023     06/19/2023   Stable Hg/HCT and improved iron reserve.   - f/u hematology notes

## 2023-07-28 NOTE — PROGRESS NOTES
[facility] Note    HPI:  Rohini Dang is a 43 y.o. female who presents today for Establish Care (Patient here to establish care)  43 year old female with multiple medical conditions presents to establish care.  Chronic medical conditions include hypertension, hyperlipidemia, diabetes mellitus type 2,  Stage 3 Cirrhosis/NAFLD, history of GI bleed secondary to NSAID use, basal cell carcinoma of labia majora, spinal stenosis, fibromyalgia, osteoarthritis, depression, bipolar, borderline personality disorder.     Care Coordination:  Cardiology, [facility] Dr. Puente  Rheumatology, Dr. Cooney   specialist, psychiatry  Obtain medical records from specialists.     Patient denies any new symptoms. Currently closely following rheumatology, hem/onc, gastroenterology.     ROS   A review of systems was performed and was negative except as noted above.    I personally reviewed allergies, past medical, surgical, social and family history and updated as appropriate.    Medications:    Current Outpatient Medications:     ACCU-CHEK KERRY PLUS TEST STRP Strp, USE 1 STRIP TO CHECK GLUCOSE TWICE DAILY, Disp: , Rfl:     busPIRone (BUSPAR) 30 MG Tab, Take 30 mg by mouth 2 (two) times daily., Disp: , Rfl:     dulaglutide (TRULICITY) 1.5 mg/0.5 mL pen injector, Inject 1.5 mg into the skin every 7 days., Disp: 12 pen, Rfl: 3    empagliflozin (JARDIANCE) 25 mg tablet, Take 1 tablet (25 mg total) by mouth once daily., Disp: 90 tablet, Rfl: 1    estradioL (ESTRACE) 1 MG tablet, Take 1 tablet (1 mg total) by mouth once daily., Disp: 30 tablet, Rfl: 11    furosemide (LASIX) 20 MG tablet, Take 20 mg by mouth daily as needed., Disp: , Rfl:     LATUDA 80 mg Tab tablet, Take 80 mg by mouth every evening., Disp: , Rfl:     LIDOcaine (LIDODERM) 5 %, SMARTSIG:Topical, Disp: , Rfl:     lubiprostone (AMITIZA) 24 MCG Cap, Take 24 mcg by mouth 2 (two) times daily., Disp: , Rfl:     methocarbamoL (ROBAXIN) 750 MG Tab, Take 750 mg by  mouth., Disp: , Rfl:     metoprolol tartrate (LOPRESSOR) 100 MG tablet, Take 100 mg by mouth 2 (two) times daily., Disp: , Rfl:     mirtazapine (REMERON) 15 MG tablet, Take 15 mg by mouth every evening., Disp: , Rfl:     montelukast (SINGULAIR) 10 mg tablet, Take 10 mg by mouth every evening., Disp: , Rfl:     nitrofurantoin, macrocrystal-monohydrate, (MACROBID) 100 MG capsule, Take 1 capsule (100 mg total) by mouth every evening., Disp: 90 capsule, Rfl: 3    nortriptyline (PAMELOR) 50 MG capsule, TAKE 1 CAPSULE BY MOUTH IN THE EVENING, Disp: 90 capsule, Rfl: 1    pregabalin (LYRICA) 200 MG Cap, Take 200 mg by mouth 2 (two) times daily., Disp: , Rfl:     rizatriptan (MAXALT) 10 MG tablet, TAKE 1 TAB ONCE AS NEEDED FOR ACUTE MIGRAINE.MAY REPEAT IN 2 HOURS IF NEEDED.MAX 2 TABS/24 HOURS, Disp: , Rfl:     sertraline (ZOLOFT) 100 MG tablet, Take 150 mg by mouth once daily., Disp: , Rfl:     tiZANidine (ZANAFLEX) 4 MG tablet, Take 1 tablet (4 mg total) by mouth every 8 (eight) hours., Disp: 14 tablet, Rfl: 0    topiramate (TOPAMAX) 200 MG Tab, Take 200 mg by mouth nightly., Disp: , Rfl:     traMADoL (ULTRAM) 50 mg tablet, Take 1 tablet (50 mg total) by mouth every 12 (twelve) hours as needed for Pain. Medically necessary for more than 7 days., Disp: 60 tablet, Rfl: 1    valsartan (DIOVAN) 80 MG tablet, , Disp: , Rfl:     chlorthalidone (HYGROTEN) 25 MG Tab, Take 0.5 tablets (12.5 mg total) by mouth once daily., Disp: 15 tablet, Rfl: 2     Health Maintenance:  Immunization History   Administered Date(s) Administered    COVID-19, MRNA, LN-S, PF (MODERNA FULL 0.5 ML DOSE) 03/10/2021, 04/07/2021, 12/28/2021    Influenza - Quadrivalent - PF *Preferred* (6 months and older) 08/26/2013, 09/04/2014, 09/24/2020    Pneumococcal Polysaccharide - 23 Valent 08/26/2013      Health Maintenance   Topic Date Due    TETANUS VACCINE  Never done    Foot Exam  03/22/2023    Eye Exam  09/06/2023    Hemoglobin A1c  09/20/2023    Lipid Panel   "03/20/2024    Mammogram  04/13/2024    Hepatitis C Screening  Completed     Health Maintenance Topics with due status: Not Due       Topic Last Completion Date    Influenza Vaccine 09/24/2020    Colorectal Cancer Screening 05/11/2021    Lipid Panel 03/20/2023    Hemoglobin A1c 03/20/2023    Mammogram 04/13/2023     Health Maintenance Due   Topic Date Due    TETANUS VACCINE  Never done    Pneumococcal Vaccines (Age 0-64) (2 - PCV) 08/26/2014    COVID-19 Vaccine (4 - Moderna series) 02/22/2022    Foot Exam  03/22/2023    Eye Exam  09/06/2023     PHYSICAL EXAM:  Vitals:    07/28/23 0946 07/28/23 1004   BP: (!) 157/88 (!) 159/91   BP Location: Right arm    Patient Position: Sitting    BP Method: Medium (Automatic)    Pulse: 60    Resp: 16    Temp: 98.1 °F (36.7 °C)    TempSrc: Oral    SpO2: 98%    Weight: 81.6 kg (180 lb)    Height: 5' 4" (1.626 m)      Body mass index is 30.9 kg/m².  Physical Exam  Vitals reviewed.   Constitutional:       General: She is not in acute distress.     Appearance: Normal appearance. She is normal weight. She is not ill-appearing or toxic-appearing.   HENT:      Head: Normocephalic and atraumatic.      Right Ear: External ear normal.      Left Ear: External ear normal.      Nose: Nose normal.      Mouth/Throat:      Mouth: Mucous membranes are moist.      Pharynx: Oropharynx is clear.   Eyes:      Extraocular Movements: Extraocular movements intact.      Conjunctiva/sclera: Conjunctivae normal.   Cardiovascular:      Rate and Rhythm: Normal rate and regular rhythm.      Pulses: Normal pulses.      Heart sounds: Normal heart sounds.   Pulmonary:      Effort: Pulmonary effort is normal. No respiratory distress.      Breath sounds: No stridor. No wheezing, rhonchi or rales.   Abdominal:      General: Abdomen is flat. Bowel sounds are normal.      Palpations: Abdomen is soft.   Musculoskeletal:         General: No tenderness. Normal range of motion.      Cervical back: Normal range of motion and " neck supple. No rigidity or tenderness.   Skin:     General: Skin is warm and dry.      Capillary Refill: Capillary refill takes less than 2 seconds.   Neurological:      General: No focal deficit present.      Mental Status: She is alert and oriented to person, place, and time. Mental status is at baseline.      Motor: No weakness.      Gait: Gait normal.   Psychiatric:         Mood and Affect: Mood normal.         Behavior: Behavior normal.         Thought Content: Thought content normal.         Judgment: Judgment normal.        ASSESSMENT/PLAN:  1. Primary hypertension  Assessment & Plan:  BP Readings from Last 3 Encounters:   07/28/23 (!) 159/91   07/27/23 (!) 148/86   04/06/23 111/63   Past medications include amlodipine   Currently taking valsartan 80 mg daily and metoprolol 100 mg BID.       Orders:  -     chlorthalidone (HYGROTEN) 25 MG Tab; Take 0.5 tablets (12.5 mg total) by mouth once daily.  Dispense: 15 tablet; Refill: 2    2. Dyslipidemia associated with type 2 diabetes mellitus  Assessment & Plan:  Patient's FSGs are controlled on current medication regimen.  Last A1c reviewed-   Lab Results   Component Value Date    HGBA1C 7.0 (H) 03/20/2023   Current regimen includes,   - trulicity 1.5 mg weekly        3. Anemia, unspecified type  Assessment & Plan:  Iron deficiency anemia addressed per hematology.   Lab Results   Component Value Date    WBC 11.04 06/19/2023    HGB 13.7 06/19/2023    HCT 41.3 06/19/2023    MCV 86 06/19/2023     06/19/2023   Stable Hg/HCT and improved iron reserve.   - f/u hematology notes          4. Class 1 obesity with serious comorbidity and body mass index (BMI) of 30.0 to 30.9 in adult, unspecified obesity type  Assessment & Plan:  Wt Readings from Last 8 Encounters:   07/28/23 81.6 kg (180 lb)   07/27/23 79.4 kg (175 lb)   04/13/23 79.4 kg (175 lb)   04/06/23 79.4 kg (175 lb)   04/05/23 79.4 kg (175 lb)   03/30/23 81 kg (178 lb 9.2 oz)   03/21/23 81 kg (178 lb 9.2 oz)    03/21/23 81 kg (178 lb 9.6 oz)   Recommendations:   Stay physically active. As tolerated alternate resistance training with stretching and cardio. Goal of 150 minutes per week of moderate intensity activity or 7,500 - 10,000 steps per day. Follow the Mediterranean Diet. Include whole fresh fruits, vegetables, olive oil, seeds, nuts, whole grains, cold water fish, salmon, mackerel and lean cuts of meat.  Do not drink sugary/diet carbonated beverages. Decrease portion sizes slightly which will result in an approximately 500-calorie deficit. Avoid fast or fried and processed food, especially canned foods. Avoid refined carbohydrates, white starchy foods, flour, white potato, bread, muffins, and cakes. Consider substituting one meal a day with a meal replacement such as Slim fast, lean cuisine, or weight watcher's. Follow a healthy diet that includes enough calcium, vitamin D and proteins for bone health.        5. Administrative encounter  Assessment & Plan:          6. Diabetes mellitus without complication    7. JAMESON (nonalcoholic steatohepatitis)  Assessment & Plan:  Lab Results   Component Value Date    AST 57 (H) 06/19/2023     (H) 04/05/2023     (H) 03/20/2023     (H) 03/20/2023    ALT 64 (H) 06/19/2023     (H) 04/05/2023     (H) 03/20/2023     (H) 03/20/2023   Elevated LFTs downtrending. Continue follow up primary GI specialist.             8. Lesion of labia  Assessment & Plan:  Follow up annual OBGYN physical.             Other than changes above, continue current medications and maintain follow up with specialists.      No follow-ups on file.   Recent Results (from the past 2016 hour(s))   CBC Auto Differential    Collection Time: 06/19/23  2:09 PM   Result Value Ref Range    WBC 11.04 3.90 - 12.70 K/uL    RBC 4.79 4.00 - 5.40 M/uL    Hemoglobin 13.7 12.0 - 16.0 g/dL    Hematocrit 41.3 37.0 - 48.5 %    MCV 86 82 - 98 fL    MCH 28.6 27.0 - 31.0 pg    MCHC 33.2 32.0 -  36.0 g/dL    RDW 16.3 (H) 11.5 - 14.5 %    Platelets 207 150 - 450 K/uL    MPV 10.3 9.2 - 12.9 fL    Immature Granulocytes 0.3 0.0 - 0.5 %    Gran # (ANC) 5.6 1.8 - 7.7 K/uL    Immature Grans (Abs) 0.03 0.00 - 0.04 K/uL    Lymph # 4.0 1.0 - 4.8 K/uL    Mono # 1.2 (H) 0.3 - 1.0 K/uL    Eos # 0.2 0.0 - 0.5 K/uL    Baso # 0.08 0.00 - 0.20 K/uL    nRBC 0 0 /100 WBC    Gran % 50.5 38.0 - 73.0 %    Lymph % 36.1 18.0 - 48.0 %    Mono % 10.5 4.0 - 15.0 %    Eosinophil % 1.9 0.0 - 8.0 %    Basophil % 0.7 0.0 - 1.9 %    Differential Method Automated    Comprehensive Metabolic Panel    Collection Time: 06/19/23  2:09 PM   Result Value Ref Range    Sodium 138 136 - 145 mmol/L    Potassium 3.8 3.5 - 5.1 mmol/L    Chloride 108 95 - 110 mmol/L    CO2 23 23 - 29 mmol/L    Glucose 118 (H) 70 - 110 mg/dL    BUN 11 6 - 20 mg/dL    Creatinine 0.8 0.5 - 1.4 mg/dL    Calcium 9.1 8.7 - 10.5 mg/dL    Total Protein 7.9 6.0 - 8.4 g/dL    Albumin 4.0 3.5 - 5.2 g/dL    Total Bilirubin 0.3 0.1 - 1.0 mg/dL    Alkaline Phosphatase 141 (H) 55 - 135 U/L    AST 57 (H) 10 - 40 U/L    ALT 64 (H) 10 - 44 U/L    eGFR >60.0 >60 mL/min/1.73 m^2    Anion Gap 7 (L) 8 - 16 mmol/L   Iron and TIBC    Collection Time: 06/19/23  2:09 PM   Result Value Ref Range    Iron 86 30 - 160 ug/dL    Transferrin 227 200 - 375 mg/dL    TIBC 318 250 - 450 ug/dL    Saturated Iron 27 20 - 50 %   Ferritin    Collection Time: 06/19/23  2:09 PM   Result Value Ref Range    Ferritin 330 (H) 20.0 - 300.0 ng/mL   Magnesium    Collection Time: 06/19/23  2:09 PM   Result Value Ref Range    Magnesium 2.0 1.6 - 2.6 mg/dL   Phosphorus    Collection Time: 06/19/23  2:09 PM   Result Value Ref Range    Phosphorus 3.7 2.7 - 4.5 mg/dL         Charlotte Roy DO  [facility]

## 2023-07-28 NOTE — ASSESSMENT & PLAN NOTE
BP Readings from Last 3 Encounters:   07/28/23 (!) 159/91   07/27/23 (!) 148/86   04/06/23 111/63   Past medications include amlodipine   Currently taking valsartan 80 mg daily and metoprolol 100 mg BID.

## 2023-07-28 NOTE — ASSESSMENT & PLAN NOTE
Wt Readings from Last 8 Encounters:   07/28/23 81.6 kg (180 lb)   07/27/23 79.4 kg (175 lb)   04/13/23 79.4 kg (175 lb)   04/06/23 79.4 kg (175 lb)   04/05/23 79.4 kg (175 lb)   03/30/23 81 kg (178 lb 9.2 oz)   03/21/23 81 kg (178 lb 9.2 oz)   03/21/23 81 kg (178 lb 9.6 oz)   Recommendations:   Stay physically active. As tolerated alternate resistance training with stretching and cardio. Goal of 150 minutes per week of moderate intensity activity or 7,500 - 10,000 steps per day. Follow the Mediterranean Diet. Include whole fresh fruits, vegetables, olive oil, seeds, nuts, whole grains, cold water fish, salmon, mackerel and lean cuts of meat.  Do not drink sugary/diet carbonated beverages. Decrease portion sizes slightly which will result in an approximately 500-calorie deficit. Avoid fast or fried and processed food, especially canned foods. Avoid refined carbohydrates, white starchy foods, flour, white potato, bread, muffins, and cakes. Consider substituting one meal a day with a meal replacement such as Slim fast, lean cuisine, or weight watcher's. Follow a healthy diet that includes enough calcium, vitamin D and proteins for bone health.

## 2023-08-14 PROBLEM — E87.1 HYPONATREMIA: Status: RESOLVED | Noted: 2022-07-14 | Resolved: 2023-08-14

## 2023-08-14 PROBLEM — Z02.9 ADMINISTRATIVE ENCOUNTER: Status: ACTIVE | Noted: 2023-08-14

## 2023-08-14 PROBLEM — R06.00 DYSPNEA: Status: RESOLVED | Noted: 2022-07-15 | Resolved: 2023-08-14

## 2023-08-14 PROBLEM — Z02.9 ADMINISTRATIVE ENCOUNTER: Status: ACTIVE | Noted: 2023-07-27

## 2023-08-14 PROBLEM — E87.5 HYPERKALEMIA: Status: RESOLVED | Noted: 2022-07-15 | Resolved: 2023-08-14

## 2023-08-14 PROBLEM — N17.9 ACUTE RENAL FAILURE: Status: RESOLVED | Noted: 2022-07-14 | Resolved: 2023-08-14

## 2023-08-14 NOTE — ASSESSMENT & PLAN NOTE
Lab Results   Component Value Date    AST 57 (H) 06/19/2023     (H) 04/05/2023     (H) 03/20/2023     (H) 03/20/2023    ALT 64 (H) 06/19/2023     (H) 04/05/2023     (H) 03/20/2023     (H) 03/20/2023   Elevated LFTs downtrending. Continue follow up primary GI specialist.

## 2023-08-14 NOTE — ASSESSMENT & PLAN NOTE
Patient's FSGs are controlled on current medication regimen.  Last A1c reviewed-   Lab Results   Component Value Date    HGBA1C 7.0 (H) 03/20/2023   Current regimen includes,   - trulicity 1.5 mg weekly

## 2023-08-16 ENCOUNTER — OFFICE VISIT (OUTPATIENT)
Dept: PRIMARY CARE CLINIC | Facility: CLINIC | Age: 43
End: 2023-08-16
Payer: MEDICAID

## 2023-08-16 VITALS
HEART RATE: 71 BPM | OXYGEN SATURATION: 95 % | SYSTOLIC BLOOD PRESSURE: 114 MMHG | RESPIRATION RATE: 16 BRPM | TEMPERATURE: 99 F | HEIGHT: 64 IN | DIASTOLIC BLOOD PRESSURE: 70 MMHG | BODY MASS INDEX: 29.53 KG/M2 | WEIGHT: 173 LBS

## 2023-08-16 DIAGNOSIS — D50.9 IRON DEFICIENCY ANEMIA, UNSPECIFIED IRON DEFICIENCY ANEMIA TYPE: Primary | ICD-10-CM

## 2023-08-16 DIAGNOSIS — E66.9 CLASS 1 OBESITY WITH SERIOUS COMORBIDITY AND BODY MASS INDEX (BMI) OF 30.0 TO 30.9 IN ADULT, UNSPECIFIED OBESITY TYPE: ICD-10-CM

## 2023-08-16 DIAGNOSIS — I10 PRIMARY HYPERTENSION: ICD-10-CM

## 2023-08-16 PROCEDURE — 1160F RVW MEDS BY RX/DR IN RCRD: CPT | Mod: CPTII,,, | Performed by: STUDENT IN AN ORGANIZED HEALTH CARE EDUCATION/TRAINING PROGRAM

## 2023-08-16 PROCEDURE — 99213 OFFICE O/P EST LOW 20 MIN: CPT | Mod: S$PBB,,, | Performed by: STUDENT IN AN ORGANIZED HEALTH CARE EDUCATION/TRAINING PROGRAM

## 2023-08-16 PROCEDURE — 3074F SYST BP LT 130 MM HG: CPT | Mod: CPTII,,, | Performed by: STUDENT IN AN ORGANIZED HEALTH CARE EDUCATION/TRAINING PROGRAM

## 2023-08-16 PROCEDURE — 1159F PR MEDICATION LIST DOCUMENTED IN MEDICAL RECORD: ICD-10-PCS | Mod: CPTII,,, | Performed by: STUDENT IN AN ORGANIZED HEALTH CARE EDUCATION/TRAINING PROGRAM

## 2023-08-16 PROCEDURE — 3008F PR BODY MASS INDEX (BMI) DOCUMENTED: ICD-10-PCS | Mod: CPTII,,, | Performed by: STUDENT IN AN ORGANIZED HEALTH CARE EDUCATION/TRAINING PROGRAM

## 2023-08-16 PROCEDURE — 1159F MED LIST DOCD IN RCRD: CPT | Mod: CPTII,,, | Performed by: STUDENT IN AN ORGANIZED HEALTH CARE EDUCATION/TRAINING PROGRAM

## 2023-08-16 PROCEDURE — 3078F PR MOST RECENT DIASTOLIC BLOOD PRESSURE < 80 MM HG: ICD-10-PCS | Mod: CPTII,,, | Performed by: STUDENT IN AN ORGANIZED HEALTH CARE EDUCATION/TRAINING PROGRAM

## 2023-08-16 PROCEDURE — 3078F DIAST BP <80 MM HG: CPT | Mod: CPTII,,, | Performed by: STUDENT IN AN ORGANIZED HEALTH CARE EDUCATION/TRAINING PROGRAM

## 2023-08-16 PROCEDURE — 3051F PR MOST RECENT HEMOGLOBIN A1C LEVEL 7.0 - < 8.0%: ICD-10-PCS | Mod: CPTII,,, | Performed by: STUDENT IN AN ORGANIZED HEALTH CARE EDUCATION/TRAINING PROGRAM

## 2023-08-16 PROCEDURE — 3074F PR MOST RECENT SYSTOLIC BLOOD PRESSURE < 130 MM HG: ICD-10-PCS | Mod: CPTII,,, | Performed by: STUDENT IN AN ORGANIZED HEALTH CARE EDUCATION/TRAINING PROGRAM

## 2023-08-16 PROCEDURE — 4010F ACE/ARB THERAPY RXD/TAKEN: CPT | Mod: CPTII,,, | Performed by: STUDENT IN AN ORGANIZED HEALTH CARE EDUCATION/TRAINING PROGRAM

## 2023-08-16 PROCEDURE — 99999 PR PBB SHADOW E&M-EST. PATIENT-LVL V: CPT | Mod: PBBFAC,,, | Performed by: STUDENT IN AN ORGANIZED HEALTH CARE EDUCATION/TRAINING PROGRAM

## 2023-08-16 PROCEDURE — 3008F BODY MASS INDEX DOCD: CPT | Mod: CPTII,,, | Performed by: STUDENT IN AN ORGANIZED HEALTH CARE EDUCATION/TRAINING PROGRAM

## 2023-08-16 PROCEDURE — 4010F PR ACE/ARB THEARPY RXD/TAKEN: ICD-10-PCS | Mod: CPTII,,, | Performed by: STUDENT IN AN ORGANIZED HEALTH CARE EDUCATION/TRAINING PROGRAM

## 2023-08-16 PROCEDURE — 99999 PR PBB SHADOW E&M-EST. PATIENT-LVL V: ICD-10-PCS | Mod: PBBFAC,,, | Performed by: STUDENT IN AN ORGANIZED HEALTH CARE EDUCATION/TRAINING PROGRAM

## 2023-08-16 PROCEDURE — 3051F HG A1C>EQUAL 7.0%<8.0%: CPT | Mod: CPTII,,, | Performed by: STUDENT IN AN ORGANIZED HEALTH CARE EDUCATION/TRAINING PROGRAM

## 2023-08-16 PROCEDURE — 99213 PR OFFICE/OUTPT VISIT, EST, LEVL III, 20-29 MIN: ICD-10-PCS | Mod: S$PBB,,, | Performed by: STUDENT IN AN ORGANIZED HEALTH CARE EDUCATION/TRAINING PROGRAM

## 2023-08-16 PROCEDURE — 1160F PR REVIEW ALL MEDS BY PRESCRIBER/CLIN PHARMACIST DOCUMENTED: ICD-10-PCS | Mod: CPTII,,, | Performed by: STUDENT IN AN ORGANIZED HEALTH CARE EDUCATION/TRAINING PROGRAM

## 2023-08-16 PROCEDURE — 99215 OFFICE O/P EST HI 40 MIN: CPT | Mod: PBBFAC,PN | Performed by: STUDENT IN AN ORGANIZED HEALTH CARE EDUCATION/TRAINING PROGRAM

## 2023-08-16 RX ORDER — BUSPIRONE HYDROCHLORIDE 15 MG/1
TABLET ORAL
COMMUNITY

## 2023-08-16 NOTE — ASSESSMENT & PLAN NOTE
Wt Readings from Last 8 Encounters:   08/16/23 78.5 kg (173 lb)   07/28/23 81.6 kg (180 lb)   07/27/23 79.4 kg (175 lb)   04/13/23 79.4 kg (175 lb)   04/06/23 79.4 kg (175 lb)   04/05/23 79.4 kg (175 lb)   03/30/23 81 kg (178 lb 9.2 oz)   03/21/23 81 kg (178 lb 9.2 oz)   Stable weight. Encouraged staying physically active as tolerated.   Recommendations:   Stay physically active. As tolerated alternate resistance training with stretching and cardio. Goal of 150 minutes per week of moderate intensity activity or 7,500 - 10,000 steps per day. Follow the Mediterranean Diet. Include whole fresh fruits, vegetables, olive oil, seeds, nuts, whole grains, cold water fish, salmon, mackerel and lean cuts of meat.  Do not drink sugary/diet carbonated beverages. Decrease portion sizes slightly which will result in an approximately 500-calorie deficit. Avoid fast or fried and processed food, especially canned foods. Avoid refined carbohydrates, white starchy foods, flour, white potato, bread, muffins, and cakes. Consider substituting one meal a day with a meal replacement such as Slim fast, lean cuisine, or weight watcher's. Follow a healthy diet that includes enough calcium, vitamin D and proteins for bone health.

## 2023-08-16 NOTE — PROGRESS NOTES
[facility] Note    HPI:  Rohini Dang is a 43 y.o. female who presents today for Hypertension (Patient here for blood pressure check.  Patient has a new medication from Neurology for fractured tailbone pain and fibromyalgia )    43 year old female with hypertension, hyperlipidemia, diabetes mellitus type 2,  Stage 3 Cirrhosis/NAFLD, history of GI bleed secondary to NSAID use, basal cell carcinoma of labia majora, spinal stenosis, fibromyalgia, osteoarthritis, depression, bipolar, borderline personality disorder.   Since end of July has been suffering from tail bone pain with questionable distal coccygeal fracture. Patient is ambulating with supportive walking pain.   Has been given tramadol and pregabalin, but endorses have not been taking above meds consistently.       Care Coordination:  Cardiology, [facility] Dr. Puente  Rheumatology, Dr. Cooney   specialist, psychiatry, Davi Obando  Neurology, Dr. Guerin    Health Maintenance:  Eye exam      Patient denies any new symptoms. Currently closely following rheumatology, hem/onc, gastroenterology.   Denies fever, chills, excessive headache, vision changes, chest pain, palpitations, shortness of breath, abdominal pain, nausea, vomiting, diarrhea, constipation.     ROS   A review of systems was performed and was negative except as noted above.    I personally reviewed allergies, past medical, surgical, social and family history and updated as appropriate.    Medications:    Current Outpatient Medications:     ACCU-CHEK KERRY PLUS TEST STRP Strp, USE 1 STRIP TO CHECK GLUCOSE TWICE DAILY, Disp: , Rfl:     brivaracetam (BRIVIACT) 25 mg Tab, Take 25 mg by mouth every evening., Disp: , Rfl:     busPIRone (BUSPAR) 15 MG tablet, 1 tablet Orally three times per day for 30 days, Disp: , Rfl:     chlorthalidone (HYGROTEN) 25 MG Tab, Take 0.5 tablets (12.5 mg total) by mouth once daily., Disp: 15 tablet, Rfl: 2    dulaglutide (TRULICITY) 1.5 mg/0.5 mL pen  injector, Inject 1.5 mg into the skin every 7 days., Disp: 12 pen, Rfl: 3    empagliflozin (JARDIANCE) 25 mg tablet, Take 1 tablet (25 mg total) by mouth once daily., Disp: 90 tablet, Rfl: 1    estradioL (ESTRACE) 1 MG tablet, Take 1 tablet (1 mg total) by mouth once daily., Disp: 30 tablet, Rfl: 11    furosemide (LASIX) 20 MG tablet, Take 20 mg by mouth daily as needed., Disp: , Rfl:     LATUDA 80 mg Tab tablet, Take 80 mg by mouth every evening., Disp: , Rfl:     LIDOcaine (LIDODERM) 5 %, SMARTSIG:Topical, Disp: , Rfl:     lubiprostone (AMITIZA) 24 MCG Cap, Take 24 mcg by mouth 2 (two) times daily., Disp: , Rfl:     methocarbamoL (ROBAXIN) 750 MG Tab, Take 750 mg by mouth., Disp: , Rfl:     metoprolol tartrate (LOPRESSOR) 100 MG tablet, Take 100 mg by mouth 2 (two) times daily., Disp: , Rfl:     mirtazapine (REMERON) 15 MG tablet, Take 15 mg by mouth every evening., Disp: , Rfl:     montelukast (SINGULAIR) 10 mg tablet, Take 10 mg by mouth every evening., Disp: , Rfl:     nitrofurantoin, macrocrystal-monohydrate, (MACROBID) 100 MG capsule, Take 1 capsule (100 mg total) by mouth every evening., Disp: 90 capsule, Rfl: 3    nortriptyline (PAMELOR) 50 MG capsule, TAKE 1 CAPSULE BY MOUTH IN THE EVENING, Disp: 90 capsule, Rfl: 1    pregabalin (LYRICA) 200 MG Cap, Take 200 mg by mouth 2 (two) times daily., Disp: , Rfl:     rizatriptan (MAXALT) 10 MG tablet, TAKE 1 TAB ONCE AS NEEDED FOR ACUTE MIGRAINE.MAY REPEAT IN 2 HOURS IF NEEDED.MAX 2 TABS/24 HOURS, Disp: , Rfl:     sertraline (ZOLOFT) 100 MG tablet, Take 150 mg by mouth once daily., Disp: , Rfl:     tiZANidine (ZANAFLEX) 4 MG tablet, Take 1 tablet (4 mg total) by mouth every 8 (eight) hours., Disp: 14 tablet, Rfl: 0    topiramate (TOPAMAX) 200 MG Tab, Take 200 mg by mouth nightly., Disp: , Rfl:     valsartan (DIOVAN) 80 MG tablet, , Disp: , Rfl:      Health Maintenance:  Immunization History   Administered Date(s) Administered    COVID-19, MRNA, LN-S, PF (MODERNA  "FULL 0.5 ML DOSE) 03/10/2021, 04/07/2021, 12/28/2021    Influenza - Quadrivalent - PF *Preferred* (6 months and older) 08/26/2013, 09/04/2014, 09/24/2020    Pneumococcal Polysaccharide - 23 Valent 08/26/2013      Health Maintenance   Topic Date Due    TETANUS VACCINE  Never done    Foot Exam  03/22/2023    Eye Exam  09/06/2023    Hemoglobin A1c  09/20/2023    Lipid Panel  03/20/2024    Mammogram  04/13/2024    Colorectal Cancer Screening  05/11/2026    Hepatitis C Screening  Completed     Health Maintenance Topics with due status: Not Due       Topic Last Completion Date    Influenza Vaccine 09/24/2020    Colorectal Cancer Screening 05/11/2021    Lipid Panel 03/20/2023    Hemoglobin A1c 03/20/2023    Mammogram 04/13/2023     Health Maintenance Due   Topic Date Due    TETANUS VACCINE  Never done    Pneumococcal Vaccines (Age 0-64) (2 - PCV) 08/26/2014    COVID-19 Vaccine (4 - Moderna series) 02/22/2022    Foot Exam  03/22/2023    Eye Exam  09/06/2023       PHYSICAL EXAM:  Vitals:    08/16/23 1313   BP: 114/70   BP Location: Right arm   Patient Position: Sitting   BP Method: Medium (Automatic)   Pulse: 71   Resp: 16   Temp: 98.5 °F (36.9 °C)   TempSrc: Oral   SpO2: 95%   Weight: 78.5 kg (173 lb)   Height: 5' 4" (1.626 m)     Body mass index is 29.7 kg/m².  Physical Exam  Vitals reviewed.   Constitutional:       General: She is not in acute distress.     Appearance: Normal appearance. She is normal weight. She is not ill-appearing or toxic-appearing.   HENT:      Head: Normocephalic and atraumatic.      Right Ear: External ear normal.      Left Ear: External ear normal.      Nose: Nose normal.      Mouth/Throat:      Mouth: Mucous membranes are dry.      Pharynx: Oropharynx is clear.   Eyes:      Extraocular Movements: Extraocular movements intact.      Conjunctiva/sclera: Conjunctivae normal.   Cardiovascular:      Rate and Rhythm: Normal rate and regular rhythm.      Pulses: Normal pulses.      Heart sounds: Normal " heart sounds.   Pulmonary:      Effort: Pulmonary effort is normal. No respiratory distress.      Breath sounds: No stridor. No wheezing, rhonchi or rales.   Abdominal:      General: Abdomen is flat. There is no distension.      Palpations: Abdomen is soft.      Tenderness: There is no abdominal tenderness. There is no right CVA tenderness, left CVA tenderness or guarding.   Musculoskeletal:         General: No tenderness. Normal range of motion.      Cervical back: Normal range of motion and neck supple. No rigidity or tenderness.   Skin:     General: Skin is warm and dry.      Capillary Refill: Capillary refill takes less than 2 seconds.   Neurological:      Mental Status: She is alert and oriented to person, place, and time. Mental status is at baseline.      Motor: No weakness.      Gait: Gait normal.   Psychiatric:         Mood and Affect: Mood normal.         Behavior: Behavior normal.        ASSESSMENT/PLAN:  1. Iron deficiency anemia, unspecified iron deficiency anemia type  Assessment & Plan:  Iron deficiency anemia addressed per hematology.   Lab Results   Component Value Date    WBC 11.04 06/19/2023    HGB 13.7 06/19/2023    HCT 41.3 06/19/2023    MCV 86 06/19/2023     06/19/2023   Stable Hg/HCT and improved iron reserve.   - f/u hematology notes          2. Primary hypertension  Assessment & Plan:  BP Readings from Last 3 Encounters:   08/16/23 114/70   07/28/23 (!) 159/91   07/27/23 (!) 148/86   Past medications include amlodipine   Currently taking valsartan 80 mg daily and metoprolol 100 mg BID and chlorthalidone 12.5 mg daily.   Normotensive. Patient denies symptoms. Will continue with current regimen.   - follow low sodium diet <2 grams or 2 teaspoons daily  - f/u 2 months          3. Class 1 obesity with serious comorbidity and body mass index (BMI) of 30.0 to 30.9 in adult, unspecified obesity type  Assessment & Plan:  Wt Readings from Last 8 Encounters:   08/16/23 78.5 kg (173 lb)    07/28/23 81.6 kg (180 lb)   07/27/23 79.4 kg (175 lb)   04/13/23 79.4 kg (175 lb)   04/06/23 79.4 kg (175 lb)   04/05/23 79.4 kg (175 lb)   03/30/23 81 kg (178 lb 9.2 oz)   03/21/23 81 kg (178 lb 9.2 oz)   Stable weight. Encouraged staying physically active as tolerated.   Recommendations:   Stay physically active. As tolerated alternate resistance training with stretching and cardio. Goal of 150 minutes per week of moderate intensity activity or 7,500 - 10,000 steps per day. Follow the Mediterranean Diet. Include whole fresh fruits, vegetables, olive oil, seeds, nuts, whole grains, cold water fish, salmon, mackerel and lean cuts of meat.  Do not drink sugary/diet carbonated beverages. Decrease portion sizes slightly which will result in an approximately 500-calorie deficit. Avoid fast or fried and processed food, especially canned foods. Avoid refined carbohydrates, white starchy foods, flour, white potato, bread, muffins, and cakes. Consider substituting one meal a day with a meal replacement such as Slim fast, lean cuisine, or weight watcher's. Follow a healthy diet that includes enough calcium, vitamin D and proteins for bone health.            Other than changes above, continue current medications and maintain follow up with specialists.      No follow-ups on file.   Recent Results (from the past 2016 hour(s))   CBC Auto Differential    Collection Time: 06/19/23  2:09 PM   Result Value Ref Range    WBC 11.04 3.90 - 12.70 K/uL    RBC 4.79 4.00 - 5.40 M/uL    Hemoglobin 13.7 12.0 - 16.0 g/dL    Hematocrit 41.3 37.0 - 48.5 %    MCV 86 82 - 98 fL    MCH 28.6 27.0 - 31.0 pg    MCHC 33.2 32.0 - 36.0 g/dL    RDW 16.3 (H) 11.5 - 14.5 %    Platelets 207 150 - 450 K/uL    MPV 10.3 9.2 - 12.9 fL    Immature Granulocytes 0.3 0.0 - 0.5 %    Gran # (ANC) 5.6 1.8 - 7.7 K/uL    Immature Grans (Abs) 0.03 0.00 - 0.04 K/uL    Lymph # 4.0 1.0 - 4.8 K/uL    Mono # 1.2 (H) 0.3 - 1.0 K/uL    Eos # 0.2 0.0 - 0.5 K/uL    Baso # 0.08  0.00 - 0.20 K/uL    nRBC 0 0 /100 WBC    Gran % 50.5 38.0 - 73.0 %    Lymph % 36.1 18.0 - 48.0 %    Mono % 10.5 4.0 - 15.0 %    Eosinophil % 1.9 0.0 - 8.0 %    Basophil % 0.7 0.0 - 1.9 %    Differential Method Automated    Comprehensive Metabolic Panel    Collection Time: 06/19/23  2:09 PM   Result Value Ref Range    Sodium 138 136 - 145 mmol/L    Potassium 3.8 3.5 - 5.1 mmol/L    Chloride 108 95 - 110 mmol/L    CO2 23 23 - 29 mmol/L    Glucose 118 (H) 70 - 110 mg/dL    BUN 11 6 - 20 mg/dL    Creatinine 0.8 0.5 - 1.4 mg/dL    Calcium 9.1 8.7 - 10.5 mg/dL    Total Protein 7.9 6.0 - 8.4 g/dL    Albumin 4.0 3.5 - 5.2 g/dL    Total Bilirubin 0.3 0.1 - 1.0 mg/dL    Alkaline Phosphatase 141 (H) 55 - 135 U/L    AST 57 (H) 10 - 40 U/L    ALT 64 (H) 10 - 44 U/L    eGFR >60.0 >60 mL/min/1.73 m^2    Anion Gap 7 (L) 8 - 16 mmol/L   Iron and TIBC    Collection Time: 06/19/23  2:09 PM   Result Value Ref Range    Iron 86 30 - 160 ug/dL    Transferrin 227 200 - 375 mg/dL    TIBC 318 250 - 450 ug/dL    Saturated Iron 27 20 - 50 %   Ferritin    Collection Time: 06/19/23  2:09 PM   Result Value Ref Range    Ferritin 330 (H) 20.0 - 300.0 ng/mL   Magnesium    Collection Time: 06/19/23  2:09 PM   Result Value Ref Range    Magnesium 2.0 1.6 - 2.6 mg/dL   Phosphorus    Collection Time: 06/19/23  2:09 PM   Result Value Ref Range    Phosphorus 3.7 2.7 - 4.5 mg/dL         Charlotte Roy DO  [facility]

## 2023-08-16 NOTE — ASSESSMENT & PLAN NOTE
BP Readings from Last 3 Encounters:   08/16/23 114/70   07/28/23 (!) 159/91   07/27/23 (!) 148/86   Past medications include amlodipine   Currently taking valsartan 80 mg daily and metoprolol 100 mg BID and chlorthalidone 12.5 mg daily.   Normotensive. Patient denies symptoms. Will continue with current regimen.   - follow low sodium diet <2 grams or 2 teaspoons daily  - f/u 2 months

## 2023-08-24 ENCOUNTER — HOSPITAL ENCOUNTER (EMERGENCY)
Facility: HOSPITAL | Age: 43
Discharge: HOME | End: 2023-08-24
Attending: EMERGENCY MEDICINE
Payer: MEDICAID

## 2023-08-24 VITALS
OXYGEN SATURATION: 96 % | SYSTOLIC BLOOD PRESSURE: 126 MMHG | HEIGHT: 64 IN | RESPIRATION RATE: 18 BRPM | BODY MASS INDEX: 28.51 KG/M2 | TEMPERATURE: 99 F | WEIGHT: 167 LBS | HEART RATE: 78 BPM | DIASTOLIC BLOOD PRESSURE: 84 MMHG

## 2023-08-24 DIAGNOSIS — R11.2 NAUSEA AND VOMITING, UNSPECIFIED VOMITING TYPE: Primary | ICD-10-CM

## 2023-08-24 LAB
ALBUMIN SERPL BCP-MCNC: 4.3 G/DL (ref 3.5–5.2)
ALP SERPL-CCNC: 173 U/L (ref 55–135)
ALT SERPL W/O P-5'-P-CCNC: 89 U/L (ref 10–44)
ANION GAP SERPL CALC-SCNC: 17 MMOL/L (ref 8–16)
AST SERPL-CCNC: 70 U/L (ref 10–40)
BACTERIA #/AREA URNS HPF: NEGATIVE /HPF
BASOPHILS # BLD AUTO: 0.12 K/UL (ref 0–0.2)
BASOPHILS NFR BLD: 0.9 % (ref 0–1.9)
BILIRUB SERPL-MCNC: 0.5 MG/DL (ref 0.1–1)
BILIRUB UR QL STRIP: NEGATIVE
BUN SERPL-MCNC: 25 MG/DL (ref 6–20)
CALCIUM SERPL-MCNC: 9.7 MG/DL (ref 8.7–10.5)
CHLORIDE SERPL-SCNC: 94 MMOL/L (ref 95–110)
CLARITY UR: CLEAR
CO2 SERPL-SCNC: 22 MMOL/L (ref 23–29)
COLOR UR: YELLOW
CREAT SERPL-MCNC: 1.1 MG/DL (ref 0.5–1.4)
CTP QC/QA: YES
DIFFERENTIAL METHOD: ABNORMAL
EOSINOPHIL # BLD AUTO: 0.1 K/UL (ref 0–0.5)
EOSINOPHIL NFR BLD: 1 % (ref 0–8)
ERYTHROCYTE [DISTWIDTH] IN BLOOD BY AUTOMATED COUNT: 13.5 % (ref 11.5–14.5)
EST. GFR  (NO RACE VARIABLE): >60 ML/MIN/1.73 M^2
GLUCOSE SERPL-MCNC: 152 MG/DL (ref 70–110)
GLUCOSE UR QL STRIP: ABNORMAL
HCT VFR BLD AUTO: 47.8 % (ref 37–48.5)
HGB BLD-MCNC: 16.7 G/DL (ref 12–16)
HGB UR QL STRIP: NEGATIVE
HYALINE CASTS #/AREA URNS LPF: 35.7 /LPF
IMM GRANULOCYTES # BLD AUTO: 0.04 K/UL (ref 0–0.04)
IMM GRANULOCYTES NFR BLD AUTO: 0.3 % (ref 0–0.5)
KETONES UR QL STRIP: ABNORMAL
LEUKOCYTE ESTERASE UR QL STRIP: NEGATIVE
LIPASE SERPL-CCNC: 276 U/L (ref 23–300)
LYMPHOCYTES # BLD AUTO: 5.1 K/UL (ref 1–4.8)
LYMPHOCYTES NFR BLD: 37.8 % (ref 18–48)
MCH RBC QN AUTO: 29.3 PG (ref 27–31)
MCHC RBC AUTO-ENTMCNC: 34.9 G/DL (ref 32–36)
MCV RBC AUTO: 84 FL (ref 82–98)
MICROSCOPIC COMMENT: ABNORMAL
MONOCYTES # BLD AUTO: 1.2 K/UL (ref 0.3–1)
MONOCYTES NFR BLD: 9.1 % (ref 4–15)
NEUTROPHILS # BLD AUTO: 6.9 K/UL (ref 1.8–7.7)
NEUTROPHILS NFR BLD: 50.9 % (ref 38–73)
NITRITE UR QL STRIP: NEGATIVE
NRBC BLD-RTO: 0 /100 WBC
PH UR STRIP: 6 [PH] (ref 5–8)
PLATELET # BLD AUTO: 358 K/UL (ref 150–450)
PMV BLD AUTO: 10.6 FL (ref 9.2–12.9)
POTASSIUM SERPL-SCNC: 3 MMOL/L (ref 3.5–5.1)
PROT SERPL-MCNC: 9.2 G/DL (ref 6–8.4)
PROT UR QL STRIP: ABNORMAL
RBC # BLD AUTO: 5.7 M/UL (ref 4–5.4)
RBC #/AREA URNS HPF: 4 /HPF (ref 0–4)
SARS-COV-2 RDRP RESP QL NAA+PROBE: NEGATIVE
SODIUM SERPL-SCNC: 133 MMOL/L (ref 136–145)
SP GR UR STRIP: 1.02 (ref 1–1.03)
SQUAMOUS #/AREA URNS HPF: 3 /HPF
URN SPEC COLLECT METH UR: ABNORMAL
UROBILINOGEN UR STRIP-ACNC: 1 EU/DL
WBC # BLD AUTO: 13.53 K/UL (ref 3.9–12.7)
WBC #/AREA URNS HPF: 2 /HPF (ref 0–5)
YEAST URNS QL MICRO: ABNORMAL

## 2023-08-24 PROCEDURE — 87635 SARS-COV-2 COVID-19 AMP PRB: CPT

## 2023-08-24 PROCEDURE — 85025 COMPLETE CBC W/AUTO DIFF WBC: CPT

## 2023-08-24 PROCEDURE — 81000 URINALYSIS NONAUTO W/SCOPE: CPT

## 2023-08-24 PROCEDURE — 63600175 PHARM REV CODE 636 W HCPCS

## 2023-08-24 PROCEDURE — 96361 HYDRATE IV INFUSION ADD-ON: CPT

## 2023-08-24 PROCEDURE — 99284 EMERGENCY DEPT VISIT MOD MDM: CPT | Mod: 25

## 2023-08-24 PROCEDURE — 83690 ASSAY OF LIPASE: CPT

## 2023-08-24 PROCEDURE — 80053 COMPREHEN METABOLIC PANEL: CPT

## 2023-08-24 PROCEDURE — 96375 TX/PRO/DX INJ NEW DRUG ADDON: CPT

## 2023-08-24 PROCEDURE — 96374 THER/PROPH/DIAG INJ IV PUSH: CPT

## 2023-08-24 PROCEDURE — 25000003 PHARM REV CODE 250

## 2023-08-24 RX ORDER — PROCHLORPERAZINE EDISYLATE 5 MG/ML
10 INJECTION INTRAMUSCULAR; INTRAVENOUS
Status: COMPLETED | OUTPATIENT
Start: 2023-08-24 | End: 2023-08-24

## 2023-08-24 RX ORDER — DIPHENHYDRAMINE HYDROCHLORIDE 50 MG/ML
25 INJECTION INTRAMUSCULAR; INTRAVENOUS
Status: COMPLETED | OUTPATIENT
Start: 2023-08-24 | End: 2023-08-24

## 2023-08-24 RX ORDER — MAG HYDROX/ALUMINUM HYD/SIMETH 200-200-20
5 SUSPENSION, ORAL (FINAL DOSE FORM) ORAL
Status: COMPLETED | OUTPATIENT
Start: 2023-08-24 | End: 2023-08-24

## 2023-08-24 RX ORDER — DIPHENHYDRAMINE HCL 25 MG
25 CAPSULE ORAL EVERY 6 HOURS PRN
Qty: 20 CAPSULE | Refills: 0 | Status: SHIPPED | OUTPATIENT
Start: 2023-08-24

## 2023-08-24 RX ORDER — ONDANSETRON 4 MG/1
4 TABLET, FILM COATED ORAL EVERY 6 HOURS PRN
Qty: 12 TABLET | Refills: 0 | Status: SHIPPED | OUTPATIENT
Start: 2023-08-24

## 2023-08-24 RX ORDER — BUTALBITAL, ASPIRIN, AND CAFFEINE 325; 50; 40 MG/1; MG/1; MG/1
1 CAPSULE ORAL EVERY 6 HOURS PRN
Qty: 12 CAPSULE | Refills: 0 | Status: SHIPPED | OUTPATIENT
Start: 2023-08-24 | End: 2023-09-23

## 2023-08-24 RX ORDER — ONDANSETRON 2 MG/ML
8 INJECTION INTRAMUSCULAR; INTRAVENOUS
Status: COMPLETED | OUTPATIENT
Start: 2023-08-24 | End: 2023-08-24

## 2023-08-24 RX ORDER — PROCHLORPERAZINE MALEATE 10 MG
10 TABLET ORAL EVERY 6 HOURS PRN
Qty: 15 TABLET | Refills: 0 | Status: SHIPPED | OUTPATIENT
Start: 2023-08-24

## 2023-08-24 RX ADMIN — POTASSIUM BICARBONATE 50 MEQ: 978 TABLET, EFFERVESCENT ORAL at 10:08

## 2023-08-24 RX ADMIN — ALUMINUM HYDROXIDE, MAGNESIUM HYDROXIDE, AND SIMETHICONE 5 ML: 200; 200; 20 SUSPENSION ORAL at 10:08

## 2023-08-24 RX ADMIN — SODIUM CHLORIDE 1000 ML: 9 INJECTION, SOLUTION INTRAVENOUS at 09:08

## 2023-08-24 RX ADMIN — SODIUM CHLORIDE 1000 ML: 9 INJECTION, SOLUTION INTRAVENOUS at 10:08

## 2023-08-24 RX ADMIN — DIPHENHYDRAMINE HYDROCHLORIDE 25 MG: 50 INJECTION INTRAMUSCULAR; INTRAVENOUS at 09:08

## 2023-08-24 RX ADMIN — PROCHLORPERAZINE EDISYLATE 10 MG: 5 INJECTION INTRAMUSCULAR; INTRAVENOUS at 09:08

## 2023-08-24 RX ADMIN — ONDANSETRON HYDROCHLORIDE 8 MG: 2 SOLUTION INTRAMUSCULAR; INTRAVENOUS at 10:08

## 2023-08-24 NOTE — DISCHARGE INSTRUCTIONS
Take medications as prescribed.  Stay well hydrated.  Follow up with primary care symptoms do not improve.

## 2023-08-24 NOTE — Clinical Note
"Rohini"Mariya Dang was seen and treated in our emergency department on 8/24/2023.  She may return to work on 08/28/2023.       If you have any questions or concerns, please don't hesitate to call.      Ricardo Jones, NP"

## 2023-08-24 NOTE — ED PROVIDER NOTES
Encounter Date: 8/24/2023       History     Chief Complaint   Patient presents with    Nausea     Headache, nausea, vomiting  x 1 week.      43-year-old female with history of anemia, anxiety, arthritis, bipolar disorder, diabetes, fibromyalgia, hypertension, Jameson presents to ED with complaints of nausea, vomiting, headaches x1 week.  Headaches have been controlled with Topamax.  Currently complains of nausea symptoms which are worsened with certain smells.  Does have associated epigastric pressure.  No relieving factors.  No medications attempted home for nausea.  Also reports decreased p.o. intake.    The history is provided by the patient.     Review of patient's allergies indicates:   Allergen Reactions    Amoxicillin Hives    Avelox [moxifloxacin] Hives    Desloratadine Hives     Other reaction(s): Unknown    Pcn [penicillins] Hives    Vimpat [lacosamide] Hallucinations    Claritin [loratadine] Palpitations    Latex, natural rubber Rash     Past Medical History:   Diagnosis Date    Acute renal failure 7/14/2022    Anemia     Anxiety disorder     Arthritis     Bipolar 2 disorder     Borderline personality disorder     Chronic bilateral low back pain without sciatica     Depression with anxiety     Diabetes mellitus     Disorder of kidney and ureter     Dyspnea 7/15/2022    Elevated LFTs     Esophageal stenosis     Fatty liver     Fibromyalgia     Fibromyalgia     High cholesterol     Hyperkalemia 7/15/2022    Hypertension     Hypoglycemia     Hyponatremia 7/14/2022    Intermittent explosive disorder     Liver disease     JAMESON (nonalcoholic steatohepatitis)     Type 2 diabetes, controlled, with peripheral neuropathy      Past Surgical History:   Procedure Laterality Date    COLONOSCOPY N/A 3/20/2018    Procedure: COLONOSCOPY;  Surgeon: Janelle Meade MD;  Location: [facility];  Service: Endoscopy;  Laterality: N/A;    COLONOSCOPY N/A 5/11/2021    Procedure: COLONOSCOPY;  Surgeon: Janelle Meade MD;  " Location: [facility];  Service: Endoscopy;  Laterality: N/A;    ESOPHAGEAL DILATION      x2    ESOPHAGOGASTRODUODENOSCOPY N/A 5/7/2019    Procedure: EGD (ESOPHAGOGASTRODUODENOSCOPY);  Surgeon: Janelle Meade MD;  Location: [facility];  Service: Endoscopy;  Laterality: N/A;    ESOPHAGOGASTRODUODENOSCOPY N/A 5/11/2021    Procedure: EGD (ESOPHAGOGASTRODUODENOSCOPY);  Surgeon: Janelle Meade MD;  Location: [facility];  Service: Endoscopy;  Laterality: N/A;  Rapid on arrival    HIATAL HERNIA REPAIR      HYSTERECTOMY      LIVER BIOPSY  2018    fibrosis stage 3 JAMESON    OOPHORECTOMY      TUBAL LIGATION      UPPER GASTROINTESTINAL ENDOSCOPY      2013? unable to obtain records     Family History   Problem Relation Age of Onset    Hypertension Mother     Clotting disorder Father     Ulcers Father     Clotting disorder Sister     Cancer Maternal Grandmother         "female Cancer"    Lung cancer Paternal Grandmother     Diabetes Maternal Aunt     No Known Problems Daughter     No Known Problems Maternal Uncle     No Known Problems Paternal Aunt     No Known Problems Paternal Uncle     No Known Problems Maternal Grandfather     No Known Problems Paternal Grandfather     Breast cancer Neg Hx     Ovarian cancer Neg Hx     BRCA 1/2 Neg Hx      Social History     Tobacco Use    Smoking status: Never     Passive exposure: Never    Smokeless tobacco: Never   Substance Use Topics    Alcohol use: No     Alcohol/week: 0.0 standard drinks of alcohol    Drug use: No     Review of Systems   Constitutional:  Negative for fever.   HENT:  Negative for sore throat.    Eyes: Negative.    Respiratory:  Negative for shortness of breath.    Cardiovascular:  Negative for chest pain.   Gastrointestinal:  Positive for abdominal pain, nausea and vomiting.   Endocrine: Negative.    Genitourinary:  Negative for dysuria.   Musculoskeletal:  Negative for back pain.   Skin:  Negative for rash.   Allergic/Immunologic: Negative.    Neurological: "  Positive for headaches. Negative for weakness.   Hematological:  Does not bruise/bleed easily.   Psychiatric/Behavioral: Negative.         Physical Exam     Initial Vitals [08/24/23 0927]   BP Pulse Resp Temp SpO2   126/84 81 20 98.7 °F (37.1 °C) 95 %      MAP       --         Physical Exam    Nursing note and vitals reviewed.  Constitutional: She appears well-developed and well-nourished.   HENT:   Head: Normocephalic and atraumatic.   Mouth/Throat: Uvula is midline. Mucous membranes are dry. No trismus in the jaw. No uvula swelling.   Eyes: EOM are normal.   Neck: Neck supple.   Normal range of motion.  Cardiovascular:  Normal rate and regular rhythm.           Pulmonary/Chest: Breath sounds normal. No respiratory distress. She has no wheezes.   Abdominal: Abdomen is soft. She exhibits no distension. There is no abdominal tenderness.   Musculoskeletal:         General: Normal range of motion.      Cervical back: Normal range of motion and neck supple.     Neurological: She is alert and oriented to person, place, and time.   Skin: Skin is warm and dry.   Psychiatric: Thought content normal.         ED Course   Procedures  Labs Reviewed   CBC W/ AUTO DIFFERENTIAL - Abnormal; Notable for the following components:       Result Value    WBC 13.53 (*)     RBC 5.70 (*)     Hemoglobin 16.7 (*)     Lymph # 5.1 (*)     Mono # 1.2 (*)     All other components within normal limits   COMPREHENSIVE METABOLIC PANEL - Abnormal; Notable for the following components:    Sodium 133 (*)     Potassium 3.0 (*)     Chloride 94 (*)     CO2 22 (*)     Glucose 152 (*)     BUN 25 (*)     Total Protein 9.2 (*)     Alkaline Phosphatase 173 (*)     AST 70 (*)     ALT 89 (*)     Anion Gap 17 (*)     All other components within normal limits   URINALYSIS, REFLEX TO URINE CULTURE - Abnormal; Notable for the following components:    Protein, UA Trace (*)     Glucose, UA 4+ (*)     Ketones, UA 2+ (*)     All other components within normal limits     Narrative:     Preferred Collection Type->Urine, Clean Catch  Specimen Source->Urine   URINALYSIS MICROSCOPIC - Abnormal; Notable for the following components:    Hyaline Casts, UA 35.7 (*)     All other components within normal limits    Narrative:     Preferred Collection Type->Urine, Clean Catch  Specimen Source->Urine   LIPASE   SARS-COV-2 RDRP GENE    Narrative:     .This test utilizes isothermal nucleic acid amplification technology to detect the SARS-CoV-2 RdRp nucleic acid segment. The analytical sensitivity (limit of detection) is 500 copies/swab.     A POSITIVE result is indicative of the presence of SARS-CoV-2 RNA; clinical correlation with patient history and other diagnostic information is necessary to determine patient infection status.    A NEGATIVE result means that SARS-CoV-2 nucleic acids are not present above the limit of detection. A NEGATIVE result should be treated as presumptive. It does not rule out the possibility of COVID-19 and should not be the sole basis for treatment decisions. If COVID-19 is strongly suspected based on clinical and exposure history, re-testing using an alternate molecular assay should be considered.     This test is only for use under the Food and Drug Administration s Emergency Use Authorization (EUA).     Commercial kits are provided by [employer]. Performance characteristics of the EUA have been independently verified by [facility] Department of Pathology and Laboratory Medicine.   _________________________________________________________________   The authorized Fact Sheet for Healthcare Providers and the authorized Fact Sheet for Patients of the ID NOW COVID-19 are available on the FDA website:    https://www.fda.gov/media/707228/download      https://www.fda.gov/media/183757/download              Imaging Results    None          Medications   prochlorperazine injection Soln 10 mg (10 mg Intravenous Given 8/24/23 7947)   diphenhydrAMINE  injection 25 mg (25 mg Intravenous Given 8/24/23 0947)   sodium chloride 0.9% bolus 1,000 mL 1,000 mL (0 mLs Intravenous Stopped 8/24/23 1046)   potassium bicarbonate disintegrating tablet 50 mEq (50 mEq Oral Given 8/24/23 1055)   ondansetron injection 8 mg (8 mg Intravenous Given 8/24/23 1055)   sodium chloride 0.9% bolus 1,000 mL 1,000 mL (0 mLs Intravenous Stopped 8/24/23 1156)   aluminum-magnesium hydroxide-simethicone 200-200-20 mg/5 mL suspension 5 mL (5 mLs Oral Given 8/24/23 1055)     Medical Decision Making  43-year-old female to ED for above complaints.  She is nontoxic-appearing.  Vitals were stable.  She had dry mucous membranes.  Neuro exam was unremarkable.  She had a mild leukocytosis without fever or tachycardia.  I do not believe that she was septic.  Her elevated white blood cell count could be due to her multiple episodes of vomiting.  CMP was significant for a potassium of 3.0 which I replenish with 50 mEq of potassium bicarbonate p.o. which she tolerated well.  No episodes of vomiting while in ED.  Abdomen was soft and nontender.  She was given Compazine and Benadryl for headache.  She was also hydrated with 2 L of normal saline.  Additionally I gave her Zofran IV.  She also tolerated the Maalox by mouth.  COVID was negative.  She was sent home with prescriptions for Compazine, Benadryl, Zofran, Fiorinal.  She is instructed to slowly advance her diet and hydrate well.  Strict return precautions given.  She was to follow up with primary care.    Amount and/or Complexity of Data Reviewed  Labs: ordered.    Risk  OTC drugs.  Prescription drug management.                               Clinical Impression:   Final diagnoses:  [R11.2] Nausea and vomiting, unspecified vomiting type (Primary)        ED Disposition Condition    Discharge Stable          ED Prescriptions       Medication Sig Dispense Start Date End Date Auth. Provider    prochlorperazine (COMPAZINE) 10 MG tablet Take 1 tablet (10 mg  total) by mouth every 6 (six) hours as needed (headaches or nausea). 15 tablet 8/24/2023 -- Ricardo Jones NP    diphenhydrAMINE (BENADRYL) 25 mg capsule Take 1 capsule (25 mg total) by mouth every 6 (six) hours as needed (headaches). 20 capsule 8/24/2023 -- Ricardo Jones NP    butalbital-aspirin-caffeine -40 mg (FIORINAL) -40 mg Cap Take 1 capsule by mouth every 6 (six) hours as needed (headaches). 12 capsule 8/24/2023 9/23/2023 Ricardo Jones NP    ondansetron (ZOFRAN) 4 MG tablet Take 1 tablet (4 mg total) by mouth every 6 (six) hours as needed for Nausea. 12 tablet 8/24/2023 -- Ricardo Jones NP          Follow-up Information       Follow up With Specialties Details Why Contact Info    Skip Celestin NP Emergency Medicine In 2 days  [address]  [place] [address]  [phone]               Ricardo Jones NP  08/24/23 8827

## 2023-08-25 DIAGNOSIS — E11.42 TYPE 2 DIABETES, CONTROLLED, WITH PERIPHERAL NEUROPATHY: ICD-10-CM

## 2023-08-25 DIAGNOSIS — K75.81 NASH (NONALCOHOLIC STEATOHEPATITIS): ICD-10-CM

## 2023-08-25 DIAGNOSIS — E66.09 CLASS 1 OBESITY DUE TO EXCESS CALORIES WITH SERIOUS COMORBIDITY AND BODY MASS INDEX (BMI) OF 30.0 TO 30.9 IN ADULT: ICD-10-CM

## 2023-08-25 RX ORDER — DULAGLUTIDE 1.5 MG/.5ML
1.5 INJECTION, SOLUTION SUBCUTANEOUS
Qty: 12 PEN | Refills: 3 | Status: SHIPPED | OUTPATIENT
Start: 2023-08-25 | End: 2024-08-24

## 2023-08-25 RX ORDER — METOPROLOL TARTRATE 100 MG/1
100 TABLET ORAL 2 TIMES DAILY
Qty: 60 TABLET | Refills: 5 | Status: SHIPPED | OUTPATIENT
Start: 2023-08-25

## 2023-08-30 ENCOUNTER — TELEPHONE (OUTPATIENT)
Dept: PRIMARY CARE CLINIC | Facility: CLINIC | Age: 43
End: 2023-08-30
Payer: MEDICAID

## 2023-08-30 ENCOUNTER — HOSPITAL ENCOUNTER (EMERGENCY)
Facility: HOSPITAL | Age: 43
Discharge: HOME | End: 2023-08-30
Attending: EMERGENCY MEDICINE
Payer: MEDICAID

## 2023-08-30 VITALS
SYSTOLIC BLOOD PRESSURE: 104 MMHG | DIASTOLIC BLOOD PRESSURE: 66 MMHG | BODY MASS INDEX: 28.51 KG/M2 | TEMPERATURE: 98 F | WEIGHT: 167 LBS | HEIGHT: 64 IN | OXYGEN SATURATION: 96 % | HEART RATE: 66 BPM | RESPIRATION RATE: 16 BRPM

## 2023-08-30 DIAGNOSIS — M79.639 FOREARM PAIN: ICD-10-CM

## 2023-08-30 DIAGNOSIS — M25.532 WRIST PAIN, ACUTE, LEFT: ICD-10-CM

## 2023-08-30 DIAGNOSIS — M79.642 LEFT HAND PAIN: Primary | ICD-10-CM

## 2023-08-30 PROCEDURE — 99283 EMERGENCY DEPT VISIT LOW MDM: CPT

## 2023-08-30 RX ORDER — DICLOFENAC SODIUM 75 MG/1
75 TABLET, DELAYED RELEASE ORAL 2 TIMES DAILY PRN
Qty: 12 TABLET | Refills: 0 | Status: SHIPPED | OUTPATIENT
Start: 2023-08-30

## 2023-08-30 NOTE — DISCHARGE INSTRUCTIONS
Continue taking your Lyrica and tramadol prescriptions you got filled earlier this month to help control your pain.  You may also try diclofenac prescription.    Orthopedic referral has been placed within [facility] system for you.

## 2023-08-30 NOTE — Clinical Note
"Rohini"Mariya Dang was seen and treated in our emergency department on 8/30/2023.  She may return to work on 09/01/2023.       If you have any questions or concerns, please don't hesitate to call.      Issa Fitzgerald, BRYAN"

## 2023-08-30 NOTE — ED PROVIDER NOTES
Encounter Date: 8/30/2023       History     Chief Complaint   Patient presents with    Hand Injury     Pt stated that had a fall on Sunday night - presents to ED with complaints of pain to left fingers / wrist / forearm with decreased ROM.      This note is dictated on M*Modal word recognition program.  There are word recognition mistakes and grammatical errors that are occasionally missed on review.     Rohini Dang is a 43 y.o. female presents to ER today with complaints of left thumb, left hand, left wrist, left forearm pain.  Patient reports she tripped and fell falling forward onto a wall.  Patient reports she tried to stop her fall with outstretched arms injuring her left hand, left wrist left forearm.  This injury happened Sunday night.  Patient reports overall pain 7/10.    The history is provided by the patient.     Review of patient's allergies indicates:   Allergen Reactions    Amoxicillin Hives    Avelox [moxifloxacin] Hives    Desloratadine Hives     Other reaction(s): Unknown    Pcn [penicillins] Hives    Vimpat [lacosamide] Hallucinations    Claritin [loratadine] Palpitations    Latex, natural rubber Rash     Past Medical History:   Diagnosis Date    Acute renal failure 7/14/2022    Anemia     Anxiety disorder     Arthritis     Bipolar 2 disorder     Borderline personality disorder     Chronic bilateral low back pain without sciatica     Depression with anxiety     Diabetes mellitus     Disorder of kidney and ureter     Dyspnea 7/15/2022    Elevated LFTs     Esophageal stenosis     Fatty liver     Fibromyalgia     Fibromyalgia     High cholesterol     Hyperkalemia 7/15/2022    Hypertension     Hypoglycemia     Hyponatremia 7/14/2022    Intermittent explosive disorder     Liver disease     JAMESON (nonalcoholic steatohepatitis)     Type 2 diabetes, controlled, with peripheral neuropathy      Past Surgical History:   Procedure Laterality Date    COLONOSCOPY N/A 3/20/2018    Procedure:  "COLONOSCOPY;  Surgeon: Janelle Meaed MD;  Location: [facility];  Service: Endoscopy;  Laterality: N/A;    COLONOSCOPY N/A 5/11/2021    Procedure: COLONOSCOPY;  Surgeon: Janelle Meade MD;  Location: [facility];  Service: Endoscopy;  Laterality: N/A;    ESOPHAGEAL DILATION      x2    ESOPHAGOGASTRODUODENOSCOPY N/A 5/7/2019    Procedure: EGD (ESOPHAGOGASTRODUODENOSCOPY);  Surgeon: Janelle Meade MD;  Location: [facility];  Service: Endoscopy;  Laterality: N/A;    ESOPHAGOGASTRODUODENOSCOPY N/A 5/11/2021    Procedure: EGD (ESOPHAGOGASTRODUODENOSCOPY);  Surgeon: Janelle Meade MD;  Location: [facility];  Service: Endoscopy;  Laterality: N/A;  Rapid on arrival    HIATAL HERNIA REPAIR      HYSTERECTOMY      LIVER BIOPSY  2018    fibrosis stage 3 JAMESON    OOPHORECTOMY      TUBAL LIGATION      UPPER GASTROINTESTINAL ENDOSCOPY      2013? unable to obtain records     Family History   Problem Relation Age of Onset    Hypertension Mother     Clotting disorder Father     Ulcers Father     Clotting disorder Sister     Cancer Maternal Grandmother         "female Cancer"    Lung cancer Paternal Grandmother     Diabetes Maternal Aunt     No Known Problems Daughter     No Known Problems Maternal Uncle     No Known Problems Paternal Aunt     No Known Problems Paternal Uncle     No Known Problems Maternal Grandfather     No Known Problems Paternal Grandfather     Breast cancer Neg Hx     Ovarian cancer Neg Hx     BRCA 1/2 Neg Hx      Social History     Tobacco Use    Smoking status: Never     Passive exposure: Never    Smokeless tobacco: Never   Substance Use Topics    Alcohol use: No     Alcohol/week: 0.0 standard drinks of alcohol    Drug use: No     Review of Systems   Constitutional: Negative.    HENT: Negative.     Eyes: Negative.    Respiratory: Negative.     Gastrointestinal: Negative.    Endocrine: Negative.    Genitourinary: Negative.    Musculoskeletal:  Positive for arthralgias and joint swelling. "   Skin: Negative.    Allergic/Immunologic: Negative.    Neurological: Negative.    Hematological: Negative.    Psychiatric/Behavioral: Negative.         Physical Exam     Initial Vitals [08/30/23 1135]   BP Pulse Resp Temp SpO2   104/66 66 16 98 °F (36.7 °C) 96 %      MAP       --         Physical Exam    Constitutional: She appears well-developed and well-nourished. She is not diaphoretic. No distress.   HENT:   Right Ear: External ear normal.   Left Ear: External ear normal.   Eyes: Pupils are equal, round, and reactive to light. Right eye exhibits no discharge. Left eye exhibits no discharge.   Neck: Neck supple.   Normal range of motion.  Cardiovascular:  Normal rate.     Exam reveals no gallop.       No murmur heard.  Pulmonary/Chest: Breath sounds normal. No respiratory distress. She has no wheezes. She has no rhonchi. She has no rales.   Abdominal: Abdomen is soft. There is no rebound.   Musculoskeletal:         General: Tenderness and edema present.      Cervical back: Normal range of motion and neck supple.      Comments: Patient has tenderness and swelling to left thumb, left wrist.  No obvious deformity to left forearm.  Left upper extremity is neurovascularly intact on examination today.         Neurological: She is alert and oriented to person, place, and time. No cranial nerve deficit or sensory deficit. GCS score is 15. GCS eye subscore is 4. GCS verbal subscore is 5. GCS motor subscore is 6.   Skin: Capillary refill takes less than 2 seconds. No rash noted. No erythema. No pallor.   Psychiatric: She has a normal mood and affect. Her behavior is normal. Thought content normal.         ED Course   Procedures  Labs Reviewed - No data to display       Imaging Results              X-Ray Forearm Left (In process)                      X-Ray Wrist Complete Left (In process)                      X-Ray Hand 3 view Left (In process)                      Medications - No data to display  Medical Decision  Making  , differential diagnosis includes. wrist fracture, hand fracture, forearm fracture, wrist sprain, mechanical fall.    X-ray of left forearm, left wrist, left hand reviewed with Dr. Trent Singh.   No obvious fractures were identified on independent review.  Awaiting official radiology report.    Patient's left wrist/left hand was immobilized with Ace wrap.    Offer patient diclofenac for additional pain control.  Patient is already on Lyrica and tramadol.    Orthopedic referral placed within [facility] system for follow-up.    Left upper extremity remained neurovascular intact pre and post Ace wrap placement.    Patient stable at time of discharge in no acute distress.  No life-threatening illnesses were found during ER visit today.  Patient was instructed to follow-up with PCP or other recommended specialist within the next 48-72 hours.  Patient was instructed to return to ER immediately for any worsening or concerning symptoms.  All discharge instructions discussed with patient, and patient agrees to comply with discharge instructions given today.     Amount and/or Complexity of Data Reviewed  Radiology: ordered and independent interpretation performed.    Risk  Prescription drug management.                               Clinical Impression:   Final diagnoses:  [M25.532] Wrist pain, acute, left  [M79.639] Forearm pain  [M79.642] Left hand pain (Primary)        ED Disposition Condition    Discharge Stable          ED Prescriptions       Medication Sig Dispense Start Date End Date Auth. Provider    diclofenac (VOLTAREN) 75 MG EC tablet Take 1 tablet (75 mg total) by mouth 2 (two) times daily as needed (pain). 12 tablet 8/30/2023 -- Issa Fitzgerald NP          Follow-up Information    None          Issa Fitzgerald NP  08/30/23 7854

## 2023-09-07 ENCOUNTER — HOSPITAL ENCOUNTER (OUTPATIENT)
Dept: RADIOLOGY | Facility: HOSPITAL | Age: 43
Discharge: HOME | End: 2023-09-07
Attending: NURSE PRACTITIONER
Payer: MEDICAID

## 2023-09-07 ENCOUNTER — OFFICE VISIT (OUTPATIENT)
Dept: ORTHOPEDICS | Facility: CLINIC | Age: 43
End: 2023-09-07
Payer: MEDICAID

## 2023-09-07 DIAGNOSIS — M25.532 WRIST PAIN, ACUTE, LEFT: ICD-10-CM

## 2023-09-07 DIAGNOSIS — M79.645 PAIN OF LEFT THUMB: ICD-10-CM

## 2023-09-07 DIAGNOSIS — S63.8X2A: ICD-10-CM

## 2023-09-07 DIAGNOSIS — M79.645 PAIN OF LEFT THUMB: Primary | ICD-10-CM

## 2023-09-07 PROCEDURE — 99999 PR PBB SHADOW E&M-EST. PATIENT-LVL II: CPT | Mod: PBBFAC,,, | Performed by: NURSE PRACTITIONER

## 2023-09-07 PROCEDURE — 99203 PR OFFICE/OUTPT VISIT, NEW, LEVL III, 30-44 MIN: ICD-10-PCS | Mod: S$PBB,,, | Performed by: NURSE PRACTITIONER

## 2023-09-07 PROCEDURE — 99212 OFFICE O/P EST SF 10 MIN: CPT | Mod: PBBFAC | Performed by: NURSE PRACTITIONER

## 2023-09-07 PROCEDURE — 1159F MED LIST DOCD IN RCRD: CPT | Mod: CPTII,,, | Performed by: NURSE PRACTITIONER

## 2023-09-07 PROCEDURE — 99999 PR PBB SHADOW E&M-EST. PATIENT-LVL II: ICD-10-PCS | Mod: PBBFAC,,, | Performed by: NURSE PRACTITIONER

## 2023-09-07 PROCEDURE — 3051F PR MOST RECENT HEMOGLOBIN A1C LEVEL 7.0 - < 8.0%: ICD-10-PCS | Mod: CPTII,,, | Performed by: NURSE PRACTITIONER

## 2023-09-07 PROCEDURE — 1160F RVW MEDS BY RX/DR IN RCRD: CPT | Mod: CPTII,,, | Performed by: NURSE PRACTITIONER

## 2023-09-07 PROCEDURE — 73140 X-RAY EXAM OF FINGER(S): CPT | Mod: TC,LT

## 2023-09-07 PROCEDURE — 3051F HG A1C>EQUAL 7.0%<8.0%: CPT | Mod: CPTII,,, | Performed by: NURSE PRACTITIONER

## 2023-09-07 PROCEDURE — 4010F PR ACE/ARB THEARPY RXD/TAKEN: ICD-10-PCS | Mod: CPTII,,, | Performed by: NURSE PRACTITIONER

## 2023-09-07 PROCEDURE — 99203 OFFICE O/P NEW LOW 30 MIN: CPT | Mod: S$PBB,,, | Performed by: NURSE PRACTITIONER

## 2023-09-07 PROCEDURE — 4010F ACE/ARB THERAPY RXD/TAKEN: CPT | Mod: CPTII,,, | Performed by: NURSE PRACTITIONER

## 2023-09-07 PROCEDURE — 1160F PR REVIEW ALL MEDS BY PRESCRIBER/CLIN PHARMACIST DOCUMENTED: ICD-10-PCS | Mod: CPTII,,, | Performed by: NURSE PRACTITIONER

## 2023-09-07 PROCEDURE — 1159F PR MEDICATION LIST DOCUMENTED IN MEDICAL RECORD: ICD-10-PCS | Mod: CPTII,,, | Performed by: NURSE PRACTITIONER

## 2023-09-07 NOTE — PROGRESS NOTES
Subjective:       Rohini Dang is a 43 y.o. right hand-dominant female referred by [facility] ER for evaluation and treatment of a left  thumb injury. This is evaluated as a personal injury. The injury occurred on 08/27/23. She states that she had a fall and broke her fall with her outstretched left hand. She had immediate pain in the wrist and thumb area. She self treated for a few days and went to the [facility] ER on 08/30/23 due to increased pain and swelling. Radiographs were done of the forearm, hand and wrist and no acute fractures were seen. She was placed in an ACE, diclofenac and instructed to see Orthopedics. Since that time the patient has been experiencing left thumb pain, limited ROM and swelling. She presents and the pain is currently rated as 6/10, worse with usage or movement. She denies any numbness or tingling.     Outside reports reviewed: ER records and x-ray reports.    The following portions of the patient's history were reviewed and updated as appropriate: allergies, current medications, past family history, past medical history, past social history, past surgical history, and problem list.    Review of Systems  Constitutional: negative  Musculoskeletal:positive for bone pain  Neurological: negative  Behavioral/Psych: negative      Objective:     NVI  General:   alert, appears stated age, and cooperative   Gait:    Normal   Left Hand/thumb  Circulation:   well perfused distal to the injury   Skin:   intact   Swelling:  Mild swelling was present in the hand   Deformity:  There is not an obvious deformity of the hand.   Finger ROM:   AROM of thumb was limited.    Wrist ROM:  wrist flexion and extension was normal   Sensation:   intact to light touch   Tenderness:    Point tenderness to the first CMC joint and metacarpal.     Imaging  X-rays:  Left hand, forearm and wrist x-rays reviewed from 08/30/23   No acute findings detected.    Left Thumb 3 V repeated today:   First CMC subluxation without  definite complete dislocation.  No fracture fragment detected     Assessment:     Left thumb sprain, CMC subluxation     Plan:     She was placed in a thumb titan brace for joint immobilization. Instructed on usage and application.   The patient was encouraged to keep her arm elevated and iced.  Wrist ROM exercises as directed.   All of her questions were answered fully.  Follow up will be in 1 weeks for follow up. Consider referral to hand specialist if needed.

## 2023-09-13 ENCOUNTER — TELEPHONE (OUTPATIENT)
Dept: PRIMARY CARE CLINIC | Facility: CLINIC | Age: 43
End: 2023-09-13

## 2023-09-20 ENCOUNTER — OFFICE VISIT (OUTPATIENT)
Dept: PRIMARY CARE CLINIC | Facility: CLINIC | Age: 43
End: 2023-09-20
Payer: MEDICAID

## 2023-09-20 VITALS
OXYGEN SATURATION: 98 % | TEMPERATURE: 98 F | DIASTOLIC BLOOD PRESSURE: 74 MMHG | WEIGHT: 175 LBS | SYSTOLIC BLOOD PRESSURE: 101 MMHG | BODY MASS INDEX: 29.88 KG/M2 | HEART RATE: 81 BPM | RESPIRATION RATE: 16 BRPM | HEIGHT: 64 IN

## 2023-09-20 DIAGNOSIS — Z23 INFLUENZA VACCINE ADMINISTERED: ICD-10-CM

## 2023-09-20 DIAGNOSIS — E11.69 DYSLIPIDEMIA ASSOCIATED WITH TYPE 2 DIABETES MELLITUS: ICD-10-CM

## 2023-09-20 DIAGNOSIS — E78.5 DYSLIPIDEMIA ASSOCIATED WITH TYPE 2 DIABETES MELLITUS: ICD-10-CM

## 2023-09-20 DIAGNOSIS — D50.9 IRON DEFICIENCY ANEMIA, UNSPECIFIED IRON DEFICIENCY ANEMIA TYPE: ICD-10-CM

## 2023-09-20 DIAGNOSIS — R05.8 POST-VIRAL COUGH SYNDROME: Primary | ICD-10-CM

## 2023-09-20 DIAGNOSIS — I10 PRIMARY HYPERTENSION: ICD-10-CM

## 2023-09-20 DIAGNOSIS — E66.9 CLASS 1 OBESITY WITH SERIOUS COMORBIDITY AND BODY MASS INDEX (BMI) OF 30.0 TO 30.9 IN ADULT, UNSPECIFIED OBESITY TYPE: ICD-10-CM

## 2023-09-20 PROCEDURE — 3074F PR MOST RECENT SYSTOLIC BLOOD PRESSURE < 130 MM HG: ICD-10-PCS | Mod: CPTII,,, | Performed by: STUDENT IN AN ORGANIZED HEALTH CARE EDUCATION/TRAINING PROGRAM

## 2023-09-20 PROCEDURE — 99999 PR PBB SHADOW E&M-EST. PATIENT-LVL IV: CPT | Mod: PBBFAC,,, | Performed by: STUDENT IN AN ORGANIZED HEALTH CARE EDUCATION/TRAINING PROGRAM

## 2023-09-20 PROCEDURE — 99214 OFFICE O/P EST MOD 30 MIN: CPT | Mod: PBBFAC,PN | Performed by: STUDENT IN AN ORGANIZED HEALTH CARE EDUCATION/TRAINING PROGRAM

## 2023-09-20 PROCEDURE — 99213 PR OFFICE/OUTPT VISIT, EST, LEVL III, 20-29 MIN: ICD-10-PCS | Mod: S$PBB,,, | Performed by: STUDENT IN AN ORGANIZED HEALTH CARE EDUCATION/TRAINING PROGRAM

## 2023-09-20 PROCEDURE — 99999 PR PBB SHADOW E&M-EST. PATIENT-LVL IV: ICD-10-PCS | Mod: PBBFAC,,, | Performed by: STUDENT IN AN ORGANIZED HEALTH CARE EDUCATION/TRAINING PROGRAM

## 2023-09-20 PROCEDURE — 99213 OFFICE O/P EST LOW 20 MIN: CPT | Mod: S$PBB,,, | Performed by: STUDENT IN AN ORGANIZED HEALTH CARE EDUCATION/TRAINING PROGRAM

## 2023-09-20 PROCEDURE — 1159F PR MEDICATION LIST DOCUMENTED IN MEDICAL RECORD: ICD-10-PCS | Mod: CPTII,,, | Performed by: STUDENT IN AN ORGANIZED HEALTH CARE EDUCATION/TRAINING PROGRAM

## 2023-09-20 PROCEDURE — 3078F PR MOST RECENT DIASTOLIC BLOOD PRESSURE < 80 MM HG: ICD-10-PCS | Mod: CPTII,,, | Performed by: STUDENT IN AN ORGANIZED HEALTH CARE EDUCATION/TRAINING PROGRAM

## 2023-09-20 PROCEDURE — 99999PBSHW FLU VACCINE (QUAD) GREATER THAN OR EQUAL TO 3YO PRESERVATIVE FREE IM: Mod: PBBFAC,,,

## 2023-09-20 PROCEDURE — 4010F PR ACE/ARB THEARPY RXD/TAKEN: ICD-10-PCS | Mod: CPTII,,, | Performed by: STUDENT IN AN ORGANIZED HEALTH CARE EDUCATION/TRAINING PROGRAM

## 2023-09-20 PROCEDURE — 3078F DIAST BP <80 MM HG: CPT | Mod: CPTII,,, | Performed by: STUDENT IN AN ORGANIZED HEALTH CARE EDUCATION/TRAINING PROGRAM

## 2023-09-20 PROCEDURE — 3051F PR MOST RECENT HEMOGLOBIN A1C LEVEL 7.0 - < 8.0%: ICD-10-PCS | Mod: CPTII,,, | Performed by: STUDENT IN AN ORGANIZED HEALTH CARE EDUCATION/TRAINING PROGRAM

## 2023-09-20 PROCEDURE — 3074F SYST BP LT 130 MM HG: CPT | Mod: CPTII,,, | Performed by: STUDENT IN AN ORGANIZED HEALTH CARE EDUCATION/TRAINING PROGRAM

## 2023-09-20 PROCEDURE — 99999PBSHW FLU VACCINE (QUAD) GREATER THAN OR EQUAL TO 3YO PRESERVATIVE FREE IM: ICD-10-PCS | Mod: PBBFAC,,,

## 2023-09-20 PROCEDURE — 4010F ACE/ARB THERAPY RXD/TAKEN: CPT | Mod: CPTII,,, | Performed by: STUDENT IN AN ORGANIZED HEALTH CARE EDUCATION/TRAINING PROGRAM

## 2023-09-20 PROCEDURE — 90471 IMMUNIZATION ADMIN: CPT | Mod: PBBFAC,PN

## 2023-09-20 PROCEDURE — 1159F MED LIST DOCD IN RCRD: CPT | Mod: CPTII,,, | Performed by: STUDENT IN AN ORGANIZED HEALTH CARE EDUCATION/TRAINING PROGRAM

## 2023-09-20 PROCEDURE — 3008F PR BODY MASS INDEX (BMI) DOCUMENTED: ICD-10-PCS | Mod: CPTII,,, | Performed by: STUDENT IN AN ORGANIZED HEALTH CARE EDUCATION/TRAINING PROGRAM

## 2023-09-20 PROCEDURE — 3051F HG A1C>EQUAL 7.0%<8.0%: CPT | Mod: CPTII,,, | Performed by: STUDENT IN AN ORGANIZED HEALTH CARE EDUCATION/TRAINING PROGRAM

## 2023-09-20 PROCEDURE — 3008F BODY MASS INDEX DOCD: CPT | Mod: CPTII,,, | Performed by: STUDENT IN AN ORGANIZED HEALTH CARE EDUCATION/TRAINING PROGRAM

## 2023-09-20 RX ORDER — BENZONATATE 100 MG/1
200 CAPSULE ORAL 3 TIMES DAILY PRN
Qty: 60 CAPSULE | Refills: 0 | Status: SHIPPED | OUTPATIENT
Start: 2023-09-20 | End: 2023-09-30

## 2023-09-20 RX ORDER — FLUTICASONE PROPIONATE 50 MCG
1 SPRAY, SUSPENSION (ML) NASAL DAILY
Qty: 16 G | Refills: 0 | Status: SHIPPED | OUTPATIENT
Start: 2023-09-20

## 2023-09-20 NOTE — ASSESSMENT & PLAN NOTE
Body mass index is 30.04 kg/m². Obesity complicates all aspects of disease management from diagnostic modalities to treatment. Weight loss encouraged and health benefits explained to patient.     Recommendations:   Stay physically active. As tolerated alternate resistance training with stretching and cardio. Goal of 150 minutes per week of moderate intensity activity or 7,500 - 10,000 steps per day. Follow the Mediterranean Diet. Include whole fresh fruits, vegetables, olive oil, seeds, nuts, whole grains, cold water fish, salmon, mackerel and lean cuts of meat.  Do not drink sugary/diet carbonated beverages. Decrease portion sizes slightly which will result in an approximately 500-calorie deficit. Avoid fast or fried and processed food, especially canned foods. Avoid refined carbohydrates, white starchy foods, flour, white potato, bread, muffins, and cakes. Consider substituting one meal a day with a meal replacement such as Slim fast, lean cuisine, or weight watcher's. Follow a healthy diet that includes enough calcium, vitamin D and proteins for bone health.

## 2023-09-20 NOTE — PROGRESS NOTES
[facility] Note    HPI:  Rohini Dang is a 43 y.o. female who presents today for Flu Vaccine (Patient states she is here for flu vaccine)  COVID19 positive 9/9/23 with now residual cough with minimal sputum production. No appetite changes. No excessive weakness in extremities.   Denies fever, chills, excessive headache, vision changes, chest pain, palpitations, shortness of breath, abdominal pain, nausea, vomiting, diarrhea, constipation.     ROS   A review of systems was performed and was negative except as noted above.    I personally reviewed allergies, past medical, surgical, social and family history and updated as appropriate.    Medications:    Current Outpatient Medications:     ACCU-CHEK KERRY PLUS TEST STRP Strp, USE 1 STRIP TO CHECK GLUCOSE TWICE DAILY, Disp: , Rfl:     benzonatate (TESSALON) 100 MG capsule, Take 2 capsules (200 mg total) by mouth 3 (three) times daily as needed for Cough., Disp: 60 capsule, Rfl: 0    brivaracetam (BRIVIACT) 25 mg Tab, Take 25 mg by mouth every evening., Disp: , Rfl:     busPIRone (BUSPAR) 15 MG tablet, 1 tablet Orally three times per day for 30 days, Disp: , Rfl:     chlorthalidone (HYGROTEN) 25 MG Tab, Take 0.5 tablets (12.5 mg total) by mouth once daily., Disp: 15 tablet, Rfl: 2    diclofenac (VOLTAREN) 75 MG EC tablet, Take 1 tablet (75 mg total) by mouth 2 (two) times daily as needed (pain)., Disp: 12 tablet, Rfl: 0    diphenhydrAMINE (BENADRYL) 25 mg capsule, Take 1 capsule (25 mg total) by mouth every 6 (six) hours as needed (headaches)., Disp: 20 capsule, Rfl: 0    dulaglutide (TRULICITY) 1.5 mg/0.5 mL pen injector, Inject 1.5 mg into the skin every 7 days., Disp: 12 pen , Rfl: 3    empagliflozin (JARDIANCE) 25 mg tablet, Take 1 tablet (25 mg total) by mouth once daily., Disp: 90 tablet, Rfl: 1    estradioL (ESTRACE) 1 MG tablet, Take 1 tablet (1 mg total) by mouth once daily., Disp: 30 tablet, Rfl: 11    fluticasone propionate (FLONASE)  50 mcg/actuation nasal spray, 1 spray (50 mcg total) by Each Nostril route once daily., Disp: 16 g, Rfl: 0    furosemide (LASIX) 20 MG tablet, Take 20 mg by mouth daily as needed., Disp: , Rfl:     LATUDA 80 mg Tab tablet, Take 80 mg by mouth every evening., Disp: , Rfl:     LIDOcaine (LIDODERM) 5 %, SMARTSIG:Topical, Disp: , Rfl:     lubiprostone (AMITIZA) 24 MCG Cap, Take 24 mcg by mouth 2 (two) times daily., Disp: , Rfl:     methocarbamoL (ROBAXIN) 750 MG Tab, Take 750 mg by mouth., Disp: , Rfl:     metoprolol tartrate (LOPRESSOR) 100 MG tablet, Take 1 tablet (100 mg total) by mouth 2 (two) times daily., Disp: 60 tablet, Rfl: 5    mirtazapine (REMERON) 15 MG tablet, Take 15 mg by mouth every evening., Disp: , Rfl:     montelukast (SINGULAIR) 10 mg tablet, Take 10 mg by mouth every evening., Disp: , Rfl:     nitrofurantoin, macrocrystal-monohydrate, (MACROBID) 100 MG capsule, Take 1 capsule (100 mg total) by mouth every evening., Disp: 90 capsule, Rfl: 3    nortriptyline (PAMELOR) 50 MG capsule, TAKE ONE CAPSULE BY MOUTH EVERY EVENING, Disp: 60 capsule, Rfl: 5    ondansetron (ZOFRAN) 4 MG tablet, Take 1 tablet (4 mg total) by mouth every 6 (six) hours as needed for Nausea., Disp: 12 tablet, Rfl: 0    pregabalin (LYRICA) 200 MG Cap, Take 200 mg by mouth 2 (two) times daily., Disp: , Rfl:     prochlorperazine (COMPAZINE) 10 MG tablet, Take 1 tablet (10 mg total) by mouth every 6 (six) hours as needed (headaches or nausea)., Disp: 15 tablet, Rfl: 0    rizatriptan (MAXALT) 10 MG tablet, TAKE 1 TAB ONCE AS NEEDED FOR ACUTE MIGRAINE.MAY REPEAT IN 2 HOURS IF NEEDED.MAX 2 TABS/24 HOURS, Disp: , Rfl:     sertraline (ZOLOFT) 100 MG tablet, Take 150 mg by mouth once daily., Disp: , Rfl:     tiZANidine (ZANAFLEX) 4 MG tablet, Take 1 tablet (4 mg total) by mouth every 8 (eight) hours., Disp: 14 tablet, Rfl: 0    topiramate (TOPAMAX) 200 MG Tab, Take 200 mg by mouth nightly., Disp: , Rfl:     valsartan (DIOVAN) 80 MG tablet, ,  "Disp: , Rfl:      Health Maintenance:  Immunization History   Administered Date(s) Administered    COVID-19, MRNA, LN-S, PF (MODERNA FULL 0.5 ML DOSE) 03/10/2021, 04/07/2021, 12/28/2021    Influenza - Quadrivalent - PF *Preferred* (6 months and older) 08/26/2013, 09/04/2014, 09/24/2020, 09/20/2023    Pneumococcal Polysaccharide - 23 Valent 08/26/2013      Health Maintenance   Topic Date Due    TETANUS VACCINE  Never done    Eye Exam  09/06/2023    Hemoglobin A1c  09/20/2023    Lipid Panel  03/20/2024    Mammogram  04/13/2024    Foot Exam  09/20/2024    Colorectal Cancer Screening  05/11/2026    Hepatitis C Screening  Completed     Health Maintenance Topics with due status: Not Due       Topic Last Completion Date    Colorectal Cancer Screening 05/11/2021    Lipid Panel 03/20/2023    Mammogram 04/13/2023    Foot Exam 09/20/2023     Health Maintenance Due   Topic Date Due    TETANUS VACCINE  Never done    Pneumococcal Vaccines (Age 0-64) (2 - PCV) 08/26/2014    COVID-19 Vaccine (4 - Moderna series) 02/22/2022    Eye Exam  09/06/2023    Hemoglobin A1c  09/20/2023       PHYSICAL EXAM:  Vitals:    09/20/23 1324   BP: 101/74   BP Location: Right arm   Patient Position: Sitting   BP Method: Medium (Automatic)   Pulse: 81   Resp: 16   Temp: 97.8 °F (36.6 °C)   TempSrc: Oral   SpO2: 98%   Weight: 79.4 kg (175 lb)   Height: 5' 4" (1.626 m)     Body mass index is 30.04 kg/m².  Physical Exam  Vitals reviewed.   Constitutional:       General: She is not in acute distress.     Appearance: Normal appearance. She is normal weight. She is not ill-appearing or toxic-appearing.   HENT:      Head: Normocephalic and atraumatic.      Right Ear: External ear normal.      Left Ear: External ear normal.      Nose: Nose normal.      Mouth/Throat:      Mouth: Mucous membranes are dry.      Pharynx: Oropharynx is clear.   Eyes:      Extraocular Movements: Extraocular movements intact.      Conjunctiva/sclera: Conjunctivae normal. "   Cardiovascular:      Rate and Rhythm: Normal rate and regular rhythm.      Pulses: Normal pulses.      Heart sounds: Normal heart sounds.   Pulmonary:      Effort: Pulmonary effort is normal. No respiratory distress.      Breath sounds: No stridor. No wheezing, rhonchi or rales.   Abdominal:      General: Abdomen is flat. There is no distension.      Palpations: Abdomen is soft.      Tenderness: There is no abdominal tenderness. There is no right CVA tenderness, left CVA tenderness or guarding.   Musculoskeletal:         General: No tenderness. Normal range of motion.      Cervical back: Normal range of motion and neck supple. No rigidity or tenderness.   Skin:     General: Skin is warm and dry.      Capillary Refill: Capillary refill takes less than 2 seconds.   Neurological:      Mental Status: She is alert and oriented to person, place, and time. Mental status is at baseline.      Motor: No weakness.      Gait: Gait normal.   Psychiatric:         Mood and Affect: Mood normal.         Behavior: Behavior normal.        Protective Sensation (w/ 10 gram monofilament):  Right: Intact  Left: Intact    Visual Inspection:  Normal -  Bilateral, Nails Intact - without Evidence of Foot Deformity- Bilateral, Ulceration -  Neither, Blister -  Neither, Skin Breakdown -  Neither, Erythema -  Neither, Increased Warmth -  Neither, Callus -  Bilateral, Dry Skin -  Bilateral, Onychomycosis -  Neither, and None -  Neither    Pedal Pulses:   Right: Present  Left: Present    Posterior Tibialis Pulses:   Right:Present  Left: Present   ASSESSMENT/PLAN:  1. Post-viral cough syndrome  -     benzonatate (TESSALON) 100 MG capsule; Take 2 capsules (200 mg total) by mouth 3 (three) times daily as needed for Cough.  Dispense: 60 capsule; Refill: 0  -     fluticasone propionate (FLONASE) 50 mcg/actuation nasal spray; 1 spray (50 mcg total) by Each Nostril route once daily.  Dispense: 16 g; Refill: 0    2. Influenza vaccine administered  -      Influenza - Quadrivalent *Preferred* (6 months+) (PF)    3. Iron deficiency anemia, unspecified iron deficiency anemia type  Assessment & Plan:  Iron deficiency anemia addressed per hematology.   Lab Results   Component Value Date    WBC 10.37 08/30/2023    HGB 14.4 08/30/2023    HCT 42.0 08/30/2023    MCV 85 08/30/2023     08/30/2023   Stable Hg/HCT and improved iron reserve.   - f/u hematology notes          4. Class 1 obesity with serious comorbidity and body mass index (BMI) of 30.0 to 30.9 in adult, unspecified obesity type  Assessment & Plan:  Body mass index is 30.04 kg/m². Obesity complicates all aspects of disease management from diagnostic modalities to treatment. Weight loss encouraged and health benefits explained to patient.     Recommendations:   Stay physically active. As tolerated alternate resistance training with stretching and cardio. Goal of 150 minutes per week of moderate intensity activity or 7,500 - 10,000 steps per day. Follow the Mediterranean Diet. Include whole fresh fruits, vegetables, olive oil, seeds, nuts, whole grains, cold water fish, salmon, mackerel and lean cuts of meat.  Do not drink sugary/diet carbonated beverages. Decrease portion sizes slightly which will result in an approximately 500-calorie deficit. Avoid fast or fried and processed food, especially canned foods. Avoid refined carbohydrates, white starchy foods, flour, white potato, bread, muffins, and cakes. Consider substituting one meal a day with a meal replacement such as Slim fast, lean cuisine, or weight watcher's. Follow a healthy diet that includes enough calcium, vitamin D and proteins for bone health.            5. Dyslipidemia associated with type 2 diabetes mellitus  Assessment & Plan:  Continue with weekly trulicity.   - f/u A1C  - f/u annual eye examination      6. Primary hypertension  Assessment & Plan:  BP Readings from Last 3 Encounters:   09/20/23 101/74   08/30/23 104/66   08/24/23 126/84    Normotensive. Continue with daily regimen;  - chlorthalidone 12.5 mg daily   - metoprolol 100 mg BID  - valsartain 80 mg daily  - follow low sodium diet  - f/u BMP        Other than changes above, continue current medications and maintain follow up with specialists.      No follow-ups on file.   Recent Results (from the past 2016 hour(s))   Urinalysis, Reflex to Urine Culture Urine, Clean Catch    Collection Time: 08/24/23  9:43 AM    Specimen: Urine, Clean Catch   Result Value Ref Range    Specimen UA Urine, Clean Catch     Color, UA Yellow Yellow, Straw, Jaylene    Appearance, UA Clear Clear    pH, UA 6.0 5.0 - 8.0    Specific Gravity, UA 1.020 1.005 - 1.030    Protein, UA Trace (A) Negative    Glucose, UA 4+ (A) Negative    Ketones, UA 2+ (A) Negative    Bilirubin (UA) Negative Negative    Occult Blood UA Negative Negative    Nitrite, UA Negative Negative    Urobilinogen, UA 1.0 <2.0 EU/dL    Leukocytes, UA Negative Negative   Urinalysis Microscopic    Collection Time: 08/24/23  9:43 AM   Result Value Ref Range    RBC, UA 4 0 - 4 /hpf    WBC, UA 2 0 - 5 /hpf    Bacteria Negative None-Occ /hpf    Yeast, UA None None    Squam Epithel, UA 3 /hpf    Hyaline Casts, UA 35.7 (A) 0-1/lpf /lpf    Microscopic Comment SEE COMMENT    CBC W/ AUTO DIFFERENTIAL    Collection Time: 08/24/23  9:54 AM   Result Value Ref Range    WBC 13.53 (H) 3.90 - 12.70 K/uL    RBC 5.70 (H) 4.00 - 5.40 M/uL    Hemoglobin 16.7 (H) 12.0 - 16.0 g/dL    Hematocrit 47.8 37.0 - 48.5 %    MCV 84 82 - 98 fL    MCH 29.3 27.0 - 31.0 pg    MCHC 34.9 32.0 - 36.0 g/dL    RDW 13.5 11.5 - 14.5 %    Platelets 358 150 - 450 K/uL    MPV 10.6 9.2 - 12.9 fL    Immature Granulocytes 0.3 0.0 - 0.5 %    Gran # (ANC) 6.9 1.8 - 7.7 K/uL    Immature Grans (Abs) 0.04 0.00 - 0.04 K/uL    Lymph # 5.1 (H) 1.0 - 4.8 K/uL    Mono # 1.2 (H) 0.3 - 1.0 K/uL    Eos # 0.1 0.0 - 0.5 K/uL    Baso # 0.12 0.00 - 0.20 K/uL    nRBC 0 0 /100 WBC    Gran % 50.9 38.0 - 73.0 %    Lymph %  37.8 18.0 - 48.0 %    Mono % 9.1 4.0 - 15.0 %    Eosinophil % 1.0 0.0 - 8.0 %    Basophil % 0.9 0.0 - 1.9 %    Differential Method Automated    Comp. Metabolic Panel    Collection Time: 08/24/23  9:54 AM   Result Value Ref Range    Sodium 133 (L) 136 - 145 mmol/L    Potassium 3.0 (L) 3.5 - 5.1 mmol/L    Chloride 94 (L) 95 - 110 mmol/L    CO2 22 (L) 23 - 29 mmol/L    Glucose 152 (H) 70 - 110 mg/dL    BUN 25 (H) 6 - 20 mg/dL    Creatinine 1.1 0.5 - 1.4 mg/dL    Calcium 9.7 8.7 - 10.5 mg/dL    Total Protein 9.2 (H) 6.0 - 8.4 g/dL    Albumin 4.3 3.5 - 5.2 g/dL    Total Bilirubin 0.5 0.1 - 1.0 mg/dL    Alkaline Phosphatase 173 (H) 55 - 135 U/L    AST 70 (H) 10 - 40 U/L    ALT 89 (H) 10 - 44 U/L    eGFR >60.0 >60 mL/min/1.73 m^2    Anion Gap 17 (H) 8 - 16 mmol/L   Lipase    Collection Time: 08/24/23  9:54 AM   Result Value Ref Range    Lipase Result 276 23 - 300 U/L   POCT COVID-19 Rapid Screening    Collection Time: 08/24/23 10:13 AM   Result Value Ref Range    POC Rapid COVID Negative Negative     Acceptable Yes    C4 Complement    Collection Time: 08/30/23  1:47 PM   Result Value Ref Range    Complement (C-4) 48 (H) 11 - 44 mg/dL   Anti-DNA Ab, Double-Stranded    Collection Time: 08/30/23  1:47 PM   Result Value Ref Range    ds DNA Ab Negative 1:10 Negative 1:10   C3 Complement    Collection Time: 08/30/23  1:47 PM   Result Value Ref Range    Complement (C-3) 165 50 - 180 mg/dL   Sedimentation rate    Collection Time: 08/30/23  1:47 PM   Result Value Ref Range    Sed Rate 33 (H) 0 - 20 mm/Hr   CBC Auto Differential    Collection Time: 08/30/23  1:47 PM   Result Value Ref Range    WBC 10.37 3.90 - 12.70 K/uL    RBC 4.95 4.00 - 5.40 M/uL    Hemoglobin 14.4 12.0 - 16.0 g/dL    Hematocrit 42.0 37.0 - 48.5 %    MCV 85 82 - 98 fL    MCH 29.1 27.0 - 31.0 pg    MCHC 34.3 32.0 - 36.0 g/dL    RDW 13.6 11.5 - 14.5 %    Platelets 266 150 - 450 K/uL    MPV 11.0 9.2 - 12.9 fL    Immature Granulocytes 0.2 0.0 - 0.5 %     Gran # (ANC) 4.0 1.8 - 7.7 K/uL    Immature Grans (Abs) 0.02 0.00 - 0.04 K/uL    Lymph # 4.7 1.0 - 4.8 K/uL    Mono # 1.2 (H) 0.3 - 1.0 K/uL    Eos # 0.4 0.0 - 0.5 K/uL    Baso # 0.10 0.00 - 0.20 K/uL    nRBC 0 0 /100 WBC    Gran % 38.4 38.0 - 73.0 %    Lymph % 45.1 18.0 - 48.0 %    Mono % 11.4 4.0 - 15.0 %    Eosinophil % 3.9 0.0 - 8.0 %    Basophil % 1.0 0.0 - 1.9 %    Differential Method Automated    Comprehensive Metabolic Panel    Collection Time: 08/30/23  1:47 PM   Result Value Ref Range    Sodium 136 136 - 145 mmol/L    Potassium 3.1 (L) 3.5 - 5.1 mmol/L    Chloride 104 95 - 110 mmol/L    CO2 26 23 - 29 mmol/L    Glucose 94 70 - 110 mg/dL    BUN 14 6 - 20 mg/dL    Creatinine 1.7 (H) 0.5 - 1.4 mg/dL    Calcium 9.2 8.7 - 10.5 mg/dL    Total Protein 8.3 6.0 - 8.4 g/dL    Albumin 4.0 3.5 - 5.2 g/dL    Total Bilirubin 0.4 0.1 - 1.0 mg/dL    Alkaline Phosphatase 134 55 - 135 U/L    AST 48 (H) 10 - 40 U/L    ALT 62 (H) 10 - 44 U/L    eGFR 37.5 (A) >60 mL/min/1.73 m^2    Anion Gap 6 (L) 8 - 16 mmol/L   C-Reactive Protein    Collection Time: 08/30/23  1:47 PM   Result Value Ref Range    CRP 0.98 (H) 0.00 - 0.75 mg/dL   Urinalysis    Collection Time: 08/30/23  2:05 PM   Result Value Ref Range    Specimen UA Urine, Clean Catch     Color, UA Yellow Yellow, Straw, Jaylene    Appearance, UA Cloudy (A) Clear    pH, UA 6.0 5.0 - 8.0    Specific Gravity, UA 1.010 1.005 - 1.030    Protein, UA Negative Negative    Glucose, UA 3+ (A) Negative    Ketones, UA Negative Negative    Bilirubin (UA) Negative Negative    Occult Blood UA Negative Negative    Nitrite, UA Negative Negative    Urobilinogen, UA Negative <2.0 EU/dL    Leukocytes, UA Negative Negative   Urinalysis Microscopic    Collection Time: 08/30/23  2:05 PM   Result Value Ref Range    RBC, UA 2 0 - 4 /hpf    WBC, UA 2 0 - 5 /hpf    Bacteria Negative None-Occ /hpf    Yeast, UA None None    Squam Epithel, UA 2 /hpf    Hyaline Casts, UA 6.6 (A) 0-1/lpf /lpf    Microscopic  Comment SEE COMMENT          Charlotte Roy,   [facility]

## 2023-09-20 NOTE — ASSESSMENT & PLAN NOTE
Iron deficiency anemia addressed per hematology.   Lab Results   Component Value Date    WBC 10.37 08/30/2023    HGB 14.4 08/30/2023    HCT 42.0 08/30/2023    MCV 85 08/30/2023     08/30/2023   Stable Hg/HCT and improved iron reserve.   - f/u hematology notes

## 2023-09-25 NOTE — ASSESSMENT & PLAN NOTE
BP Readings from Last 3 Encounters:   09/20/23 101/74   08/30/23 104/66   08/24/23 126/84   Normotensive. Continue with daily regimen;  - chlorthalidone 12.5 mg daily   - metoprolol 100 mg BID  - valsartain 80 mg daily  - follow low sodium diet  - f/u BMP

## 2023-11-02 DIAGNOSIS — E87.6 HYPOKALEMIA: Primary | ICD-10-CM

## 2023-11-02 DIAGNOSIS — E11.9 DIABETES MELLITUS WITHOUT COMPLICATION: ICD-10-CM

## 2024-03-13 ENCOUNTER — TELEPHONE (OUTPATIENT)
Dept: OBSTETRICS AND GYNECOLOGY | Facility: CLINIC | Age: 44
End: 2024-03-13
Payer: MEDICAID

## 2024-03-18 ENCOUNTER — TELEPHONE (OUTPATIENT)
Dept: OBSTETRICS AND GYNECOLOGY | Facility: CLINIC | Age: 44
End: 2024-03-18
Payer: MEDICAID

## 2025-04-19 ENCOUNTER — HOSPITAL ENCOUNTER (EMERGENCY)
Facility: HOSPITAL | Age: 45
Discharge: HOME | End: 2025-04-19
Attending: EMERGENCY MEDICINE

## 2025-04-19 VITALS
BODY MASS INDEX: 27.58 KG/M2 | DIASTOLIC BLOOD PRESSURE: 94 MMHG | SYSTOLIC BLOOD PRESSURE: 111 MMHG | WEIGHT: 165.56 LBS | OXYGEN SATURATION: 98 % | HEIGHT: 65 IN | RESPIRATION RATE: 18 BRPM | TEMPERATURE: 98 F | HEART RATE: 93 BPM

## 2025-04-19 DIAGNOSIS — M25.562 ACUTE PAIN OF LEFT KNEE: Primary | ICD-10-CM

## 2025-04-19 PROCEDURE — 25000003 PHARM REV CODE 250: Performed by: NURSE PRACTITIONER

## 2025-04-19 PROCEDURE — 99283 EMERGENCY DEPT VISIT LOW MDM: CPT | Mod: 25

## 2025-04-19 RX ORDER — ATORVASTATIN CALCIUM 40 MG/1
40 TABLET, FILM COATED ORAL DAILY
COMMUNITY

## 2025-04-19 RX ORDER — LIDOCAINE 50 MG/G
1 PATCH TOPICAL DAILY PRN
Qty: 5 PATCH | Refills: 0 | Status: SHIPPED | OUTPATIENT
Start: 2025-04-19 | End: 2025-04-24

## 2025-04-19 RX ORDER — HYDROCODONE BITARTRATE AND ACETAMINOPHEN 5; 325 MG/1; MG/1
1 TABLET ORAL
Refills: 0 | Status: COMPLETED | OUTPATIENT
Start: 2025-04-19 | End: 2025-04-19

## 2025-04-19 RX ORDER — ALBUTEROL SULFATE 90 UG/1
1 INHALANT RESPIRATORY (INHALATION) EVERY 4 HOURS PRN
COMMUNITY

## 2025-04-19 RX ORDER — NITROFURANTOIN 25; 75 MG/1; MG/1
100 CAPSULE ORAL 2 TIMES DAILY
COMMUNITY

## 2025-04-19 RX ADMIN — HYDROCODONE BITARTRATE AND ACETAMINOPHEN 1 TABLET: 5; 325 TABLET ORAL at 03:04

## 2025-04-19 NOTE — ED PROVIDER NOTES
Encounter Date: 4/19/2025       History     Chief Complaint   Patient presents with    Fall     Pt reports she fall over a playpen injuring her left knee and left wrist 2 days ago.      This is a 45-year-old white female with a history of fibromyalgia and chronic low back pain with sciatica who presents to the emergency department with complaints of pain due to fall sustained 2 days ago.  Patient reports trip and fall, landing on knees and outstretched left hand.  She reports pain to the left knee that is relatively constant, worse with attempted ambulation and touching the area.  She also reports some mild swelling to the knee.  Symptoms have progressively worsened and unrelieved with over-the-counter medications.  Patient also reports some mild lingering pain to the left wrist that is worse with movement.  Denies swelling.      Review of patient's allergies indicates:   Allergen Reactions    Amoxicillin Hives    Avelox [moxifloxacin] Hives    Desloratadine Hives     Other reaction(s): Unknown    Pcn [penicillins] Hives    Vimpat [lacosamide] Hallucinations    Claritin [loratadine] Palpitations    Latex, natural rubber Rash     Past Medical History:   Diagnosis Date    Acute renal failure 7/14/2022    Anemia     Anxiety disorder     Arthritis     Bipolar 2 disorder     Borderline personality disorder     Chronic bilateral low back pain without sciatica     Depression with anxiety     Diabetes mellitus     Disorder of kidney and ureter     Dyspnea 7/15/2022    Elevated LFTs     Esophageal stenosis     Fatty liver     Fibromyalgia     Fibromyalgia     High cholesterol     Hyperkalemia 7/15/2022    Hypertension     Hypoglycemia     Hyponatremia 7/14/2022    Intermittent explosive disorder     Liver disease     JAMESON (nonalcoholic steatohepatitis)     Type 2 diabetes, controlled, with peripheral neuropathy      Past Surgical History:   Procedure Laterality Date    COLONOSCOPY N/A 3/20/2018    Procedure: COLONOSCOPY;   "Surgeon: Janelle Meade MD;  Location: [facility];  Service: Endoscopy;  Laterality: N/A;    COLONOSCOPY N/A 5/11/2021    Procedure: COLONOSCOPY;  Surgeon: Janelle Meade MD;  Location: [facility];  Service: Endoscopy;  Laterality: N/A;    ESOPHAGEAL DILATION      x2    ESOPHAGOGASTRODUODENOSCOPY N/A 5/7/2019    Procedure: EGD (ESOPHAGOGASTRODUODENOSCOPY);  Surgeon: Janelle Meade MD;  Location: [facility];  Service: Endoscopy;  Laterality: N/A;    ESOPHAGOGASTRODUODENOSCOPY N/A 5/11/2021    Procedure: EGD (ESOPHAGOGASTRODUODENOSCOPY);  Surgeon: Janelle Meade MD;  Location: [facility];  Service: Endoscopy;  Laterality: N/A;  Rapid on arrival    HIATAL HERNIA REPAIR      HYSTERECTOMY      LIVER BIOPSY  2018    fibrosis stage 3 JAMESON    OOPHORECTOMY      TUBAL LIGATION      UPPER GASTROINTESTINAL ENDOSCOPY      2013? unable to obtain records     Family History   Problem Relation Name Age of Onset    Hypertension Mother      Clotting disorder Father      Ulcers Father      Clotting disorder Sister      Cancer Maternal Grandmother          "female Cancer"    Lung cancer Paternal Grandmother      Diabetes Maternal Aunt      No Known Problems Daughter      No Known Problems Maternal Uncle      No Known Problems Paternal Aunt      No Known Problems Paternal Uncle      No Known Problems Maternal Grandfather      No Known Problems Paternal Grandfather      Breast cancer Neg Hx      Ovarian cancer Neg Hx      BRCA 1/2 Neg Hx       Social History[1]  Review of Systems   Musculoskeletal:  Positive for arthralgias, gait problem and joint swelling.   Skin: Negative.        Physical Exam     Initial Vitals [04/19/25 1441]   BP Pulse Resp Temp SpO2   (!) 111/94 93 18 98 °F (36.7 °C) 98 %      MAP       --         Physical Exam    Nursing note and vitals reviewed.  Constitutional: She appears well-developed and well-nourished. She is active. No distress.   HENT:   Head: Normocephalic and atraumatic. "   Eyes: EOM are normal. Pupils are equal, round, and reactive to light.   Neck: Neck supple.   Normal range of motion.  Cardiovascular:  Normal rate.           Pulmonary/Chest: No respiratory distress.   Musculoskeletal:      Cervical back: Normal range of motion and neck supple.      Comments: The left wrist appears normal upon inspection, no rash or discoloration, no swelling or deformity.  Nontender.  Distally NVI.  Normal range of motion.    The left knee appears mildly swollen locally over the anterior region with no discoloration.  Patient experiences generalized tenderness.  No laxity.  Distally NVI.     Neurological: She is alert and oriented to person, place, and time. GCS eye subscore is 4. GCS verbal subscore is 5. GCS motor subscore is 6.   Skin: Skin is warm and dry. Capillary refill takes less than 2 seconds.   Psychiatric: She has a normal mood and affect. Her behavior is normal. Thought content normal.         ED Course   Procedures  Labs Reviewed - No data to display       Imaging Results              X-Ray Knee 1 or 2 View Left (Final result)  Result time 04/19/25 15:20:14      Final result by Ubaldo Pardo MD (04/19/25 15:20:14)                   Impression:      No acute abnormality.      Electronically signed by: Ubaldo Pardo MD  Date:    04/19/2025  Time:    15:20               Narrative:    EXAMINATION:  XR KNEE 1 OR 2 VIEW LEFT    CLINICAL HISTORY:  fall;    FINDINGS:  No fracture or dislocation.  Small superior patellar enthesophyte.  Unremarkable soft tissues.                                       Medications   HYDROcodone-acetaminophen 5-325 mg per tablet 1 tablet (has no administration in time range)     Medical Decision Making  Amount and/or Complexity of Data Reviewed  Radiology: ordered.    Risk  Prescription drug management.               ED Course as of 04/19/25 1525   Sat Apr 19, 2025   1523 No acute findings on left knee x-ray [CB]      ED Course User Index  [CB] Vangie  Krista DE LEON NP                           Clinical Impression:  Final diagnoses:  [M25.562] Acute pain of left knee (Primary)          ED Disposition Condition    Discharge Stable          ED Prescriptions       Medication Sig Dispense Start Date End Date Auth. Provider    LIDOcaine (LIDODERM) 5 % Place 1 patch onto the skin daily as needed (Pain). Remove & Discard patch within 12 hours or as directed by MD 5 patch 4/19/2025 4/24/2025 Krista Vences NP          Follow-up Information       Follow up With Specialties Details Why Contact Info    PCP Follow UP  Schedule an appointment as soon as possible for a visit in 2 days for follow-up, for re-evaluation of today's complaint                [1]   Social History  Tobacco Use    Smoking status: Never     Passive exposure: Never    Smokeless tobacco: Never   Substance Use Topics    Alcohol use: No     Alcohol/week: 0.0 standard drinks of alcohol    Drug use: No        Krista Vences NP  04/19/25 152

## 2025-04-19 NOTE — Clinical Note
"Rohini"Mariya Dang was seen and treated in our emergency department on 4/19/2025.  She may return to work on 04/21/2025.       If you have any questions or concerns, please don't hesitate to call.      Krista Vences, NP"

## 2025-04-19 NOTE — DISCHARGE INSTRUCTIONS
Maintain use of Ace wrap as needed and you may apply ice to help reduce pain.  Plan to follow-up with your primary doctor in 1-2 days and return to the emergency department for worsening condition.

## 2025-05-24 ENCOUNTER — HOSPITAL ENCOUNTER (EMERGENCY)
Facility: HOSPITAL | Age: 45
Discharge: HOME | End: 2025-05-24
Attending: EMERGENCY MEDICINE

## 2025-05-24 VITALS
WEIGHT: 159.38 LBS | TEMPERATURE: 99 F | HEART RATE: 61 BPM | DIASTOLIC BLOOD PRESSURE: 75 MMHG | OXYGEN SATURATION: 97 % | HEIGHT: 65 IN | BODY MASS INDEX: 26.55 KG/M2 | RESPIRATION RATE: 18 BRPM | SYSTOLIC BLOOD PRESSURE: 113 MMHG

## 2025-05-24 DIAGNOSIS — R10.9 ABDOMINAL PAIN, UNSPECIFIED ABDOMINAL LOCATION: Primary | ICD-10-CM

## 2025-05-24 LAB
ABSOLUTE EOSINOPHIL (OHS): 0.08 K/UL
ABSOLUTE MONOCYTE (OHS): 0.59 K/UL (ref 0.3–1)
ABSOLUTE NEUTROPHIL COUNT (OHS): 4.56 K/UL (ref 1.8–7.7)
ALBUMIN SERPL BCP-MCNC: 4.2 G/DL (ref 3.5–5.2)
ALP SERPL-CCNC: 153 UNIT/L (ref 40–150)
ALT SERPL W/O P-5'-P-CCNC: 46 UNIT/L (ref 10–44)
ANION GAP (OHS): 12 MMOL/L (ref 8–16)
AST SERPL-CCNC: 46 UNIT/L (ref 11–45)
BACTERIA #/AREA URNS AUTO: ABNORMAL /HPF
BASOPHILS # BLD AUTO: 0.05 K/UL
BASOPHILS NFR BLD AUTO: 0.6 %
BILIRUB SERPL-MCNC: 0.5 MG/DL (ref 0.1–1)
BILIRUB UR QL STRIP.AUTO: NEGATIVE
BUN SERPL-MCNC: 20 MG/DL (ref 6–20)
CALCIUM SERPL-MCNC: 9.5 MG/DL (ref 8.7–10.5)
CHLORIDE SERPL-SCNC: 104 MMOL/L (ref 95–110)
CLARITY UR: ABNORMAL
CO2 SERPL-SCNC: 23 MMOL/L (ref 23–29)
COLOR UR AUTO: YELLOW
CREAT SERPL-MCNC: 0.6 MG/DL (ref 0.5–1.4)
ERYTHROCYTE [DISTWIDTH] IN BLOOD BY AUTOMATED COUNT: 13.3 % (ref 11.5–14.5)
GFR SERPLBLD CREATININE-BSD FMLA CKD-EPI: >60 ML/MIN/1.73/M2
GLUCOSE SERPL-MCNC: 105 MG/DL (ref 70–110)
GLUCOSE UR QL STRIP: ABNORMAL
HCT VFR BLD AUTO: 42 % (ref 37–48.5)
HGB BLD-MCNC: 13.9 GM/DL (ref 12–16)
HGB UR QL STRIP: NEGATIVE
HOLD SPECIMEN: NORMAL
HYALINE CASTS UR QL AUTO: 8 /LPF (ref 0–1)
IMM GRANULOCYTES # BLD AUTO: 0.02 K/UL (ref 0–0.04)
IMM GRANULOCYTES NFR BLD AUTO: 0.2 % (ref 0–0.5)
KETONES UR QL STRIP: ABNORMAL
LEUKOCYTE ESTERASE UR QL STRIP: NEGATIVE
LIPASE SERPL-CCNC: 29 U/L (ref 4–60)
LYMPHOCYTES # BLD AUTO: 3.14 K/UL (ref 1–4.8)
MCH RBC QN AUTO: 27.6 PG (ref 27–31)
MCHC RBC AUTO-ENTMCNC: 33.1 G/DL (ref 32–36)
MCV RBC AUTO: 84 FL (ref 82–98)
MICROSCOPIC COMMENT: ABNORMAL
NITRITE UR QL STRIP: NEGATIVE
NUCLEATED RBC (/100WBC) (OHS): 0 /100 WBC
PH UR STRIP: 6 [PH]
PLATELET # BLD AUTO: 231 K/UL (ref 150–450)
PMV BLD AUTO: 11.4 FL (ref 9.2–12.9)
POTASSIUM SERPL-SCNC: 4 MMOL/L (ref 3.5–5.1)
PROT SERPL-MCNC: 7.6 GM/DL (ref 6–8.4)
PROT UR QL STRIP: ABNORMAL
RBC # BLD AUTO: 5.03 M/UL (ref 4–5.4)
RBC #/AREA URNS AUTO: 1 /HPF (ref 0–4)
RELATIVE EOSINOPHIL (OHS): 0.9 %
RELATIVE LYMPHOCYTE (OHS): 37.2 % (ref 18–48)
RELATIVE MONOCYTE (OHS): 7 % (ref 4–15)
RELATIVE NEUTROPHIL (OHS): 54.1 % (ref 38–73)
SODIUM SERPL-SCNC: 139 MMOL/L (ref 136–145)
SP GR UR STRIP: >=1.03
SQUAMOUS #/AREA URNS AUTO: 8 /HPF
UROBILINOGEN UR STRIP-ACNC: NEGATIVE EU/DL
WBC # BLD AUTO: 8.44 K/UL (ref 3.9–12.7)
WBC #/AREA URNS AUTO: 2 /HPF (ref 0–5)
YEAST URNS QL MICRO: ABNORMAL /HPF

## 2025-05-24 PROCEDURE — 85025 COMPLETE CBC W/AUTO DIFF WBC: CPT | Performed by: CLINICAL NURSE SPECIALIST

## 2025-05-24 PROCEDURE — 99285 EMERGENCY DEPT VISIT HI MDM: CPT | Mod: 25

## 2025-05-24 PROCEDURE — 36415 COLL VENOUS BLD VENIPUNCTURE: CPT | Performed by: CLINICAL NURSE SPECIALIST

## 2025-05-24 PROCEDURE — 96375 TX/PRO/DX INJ NEW DRUG ADDON: CPT

## 2025-05-24 PROCEDURE — 81003 URINALYSIS AUTO W/O SCOPE: CPT | Performed by: CLINICAL NURSE SPECIALIST

## 2025-05-24 PROCEDURE — 63600175 PHARM REV CODE 636 W HCPCS: Performed by: CLINICAL NURSE SPECIALIST

## 2025-05-24 PROCEDURE — 96374 THER/PROPH/DIAG INJ IV PUSH: CPT

## 2025-05-24 PROCEDURE — 80053 COMPREHEN METABOLIC PANEL: CPT | Performed by: CLINICAL NURSE SPECIALIST

## 2025-05-24 PROCEDURE — 25500020 PHARM REV CODE 255: Performed by: EMERGENCY MEDICINE

## 2025-05-24 PROCEDURE — 83690 ASSAY OF LIPASE: CPT | Performed by: CLINICAL NURSE SPECIALIST

## 2025-05-24 RX ORDER — DIPHENOXYLATE HYDROCHLORIDE AND ATROPINE SULFATE 2.5; .025 MG/1; MG/1
1 TABLET ORAL 4 TIMES DAILY PRN
Qty: 15 TABLET | Refills: 0 | Status: SHIPPED | OUTPATIENT
Start: 2025-05-24 | End: 2025-06-03

## 2025-05-24 RX ORDER — ONDANSETRON HYDROCHLORIDE 2 MG/ML
8 INJECTION, SOLUTION INTRAVENOUS ONCE
Status: COMPLETED | OUTPATIENT
Start: 2025-05-24 | End: 2025-05-24

## 2025-05-24 RX ORDER — MORPHINE SULFATE 2 MG/ML
2 INJECTION, SOLUTION INTRAMUSCULAR; INTRAVENOUS ONCE
Refills: 0 | Status: COMPLETED | OUTPATIENT
Start: 2025-05-24 | End: 2025-05-24

## 2025-05-24 RX ORDER — ONDANSETRON 4 MG/1
4 TABLET, ORALLY DISINTEGRATING ORAL EVERY 6 HOURS PRN
Qty: 10 TABLET | Refills: 0 | Status: SHIPPED | OUTPATIENT
Start: 2025-05-24

## 2025-05-24 RX ORDER — DICYCLOMINE HYDROCHLORIDE 20 MG/1
20 TABLET ORAL 2 TIMES DAILY
Qty: 20 TABLET | Refills: 0 | Status: SHIPPED | OUTPATIENT
Start: 2025-05-24 | End: 2025-06-03

## 2025-05-24 RX ADMIN — IOHEXOL 100 ML: 350 INJECTION, SOLUTION INTRAVENOUS at 01:05

## 2025-05-24 RX ADMIN — ONDANSETRON 8 MG: 2 INJECTION INTRAMUSCULAR; INTRAVENOUS at 01:05

## 2025-05-24 RX ADMIN — MORPHINE SULFATE 2 MG: 2 INJECTION, SOLUTION INTRAMUSCULAR; INTRAVENOUS at 01:05

## 2025-05-24 NOTE — ED PROVIDER NOTES
Encounter Date: 5/24/2025       History     Chief Complaint   Patient presents with    Abdominal Pain     Patient to the ER with complaints of abdominal pain/bloating, nausea/vomiting/diarrhea, and weakness onset one week ago.     45-year-old female presents to the emergency room with epigastric abdominal pain/bloating, nausea, vomiting, diarrhea, weakness for the last week.  Daughter at bedside with multiple questions, request which I tried to accommodate.  Daughter is concerned due to a family history of pancreatic cancer, pancreatitis.  She is also concerned about appendicitis.  Vital signs are stable.  Patient has a history of renal failure, Jameson, anemia, borderline personality disorder, bipolar, diabetes, fibromyalgia        Review of patient's allergies indicates:   Allergen Reactions    Amoxicillin Hives    Avelox [moxifloxacin] Hives    Desloratadine Hives     Other reaction(s): Unknown    Pcn [penicillins] Hives    Vimpat [lacosamide] Hallucinations    Claritin [loratadine] Palpitations    Latex, natural rubber Rash     Past Medical History:   Diagnosis Date    Acute renal failure 7/14/2022    Anemia     Anxiety disorder     Arthritis     Bipolar 2 disorder     Borderline personality disorder     Chronic bilateral low back pain without sciatica     Depression with anxiety     Diabetes mellitus     Disorder of kidney and ureter     Dyspnea 7/15/2022    Elevated LFTs     Esophageal stenosis     Fatty liver     Fibromyalgia     Fibromyalgia     High cholesterol     Hyperkalemia 7/15/2022    Hypertension     Hypoglycemia     Hyponatremia 7/14/2022    Intermittent explosive disorder     Liver disease     JAMESON (nonalcoholic steatohepatitis)     Type 2 diabetes, controlled, with peripheral neuropathy      Past Surgical History:   Procedure Laterality Date    COLONOSCOPY N/A 3/20/2018    Procedure: COLONOSCOPY;  Surgeon: Janelle Meade MD;  [facility];  Service: Endoscopy;  Laterality: N/A;     "COLONOSCOPY N/A 5/11/2021    Procedure: COLONOSCOPY;  Surgeon: Janelle Meade MD;  Location: [facility];  Service: Endoscopy;  Laterality: N/A;    ESOPHAGEAL DILATION      x2    ESOPHAGOGASTRODUODENOSCOPY N/A 5/7/2019    Procedure: EGD (ESOPHAGOGASTRODUODENOSCOPY);  Surgeon: Janelle Meade MD;  Location: [facility];  Service: Endoscopy;  Laterality: N/A;    ESOPHAGOGASTRODUODENOSCOPY N/A 5/11/2021    Procedure: EGD (ESOPHAGOGASTRODUODENOSCOPY);  Surgeon: Janelle Meade MD;  Location: [facility];  Service: Endoscopy;  Laterality: N/A;  Rapid on arrival    HIATAL HERNIA REPAIR      HYSTERECTOMY      LIVER BIOPSY  2018    fibrosis stage 3 JAMESON    OOPHORECTOMY      TUBAL LIGATION      UPPER GASTROINTESTINAL ENDOSCOPY      2013? unable to obtain records     Family History   Problem Relation Name Age of Onset    Hypertension Mother      Clotting disorder Father      Ulcers Father      Clotting disorder Sister      Cancer Maternal Grandmother          "female Cancer"    Lung cancer Paternal Grandmother      Diabetes Maternal Aunt      No Known Problems Daughter      No Known Problems Maternal Uncle      No Known Problems Paternal Aunt      No Known Problems Paternal Uncle      No Known Problems Maternal Grandfather      No Known Problems Paternal Grandfather      Breast cancer Neg Hx      Ovarian cancer Neg Hx      BRCA 1/2 Neg Hx       Social History[1]  Review of Systems   Constitutional:  Negative for fever.   HENT:  Negative for sore throat.    Respiratory:  Negative for shortness of breath.    Cardiovascular:  Negative for chest pain.   Gastrointestinal:  Positive for abdominal pain, diarrhea, nausea and vomiting.   Genitourinary:  Negative for dysuria.   Musculoskeletal:  Negative for back pain.   Skin:  Negative for rash.   Neurological:  Positive for weakness.   Hematological:  Does not bruise/bleed easily.   Psychiatric/Behavioral:  The patient is nervous/anxious.    All other systems reviewed " and are negative.      Physical Exam     Initial Vitals [05/24/25 1248]   BP Pulse Resp Temp SpO2   127/86 87 18 98.8 °F (37.1 °C) 98 %      MAP       --         Physical Exam    Nursing note and vitals reviewed.  Constitutional: She appears well-developed and well-nourished.   HENT:   Head: Normocephalic and atraumatic.   Eyes: Pupils are equal, round, and reactive to light.   Neck:   Normal range of motion.  Cardiovascular:  Normal rate and regular rhythm.           Pulmonary/Chest: Breath sounds normal.   Abdominal: Abdomen is soft. Bowel sounds are normal. There is abdominal tenderness.   Musculoskeletal:         General: Normal range of motion.      Cervical back: Normal range of motion.     Neurological: She is alert and oriented to person, place, and time.   Skin: Skin is warm and dry.   Psychiatric: She has a normal mood and affect.         ED Course   Procedures  Labs Reviewed   COMPREHENSIVE METABOLIC PANEL - Abnormal       Result Value    Sodium 139      Potassium 4.0      Chloride 104      CO2 23      Glucose 105      BUN 20      Creatinine 0.6      Calcium 9.5      Protein Total 7.6      Albumin 4.2      Bilirubin Total 0.5       (*)     AST 46 (*)     ALT 46 (*)     Anion Gap 12      eGFR >60     URINALYSIS, REFLEX TO URINE CULTURE - Abnormal    Color, UA Yellow      Appearance, UA Cloudy (*)     pH, UA 6.0      Spec Grav UA >=1.030 (*)     Protein, UA Trace (*)     Glucose, UA 4+ (*)     Ketones, UA 1+ (*)     Bilirubin, UA Negative      Blood, UA Negative      Nitrites, UA Negative      Urobilinogen, UA Negative      Leukocyte Esterase, UA Negative     URINALYSIS MICROSCOPIC - Abnormal    RBC, UA 1      WBC, UA 2      Bacteria, UA Rare      Yeast, UA None      Squamous Epithelial Cells, UA 8      Hyaline Casts, UA 8 (*)     Microscopic Comment       LIPASE - Normal    Lipase Level 29     CBC WITH DIFFERENTIAL - Normal    WBC 8.44      RBC 5.03      HGB 13.9      HCT 42.0      MCV 84      MCH  27.6      MCHC 33.1      RDW 13.3      Platelet Count 231      MPV 11.4      Nucleated RBC 0      Neut % 54.1      Lymph % 37.2      Mono % 7.0      Eos % 0.9      Basophil % 0.6      Imm Grans % 0.2      Neut # 4.56      Lymph # 3.14      Mono # 0.59      Eos # 0.08      Baso # 0.05      Imm Grans # 0.02     CBC W/ AUTO DIFFERENTIAL    Narrative:     The following orders were created for panel order CBC auto differential.  Procedure                               Abnormality         Status                     ---------                               -----------         ------                     CBC with Differential[[id number]]        Normal              Final result                 Please view results for these tests on the individual orders.   GREY TOP URINE HOLD    Extra Tube hold            Imaging Results              CT Abdomen Pelvis With IV Contrast NO Oral Contrast (Final result)  Result time 05/24/25 16:18:28      Final result by Dangelo Lopez MD (05/24/25 16:18:28)                   Impression:      No evidence of an acute intra-abdominal or pelvic process.      Electronically signed by: Dangelo Lopez MD  Date:    05/24/2025  Time:    16:18               Narrative:    EXAMINATION:  CT ABDOMEN PELVIS WITH IV CONTRAST    CLINICAL HISTORY:  Abdominal pain, acute, nonlocalized;    CT/nuclear cardiac exams in previous 12 months: None    TECHNIQUE:  Axial CT images were obtained and evaluated with coronal reformatted images.  Iterative reconstruction technique was used.    COMPARISON:  CT abdomen/pelvis 06/19/2022    FINDINGS:  Sub 5 mm nodular density at the periphery of the left lower pulmonary lobe is unchanged.  No abnormality identified of the liver, pancreas, spleen or adrenal glands.  Kidneys enhance symmetrically.  No hydronephrosis.  Gallbladder is unremarkable.  No evidence of bowel obstruction or appendicitis.  Urinary bladder is unremarkable.                                       Medications   morphine  injection 2 mg (2 mg Intravenous Given 5/24/25 1341)   ondansetron injection 8 mg (8 mg Intravenous Given 5/24/25 1341)   iohexoL (OMNIPAQUE 350) injection 100 mL (100 mLs Intravenous Given 5/24/25 1352)     Medical Decision Making  Amount and/or Complexity of Data Reviewed  Labs: ordered. Decision-making details documented in ED Course.  Radiology: ordered.    Risk  Prescription drug management.               ED Course as of 05/24/25 1627   Sat May 24, 2025   1447 ALP(!): 153 [JT]   1447 AST(!): 46 [JT]   1447 ALT(!): 46  History of JAMESON and elevated liver enzymes. [JT]      ED Course User Index  [JT] Ana Dickey NP                           Clinical Impression:  Final diagnoses:  [R10.9] Abdominal pain, unspecified abdominal location (Primary)          ED Disposition Condition    Discharge Stable          ED Prescriptions       Medication Sig Dispense Start Date End Date Auth. Provider    dicyclomine (BENTYL) 20 mg tablet Take 1 tablet (20 mg total) by mouth 2 (two) times daily. for 10 days 20 tablet 5/24/2025 6/3/2025 Ana Dickey NP    ondansetron (ZOFRAN-ODT) 4 MG TbDL Take 1 tablet (4 mg total) by mouth every 6 (six) hours as needed. 10 tablet 5/24/2025 -- Ana Dickey NP    diphenoxylate-atropine 2.5-0.025 mg (LOMOTIL) 2.5-0.025 mg per tablet Take 1 tablet by mouth 4 (four) times daily as needed. 15 tablet 5/24/2025 6/3/2025 Ana Dickey NP          Follow-up Information       Follow up With Specialties Details Why Contact Info    [facility]  As needed [address] [facility] [address] [facility] [address]  [phone]                     [1]   Social History  Tobacco Use    Smoking status: Never     Passive exposure: Never    Smokeless tobacco: Never   Substance Use Topics    Alcohol use: No     Alcohol/week: 0.0 standard drinks of alcohol    Drug use: No        Ana Dickey NP  05/24/25 1627

## 2025-05-29 ENCOUNTER — E-VISIT (OUTPATIENT)
Dept: OBSTETRICS AND GYNECOLOGY | Facility: CLINIC | Age: 45
End: 2025-05-29

## 2025-05-29 DIAGNOSIS — N89.8 VAGINAL ITCHING: Primary | ICD-10-CM

## 2025-05-29 DIAGNOSIS — B37.31 YEAST VAGINITIS: ICD-10-CM

## 2025-05-29 RX ORDER — FLUCONAZOLE 100 MG/1
100 TABLET ORAL DAILY
Qty: 7 TABLET | Refills: 0 | Status: SHIPPED | OUTPATIENT
Start: 2025-05-29 | End: 2025-06-05

## 2025-05-29 NOTE — PROGRESS NOTES
Patient ID: Rohini Dang is a 45 y.o. female.        E-Visit Time Tracking:   Day 1 Time (in minutes): 13  Total Time (in minutes): 13      Chief Complaint: General Illness (Entered automatically based on patient selection in [employer].)      The patient initiated a request through [employer] on 5/29/2025 for evaluation and management with a chief complaint of General Illness (Entered automatically based on patient selection in [employer].)     I evaluated the questionnaire submission on 05/29/2025.    [facility]    5/29/2025 10:12 AM CDT - Filed by Patient   What do you need help with? Vaginal Concerns   Do you agree to participate in an E-Visit? Yes   If you have any of the following symptoms,  please do not complete an E-Visit,  schedule an appointment with your provider: I acknowledge   Do you have any of the following pregnancy-related conditions? None   What is the main issue you would like addressed today? Yeast infection   Which of the following vaginal concerns do you have? Itching;  Pain   Do you have vaginal discharge? No discharge   Do you have pain while passing urine? Yes   Do you have any of the following symptoms? Belly pain    Have you taken antibiotics in the last two weeks? No    Do you use any of the following? No   Which of the following applies to your menstrual period? Have not had for a while   Which of the following applies to your menstrual cycle? No bleeding   Do you have spotting between periods? No   Do you have pain with your period? No   Have you had similar symptoms in the past? Frequently   When you had similar symptoms in the past, did any of the following work? Cream for yeast infection   Have you had a temperature of 100.4 or higher? I don't know   Provide any additional information you feel is important. I am itching and swollen. It burns to go to the bathroom.   Please attach any relevant images or files    Are you able to take your vital signs? No          Encounter Diagnoses   Name Primary?    Vaginal itching Yes    Yeast vaginitis         No orders of the defined types were placed in this encounter.     Medications Ordered This Encounter   Medications    fluconazole (DIFLUCAN) 100 MG tablet     Sig: Take 1 tablet (100 mg total) by mouth once daily. for 7 days     Dispense:  7 tablet     Refill:  0        Follow up in about 1 month (around 6/29/2025) for annual.

## 2025-06-02 ENCOUNTER — OFFICE VISIT (OUTPATIENT)
Dept: PRIMARY CARE CLINIC | Facility: CLINIC | Age: 45
End: 2025-06-02
Payer: MEDICAID

## 2025-06-02 ENCOUNTER — RESULTS FOLLOW-UP (OUTPATIENT)
Dept: PRIMARY CARE CLINIC | Facility: CLINIC | Age: 45
End: 2025-06-02

## 2025-06-02 VITALS
SYSTOLIC BLOOD PRESSURE: 118 MMHG | DIASTOLIC BLOOD PRESSURE: 79 MMHG | WEIGHT: 161 LBS | HEIGHT: 65 IN | HEART RATE: 105 BPM | OXYGEN SATURATION: 96 % | BODY MASS INDEX: 26.82 KG/M2

## 2025-06-02 DIAGNOSIS — M50.30 DDD (DEGENERATIVE DISC DISEASE), CERVICAL: ICD-10-CM

## 2025-06-02 DIAGNOSIS — E11.42 TYPE 2 DIABETES, CONTROLLED, WITH PERIPHERAL NEUROPATHY: ICD-10-CM

## 2025-06-02 DIAGNOSIS — J30.89 ENVIRONMENTAL AND SEASONAL ALLERGIES: ICD-10-CM

## 2025-06-02 DIAGNOSIS — E11.69 DYSLIPIDEMIA ASSOCIATED WITH TYPE 2 DIABETES MELLITUS: ICD-10-CM

## 2025-06-02 DIAGNOSIS — R30.0 DYSURIA: ICD-10-CM

## 2025-06-02 DIAGNOSIS — I42.9 CARDIOMYOPATHY, UNSPECIFIED TYPE: ICD-10-CM

## 2025-06-02 DIAGNOSIS — Z13.29 THYROID DISORDER SCREEN: ICD-10-CM

## 2025-06-02 DIAGNOSIS — Z76.89 ENCOUNTER TO ESTABLISH CARE: Primary | ICD-10-CM

## 2025-06-02 DIAGNOSIS — M19.90 ARTHRITIS: ICD-10-CM

## 2025-06-02 DIAGNOSIS — G56.03 CARPAL TUNNEL SYNDROME, BILATERAL: ICD-10-CM

## 2025-06-02 DIAGNOSIS — E11.9 DIABETES MELLITUS WITHOUT COMPLICATION: Primary | ICD-10-CM

## 2025-06-02 DIAGNOSIS — F51.01 PRIMARY INSOMNIA: ICD-10-CM

## 2025-06-02 DIAGNOSIS — F43.10 PTSD (POST-TRAUMATIC STRESS DISORDER): ICD-10-CM

## 2025-06-02 DIAGNOSIS — R79.89 ELEVATED LFTS: ICD-10-CM

## 2025-06-02 DIAGNOSIS — F63.81 INTERMITTENT EXPLOSIVE DISORDER: ICD-10-CM

## 2025-06-02 DIAGNOSIS — G43.E09 CHRONIC MIGRAINE WITH AURA WITHOUT STATUS MIGRAINOSUS, NOT INTRACTABLE: ICD-10-CM

## 2025-06-02 DIAGNOSIS — R21 RASH: ICD-10-CM

## 2025-06-02 DIAGNOSIS — E78.5 DYSLIPIDEMIA ASSOCIATED WITH TYPE 2 DIABETES MELLITUS: ICD-10-CM

## 2025-06-02 DIAGNOSIS — F31.81 BIPOLAR 2 DISORDER: ICD-10-CM

## 2025-06-02 DIAGNOSIS — R91.1 NODULE OF LEFT LUNG: ICD-10-CM

## 2025-06-02 DIAGNOSIS — F41.8 DEPRESSION WITH ANXIETY: ICD-10-CM

## 2025-06-02 DIAGNOSIS — Z86.2 HISTORY OF IRON DEFICIENCY ANEMIA: ICD-10-CM

## 2025-06-02 DIAGNOSIS — Z00.00 ANNUAL WELLNESS VISIT: ICD-10-CM

## 2025-06-02 DIAGNOSIS — Z12.31 ENCOUNTER FOR SCREENING MAMMOGRAM FOR MALIGNANT NEOPLASM OF BREAST: ICD-10-CM

## 2025-06-02 DIAGNOSIS — N18.9 CHRONIC KIDNEY DISEASE, UNSPECIFIED CKD STAGE: ICD-10-CM

## 2025-06-02 DIAGNOSIS — M62.838 MUSCLE SPASM: ICD-10-CM

## 2025-06-02 DIAGNOSIS — K76.9 LIVER DISEASE: ICD-10-CM

## 2025-06-02 DIAGNOSIS — M54.50 CHRONIC BILATERAL LOW BACK PAIN WITHOUT SCIATICA: ICD-10-CM

## 2025-06-02 DIAGNOSIS — Z78.0 POST-MENOPAUSAL: ICD-10-CM

## 2025-06-02 DIAGNOSIS — Z92.89 HISTORY OF BLOOD TRANSFUSION: ICD-10-CM

## 2025-06-02 DIAGNOSIS — Z23 NEED FOR PNEUMOCOCCAL 20-VALENT CONJUGATE VACCINATION: ICD-10-CM

## 2025-06-02 DIAGNOSIS — G89.29 CHRONIC BILATERAL LOW BACK PAIN WITHOUT SCIATICA: ICD-10-CM

## 2025-06-02 DIAGNOSIS — F41.1 GENERALIZED ANXIETY DISORDER: ICD-10-CM

## 2025-06-02 DIAGNOSIS — S73.192D TEAR OF LEFT ACETABULAR LABRUM, SUBSEQUENT ENCOUNTER: ICD-10-CM

## 2025-06-02 DIAGNOSIS — Z23 NEED FOR TDAP VACCINATION: ICD-10-CM

## 2025-06-02 DIAGNOSIS — M15.0 PRIMARY GENERALIZED (OSTEO)ARTHRITIS: ICD-10-CM

## 2025-06-02 DIAGNOSIS — R10.13 EPIGASTRIC PAIN: ICD-10-CM

## 2025-06-02 DIAGNOSIS — Z76.89 ENCOUNTER FOR NAIL CARE: ICD-10-CM

## 2025-06-02 DIAGNOSIS — M79.7 FIBROMYALGIA: ICD-10-CM

## 2025-06-02 DIAGNOSIS — K76.0 FATTY LIVER: ICD-10-CM

## 2025-06-02 DIAGNOSIS — M51.16 INTERVERTEBRAL DISC DISORDER WITH RADICULOPATHY OF LUMBAR REGION: ICD-10-CM

## 2025-06-02 DIAGNOSIS — E55.9 VITAMIN D DEFICIENCY: ICD-10-CM

## 2025-06-02 DIAGNOSIS — R60.9 SWELLING: ICD-10-CM

## 2025-06-02 DIAGNOSIS — N39.0 FREQUENT UTI: ICD-10-CM

## 2025-06-02 DIAGNOSIS — R25.1 TREMORS OF NERVOUS SYSTEM: ICD-10-CM

## 2025-06-02 LAB
ABSOLUTE EOSINOPHIL (OHS): 0.16 K/UL
ABSOLUTE MONOCYTE (OHS): 1.03 K/UL (ref 0.3–1)
ABSOLUTE NEUTROPHIL COUNT (OHS): 5.13 K/UL (ref 1.8–7.7)
ALBUMIN SERPL BCP-MCNC: 4.7 G/DL (ref 3.5–5.2)
ALBUMIN/CREAT UR: ABNORMAL
ALP SERPL-CCNC: 164 UNIT/L (ref 40–150)
ALT SERPL W/O P-5'-P-CCNC: 92 UNIT/L (ref 10–44)
ANION GAP (OHS): 12 MMOL/L (ref 8–16)
AST SERPL-CCNC: 77 UNIT/L (ref 11–45)
BACTERIA #/AREA URNS AUTO: NORMAL /HPF
BASOPHILS # BLD AUTO: 0.07 K/UL
BASOPHILS NFR BLD AUTO: 0.7 %
BILIRUB SERPL-MCNC: 0.4 MG/DL (ref 0.1–1)
BILIRUB UR QL STRIP.AUTO: NEGATIVE
BUN SERPL-MCNC: 17 MG/DL (ref 6–20)
CALCIUM SERPL-MCNC: 10.1 MG/DL (ref 8.7–10.5)
CHLORIDE SERPL-SCNC: 103 MMOL/L (ref 95–110)
CHOLEST SERPL-MCNC: 296 MG/DL (ref 120–199)
CHOLEST/HDLC SERPL: 7.8 {RATIO} (ref 2–5)
CLARITY UR: CLEAR
CO2 SERPL-SCNC: 28 MMOL/L (ref 23–29)
COLOR UR AUTO: YELLOW
CREAT SERPL-MCNC: 0.9 MG/DL (ref 0.5–1.4)
CREAT UR-MCNC: 10 MG/DL (ref 15–325)
EAG (OHS): 137 MG/DL (ref 68–131)
ERYTHROCYTE [DISTWIDTH] IN BLOOD BY AUTOMATED COUNT: 13.8 % (ref 11.5–14.5)
FERRITIN SERPL-MCNC: 185.3 NG/ML (ref 20–300)
GFR SERPLBLD CREATININE-BSD FMLA CKD-EPI: >60 ML/MIN/1.73/M2
GLUCOSE SERPL-MCNC: 93 MG/DL (ref 70–110)
GLUCOSE UR QL STRIP: ABNORMAL
HBA1C MFR BLD: 6.4 % (ref 4–5.6)
HCT VFR BLD AUTO: 45.1 % (ref 37–48.5)
HDLC SERPL-MCNC: 38 MG/DL (ref 40–75)
HDLC SERPL: 12.8 % (ref 20–50)
HGB BLD-MCNC: 14.6 GM/DL (ref 12–16)
HGB UR QL STRIP: NEGATIVE
HOLD SPECIMEN: NORMAL
HYALINE CASTS UR QL AUTO: 0 /LPF (ref 0–1)
IMM GRANULOCYTES # BLD AUTO: 0.02 K/UL (ref 0–0.04)
IMM GRANULOCYTES NFR BLD AUTO: 0.2 % (ref 0–0.5)
IRON SATN MFR SERPL: 21 % (ref 20–50)
IRON SERPL-MCNC: 89 UG/DL (ref 30–160)
KETONES UR QL STRIP: NEGATIVE
LDLC SERPL CALC-MCNC: 185.2 MG/DL (ref 63–159)
LEUKOCYTE ESTERASE UR QL STRIP: NEGATIVE
LYMPHOCYTES # BLD AUTO: 3.91 K/UL (ref 1–4.8)
MCH RBC QN AUTO: 27.5 PG (ref 27–31)
MCHC RBC AUTO-ENTMCNC: 32.4 G/DL (ref 32–36)
MCV RBC AUTO: 85 FL (ref 82–98)
MICROALBUMIN UR-MCNC: <5 UG/ML (ref ?–5000)
MICROSCOPIC COMMENT: NORMAL
NITRITE UR QL STRIP: NEGATIVE
NONHDLC SERPL-MCNC: 258 MG/DL
NUCLEATED RBC (/100WBC) (OHS): 0 /100 WBC
PH UR STRIP: 7 [PH]
PLATELET # BLD AUTO: 309 K/UL (ref 150–450)
PMV BLD AUTO: 11.6 FL (ref 9.2–12.9)
POTASSIUM SERPL-SCNC: 3.6 MMOL/L (ref 3.5–5.1)
PROT SERPL-MCNC: 8.4 GM/DL (ref 6–8.4)
PROT UR QL STRIP: NEGATIVE
RBC # BLD AUTO: 5.3 M/UL (ref 4–5.4)
RBC #/AREA URNS AUTO: 0 /HPF (ref 0–4)
RELATIVE EOSINOPHIL (OHS): 1.6 %
RELATIVE LYMPHOCYTE (OHS): 37.9 % (ref 18–48)
RELATIVE MONOCYTE (OHS): 10 % (ref 4–15)
RELATIVE NEUTROPHIL (OHS): 49.6 % (ref 38–73)
SODIUM SERPL-SCNC: 143 MMOL/L (ref 136–145)
SP GR UR STRIP: 1.01
SQUAMOUS #/AREA URNS AUTO: 1 /HPF
TIBC SERPL-MCNC: 417 UG/DL (ref 250–450)
TRANSFERRIN SERPL-MCNC: 282 MG/DL (ref 200–375)
TRIGL SERPL-MCNC: 364 MG/DL (ref 30–150)
TSH SERPL-ACNC: 3.36 UIU/ML (ref 0.4–4)
UROBILINOGEN UR STRIP-ACNC: NEGATIVE EU/DL
VIT B12 SERPL-MCNC: 458 PG/ML (ref 210–950)
WBC # BLD AUTO: 10.32 K/UL (ref 3.9–12.7)
WBC #/AREA URNS AUTO: 0 /HPF (ref 0–5)
YEAST URNS QL MICRO: NORMAL /HPF

## 2025-06-02 PROCEDURE — 80061 LIPID PANEL: CPT | Performed by: NURSE PRACTITIONER

## 2025-06-02 PROCEDURE — 3074F SYST BP LT 130 MM HG: CPT | Mod: CPTII,,, | Performed by: NURSE PRACTITIONER

## 2025-06-02 PROCEDURE — 1160F RVW MEDS BY RX/DR IN RCRD: CPT | Mod: CPTII,,, | Performed by: NURSE PRACTITIONER

## 2025-06-02 PROCEDURE — 90472 IMMUNIZATION ADMIN EACH ADD: CPT | Mod: PBBFAC

## 2025-06-02 PROCEDURE — 85025 COMPLETE CBC W/AUTO DIFF WBC: CPT | Performed by: NURSE PRACTITIONER

## 2025-06-02 PROCEDURE — 1159F MED LIST DOCD IN RCRD: CPT | Mod: CPTII,,, | Performed by: NURSE PRACTITIONER

## 2025-06-02 PROCEDURE — 3008F BODY MASS INDEX DOCD: CPT | Mod: CPTII,,, | Performed by: NURSE PRACTITIONER

## 2025-06-02 PROCEDURE — 82607 VITAMIN B-12: CPT | Performed by: NURSE PRACTITIONER

## 2025-06-02 PROCEDURE — 36415 COLL VENOUS BLD VENIPUNCTURE: CPT | Mod: PBBFAC

## 2025-06-02 PROCEDURE — 82040 ASSAY OF SERUM ALBUMIN: CPT | Performed by: NURSE PRACTITIONER

## 2025-06-02 PROCEDURE — 83036 HEMOGLOBIN GLYCOSYLATED A1C: CPT | Performed by: NURSE PRACTITIONER

## 2025-06-02 PROCEDURE — 84443 ASSAY THYROID STIM HORMONE: CPT | Performed by: NURSE PRACTITIONER

## 2025-06-02 PROCEDURE — 90471 IMMUNIZATION ADMIN: CPT | Mod: PBBFAC

## 2025-06-02 PROCEDURE — 99999PBSHW PR PBB SHADOW TECHNICAL ONLY FILED TO HB: Mod: PBBFAC,,,

## 2025-06-02 PROCEDURE — 99999 PR PBB SHADOW E&M-EST. PATIENT-LVL V: CPT | Mod: PBBFAC,,, | Performed by: NURSE PRACTITIONER

## 2025-06-02 PROCEDURE — 82570 ASSAY OF URINE CREATININE: CPT | Performed by: NURSE PRACTITIONER

## 2025-06-02 PROCEDURE — 90715 TDAP VACCINE 7 YRS/> IM: CPT | Mod: PBBFAC

## 2025-06-02 PROCEDURE — 82306 VITAMIN D 25 HYDROXY: CPT | Performed by: NURSE PRACTITIONER

## 2025-06-02 PROCEDURE — 82728 ASSAY OF FERRITIN: CPT | Performed by: NURSE PRACTITIONER

## 2025-06-02 PROCEDURE — 81003 URINALYSIS AUTO W/O SCOPE: CPT | Performed by: NURSE PRACTITIONER

## 2025-06-02 PROCEDURE — 99215 OFFICE O/P EST HI 40 MIN: CPT | Mod: S$PBB,,, | Performed by: NURSE PRACTITIONER

## 2025-06-02 PROCEDURE — 99215 OFFICE O/P EST HI 40 MIN: CPT | Mod: PBBFAC | Performed by: NURSE PRACTITIONER

## 2025-06-02 PROCEDURE — 3078F DIAST BP <80 MM HG: CPT | Mod: CPTII,,, | Performed by: NURSE PRACTITIONER

## 2025-06-02 PROCEDURE — 99417 PROLNG OP E/M EACH 15 MIN: CPT | Mod: S$PBB,,, | Performed by: NURSE PRACTITIONER

## 2025-06-02 PROCEDURE — 83540 ASSAY OF IRON: CPT | Performed by: NURSE PRACTITIONER

## 2025-06-02 PROCEDURE — 90677 PCV20 VACCINE IM: CPT | Mod: PBBFAC

## 2025-06-02 RX ORDER — ATOGEPANT 60 MG/1
60 TABLET ORAL DAILY
COMMUNITY
End: 2025-06-02 | Stop reason: CLARIF

## 2025-06-02 RX ORDER — GALCANEZUMAB 120 MG/ML
120 INJECTION, SOLUTION SUBCUTANEOUS
Qty: 3 ML | Refills: 3 | Status: SHIPPED | OUTPATIENT
Start: 2025-06-02 | End: 2026-06-02

## 2025-06-02 RX ORDER — ROSUVASTATIN CALCIUM 40 MG/1
40 TABLET, COATED ORAL NIGHTLY
Qty: 90 TABLET | Refills: 3 | Status: SHIPPED | OUTPATIENT
Start: 2025-06-02 | End: 2026-06-02

## 2025-06-02 RX ORDER — DICLOFENAC SODIUM 30 MG/G
GEL TOPICAL DAILY
Qty: 100 G | Refills: 5 | Status: SHIPPED | OUTPATIENT
Start: 2025-06-02

## 2025-06-02 RX ORDER — LAMOTRIGINE 100 MG/1
50 TABLET ORAL DAILY
Qty: 15 TABLET | Refills: 11 | Status: SHIPPED | OUTPATIENT
Start: 2025-06-02 | End: 2026-06-02

## 2025-06-02 RX ORDER — NITROFURANTOIN 25; 75 MG/1; MG/1
100 CAPSULE ORAL DAILY
Qty: 30 CAPSULE | Refills: 11 | Status: SHIPPED | OUTPATIENT
Start: 2025-06-02

## 2025-06-02 RX ORDER — VITAMIN B COMPLEX
1 CAPSULE ORAL DAILY
COMMUNITY

## 2025-06-02 RX ORDER — UBROGEPANT 100 MG/1
100 TABLET ORAL
Qty: 16 TABLET | Refills: 2 | Status: SHIPPED | OUTPATIENT
Start: 2025-06-02

## 2025-06-02 RX ORDER — PREGABALIN 200 MG/1
200 CAPSULE ORAL 2 TIMES DAILY
Qty: 60 CAPSULE | Refills: 5 | Status: SHIPPED | OUTPATIENT
Start: 2025-06-02

## 2025-06-02 RX ORDER — DULOXETIN HYDROCHLORIDE 30 MG/1
30 CAPSULE, DELAYED RELEASE ORAL 2 TIMES DAILY
Qty: 60 CAPSULE | Refills: 11 | Status: SHIPPED | OUTPATIENT
Start: 2025-06-02 | End: 2026-06-02

## 2025-06-02 RX ORDER — SEMAGLUTIDE 0.68 MG/ML
0.5 INJECTION, SOLUTION SUBCUTANEOUS
Qty: 3 ML | Refills: 11 | Status: SHIPPED | OUTPATIENT
Start: 2025-06-02 | End: 2026-06-02

## 2025-06-02 RX ORDER — ASCORBIC ACID 500 MG
500 TABLET ORAL DAILY
COMMUNITY

## 2025-06-02 RX ORDER — FUROSEMIDE 40 MG/1
40 TABLET ORAL DAILY PRN
Qty: 30 TABLET | Refills: 0 | Status: SHIPPED | OUTPATIENT
Start: 2025-06-02 | End: 2026-06-02

## 2025-06-02 RX ORDER — GALCANEZUMAB 120 MG/ML
120 INJECTION, SOLUTION SUBCUTANEOUS
COMMUNITY
End: 2025-06-02 | Stop reason: SDUPTHER

## 2025-06-02 RX ORDER — LEVOCETIRIZINE DIHYDROCHLORIDE 5 MG/1
5 TABLET, FILM COATED ORAL NIGHTLY
Qty: 30 TABLET | Refills: 11 | Status: SHIPPED | OUTPATIENT
Start: 2025-06-02 | End: 2026-06-02

## 2025-06-02 RX ORDER — TIZANIDINE 4 MG/1
4 TABLET ORAL EVERY 8 HOURS
Qty: 21 TABLET | Refills: 0 | Status: SHIPPED | OUTPATIENT
Start: 2025-06-02

## 2025-06-02 RX ORDER — PRIMIDONE 50 MG/1
50 TABLET ORAL NIGHTLY
Qty: 30 TABLET | Refills: 11 | Status: SHIPPED | OUTPATIENT
Start: 2025-06-02 | End: 2026-06-02

## 2025-06-02 RX ORDER — ZOLPIDEM TARTRATE 5 MG/1
5 TABLET ORAL NIGHTLY PRN
Qty: 30 TABLET | Refills: 5 | Status: SHIPPED | OUTPATIENT
Start: 2025-06-02 | End: 2025-12-01

## 2025-06-02 RX ORDER — MONTELUKAST SODIUM 10 MG/1
10 TABLET ORAL NIGHTLY
Qty: 30 TABLET | Refills: 11 | Status: SHIPPED | OUTPATIENT
Start: 2025-06-02

## 2025-06-02 RX ORDER — ZINC GLUCONATE 50 MG
50 TABLET ORAL DAILY
COMMUNITY

## 2025-06-02 RX ORDER — CELECOXIB 200 MG/1
200 CAPSULE ORAL DAILY
Qty: 30 CAPSULE | Refills: 11 | Status: SHIPPED | OUTPATIENT
Start: 2025-06-02

## 2025-06-02 RX ADMIN — CLOSTRIDIUM TETANI TOXOID ANTIGEN (FORMALDEHYDE INACTIVATED), CORYNEBACTERIUM DIPHTHERIAE TOXOID ANTIGEN (FORMALDEHYDE INACTIVATED), BORDETELLA PERTUSSIS TOXOID ANTIGEN (GLUTARALDEHYDE INACTIVATED), BORDETELLA PERTUSSIS FILAMENTOUS HEMAGGLUTININ ANTIGEN (FORMALDEHYDE INACTIVATED), BORDETELLA PERTUSSIS PERTACTIN ANTIGEN, AND BORDETELLA PERTUSSIS FIMBRIAE 2/3 ANTIGEN 0.5 ML: 5; 2; 2.5; 5; 3; 5 INJECTION, SUSPENSION INTRAMUSCULAR at 04:06

## 2025-06-02 RX ADMIN — PNEUMOCOCCAL 20-VALENT CONJUGATE VACCINE 0.5 ML
2.2; 2.2; 2.2; 2.2; 2.2; 2.2; 2.2; 2.2; 2.2; 2.2; 2.2; 2.2; 2.2; 2.2; 2.2; 2.2; 4.4; 2.2; 2.2; 2.2 INJECTION, SUSPENSION INTRAMUSCULAR at 04:06

## 2025-06-03 LAB — 25(OH)D3+25(OH)D2 SERPL-MCNC: 45 NG/ML (ref 30–96)

## 2025-06-05 ENCOUNTER — OFFICE VISIT (OUTPATIENT)
Dept: OBSTETRICS AND GYNECOLOGY | Facility: CLINIC | Age: 45
End: 2025-06-05
Payer: MEDICAID

## 2025-06-05 ENCOUNTER — TELEPHONE (OUTPATIENT)
Dept: OBSTETRICS AND GYNECOLOGY | Facility: CLINIC | Age: 45
End: 2025-06-05

## 2025-06-05 VITALS
WEIGHT: 157 LBS | DIASTOLIC BLOOD PRESSURE: 85 MMHG | SYSTOLIC BLOOD PRESSURE: 113 MMHG | HEART RATE: 102 BPM | BODY MASS INDEX: 26.16 KG/M2 | HEIGHT: 65 IN

## 2025-06-05 DIAGNOSIS — N89.8 VAGINAL DISCHARGE: ICD-10-CM

## 2025-06-05 DIAGNOSIS — N89.8 VAGINAL ITCHING: ICD-10-CM

## 2025-06-05 DIAGNOSIS — Z00.00 ANNUAL WELLNESS VISIT: Primary | ICD-10-CM

## 2025-06-05 DIAGNOSIS — B37.31 YEAST VAGINITIS: ICD-10-CM

## 2025-06-05 DIAGNOSIS — Z90.710 HISTORY OF HYSTERECTOMY: ICD-10-CM

## 2025-06-05 PROCEDURE — 99999 PR PBB SHADOW E&M-EST. PATIENT-LVL III: CPT | Mod: PBBFAC,,, | Performed by: OBSTETRICS & GYNECOLOGY

## 2025-06-05 PROCEDURE — 3008F BODY MASS INDEX DOCD: CPT | Mod: CPTII,,, | Performed by: OBSTETRICS & GYNECOLOGY

## 2025-06-05 PROCEDURE — 99213 OFFICE O/P EST LOW 20 MIN: CPT | Mod: PBBFAC | Performed by: OBSTETRICS & GYNECOLOGY

## 2025-06-05 PROCEDURE — 3079F DIAST BP 80-89 MM HG: CPT | Mod: CPTII,,, | Performed by: OBSTETRICS & GYNECOLOGY

## 2025-06-05 PROCEDURE — 3066F NEPHROPATHY DOC TX: CPT | Mod: CPTII,,, | Performed by: OBSTETRICS & GYNECOLOGY

## 2025-06-05 PROCEDURE — 1160F RVW MEDS BY RX/DR IN RCRD: CPT | Mod: CPTII,,, | Performed by: OBSTETRICS & GYNECOLOGY

## 2025-06-05 PROCEDURE — 3074F SYST BP LT 130 MM HG: CPT | Mod: CPTII,,, | Performed by: OBSTETRICS & GYNECOLOGY

## 2025-06-05 PROCEDURE — 3044F HG A1C LEVEL LT 7.0%: CPT | Mod: CPTII,,, | Performed by: OBSTETRICS & GYNECOLOGY

## 2025-06-05 PROCEDURE — 3061F NEG MICROALBUMINURIA REV: CPT | Mod: CPTII,,, | Performed by: OBSTETRICS & GYNECOLOGY

## 2025-06-05 PROCEDURE — 1159F MED LIST DOCD IN RCRD: CPT | Mod: CPTII,,, | Performed by: OBSTETRICS & GYNECOLOGY

## 2025-06-05 PROCEDURE — 99396 PREV VISIT EST AGE 40-64: CPT | Mod: S$PBB,,, | Performed by: OBSTETRICS & GYNECOLOGY

## 2025-06-05 RX ORDER — PROMETHAZINE HYDROCHLORIDE 25 MG/1
25 TABLET ORAL EVERY 6 HOURS PRN
Qty: 20 TABLET | Refills: 0 | Status: SHIPPED | OUTPATIENT
Start: 2025-06-05

## 2025-06-05 RX ORDER — TERCONAZOLE 4 MG/G
1 CREAM VAGINAL NIGHTLY
Qty: 45 G | Refills: 0 | Status: SHIPPED | OUTPATIENT
Start: 2025-06-05

## 2025-06-06 ENCOUNTER — OFFICE VISIT (OUTPATIENT)
Dept: ORTHOPEDICS | Facility: CLINIC | Age: 45
End: 2025-06-06
Payer: MEDICAID

## 2025-06-06 ENCOUNTER — HOSPITAL ENCOUNTER (EMERGENCY)
Facility: HOSPITAL | Age: 45
Discharge: HOME | End: 2025-06-06
Attending: EMERGENCY MEDICINE
Payer: MEDICAID

## 2025-06-06 VITALS
TEMPERATURE: 99 F | DIASTOLIC BLOOD PRESSURE: 76 MMHG | SYSTOLIC BLOOD PRESSURE: 120 MMHG | HEIGHT: 65 IN | BODY MASS INDEX: 26.19 KG/M2 | HEART RATE: 73 BPM | OXYGEN SATURATION: 100 % | WEIGHT: 157.19 LBS | RESPIRATION RATE: 17 BRPM

## 2025-06-06 DIAGNOSIS — M25.552 PAIN OF LEFT HIP: Primary | ICD-10-CM

## 2025-06-06 DIAGNOSIS — S73.192D TEAR OF LEFT ACETABULAR LABRUM, SUBSEQUENT ENCOUNTER: ICD-10-CM

## 2025-06-06 DIAGNOSIS — M25.562 ACUTE PAIN OF LEFT KNEE: ICD-10-CM

## 2025-06-06 DIAGNOSIS — R10.9 ABDOMINAL PAIN, UNSPECIFIED ABDOMINAL LOCATION: Primary | ICD-10-CM

## 2025-06-06 DIAGNOSIS — H10.12 ALLERGIC CONJUNCTIVITIS OF LEFT EYE: ICD-10-CM

## 2025-06-06 LAB
ABSOLUTE EOSINOPHIL (OHS): 0.16 K/UL
ABSOLUTE MONOCYTE (OHS): 1 K/UL (ref 0.3–1)
ABSOLUTE NEUTROPHIL COUNT (OHS): 4.67 K/UL (ref 1.8–7.7)
ALBUMIN SERPL BCP-MCNC: 4.1 G/DL (ref 3.5–5.2)
ALP SERPL-CCNC: 164 UNIT/L (ref 40–150)
ALT SERPL W/O P-5'-P-CCNC: 78 UNIT/L (ref 10–44)
ANION GAP (OHS): 8 MMOL/L (ref 8–16)
AST SERPL-CCNC: 60 UNIT/L (ref 11–45)
BASOPHILS # BLD AUTO: 0.05 K/UL
BASOPHILS NFR BLD AUTO: 0.5 %
BILIRUB SERPL-MCNC: 0.3 MG/DL (ref 0.1–1)
BUN SERPL-MCNC: 19 MG/DL (ref 6–20)
CALCIUM SERPL-MCNC: 9.7 MG/DL (ref 8.7–10.5)
CHLORIDE SERPL-SCNC: 105 MMOL/L (ref 95–110)
CO2 SERPL-SCNC: 27 MMOL/L (ref 23–29)
CREAT SERPL-MCNC: 1 MG/DL (ref 0.5–1.4)
ERYTHROCYTE [DISTWIDTH] IN BLOOD BY AUTOMATED COUNT: 13.3 % (ref 11.5–14.5)
GFR SERPLBLD CREATININE-BSD FMLA CKD-EPI: >60 ML/MIN/1.73/M2
GLUCOSE SERPL-MCNC: 109 MG/DL (ref 70–110)
HCT VFR BLD AUTO: 43.9 % (ref 37–48.5)
HGB BLD-MCNC: 14.4 GM/DL (ref 12–16)
IMM GRANULOCYTES # BLD AUTO: 0.02 K/UL (ref 0–0.04)
IMM GRANULOCYTES NFR BLD AUTO: 0.2 % (ref 0–0.5)
LIPASE SERPL-CCNC: 23 U/L (ref 4–60)
LYMPHOCYTES # BLD AUTO: 3.7 K/UL (ref 1–4.8)
MCH RBC QN AUTO: 27.2 PG (ref 27–31)
MCHC RBC AUTO-ENTMCNC: 32.8 G/DL (ref 32–36)
MCV RBC AUTO: 83 FL (ref 82–98)
NUCLEATED RBC (/100WBC) (OHS): 0 /100 WBC
PLATELET # BLD AUTO: 262 K/UL (ref 150–450)
PMV BLD AUTO: 10.9 FL (ref 9.2–12.9)
POTASSIUM SERPL-SCNC: 4.2 MMOL/L (ref 3.5–5.1)
PROT SERPL-MCNC: 7.7 GM/DL (ref 6–8.4)
RBC # BLD AUTO: 5.29 M/UL (ref 4–5.4)
RELATIVE EOSINOPHIL (OHS): 1.7 %
RELATIVE LYMPHOCYTE (OHS): 38.5 % (ref 18–48)
RELATIVE MONOCYTE (OHS): 10.4 % (ref 4–15)
RELATIVE NEUTROPHIL (OHS): 48.7 % (ref 38–73)
SODIUM SERPL-SCNC: 140 MMOL/L (ref 136–145)
WBC # BLD AUTO: 9.6 K/UL (ref 3.9–12.7)

## 2025-06-06 PROCEDURE — 80053 COMPREHEN METABOLIC PANEL: CPT | Performed by: EMERGENCY MEDICINE

## 2025-06-06 PROCEDURE — 99999 PR PBB SHADOW E&M-EST. PATIENT-LVL V: CPT | Mod: PBBFAC,,, | Performed by: NURSE PRACTITIONER

## 2025-06-06 PROCEDURE — 63600175 PHARM REV CODE 636 W HCPCS: Performed by: EMERGENCY MEDICINE

## 2025-06-06 PROCEDURE — 85025 COMPLETE CBC W/AUTO DIFF WBC: CPT | Performed by: EMERGENCY MEDICINE

## 2025-06-06 PROCEDURE — 83690 ASSAY OF LIPASE: CPT | Performed by: EMERGENCY MEDICINE

## 2025-06-06 PROCEDURE — 99215 OFFICE O/P EST HI 40 MIN: CPT | Mod: PBBFAC | Performed by: NURSE PRACTITIONER

## 2025-06-06 PROCEDURE — 99284 EMERGENCY DEPT VISIT MOD MDM: CPT | Mod: 25,27

## 2025-06-06 PROCEDURE — 96360 HYDRATION IV INFUSION INIT: CPT

## 2025-06-06 RX ORDER — CETIRIZINE HYDROCHLORIDE 10 MG/1
10 TABLET ORAL DAILY PRN
Qty: 30 TABLET | Refills: 0 | Status: SHIPPED | OUTPATIENT
Start: 2025-06-06 | End: 2025-07-06

## 2025-06-06 RX ORDER — POLYVINYL ALCOHOL 14 MG/ML
1 SOLUTION/ DROPS OPHTHALMIC
Qty: 15 ML | Refills: 0 | Status: SHIPPED | OUTPATIENT
Start: 2025-06-06

## 2025-06-06 RX ADMIN — SODIUM CHLORIDE, POTASSIUM CHLORIDE, SODIUM LACTATE AND CALCIUM CHLORIDE 1000 ML: 600; 310; 30; 20 INJECTION, SOLUTION INTRAVENOUS at 10:06

## 2025-06-07 NOTE — ED PROVIDER NOTES
EMERGENCY DEPARTMENT HISTORY AND PHYSICAL EXAM     This note is dictated on M*Modal word recognition program.  There are word recognition mistakes and grammatical errors that are occasionally missed on review.     Date: 6/6/2025   Patient Name: Rohini Dang       History of Presenting Illness      Chief Complaint   Patient presents with    Abdominal Pain     Patient complains of abdominal pain with n/v for several weeks. Seen in ED approximately 2 weeks ago for same complaint and had a follow up with Dr. Miller        [id number]   Rohini Dang is a 45 y.o. female with PMHX of TBI, encephalomalacia, depression, bipolar, anxiety, Sharp, type 2 diabetes who presents to the emergency department C/O nausea.      Patient and daughter reports nausea and vomiting.  This has been going on for several weeks.  She was seen for this problem couple weeks ago in the emergency department.  She has since followed up with her primary care provider and has been referred to GI.  She reports dizziness when sitting up.  She reports poor oral appetite.  She reports frequent nausea parents does not have any bowel movements need to reduce appetite.  Patient is a prescribed Ozempic but says she has not had it in the past month    PCP: Mandie Miller, BRYAN      Current Medications[1]        Past History     Past Medical History:   Past Medical History:   Diagnosis Date    Anxiety disorder     Arthritis     Bipolar 2 disorder     Borderline personality disorder     Chronic bilateral low back pain without sciatica     Depression with anxiety     Elevated LFTs     Fatty liver     Fibromyalgia     Intermittent explosive disorder     Liver disease     SHARP (nonalcoholic steatohepatitis)     Type 2 diabetes, controlled, with peripheral neuropathy         Past Surgical History:   Past Surgical History:   Procedure Laterality Date    COLONOSCOPY N/A 3/20/2018    Procedure: COLONOSCOPY;  Surgeon: Janelle Meade MD;  Location:  "[facility];  Service: Endoscopy;  Laterality: N/A;    COLONOSCOPY N/A 5/11/2021    Procedure: COLONOSCOPY;  Surgeon: Janelle Meade MD;  Location: [facility];  Service: Endoscopy;  Laterality: N/A;    ESOPHAGEAL DILATION      x2    ESOPHAGOGASTRODUODENOSCOPY N/A 5/7/2019    Procedure: EGD (ESOPHAGOGASTRODUODENOSCOPY);  Surgeon: Janelle Meade MD;  Location: [facility];  Service: Endoscopy;  Laterality: N/A;    ESOPHAGOGASTRODUODENOSCOPY N/A 5/11/2021    Procedure: EGD (ESOPHAGOGASTRODUODENOSCOPY);  Surgeon: Janelle Meade MD;  Location: [facility];  Service: Endoscopy;  Laterality: N/A;  Rapid on arrival    HIATAL HERNIA REPAIR      HYSTERECTOMY      LIVER BIOPSY  2018    fibrosis stage 3 JAMESON    OOPHORECTOMY      TUBAL LIGATION      UPPER GASTROINTESTINAL ENDOSCOPY      2013? unable to obtain records        Family History:   Family History   Problem Relation Name Age of Onset    Hypertension Mother      Clotting disorder Father      Ulcers Father      Clotting disorder Sister      Cancer Maternal Grandmother          "female Cancer"    Lung cancer Paternal Grandmother      Diabetes Maternal Aunt      No Known Problems Daughter      No Known Problems Maternal Uncle      No Known Problems Paternal Aunt      No Known Problems Paternal Uncle      No Known Problems Maternal Grandfather      No Known Problems Paternal Grandfather      Breast cancer Neg Hx      Ovarian cancer Neg Hx      BRCA 1/2 Neg Hx          Social History:   Social History[2]     Allergies:   Review of patient's allergies indicates:   Allergen Reactions    Amoxicillin Hives    Avelox [moxifloxacin] Hives    Cherries Hives    Coconut oil Blisters    Desloratadine Hives     Other reaction(s): Unknown    Kiwi Blisters    Pcn [penicillins] Hives    Pineapple Blisters    Vimpat [lacosamide] Hallucinations    Claritin [loratadine] Palpitations    Latex, natural rubber Rash          Review of Systems   Review of Systems   See HPI for " "pertinent positives and negatives       Physical Exam     Vitals:    06/06/25 1847 06/06/25 1848 06/06/25 2234 06/06/25 2321   BP: 115/89  116/75 120/76   Pulse: 98  75 73   Resp: 18  18 17   Temp: 99 °F (37.2 °C)  97.7 °F (36.5 °C) 98.6 °F (37 °C)   TempSrc:   Oral    SpO2: 99%  100% 100%   Weight:  71.3 kg (157 lb 3 oz)     Height:  5' 5" (1.651 m)        Physical Exam  Vitals and nursing note reviewed.   Constitutional:       General: She is not in acute distress.     Appearance: Normal appearance. She is not ill-appearing.   HENT:      Head: Normocephalic and atraumatic.      Right Ear: External ear normal.      Left Ear: External ear normal.      Nose: Nose normal. No congestion or rhinorrhea.      Mouth/Throat:      Mouth: Mucous membranes are moist.   Eyes:      Conjunctiva/sclera:      Left eye: Left conjunctiva is injected. No chemosis or exudate.     Pupils: Pupils are equal, round, and reactive to light.   Cardiovascular:      Rate and Rhythm: Normal rate and regular rhythm.   Pulmonary:      Effort: Pulmonary effort is normal. No respiratory distress.   Abdominal:      General: Abdomen is flat. Bowel sounds are normal.      Palpations: Abdomen is soft.      Tenderness: There is generalized abdominal tenderness. There is no guarding.   Musculoskeletal:         General: No deformity. Normal range of motion.      Cervical back: Normal range of motion. No rigidity.   Skin:     General: Skin is dry.   Neurological:      General: No focal deficit present.      Mental Status: She is alert and oriented to person, place, and time. Mental status is at baseline.   Psychiatric:         Mood and Affect: Mood normal.         Behavior: Behavior normal.              Diagnostic Study Results      Labs -   Recent Results (from the past 12 hours)   CBC with Differential    Collection Time: 06/06/25 10:05 PM   Result Value Ref Range    WBC 9.60 3.90 - 12.70 K/uL    RBC 5.29 4.00 - 5.40 M/uL    HGB 14.4 12.0 - 16.0 gm/dL    " HCT 43.9 37.0 - 48.5 %    MCV 83 82 - 98 fL    MCH 27.2 27.0 - 31.0 pg    MCHC 32.8 32.0 - 36.0 g/dL    RDW 13.3 11.5 - 14.5 %    Platelet Count 262 150 - 450 K/uL    MPV 10.9 9.2 - 12.9 fL    Nucleated RBC 0 <=0 /100 WBC    Neut % 48.7 38 - 73 %    Lymph % 38.5 18 - 48 %    Mono % 10.4 4 - 15 %    Eos % 1.7 <=8 %    Basophil % 0.5 <=1.9 %    Imm Grans % 0.2 0.0 - 0.5 %    Neut # 4.67 1.8 - 7.7 K/uL    Lymph # 3.70 1 - 4.8 K/uL    Mono # 1.00 0.3 - 1 K/uL    Eos # 0.16 <=0.5 K/uL    Baso # 0.05 <=0.2 K/uL    Imm Grans # 0.02 0.00 - 0.04 K/uL   Comprehensive Metabolic Panel    Collection Time: 06/06/25 10:06 PM   Result Value Ref Range    Sodium 140 136 - 145 mmol/L    Potassium 4.2 3.5 - 5.1 mmol/L    Chloride 105 95 - 110 mmol/L    CO2 27 23 - 29 mmol/L    Glucose 109 70 - 110 mg/dL    BUN 19 6 - 20 mg/dL    Creatinine 1.0 0.5 - 1.4 mg/dL    Calcium 9.7 8.7 - 10.5 mg/dL    Protein Total 7.7 6.0 - 8.4 gm/dL    Albumin 4.1 3.5 - 5.2 g/dL    Bilirubin Total 0.3 0.1 - 1.0 mg/dL     (H) 40 - 150 unit/L    AST 60 (H) 11 - 45 unit/L    ALT 78 (H) 10 - 44 unit/L    Anion Gap 8 8 - 16 mmol/L    eGFR >60 >60 mL/min/1.73/m2   Lipase    Collection Time: 06/06/25 10:06 PM   Result Value Ref Range    Lipase Level 23 4 - 60 U/L        Radiologic Studies -    No orders to display        Medications given in the ED-   Medications   lactated ringers bolus 1,000 mL (0 mLs Intravenous Stopped 6/6/25 4443)           Medical Decision Making    I am the first provider for this patient.     I reviewed the vital signs, available nursing notes, past medical history, past surgical history, family history and social history.     Vital Signs:  Reviewed the patient's vital signs.     Pulse Oximetry Analysis and Interpretation:    99% on Room Air, normal      External Test Results (Pertinent to encounter):    Records Reviewed: Nursing Notes, Current Prescription Medications, Old Medical Records, and External Medical Records     History  Obtained By: Patient and Daughter    Provider Notes: Rohini Dang is a 45 y.o. female with nausea    Co-morbidities Considered: JAMESON, diabetes, Ozempic use    Differential Diagnosis:  Gastroparesis, gastritis, PUD, adverse event from Ozempic, biliary colic      ED Course:    Patient presents with persistent nausea and vomiting.  Going on for several weeks now.  No vomiting in emergency department.  Patient complains of generalized abdominal tenderness but abdomen not peritonitic.  Labs were obtained.  CBC unremarkable.  Chemistry reveals elevated alk-phos and AST ALT similar to patient baseline.  These are not markedly elevated.  I reviewed recent ED visit where patient was evaluated for this problem and had labs as well as imaging done.  CT abdomen pelvis demonstrated no acute pathology or concerning features.    Discussed results with patient and her daughter.  At this time no emergent emergency condition identified.  Vital signs within normal limits and patient does not appear ill.  Discussed further workup and evaluation which can be done by GI or General surgery as needed.  Discussed patient may benefit from endoscopy or gastric emptying study.  Reasons to return to ED discussed.  Patient and daughter comfortable and agreeable with plan         Problems Addressed:  Nausea    Procedures:   Procedures       Diagnosis and Disposition     Critical Care:      DISCHARGE NOTE:       Rohini Dang's  results have been reviewed with her.  She has been counseled regarding her diagnosis, treatment, and plan.  She verbally conveys understanding and agreement of the signs, symptoms, diagnosis, treatment and prognosis and additionally agrees to follow up as discussed.  She also agrees with the care-plan and conveys that all of her questions have been answered.  I have also provided discharge instructions for her that include: educational information regarding their diagnosis and treatment, and list of  reasons why they would want to return to the ED prior to their follow-up appointment, should her condition change. She has been provided with education for proper emergency department utilization.         CLINICAL IMPRESSION:         1. Abdominal pain, unspecified abdominal location    2. Allergic conjunctivitis of left eye              PLAN:   1. Discharge Home  2.      Medication List        START taking these medications      cetirizine 10 MG tablet  Commonly known as: ZYRTEC  Take 1 tablet (10 mg total) by mouth daily as needed for Allergies or Rhinitis.     polyvinyl alcohol (artificial tears) 1.4 % ophthalmic solution  Commonly known as: LIQUIFILM TEARS  Place 1 drop into both eyes as needed (Dry Eye).            ASK your doctor about these medications      ACCU-CHEK KERRY PLUS TEST STRP Strp  Generic drug: blood sugar diagnostic     albuterol 90 mcg/actuation inhaler  Commonly known as: PROVENTIL/VENTOLIN HFA     atorvastatin 40 MG tablet  Commonly known as: LIPITOR     b complex vitamins capsule     celecoxib 200 MG capsule  Commonly known as: CeleBREX  Take 1 capsule (200 mg total) by mouth once daily.     diclofenac sodium 3 % gel  Commonly known as: SOLARAZE  Apply topically once daily.     DULoxetine 30 MG capsule  Commonly known as: CYMBALTA  Take 1 capsule (30 mg total) by mouth 2 (two) times daily.     EMGALITY  mg/mL Pnij  Generic drug: galcanezumab-gnlm  Inject 1 mL (120 mg total) into the skin every 28 days.     empagliflozin 10 mg tablet  Commonly known as: JARDIANCE  Take 1 tablet (10 mg total) by mouth once daily.     fluconazole 100 MG tablet  Commonly known as: DIFLUCAN  Take 1 tablet (100 mg total) by mouth once daily. for 7 days  Ask about: Should I take this medication?     furosemide 40 MG tablet  Commonly known as: LASIX  Take 1 tablet (40 mg total) by mouth daily as needed (swelling).     lamoTRIgine 100 MG tablet  Commonly known as: LAMICTAL  Take 0.5 tablets (50 mg total) by  mouth once daily.     LATUDA 80 mg Tab tablet  Generic drug: lurasidone     levocetirizine 5 MG tablet  Commonly known as: XYZAL  Take 1 tablet (5 mg total) by mouth every evening.     montelukast 10 mg tablet  Commonly known as: SINGULAIR  Take 1 tablet (10 mg total) by mouth every evening.     nitrofurantoin (macrocrystal-monohydrate) 100 MG capsule  Commonly known as: MACROBID  Take 1 capsule (100 mg total) by mouth once daily.     ondansetron 4 MG tablet  Commonly known as: ZOFRAN  Take 1 tablet (4 mg total) by mouth every 6 (six) hours as needed for Nausea.     ondansetron 4 MG Tbdl  Commonly known as: ZOFRAN-ODT  Take 1 tablet (4 mg total) by mouth every 6 (six) hours as needed.     OZEMPIC 0.25 mg or 0.5 mg (2 mg/3 mL) pen injector  Generic drug: semaglutide  Inject 0.5 mg into the skin every 7 days.     pregabalin 200 MG Cap  Commonly known as: LYRICA  Take 1 capsule (200 mg total) by mouth 2 (two) times daily.     primidone 50 MG Tab  Commonly known as: MYSOLINE  Take 1 tablet (50 mg total) by mouth every evening.     promethazine 25 MG tablet  Commonly known as: PHENERGAN  Take 1 tablet (25 mg total) by mouth every 6 (six) hours as needed for Nausea.     rizatriptan 10 MG tablet  Commonly known as: MAXALT     rosuvastatin 40 MG Tab  Commonly known as: CRESTOR  Take 1 tablet (40 mg total) by mouth every evening.     terconazole 0.4 % Crea  Commonly known as: TERAZOL 7  Place 1 applicator vaginally every evening.     tiZANidine 4 MG tablet  Commonly known as: ZANAFLEX  Take 1 tablet (4 mg total) by mouth every 8 (eight) hours.     UBRELVY 100 mg tablet  Generic drug: ubrogepant  Take 1 tablet (100 mg total) by mouth as needed for Migraine. If symptoms persist or return, may repeat dose after 2 hours. Maximum: 200 mg per 24 hours     VITAMIN C 500 MG tablet  Generic drug: ascorbic acid (vitamin C)     zinc gluconate 50 mg tablet     zolpidem 5 MG Tab  Commonly known as: AMBIEN  Take 1 tablet (5 mg total) by  mouth nightly as needed (insomnia).               Where to Get Your Medications        These medications were sent to [facility] [address] - [place], [place] - [address]  [address], [facility] [address]      Phone: [phone]   cetirizine 10 MG tablet  polyvinyl alcohol (artificial tears) 1.4 % ophthalmic solution        3. Mandie Miller, NP  [address]  [facility] [address]  [phone]    Schedule an appointment as soon as possible for a visit   As needed    GI    Go to   As Scheduled    [facility] Emergency Department  [address] [id number] [phone]  Go to   If symptoms worsen       _______________________________     Please note that this dictation was completed with [employer], the computer voice recognition software.  Quite often unanticipated grammatical, syntax, homophones, and other interpretive errors are inadvertently transcribed by the computer software.  Please disregard these errors.  Please excuse any errors that have escaped final proofreading.               [1]   No current facility-administered medications for this encounter.     Current Outpatient Medications   Medication Sig Dispense Refill    ACCU-CHEK KERRY PLUS TEST STRP Strp USE 1 STRIP TO CHECK GLUCOSE TWICE DAILY      albuterol (PROVENTIL/VENTOLIN HFA) 90 mcg/actuation inhaler Inhale 1 puff into the lungs every 4 (four) hours as needed.      ascorbic acid, vitamin C, (VITAMIN C) 500 MG tablet Take 500 mg by mouth once daily.      atorvastatin (LIPITOR) 40 MG tablet Take 40 mg by mouth once daily.      b complex vitamins capsule Take 1 capsule by mouth once daily.      celecoxib (CELEBREX) 200 MG capsule Take 1 capsule (200 mg total) by mouth once daily. (Patient not taking: Reported on 6/6/2025) 30 capsule 11    cetirizine (ZYRTEC) 10 MG tablet Take 1 tablet (10 mg total) by mouth daily as needed for Allergies or Rhinitis. 30 tablet 0    diclofenac sodium (SOLARAZE)  3 % gel Apply topically once daily. (Patient not taking: Reported on 6/6/2025) 100 g 5    DULoxetine (CYMBALTA) 30 MG capsule Take 1 capsule (30 mg total) by mouth 2 (two) times daily. 60 capsule 11    empagliflozin (JARDIANCE) 10 mg tablet Take 1 tablet (10 mg total) by mouth once daily. 30 tablet 11    furosemide (LASIX) 40 MG tablet Take 1 tablet (40 mg total) by mouth daily as needed (swelling). 30 tablet 0    galcanezumab-gnlm (EMGALITY PEN) 120 mg/mL PnIj Inject 1 mL (120 mg total) into the skin every 28 days. 3 mL 3    lamoTRIgine (LAMICTAL) 100 MG tablet Take 0.5 tablets (50 mg total) by mouth once daily. 15 tablet 11    LATUDA 80 mg Tab tablet Take 80 mg by mouth every evening.      levocetirizine (XYZAL) 5 MG tablet Take 1 tablet (5 mg total) by mouth every evening. 30 tablet 11    montelukast (SINGULAIR) 10 mg tablet Take 1 tablet (10 mg total) by mouth every evening. 30 tablet 11    nitrofurantoin, macrocrystal-monohydrate, (MACROBID) 100 MG capsule Take 1 capsule (100 mg total) by mouth once daily. 30 capsule 11    ondansetron (ZOFRAN) 4 MG tablet Take 1 tablet (4 mg total) by mouth every 6 (six) hours as needed for Nausea. 12 tablet 0    ondansetron (ZOFRAN-ODT) 4 MG TbDL Take 1 tablet (4 mg total) by mouth every 6 (six) hours as needed. 10 tablet 0    polyvinyl alcohol, artificial tears, (LIQUIFILM TEARS) 1.4 % ophthalmic solution Place 1 drop into both eyes as needed (Dry Eye). 15 mL 0    pregabalin (LYRICA) 200 MG Cap Take 1 capsule (200 mg total) by mouth 2 (two) times daily. 60 capsule 5    primidone (MYSOLINE) 50 MG Tab Take 1 tablet (50 mg total) by mouth every evening. 30 tablet 11    promethazine (PHENERGAN) 25 MG tablet Take 1 tablet (25 mg total) by mouth every 6 (six) hours as needed for Nausea. 20 tablet 0    rizatriptan (MAXALT) 10 MG tablet TAKE 1 TAB ONCE AS NEEDED FOR ACUTE MIGRAINE.MAY REPEAT IN 2 HOURS IF NEEDED.MAX 2 TABS/24 HOURS      rosuvastatin (CRESTOR) 40 MG Tab Take 1 tablet  (40 mg total) by mouth every evening. 90 tablet 3    semaglutide (OZEMPIC) 0.25 mg or 0.5 mg (2 mg/3 mL) pen injector Inject 0.5 mg into the skin every 7 days. 3 mL 11    terconazole (TERAZOL 7) 0.4 % Crea Place 1 applicator vaginally every evening. 45 g 0    tiZANidine (ZANAFLEX) 4 MG tablet Take 1 tablet (4 mg total) by mouth every 8 (eight) hours. 21 tablet 0    ubrogepant (UBRELVY) 100 mg tablet Take 1 tablet (100 mg total) by mouth as needed for Migraine. If symptoms persist or return, may repeat dose after 2 hours. Maximum: 200 mg per 24 hours (Patient not taking: Reported on 6/6/2025) 16 tablet 2    zinc gluconate 50 mg tablet Take 50 mg by mouth once daily.      zolpidem (AMBIEN) 5 MG Tab Take 1 tablet (5 mg total) by mouth nightly as needed (insomnia). 30 tablet 5   [2]   Social History  Tobacco Use    Smoking status: Never     Passive exposure: Never    Smokeless tobacco: Never   Substance Use Topics    Alcohol use: No     Alcohol/week: 0.0 standard drinks of alcohol    Drug use: No        Obi Marks MD  06/07/25 0010

## 2025-06-09 ENCOUNTER — RESULTS FOLLOW-UP (OUTPATIENT)
Dept: OBSTETRICS AND GYNECOLOGY | Facility: CLINIC | Age: 45
End: 2025-06-09

## 2025-06-09 DIAGNOSIS — M19.90 ARTHRITIS: ICD-10-CM

## 2025-06-09 RX ORDER — DICLOFENAC SODIUM 30 MG/G
GEL TOPICAL 2 TIMES DAILY PRN
Qty: 100 G | Refills: 5 | Status: SHIPPED | OUTPATIENT
Start: 2025-06-09 | End: 2026-06-04

## 2025-06-16 DIAGNOSIS — M25.552 PAIN OF LEFT HIP: ICD-10-CM

## 2025-06-16 DIAGNOSIS — M25.562 ACUTE PAIN OF LEFT KNEE: Primary | ICD-10-CM

## 2025-06-16 DIAGNOSIS — S73.192D TEAR OF LEFT ACETABULAR LABRUM, SUBSEQUENT ENCOUNTER: ICD-10-CM

## 2025-06-17 ENCOUNTER — HOSPITAL ENCOUNTER (OUTPATIENT)
Dept: RADIOLOGY | Facility: HOSPITAL | Age: 45
Discharge: HOME | End: 2025-06-17
Attending: NURSE PRACTITIONER
Payer: MEDICAID

## 2025-06-17 DIAGNOSIS — M25.552 PAIN OF LEFT HIP: ICD-10-CM

## 2025-06-17 DIAGNOSIS — M25.562 ACUTE PAIN OF LEFT KNEE: ICD-10-CM

## 2025-06-17 DIAGNOSIS — S73.192D TEAR OF LEFT ACETABULAR LABRUM, SUBSEQUENT ENCOUNTER: ICD-10-CM

## 2025-06-17 PROCEDURE — 73502 X-RAY EXAM HIP UNI 2-3 VIEWS: CPT | Mod: TC,LT

## 2025-06-17 PROCEDURE — 73562 X-RAY EXAM OF KNEE 3: CPT | Mod: TC,LT

## 2025-06-18 ENCOUNTER — OFFICE VISIT (OUTPATIENT)
Dept: ORTHOPEDICS | Facility: CLINIC | Age: 45
End: 2025-06-18
Payer: MEDICAID

## 2025-06-18 DIAGNOSIS — M25.552 CHRONIC HIP PAIN, LEFT: ICD-10-CM

## 2025-06-18 DIAGNOSIS — S73.192D TEAR OF LEFT ACETABULAR LABRUM, SUBSEQUENT ENCOUNTER: ICD-10-CM

## 2025-06-18 DIAGNOSIS — G89.29 CHRONIC HIP PAIN, LEFT: ICD-10-CM

## 2025-06-18 DIAGNOSIS — M25.552 PAIN OF LEFT HIP: Primary | ICD-10-CM

## 2025-06-18 DIAGNOSIS — M25.562 ACUTE PAIN OF LEFT KNEE: ICD-10-CM

## 2025-06-18 DIAGNOSIS — M25.362 INSTABILITY OF LEFT KNEE JOINT: ICD-10-CM

## 2025-06-18 PROCEDURE — 99999 PR PBB SHADOW E&M-EST. PATIENT-LVL III: CPT | Mod: PBBFAC,,, | Performed by: NURSE PRACTITIONER

## 2025-06-18 PROCEDURE — 1159F MED LIST DOCD IN RCRD: CPT | Mod: CPTII,,, | Performed by: NURSE PRACTITIONER

## 2025-06-18 PROCEDURE — 3061F NEG MICROALBUMINURIA REV: CPT | Mod: CPTII,,, | Performed by: NURSE PRACTITIONER

## 2025-06-18 PROCEDURE — 99213 OFFICE O/P EST LOW 20 MIN: CPT | Mod: PBBFAC | Performed by: NURSE PRACTITIONER

## 2025-06-18 PROCEDURE — 3044F HG A1C LEVEL LT 7.0%: CPT | Mod: CPTII,,, | Performed by: NURSE PRACTITIONER

## 2025-06-18 PROCEDURE — 99214 OFFICE O/P EST MOD 30 MIN: CPT | Mod: S$PBB,,, | Performed by: NURSE PRACTITIONER

## 2025-06-18 PROCEDURE — 3066F NEPHROPATHY DOC TX: CPT | Mod: CPTII,,, | Performed by: NURSE PRACTITIONER

## 2025-06-18 RX ORDER — MELOXICAM 15 MG/1
15 TABLET ORAL DAILY
Qty: 30 TABLET | Refills: 1 | Status: SHIPPED | OUTPATIENT
Start: 2025-06-18 | End: 2025-08-17

## 2025-06-18 NOTE — PROGRESS NOTES
Subjective:      Follow up visit: LT Hip pain:     Rohini Dang is a 45 y.o. female who presents for follow up for left hip pain. She states that she continues with hip pain as previous. Previous MRI in 2024 showed a suspected labral tear, but was not done with contrast. She was told at that time that she was not a good candidate for a hip scope. She reports continued groin and lateral hip pain as previous. She rates her pain as 6/10. She describes the pain as sharp and throbbing. She states that she was not able to get the Celebrex. She is noted with a limp. The patient reports the hip pain is worse with weight bearing and ROM. Aggravating symptoms include: any weight bearing, rising after sitting, and walking.     She also reports having chronic left knee pain as well. She reports having injections in the past. She denies any previous knee surgeries. She  reports pain with ROM and with weight bearing. The pain is over the joint lines. She denies any locking but is having instability at times.      She again states that she has upcoming appointments with pain mgt for chronic spine ailments and Rheumatology for Fibromyalgia.      Medical History:       Past Medical History:   Diagnosis Date    Anxiety disorder      Arthritis      Bipolar 2 disorder      Borderline personality disorder      Chronic bilateral low back pain without sciatica      Depression with anxiety      Elevated LFTs      Fatty liver      Fibromyalgia      Intermittent explosive disorder      Liver disease      JAMESON (nonalcoholic steatohepatitis)      Type 2 diabetes, controlled, with peripheral neuropathy           Surgical History:        Past Surgical History:   Procedure Laterality Date    COLONOSCOPY N/A 3/20/2018     Procedure: COLONOSCOPY;  Surgeon: Janelle Meade MD;  Location: Replaced by [facility];  Service: Endoscopy;  Laterality: N/A;    COLONOSCOPY N/A 5/11/2021     Procedure: COLONOSCOPY;  Surgeon: Janelle Meade MD;   "Location: [facility];  Service: Endoscopy;  Laterality: N/A;    ESOPHAGEAL DILATION         x2    ESOPHAGOGASTRODUODENOSCOPY N/A 5/7/2019     Procedure: EGD (ESOPHAGOGASTRODUODENOSCOPY);  Surgeon: Janelle Meade MD;  Location: [facility];  Service: Endoscopy;  Laterality: N/A;    ESOPHAGOGASTRODUODENOSCOPY N/A 5/11/2021     Procedure: EGD (ESOPHAGOGASTRODUODENOSCOPY);  Surgeon: Janelle Meade MD;  Location: [facility];  Service: Endoscopy;  Laterality: N/A;  Rapid on arrival    HIATAL HERNIA REPAIR        HYSTERECTOMY        LIVER BIOPSY   2018     fibrosis stage 3 JAMESON    OOPHORECTOMY        TUBAL LIGATION        UPPER GASTROINTESTINAL ENDOSCOPY         2013? unable to obtain records         Family History:         Family History   Problem Relation Name Age of Onset    Hypertension Mother        Clotting disorder Father        Ulcers Father        Clotting disorder Sister        Cancer Maternal Grandmother             "female Cancer"    Lung cancer Paternal Grandmother        Diabetes Maternal Aunt        No Known Problems Daughter        No Known Problems Maternal Uncle        No Known Problems Paternal Aunt        No Known Problems Paternal Uncle        No Known Problems Maternal Grandfather        No Known Problems Paternal Grandfather        Breast cancer Neg Hx        Ovarian cancer Neg Hx        BRCA 1/2 Neg Hx             Allergies:         Review of patient's allergies indicates:   Allergen Reactions    Amoxicillin Hives    Avelox [moxifloxacin] Hives    Cherries Hives    Coconut oil Blisters    Desloratadine Hives       Other reaction(s): Unknown    Kiwi Blisters    Pcn [penicillins] Hives    Pineapple Blisters    Vimpat [lacosamide] Hallucinations    Claritin [loratadine] Palpitations    Latex, natural rubber Rash         Review of Systems  Constitutional: negative  Musculoskeletal:positive for arthralgias  Neurological: negative  Behavioral/Psych: positive for anxiety and depression      "   Objective:         General:   alert, appears stated age, and cooperative   She has some trouble getting up onto the exam table.   Gait:    Antalgic   Knee Alignment:   Standing LE alignment at the knees is slight valgus and symmetric   Left Leg  Hip ROM:  Restricted- guarded. + groin pain   Straight Leg Raise:   normal   EXT/Flexion:  Limited due to pain   IR/ER:  Limited due to pain.    Fadir:   positive   Autumn:    positive   Active Abduction:  fair   Quad Strength:  fair   Neurovascular:  intact      LT Knee exam:  Neg effusion  AROM 5-120, mild crepitus  TTP med > lat joint line  McMurrays guarded  Lachmans neg    Imaging:  X-ray   LT hip ordered and reviewed by me today  No evidence of a fracture or dislocation.  Femoroacetabular joint spaces are maintained.     Impression:     Unremarkable exam    LT knee ordered and reviewed by me today  No fracture or dislocation.  No bone lesion, erosion or periosteal reaction.  Joint spaces are maintained.     Impression:     Unremarkable exam    LT Hip MRI: Done off site and reviewed by me from 05/14/24  Left paralabral cyst consistent with a labral tear    Assessment:      Left hip pain (chronic), reports hx labral tear  Left knee pain, instability     Plan:      Radiographs of the left hip and left knee were negative. Previous MR done in 2024, suspected labral tear.  RX- Mobic 15 mg po daily.   Referral to therapy for modalities for the left hip and left knee. Continue the Physician directed exercises for the hip and knee as directed.  RTC 6 weeks for follow up. Will get MRI of the knee if needed. May also need referral to hip specialist to further evaluate options for the labral tear.    All questions were answered.

## 2025-06-19 ENCOUNTER — HOSPITAL ENCOUNTER (OUTPATIENT)
Dept: RADIOLOGY | Facility: HOSPITAL | Age: 45
Discharge: HOME | End: 2025-06-19
Attending: NURSE PRACTITIONER
Payer: MEDICAID

## 2025-06-19 ENCOUNTER — RESULTS FOLLOW-UP (OUTPATIENT)
Dept: PRIMARY CARE CLINIC | Facility: CLINIC | Age: 45
End: 2025-06-19

## 2025-06-19 VITALS — HEIGHT: 65 IN | BODY MASS INDEX: 26.82 KG/M2 | WEIGHT: 161 LBS

## 2025-06-19 DIAGNOSIS — M81.0 OSTEOPOROSIS OF LUMBAR SPINE: ICD-10-CM

## 2025-06-19 DIAGNOSIS — M85.851 OSTEOPENIA OF BOTH HIPS: Primary | ICD-10-CM

## 2025-06-19 DIAGNOSIS — R10.13 EPIGASTRIC PAIN: ICD-10-CM

## 2025-06-19 DIAGNOSIS — Z78.0 POST-MENOPAUSAL: ICD-10-CM

## 2025-06-19 DIAGNOSIS — M85.852 OSTEOPENIA OF BOTH HIPS: Primary | ICD-10-CM

## 2025-06-19 DIAGNOSIS — Z12.31 ENCOUNTER FOR SCREENING MAMMOGRAM FOR MALIGNANT NEOPLASM OF BREAST: ICD-10-CM

## 2025-06-19 PROCEDURE — 77063 BREAST TOMOSYNTHESIS BI: CPT | Mod: TC

## 2025-06-19 PROCEDURE — 76700 US EXAM ABDOM COMPLETE: CPT | Mod: TC

## 2025-06-19 PROCEDURE — 77080 DXA BONE DENSITY AXIAL: CPT | Mod: TC

## 2025-06-20 ENCOUNTER — PATIENT MESSAGE (OUTPATIENT)
Dept: HEMATOLOGY/ONCOLOGY | Facility: CLINIC | Age: 45
End: 2025-06-20
Payer: MEDICAID

## 2025-06-23 ENCOUNTER — TELEPHONE (OUTPATIENT)
Dept: PRIMARY CARE CLINIC | Facility: CLINIC | Age: 45
End: 2025-06-23
Payer: MEDICAID

## 2025-06-23 DIAGNOSIS — M85.852 OSTEOPENIA OF BOTH HIPS: Primary | ICD-10-CM

## 2025-06-23 DIAGNOSIS — M81.0 OSTEOPOROSIS OF LUMBAR SPINE: ICD-10-CM

## 2025-06-23 DIAGNOSIS — M85.851 OSTEOPENIA OF BOTH HIPS: Primary | ICD-10-CM

## 2025-06-23 RX ORDER — ALENDRONATE SODIUM 70 MG/1
70 TABLET ORAL
Qty: 4 TABLET | Refills: 11 | Status: SHIPPED | OUTPATIENT
Start: 2025-06-23 | End: 2026-06-23

## 2025-06-24 ENCOUNTER — OFFICE VISIT (OUTPATIENT)
Dept: PRIMARY CARE CLINIC | Facility: CLINIC | Age: 45
End: 2025-06-24
Payer: MEDICAID

## 2025-06-24 ENCOUNTER — RESULTS FOLLOW-UP (OUTPATIENT)
Dept: PRIMARY CARE CLINIC | Facility: CLINIC | Age: 45
End: 2025-06-24

## 2025-06-24 VITALS
BODY MASS INDEX: 26.63 KG/M2 | TEMPERATURE: 98 F | HEIGHT: 65 IN | DIASTOLIC BLOOD PRESSURE: 82 MMHG | SYSTOLIC BLOOD PRESSURE: 111 MMHG | HEART RATE: 102 BPM | OXYGEN SATURATION: 96 % | WEIGHT: 159.81 LBS

## 2025-06-24 DIAGNOSIS — R05.1 ACUTE COUGH: Primary | ICD-10-CM

## 2025-06-24 LAB
B PERT DNA NPH QL NAA+PROBE: NOT DETECTED
C PNEUM DNA LOWER RESP QL NAA+NON-PROBE: NOT DETECTED
FLUAV RNA NPH QL NAA+NON-PROBE: NOT DETECTED
FLUBV RNA NPH QL NAA+NON-PROBE: NOT DETECTED
HADV DNA NPH QL NAA+NON-PROBE: NOT DETECTED
HCOV 229E RNA NPH QL NAA+NON-PROBE: NOT DETECTED
HCOV HKU1 RNA NPH QL NAA+NON-PROBE: NOT DETECTED
HCOV NL63 RNA NPH QL NAA+NON-PROBE: NOT DETECTED
HCOV OC43 RNA NPH QL NAA+NON-PROBE: NOT DETECTED
HMPV RNA LOWER RESP QL NAA+NON-PROBE: NOT DETECTED
HMPV RNA NPH QL NAA+NON-PROBE: NOT DETECTED
HPIV1 RNA NPH QL NAA+NON-PROBE: NOT DETECTED
HPIV2 RNA NPH QL NAA+NON-PROBE: NOT DETECTED
HPIV3 RNA NPH QL NAA+NON-PROBE: NOT DETECTED
HPIV4 RNA NPH QL NAA+NON-PROBE: NOT DETECTED
RSV RNA NPH QL NAA+NON-PROBE: NOT DETECTED
RSV RNA NPH QL NAA+NON-PROBE: NOT DETECTED
RV+EV RNA NPH QL NAA+NON-PROBE: NOT DETECTED
SARS-COV-2 RNA RESP QL NAA+PROBE: NOT DETECTED
SPECIMEN SOURCE: NORMAL

## 2025-06-24 PROCEDURE — 3008F BODY MASS INDEX DOCD: CPT | Mod: CPTII,,, | Performed by: NURSE PRACTITIONER

## 2025-06-24 PROCEDURE — 3079F DIAST BP 80-89 MM HG: CPT | Mod: CPTII,,, | Performed by: NURSE PRACTITIONER

## 2025-06-24 PROCEDURE — 99213 OFFICE O/P EST LOW 20 MIN: CPT | Mod: S$PBB,,, | Performed by: NURSE PRACTITIONER

## 2025-06-24 PROCEDURE — 99999 PR PBB SHADOW E&M-EST. PATIENT-LVL V: CPT | Mod: PBBFAC,,, | Performed by: NURSE PRACTITIONER

## 2025-06-24 PROCEDURE — 0202U NFCT DS 22 TRGT SARS-COV-2: CPT | Performed by: NURSE PRACTITIONER

## 2025-06-24 PROCEDURE — 99215 OFFICE O/P EST HI 40 MIN: CPT | Mod: PBBFAC | Performed by: NURSE PRACTITIONER

## 2025-06-24 PROCEDURE — 3066F NEPHROPATHY DOC TX: CPT | Mod: CPTII,,, | Performed by: NURSE PRACTITIONER

## 2025-06-24 PROCEDURE — 3074F SYST BP LT 130 MM HG: CPT | Mod: CPTII,,, | Performed by: NURSE PRACTITIONER

## 2025-06-24 PROCEDURE — 3044F HG A1C LEVEL LT 7.0%: CPT | Mod: CPTII,,, | Performed by: NURSE PRACTITIONER

## 2025-06-24 PROCEDURE — 1160F RVW MEDS BY RX/DR IN RCRD: CPT | Mod: CPTII,,, | Performed by: NURSE PRACTITIONER

## 2025-06-24 PROCEDURE — 3061F NEG MICROALBUMINURIA REV: CPT | Mod: CPTII,,, | Performed by: NURSE PRACTITIONER

## 2025-06-24 PROCEDURE — 1159F MED LIST DOCD IN RCRD: CPT | Mod: CPTII,,, | Performed by: NURSE PRACTITIONER

## 2025-06-24 RX ORDER — PROMETHAZINE HYDROCHLORIDE AND DEXTROMETHORPHAN HYDROBROMIDE 6.25; 15 MG/5ML; MG/5ML
5 SYRUP ORAL EVERY 4 HOURS PRN
Qty: 240 ML | Refills: 0 | Status: SHIPPED | OUTPATIENT
Start: 2025-06-24 | End: 2025-07-04

## 2025-06-24 NOTE — PROGRESS NOTES
[facility] Note    HPI:  Rohini Dang is a 45 y.o. female who presents today for Cough, Nasal Congestion, and Shortness of Breath (Pt here for cough,runny nose sob)        Review of Systems   Constitutional: Negative.    HENT:  Positive for congestion and sore throat.    Eyes: Negative.    Respiratory:  Positive for cough and shortness of breath.    Cardiovascular: Negative.    Gastrointestinal: Negative.    Genitourinary: Negative.    Musculoskeletal: Negative.    Skin: Negative.    Neurological: Negative.    Endo/Heme/Allergies: Negative.    Psychiatric/Behavioral: Negative.        A review of systems was performed and was negative except as noted above.    I personally reviewed allergies, past medical, surgical, social and family history and updated as appropriate.    Medications:  Current Medications[1]     Health Maintenance:  Immunization History   Administered Date(s) Administered    COVID-19 MRNA, LN-S PF (MODERNA HALF 0.25 ML DOSE) 12/28/2021    COVID-19, MRNA, LN-S, PF (MODERNA FULL 0.5 ML DOSE) 03/10/2021, 04/07/2021    Influenza - Quadrivalent - PF *Preferred* (6 months and older) 08/26/2013, 09/04/2014, 09/24/2020, 09/20/2023    Influenza - Trivalent - Afluria, Fluzone MDV 08/26/2013, 09/04/2014    Pneumococcal Conjugate - 20 Valent 06/02/2025    Pneumococcal Polysaccharide - 23 Valent 08/26/2013    Tdap 06/02/2025      Health Maintenance   Topic Date Due    Diabetic Eye Exam  09/06/2023    Foot Exam  09/20/2024    COVID-19 Vaccine (4 - 2024-25 season) 06/02/2026 (Originally 9/1/2024)    Influenza Vaccine (Season Ended) 09/01/2025    Hemoglobin A1c  12/02/2025    Colorectal Cancer Screening  05/11/2026    Diabetes Urine Screening  06/02/2026    Lipid Panel  06/02/2026    Mammogram  06/19/2026    DEXA Scan  06/19/2027    TETANUS VACCINE  06/02/2035    RSV Vaccine (Age 60+ and Pregnant patients) (1 - 1-dose 75+ series) 02/03/2053    Hepatitis C Screening  Completed    HIV  "Screening  Completed    Pneumococcal Vaccines (Age 0-49)  Completed     Health Maintenance Topics with due status: Not Due       Topic Last Completion Date    Colorectal Cancer Screening 05/11/2021    Influenza Vaccine 09/20/2023    TETANUS VACCINE 06/02/2025    Diabetes Urine Screening 06/02/2025    Lipid Panel 06/02/2025    Hemoglobin A1c 06/02/2025    Mammogram 06/19/2025    DEXA Scan 06/19/2025    RSV Vaccine (Age 60+ and Pregnant patients) Not Due     Health Maintenance Due   Topic Date Due    Diabetic Eye Exam  09/06/2023    Foot Exam  09/20/2024       PHYSICAL EXAM:  Vitals:    06/24/25 0940   BP: 111/82   BP Location: Left arm   Patient Position: Sitting   Pulse: 102   Temp: 97.9 °F (36.6 °C)   TempSrc: Temporal   SpO2: 96%   Weight: 72.5 kg (159 lb 12.8 oz)   Height: 5' 5" (1.651 m)     Body mass index is 26.59 kg/m².  Physical Exam  Constitutional:       Appearance: Normal appearance. She is normal weight.   HENT:      Head: Normocephalic.      Right Ear: Tympanic membrane, ear canal and external ear normal.      Left Ear: Tympanic membrane, ear canal and external ear normal.      Nose: Rhinorrhea present.      Mouth/Throat:      Mouth: Mucous membranes are moist.   Cardiovascular:      Rate and Rhythm: Normal rate and regular rhythm.      Heart sounds: Normal heart sounds.   Pulmonary:      Effort: Pulmonary effort is normal.      Breath sounds: Normal breath sounds.   Musculoskeletal:         General: Normal range of motion.      Cervical back: Normal range of motion.   Skin:     General: Skin is warm and dry.   Neurological:      General: No focal deficit present.      Mental Status: She is alert and oriented to person, place, and time.          ASSESSMENT/PLAN:  1. Acute cough  -     Respiratory Infection Panel (PCR), Nasopharyngeal; Future; Expected date: 06/24/2025  -     promethazine-dextromethorphan (PROMETHAZINE-DM) 6.25-15 mg/5 mL Syrp; Take 5 mLs by mouth every 4 (four) hours as needed (cough).  " Dispense: 240 mL; Refill: 0        Other than changes above, continue current medications and maintain follow up with specialists.      Follow up if symptoms worsen or fail to improve.   Recent Results (from the past 12 weeks)   Urinalysis, Reflex to Urine Culture Urine, Clean Catch    Collection Time: 05/24/25  1:46 PM    Specimen: Urine, Clean Catch   Result Value Ref Range    Color, UA Yellow Straw, Jaylene, Yellow, Light-Orange    Appearance, UA Cloudy (A) Clear    pH, UA 6.0 5.0 - 8.0    Spec Grav UA >=1.030 (A) 1.005 - 1.030    Protein, UA Trace (A) Negative    Glucose, UA 4+ (A) Negative    Ketones, UA 1+ (A) Negative    Bilirubin, UA Negative Negative    Blood, UA Negative Negative    Nitrites, UA Negative Negative    Urobilinogen, UA Negative <2.0 EU/dL    Leukocyte Esterase, UA Negative Negative   GREY TOP URINE HOLD    Collection Time: 05/24/25  1:46 PM   Result Value Ref Range    Extra Tube hold    Urinalysis Microscopic    Collection Time: 05/24/25  1:46 PM   Result Value Ref Range    RBC, UA 1 0 - 4 /HPF    WBC, UA 2 0 - 5 /HPF    Bacteria, UA Rare None, Rare, Occasional /HPF    Yeast, UA None None /HPF    Squamous Epithelial Cells, UA 8 /HPF    Hyaline Casts, UA 8 (H) 0 - 1 /LPF    Microscopic Comment     Comprehensive metabolic panel    Collection Time: 05/24/25  1:49 PM   Result Value Ref Range    Sodium 139 136 - 145 mmol/L    Potassium 4.0 3.5 - 5.1 mmol/L    Chloride 104 95 - 110 mmol/L    CO2 23 23 - 29 mmol/L    Glucose 105 70 - 110 mg/dL    BUN 20 6 - 20 mg/dL    Creatinine 0.6 0.5 - 1.4 mg/dL    Calcium 9.5 8.7 - 10.5 mg/dL    Protein Total 7.6 6.0 - 8.4 gm/dL    Albumin 4.2 3.5 - 5.2 g/dL    Bilirubin Total 0.5 0.1 - 1.0 mg/dL     (H) 40 - 150 unit/L    AST 46 (H) 11 - 45 unit/L    ALT 46 (H) 10 - 44 unit/L    Anion Gap 12 8 - 16 mmol/L    eGFR >60 >60 mL/min/1.73/m2   Lipase    Collection Time: 05/24/25  1:49 PM   Result Value Ref Range    Lipase Level 29 4 - 60 U/L   CBC with  Differential    Collection Time: 05/24/25  1:49 PM   Result Value Ref Range    WBC 8.44 3.90 - 12.70 K/uL    RBC 5.03 4.00 - 5.40 M/uL    HGB 13.9 12.0 - 16.0 gm/dL    HCT 42.0 37.0 - 48.5 %    MCV 84 82 - 98 fL    MCH 27.6 27.0 - 31.0 pg    MCHC 33.1 32.0 - 36.0 g/dL    RDW 13.3 11.5 - 14.5 %    Platelet Count 231 150 - 450 K/uL    MPV 11.4 9.2 - 12.9 fL    Nucleated RBC 0 <=0 /100 WBC    Neut % 54.1 38 - 73 %    Lymph % 37.2 18 - 48 %    Mono % 7.0 4 - 15 %    Eos % 0.9 <=8 %    Basophil % 0.6 <=1.9 %    Imm Grans % 0.2 0.0 - 0.5 %    Neut # 4.56 1.8 - 7.7 K/uL    Lymph # 3.14 1 - 4.8 K/uL    Mono # 0.59 0.3 - 1 K/uL    Eos # 0.08 <=0.5 K/uL    Baso # 0.05 <=0.2 K/uL    Imm Grans # 0.02 0.00 - 0.04 K/uL   Vitamin D    Collection Time: 06/02/25  4:23 PM   Result Value Ref Range    Vitamin D 45 30 - 96 ng/mL   Microalbumin/Creatinine Ratio, Urine    Collection Time: 06/02/25  4:23 PM   Result Value Ref Range    Urine Microalbumin <5.0   ug/mL    Urine Creatinine 10.0 (L) 15.0 - 325.0 mg/dL    Microalbumin/Creatinine Ratio Urine     Vitamin B12    Collection Time: 06/02/25  4:23 PM   Result Value Ref Range    Vitamin B12 458 210 - 950 pg/mL   Iron and TIBC    Collection Time: 06/02/25  4:23 PM   Result Value Ref Range    Iron Level 89 30 - 160 ug/dL    Transferrin 282 200 - 375 mg/dL    Iron Binding Capacity Total 417 250 - 450 ug/dL    Iron Saturation 21 20 - 50 %   Ferritin    Collection Time: 06/02/25  4:23 PM   Result Value Ref Range    Ferritin 185.3 20.0 - 300.0 ng/mL   Hemoglobin A1C    Collection Time: 06/02/25  4:23 PM   Result Value Ref Range    Hemoglobin A1c 6.4 (H) 4.0 - 5.6 %    Estimated Average Glucose 137 (H) 68 - 131 mg/dL   Lipid Panel    Collection Time: 06/02/25  4:23 PM   Result Value Ref Range    Cholesterol Total 296 (H) 120 - 199 mg/dL    Triglyceride 364 (H) 30 - 150 mg/dL    HDL Cholesterol 38 (L) 40 - 75 mg/dL    LDL Cholesterol 185.2 (H) 63.0 - 159.0 mg/dL    HDL/Cholesterol Ratio 12.8  (L) 20.0 - 50.0 %    Cholesterol/HDL Ratio 7.8 (H) 2.0 - 5.0    Non HDL Cholesterol 258 mg/dL   TSH    Collection Time: 06/02/25  4:23 PM   Result Value Ref Range    TSH 3.362 0.400 - 4.000 uIU/mL   Comprehensive Metabolic Panel    Collection Time: 06/02/25  4:23 PM   Result Value Ref Range    Sodium 143 136 - 145 mmol/L    Potassium 3.6 3.5 - 5.1 mmol/L    Chloride 103 95 - 110 mmol/L    CO2 28 23 - 29 mmol/L    Glucose 93 70 - 110 mg/dL    BUN 17 6 - 20 mg/dL    Creatinine 0.9 0.5 - 1.4 mg/dL    Calcium 10.1 8.7 - 10.5 mg/dL    Protein Total 8.4 6.0 - 8.4 gm/dL    Albumin 4.7 3.5 - 5.2 g/dL    Bilirubin Total 0.4 0.1 - 1.0 mg/dL     (H) 40 - 150 unit/L    AST 77 (H) 11 - 45 unit/L    ALT 92 (H) 10 - 44 unit/L    Anion Gap 12 8 - 16 mmol/L    eGFR >60 >60 mL/min/1.73/m2   CBC with Differential    Collection Time: 06/02/25  4:23 PM   Result Value Ref Range    WBC 10.32 3.90 - 12.70 K/uL    RBC 5.30 4.00 - 5.40 M/uL    HGB 14.6 12.0 - 16.0 gm/dL    HCT 45.1 37.0 - 48.5 %    MCV 85 82 - 98 fL    MCH 27.5 27.0 - 31.0 pg    MCHC 32.4 32.0 - 36.0 g/dL    RDW 13.8 11.5 - 14.5 %    Platelet Count 309 150 - 450 K/uL    MPV 11.6 9.2 - 12.9 fL    Nucleated RBC 0 <=0 /100 WBC    Neut % 49.6 38 - 73 %    Lymph % 37.9 18 - 48 %    Mono % 10.0 4 - 15 %    Eos % 1.6 <=8 %    Basophil % 0.7 <=1.9 %    Imm Grans % 0.2 0.0 - 0.5 %    Neut # 5.13 1.8 - 7.7 K/uL    Lymph # 3.91 1 - 4.8 K/uL    Mono # 1.03 (H) 0.3 - 1 K/uL    Eos # 0.16 <=0.5 K/uL    Baso # 0.07 <=0.2 K/uL    Imm Grans # 0.02 0.00 - 0.04 K/uL   Urinalysis, Reflex to Urine Culture Urine, Clean Catch    Collection Time: 06/02/25  4:23 PM    Specimen: Urine, Clean Catch   Result Value Ref Range    Color, UA Yellow Straw, Jaylene, Yellow, Light-Orange    Appearance, UA Clear Clear    pH, UA 7.0 5.0 - 8.0    Spec Grav UA 1.010 1.005 - 1.030    Protein, UA Negative Negative    Glucose, UA 4+ (A) Negative    Ketones, UA Negative Negative    Bilirubin, UA Negative Negative     Blood, UA Negative Negative    Nitrites, UA Negative Negative    Urobilinogen, UA Negative <2.0 EU/dL    Leukocyte Esterase, UA Negative Negative   GREY TOP URINE HOLD    Collection Time: 06/02/25  4:23 PM   Result Value Ref Range    Extra Tube HOLD    Urinalysis Microscopic    Collection Time: 06/02/25  4:23 PM   Result Value Ref Range    RBC, UA 0 0 - 4 /HPF    WBC, UA 0 0 - 5 /HPF    Bacteria, UA None None, Rare, Occasional /HPF    Yeast, UA None None /HPF    Squamous Epithelial Cells, UA 1 /HPF    Hyaline Casts, UA 0 0 - 1 /LPF    Microscopic Comment     Vaginosis Screen by DNA Probe    Collection Time: 06/05/25  1:52 PM   Result Value Ref Range    Bacterial vaginosis DNA Not Detected Not Detected, Invalid    Candida species DNA Not Detected Not Detected, Invalid    Candida glabrata/krusei DNA Detected (A) Not Detected, Invalid    Trichomonas vaginalis DNA Not Detected Not Detected, Invalid   CBC with Differential    Collection Time: 06/06/25 10:05 PM   Result Value Ref Range    WBC 9.60 3.90 - 12.70 K/uL    RBC 5.29 4.00 - 5.40 M/uL    HGB 14.4 12.0 - 16.0 gm/dL    HCT 43.9 37.0 - 48.5 %    MCV 83 82 - 98 fL    MCH 27.2 27.0 - 31.0 pg    MCHC 32.8 32.0 - 36.0 g/dL    RDW 13.3 11.5 - 14.5 %    Platelet Count 262 150 - 450 K/uL    MPV 10.9 9.2 - 12.9 fL    Nucleated RBC 0 <=0 /100 WBC    Neut % 48.7 38 - 73 %    Lymph % 38.5 18 - 48 %    Mono % 10.4 4 - 15 %    Eos % 1.7 <=8 %    Basophil % 0.5 <=1.9 %    Imm Grans % 0.2 0.0 - 0.5 %    Neut # 4.67 1.8 - 7.7 K/uL    Lymph # 3.70 1 - 4.8 K/uL    Mono # 1.00 0.3 - 1 K/uL    Eos # 0.16 <=0.5 K/uL    Baso # 0.05 <=0.2 K/uL    Imm Grans # 0.02 0.00 - 0.04 K/uL   Comprehensive Metabolic Panel    Collection Time: 06/06/25 10:06 PM   Result Value Ref Range    Sodium 140 136 - 145 mmol/L    Potassium 4.2 3.5 - 5.1 mmol/L    Chloride 105 95 - 110 mmol/L    CO2 27 23 - 29 mmol/L    Glucose 109 70 - 110 mg/dL    BUN 19 6 - 20 mg/dL    Creatinine 1.0 0.5 - 1.4 mg/dL     Calcium 9.7 8.7 - 10.5 mg/dL    Protein Total 7.7 6.0 - 8.4 gm/dL    Albumin 4.1 3.5 - 5.2 g/dL    Bilirubin Total 0.3 0.1 - 1.0 mg/dL     (H) 40 - 150 unit/L    AST 60 (H) 11 - 45 unit/L    ALT 78 (H) 10 - 44 unit/L    Anion Gap 8 8 - 16 mmol/L    eGFR >60 >60 mL/min/1.73/m2   Lipase    Collection Time: 06/06/25 10:06 PM   Result Value Ref Range    Lipase Level 23 4 - 60 U/L         Mandie Miller, P  [facility]                       [1]   Current Outpatient Medications:     [facility], USE 1 STRIP TO CHECK GLUCOSE TWICE DAILY, Disp: , Rfl:     albuterol (PROVENTIL/VENTOLIN HFA) 90 mcg/actuation inhaler, Inhale 1 puff into the lungs every 4 (four) hours as needed., Disp: , Rfl:     alendronate (FOSAMAX) 70 MG tablet, Take 1 tablet (70 mg total) by mouth every 7 days., Disp: 4 tablet, Rfl: 11    ascorbic acid, vitamin C, (VITAMIN C) 500 MG tablet, Take 500 mg by mouth once daily., Disp: , Rfl:     atorvastatin (LIPITOR) 40 MG tablet, Take 40 mg by mouth once daily., Disp: , Rfl:     b complex vitamins capsule, Take 1 capsule by mouth once daily., Disp: , Rfl:     cetirizine (ZYRTEC) 10 MG tablet, Take 1 tablet (10 mg total) by mouth daily as needed for Allergies or Rhinitis., Disp: 30 tablet, Rfl: 0    diclofenac sodium (SOLARAZE) 3 % gel, Apply topically 2 (two) times daily as needed (pain). Apply 4 gm BID PRN, Disp: 100 g, Rfl: 5    DULoxetine (CYMBALTA) 30 MG capsule, Take 1 capsule (30 mg total) by mouth 2 (two) times daily., Disp: 60 capsule, Rfl: 11    empagliflozin (JARDIANCE) 10 mg tablet, Take 1 tablet (10 mg total) by mouth once daily., Disp: 30 tablet, Rfl: 11    furosemide (LASIX) 40 MG tablet, Take 1 tablet (40 mg total) by mouth daily as needed (swelling)., Disp: 30 tablet, Rfl: 0    galcanezumab-gnlm (EMGALITY PEN) 120 mg/mL PnIj, Inject 1 mL (120 mg total) into the skin every 28 days., Disp: 3 mL, Rfl: 3    lamoTRIgine (LAMICTAL) 100 MG tablet, Take 0.5  tablets (50 mg total) by mouth once daily., Disp: 15 tablet, Rfl: 11    LATUDA 80 mg Tab tablet, Take 80 mg by mouth every evening., Disp: , Rfl:     levocetirizine (XYZAL) 5 MG tablet, Take 1 tablet (5 mg total) by mouth every evening., Disp: 30 tablet, Rfl: 11    meloxicam (MOBIC) 15 MG tablet, Take 1 tablet (15 mg total) by mouth once daily., Disp: 30 tablet, Rfl: 1    montelukast (SINGULAIR) 10 mg tablet, Take 1 tablet (10 mg total) by mouth every evening., Disp: 30 tablet, Rfl: 11    nitrofurantoin, macrocrystal-monohydrate, (MACROBID) 100 MG capsule, Take 1 capsule (100 mg total) by mouth once daily., Disp: 30 capsule, Rfl: 11    ondansetron (ZOFRAN) 4 MG tablet, Take 1 tablet (4 mg total) by mouth every 6 (six) hours as needed for Nausea., Disp: 12 tablet, Rfl: 0    ondansetron (ZOFRAN-ODT) 4 MG TbDL, Take 1 tablet (4 mg total) by mouth every 6 (six) hours as needed., Disp: 10 tablet, Rfl: 0    polyvinyl alcohol, artificial tears, (LIQUIFILM TEARS) 1.4 % ophthalmic solution, Place 1 drop into both eyes as needed (Dry Eye)., Disp: 15 mL, Rfl: 0    pregabalin (LYRICA) 200 MG Cap, Take 1 capsule (200 mg total) by mouth 2 (two) times daily., Disp: 60 capsule, Rfl: 5    primidone (MYSOLINE) 50 MG Tab, Take 1 tablet (50 mg total) by mouth every evening., Disp: 30 tablet, Rfl: 11    promethazine (PHENERGAN) 25 MG tablet, Take 1 tablet (25 mg total) by mouth every 6 (six) hours as needed for Nausea., Disp: 20 tablet, Rfl: 0    promethazine-dextromethorphan (PROMETHAZINE-DM) 6.25-15 mg/5 mL Syrp, Take 5 mLs by mouth every 4 (four) hours as needed (cough)., Disp: 240 mL, Rfl: 0    rizatriptan (MAXALT) 10 MG tablet, TAKE 1 TAB ONCE AS NEEDED FOR ACUTE MIGRAINE.MAY REPEAT IN 2 HOURS IF NEEDED.MAX 2 TABS/24 HOURS, Disp: , Rfl:     rosuvastatin (CRESTOR) 40 MG Tab, Take 1 tablet (40 mg total) by mouth every evening., Disp: 90 tablet, Rfl: 3    semaglutide (OZEMPIC) 0.25 mg or 0.5 mg (2 mg/3 mL) pen injector, Inject 0.5 mg  into the skin every 7 days., Disp: 3 mL, Rfl: 11    terconazole (TERAZOL 7) 0.4 % Crea, Place 1 applicator vaginally every evening., Disp: 45 g, Rfl: 0    tiZANidine (ZANAFLEX) 4 MG tablet, Take 1 tablet (4 mg total) by mouth every 8 (eight) hours., Disp: 21 tablet, Rfl: 0    ubrogepant (UBRELVY) 100 mg tablet, Take 1 tablet (100 mg total) by mouth as needed for Migraine. If symptoms persist or return, may repeat dose after 2 hours. Maximum: 200 mg per 24 hours, Disp: 16 tablet, Rfl: 2    zinc gluconate 50 mg tablet, Take 50 mg by mouth once daily., Disp: , Rfl:     zolpidem (AMBIEN) 5 MG Tab, Take 1 tablet (5 mg total) by mouth nightly as needed (insomnia)., Disp: 30 tablet, Rfl: 5

## 2025-06-30 ENCOUNTER — HOSPITAL ENCOUNTER (EMERGENCY)
Facility: HOSPITAL | Age: 45
Discharge: HOME | End: 2025-06-30
Attending: EMERGENCY MEDICINE
Payer: MEDICAID

## 2025-06-30 VITALS
SYSTOLIC BLOOD PRESSURE: 167 MMHG | WEIGHT: 158.94 LBS | OXYGEN SATURATION: 100 % | HEIGHT: 65 IN | RESPIRATION RATE: 18 BRPM | TEMPERATURE: 99 F | BODY MASS INDEX: 26.48 KG/M2 | HEART RATE: 85 BPM | DIASTOLIC BLOOD PRESSURE: 96 MMHG

## 2025-06-30 DIAGNOSIS — R13.10 DYSPHAGIA, UNSPECIFIED TYPE: ICD-10-CM

## 2025-06-30 DIAGNOSIS — B96.89 BACTERIAL UPPER RESPIRATORY INFECTION: ICD-10-CM

## 2025-06-30 DIAGNOSIS — J06.9 BACTERIAL UPPER RESPIRATORY INFECTION: ICD-10-CM

## 2025-06-30 DIAGNOSIS — R06.02 SOB (SHORTNESS OF BREATH): ICD-10-CM

## 2025-06-30 DIAGNOSIS — R05.9 COUGH: Primary | ICD-10-CM

## 2025-06-30 LAB
ABSOLUTE EOSINOPHIL (OHS): 0.22 K/UL
ABSOLUTE MONOCYTE (OHS): 0.92 K/UL (ref 0.3–1)
ABSOLUTE NEUTROPHIL COUNT (OHS): 4.75 K/UL (ref 1.8–7.7)
ALBUMIN SERPL BCP-MCNC: 4 G/DL (ref 3.5–5.2)
ALP SERPL-CCNC: 156 UNIT/L (ref 40–150)
ALT SERPL W/O P-5'-P-CCNC: 31 UNIT/L (ref 10–44)
AMPHET UR QL SCN: NEGATIVE
ANION GAP (OHS): 10 MMOL/L (ref 8–16)
AST SERPL-CCNC: 34 UNIT/L (ref 11–45)
BACTERIA #/AREA URNS AUTO: NORMAL /HPF
BARBITURATE SCN PRESENT UR: NEGATIVE
BASOPHILS # BLD AUTO: 0.04 K/UL
BASOPHILS NFR BLD AUTO: 0.4 %
BENZODIAZ UR QL SCN: NEGATIVE
BILIRUB SERPL-MCNC: 0.3 MG/DL (ref 0.1–1)
BILIRUB UR QL STRIP.AUTO: NEGATIVE
BUN SERPL-MCNC: 7 MG/DL (ref 6–20)
CALCIUM SERPL-MCNC: 9.6 MG/DL (ref 8.7–10.5)
CANNABINOIDS UR QL SCN: NEGATIVE
CHLORIDE SERPL-SCNC: 109 MMOL/L (ref 95–110)
CLARITY UR: CLEAR
CO2 SERPL-SCNC: 21 MMOL/L (ref 23–29)
COCAINE UR QL SCN: NEGATIVE
COLOR UR AUTO: YELLOW
CREAT SERPL-MCNC: 0.7 MG/DL (ref 0.5–1.4)
CREAT UR-MCNC: 16.3 MG/DL (ref 15–325)
CTP QC/QA: YES
ERYTHROCYTE [DISTWIDTH] IN BLOOD BY AUTOMATED COUNT: 13.2 % (ref 11.5–14.5)
GFR SERPLBLD CREATININE-BSD FMLA CKD-EPI: >60 ML/MIN/1.73/M2
GLUCOSE SERPL-MCNC: 85 MG/DL (ref 70–110)
GLUCOSE UR QL STRIP: ABNORMAL
HCT VFR BLD AUTO: 37.9 % (ref 37–48.5)
HGB BLD-MCNC: 12.9 GM/DL (ref 12–16)
HGB UR QL STRIP: NEGATIVE
HOLD SPECIMEN: NORMAL
HOLD SPECIMEN: NORMAL
HYALINE CASTS UR QL AUTO: 0 /LPF (ref 0–1)
IMM GRANULOCYTES # BLD AUTO: 0.03 K/UL (ref 0–0.04)
IMM GRANULOCYTES NFR BLD AUTO: 0.3 % (ref 0–0.5)
KETONES UR QL STRIP: NEGATIVE
LEUKOCYTE ESTERASE UR QL STRIP: NEGATIVE
LYMPHOCYTES # BLD AUTO: 3.52 K/UL (ref 1–4.8)
MCH RBC QN AUTO: 27 PG (ref 27–31)
MCHC RBC AUTO-ENTMCNC: 34 G/DL (ref 32–36)
MCV RBC AUTO: 80 FL (ref 82–98)
METHADONE UR QL SCN: NEGATIVE
MICROSCOPIC COMMENT: NORMAL
NITRITE UR QL STRIP: NEGATIVE
NT-PROBNP SERPL-MCNC: 239 PG/ML
NUCLEATED RBC (/100WBC) (OHS): 0 /100 WBC
OPIATES UR QL SCN: NEGATIVE
PCP UR QL: NEGATIVE
PH UR STRIP: 8 [PH]
PLATELET # BLD AUTO: 239 K/UL (ref 150–450)
PMV BLD AUTO: 10.8 FL (ref 9.2–12.9)
POTASSIUM SERPL-SCNC: 3.6 MMOL/L (ref 3.5–5.1)
PROT SERPL-MCNC: 7.2 GM/DL (ref 6–8.4)
PROT UR QL STRIP: NEGATIVE
RBC # BLD AUTO: 4.77 M/UL (ref 4–5.4)
RBC #/AREA URNS AUTO: 0 /HPF (ref 0–4)
RELATIVE EOSINOPHIL (OHS): 2.3 %
RELATIVE LYMPHOCYTE (OHS): 37.1 % (ref 18–48)
RELATIVE MONOCYTE (OHS): 9.7 % (ref 4–15)
RELATIVE NEUTROPHIL (OHS): 50.2 % (ref 38–73)
SARS-COV-2 RDRP RESP QL NAA+PROBE: NEGATIVE
SODIUM SERPL-SCNC: 140 MMOL/L (ref 136–145)
SP GR UR STRIP: 1.01
SQUAMOUS #/AREA URNS AUTO: 2 /HPF
TROPONIN I SERPL HS-MCNC: <3 NG/L
UROBILINOGEN UR STRIP-ACNC: NEGATIVE EU/DL
WBC # BLD AUTO: 9.48 K/UL (ref 3.9–12.7)
WBC #/AREA URNS AUTO: 0 /HPF (ref 0–5)
YEAST URNS QL MICRO: NORMAL /HPF

## 2025-06-30 PROCEDURE — 63600175 PHARM REV CODE 636 W HCPCS

## 2025-06-30 PROCEDURE — 94760 N-INVAS EAR/PLS OXIMETRY 1: CPT

## 2025-06-30 PROCEDURE — 93010 ELECTROCARDIOGRAM REPORT: CPT | Mod: ,,, | Performed by: INTERNAL MEDICINE

## 2025-06-30 PROCEDURE — 25000242 PHARM REV CODE 250 ALT 637 W/ HCPCS

## 2025-06-30 PROCEDURE — 84484 ASSAY OF TROPONIN QUANT: CPT

## 2025-06-30 PROCEDURE — 80053 COMPREHEN METABOLIC PANEL: CPT

## 2025-06-30 PROCEDURE — 80307 DRUG TEST PRSMV CHEM ANLYZR: CPT

## 2025-06-30 PROCEDURE — 99900031 HC PATIENT EDUCATION (STAT)

## 2025-06-30 PROCEDURE — 96372 THER/PROPH/DIAG INJ SC/IM: CPT

## 2025-06-30 PROCEDURE — 99900035 HC TECH TIME PER 15 MIN (STAT)

## 2025-06-30 PROCEDURE — 87635 SARS-COV-2 COVID-19 AMP PRB: CPT

## 2025-06-30 PROCEDURE — 99285 EMERGENCY DEPT VISIT HI MDM: CPT | Mod: 25

## 2025-06-30 PROCEDURE — 63700000 PHARM REV CODE 250 ALT 637 W/O HCPCS

## 2025-06-30 PROCEDURE — 85025 COMPLETE CBC W/AUTO DIFF WBC: CPT

## 2025-06-30 PROCEDURE — 93005 ELECTROCARDIOGRAM TRACING: CPT

## 2025-06-30 PROCEDURE — 94640 AIRWAY INHALATION TREATMENT: CPT

## 2025-06-30 PROCEDURE — 25000003 PHARM REV CODE 250

## 2025-06-30 PROCEDURE — 81001 URINALYSIS AUTO W/SCOPE: CPT

## 2025-06-30 PROCEDURE — 36415 COLL VENOUS BLD VENIPUNCTURE: CPT

## 2025-06-30 PROCEDURE — 83880 ASSAY OF NATRIURETIC PEPTIDE: CPT

## 2025-06-30 RX ORDER — CODEINE PHOSPHATE AND GUAIFENESIN 10; 100 MG/5ML; MG/5ML
7.5 SOLUTION ORAL
Status: COMPLETED | OUTPATIENT
Start: 2025-06-30 | End: 2025-06-30

## 2025-06-30 RX ORDER — AZITHROMYCIN 250 MG/1
TABLET, FILM COATED ORAL
Qty: 6 TABLET | Refills: 0 | Status: SHIPPED | OUTPATIENT
Start: 2025-06-30

## 2025-06-30 RX ORDER — MORPHINE SULFATE 2 MG/ML
6 INJECTION, SOLUTION INTRAMUSCULAR; INTRAVENOUS
Status: COMPLETED | OUTPATIENT
Start: 2025-06-30 | End: 2025-06-30

## 2025-06-30 RX ORDER — MORPHINE SULFATE 2 MG/ML
6 INJECTION, SOLUTION INTRAMUSCULAR; INTRAVENOUS
Refills: 0 | Status: DISCONTINUED | OUTPATIENT
Start: 2025-06-30 | End: 2025-06-30

## 2025-06-30 RX ORDER — IPRATROPIUM BROMIDE AND ALBUTEROL SULFATE 2.5; .5 MG/3ML; MG/3ML
3 SOLUTION RESPIRATORY (INHALATION)
Status: COMPLETED | OUTPATIENT
Start: 2025-06-30 | End: 2025-06-30

## 2025-06-30 RX ORDER — METHYLPREDNISOLONE 4 MG/1
TABLET ORAL
Qty: 21 EACH | Refills: 0 | Status: SHIPPED | OUTPATIENT
Start: 2025-06-30

## 2025-06-30 RX ORDER — AZITHROMYCIN 250 MG/1
500 TABLET, FILM COATED ORAL
Status: COMPLETED | OUTPATIENT
Start: 2025-06-30 | End: 2025-06-30

## 2025-06-30 RX ORDER — HYDROCODONE POLISTIREX AND CHLORPHENIRAMINE POLISTIREX 10; 8 MG/5ML; MG/5ML
5 SUSPENSION, EXTENDED RELEASE ORAL EVERY 12 HOURS PRN
Qty: 70 ML | Refills: 0 | Status: SHIPPED | OUTPATIENT
Start: 2025-06-30 | End: 2025-07-01 | Stop reason: SDUPTHER

## 2025-06-30 RX ADMIN — MORPHINE SULFATE 6 MG: 2 INJECTION, SOLUTION INTRAMUSCULAR; INTRAVENOUS at 08:06

## 2025-06-30 RX ADMIN — IPRATROPIUM BROMIDE AND ALBUTEROL SULFATE 3 ML: 2.5; .5 SOLUTION RESPIRATORY (INHALATION) at 06:06

## 2025-06-30 RX ADMIN — METHYLPREDNISOLONE SODIUM SUCCINATE 80 MG: 40 INJECTION, POWDER, FOR SOLUTION INTRAMUSCULAR; INTRAVENOUS at 06:06

## 2025-06-30 RX ADMIN — GUAIFENESIN AND CODEINE PHOSPHATE 7.5 ML: 100; 10 SOLUTION ORAL at 06:06

## 2025-06-30 RX ADMIN — AZITHROMYCIN DIHYDRATE 500 MG: 250 TABLET, FILM COATED ORAL at 08:06

## 2025-06-30 NOTE — ED PROVIDER NOTES
Encounter Date: 6/30/2025       History     Chief Complaint   Patient presents with    Cough     Pt here per EMS with c/o dry frequent cough x 1 week. Pt states saw PCP and tested negative for flu, covid and RSV. Pt states feels SOB with coughing. O2 sat 97% -100% on RA. Pt also reports can only have soft foods and liquids      This note is dictated on M*Modal word recognition program.  There are word recognition mistakes and grammatical errors that are occasionally missed on review.     Rohini Dang is a 45 y.o. female with a history of anxiety, bipolar 2 borderline personality disorder, fibromyalgia,-, type 2 diabetes presents [facility] with complaints of cough x1 week.  Patient reports she was seen by her PCP for the same cough dyspnea negative for flu, COVID and RSV at the time.  Patient reports she continues with bad cough and chest congestion.  Patient also reports lack of appetite with cough.  Reports at times she does feel short of breath.  Patient reports she is also very weak.  She reports it was hard for her to get out of the shower today on her own.  Had to be assisted out of the shower with the help of her daughter.  Reports she use albuterol inhaler with no relief of coughing symptoms or shortness of breath.  Last 30s weakness has become worse.  Patient also reports over the last few weeks she has been having dysphagia and trouble swallowing.  Patient reports she is scheduled to follow-up with Dr. azalea portillo to have her dysphagia examined he does have a follow-up with PCP tomorrow as scheduled      The history is provided by the patient.     Review of patient's allergies indicates:   Allergen Reactions    Amoxicillin Hives    Avelox [moxifloxacin] Hives    Cherries Hives    Coconut oil Blisters    Desloratadine Hives     Other reaction(s): Unknown    Kiwi Blisters    Pcn [penicillins] Hives    Pineapple Blisters    Vimpat [lacosamide] Hallucinations    Claritin [loratadine] Palpitations     "Latex, natural rubber Rash     Past Medical History:   Diagnosis Date    Anxiety disorder     Arthritis     Bipolar 2 disorder     Borderline personality disorder     Chronic bilateral low back pain without sciatica     Depression with anxiety     Elevated LFTs     Fatty liver     Fibromyalgia     Intermittent explosive disorder     Liver disease     JAMESON (nonalcoholic steatohepatitis)     Type 2 diabetes, controlled, with peripheral neuropathy      Past Surgical History:   Procedure Laterality Date    COLONOSCOPY N/A 03/20/2018    Procedure: COLONOSCOPY;  Surgeon: Janelle Meade MD;  Location: [facility];  Service: Endoscopy;  Laterality: N/A;    COLONOSCOPY N/A 05/11/2021    Procedure: COLONOSCOPY;  Surgeon: Janelle Meade MD;  Location: [facility];  Service: Endoscopy;  Laterality: N/A;    ESOPHAGEAL DILATION      x2    ESOPHAGOGASTRODUODENOSCOPY N/A 05/07/2019    Procedure: EGD (ESOPHAGOGASTRODUODENOSCOPY);  Surgeon: Janelle Meade MD;  Location: [facility];  Service: Endoscopy;  Laterality: N/A;    ESOPHAGOGASTRODUODENOSCOPY N/A 05/11/2021    Procedure: EGD (ESOPHAGOGASTRODUODENOSCOPY);  Surgeon: Janelle Meade MD;  Location: [facility];  Service: Endoscopy;  Laterality: N/A;  Rapid on arrival    HIATAL HERNIA REPAIR      HYSTERECTOMY      LIVER BIOPSY  2018    fibrosis stage 3 JAMESON    OOPHORECTOMY      TUBAL LIGATION      UPPER GASTROINTESTINAL ENDOSCOPY      2013? unable to obtain records     Family History   Problem Relation Name Age of Onset    Hypertension Mother      Clotting disorder Father      Ulcers Father      Clotting disorder Sister      No Known Problems Daughter      Breast cancer Maternal Aunt      Ovarian cancer Maternal Aunt      Diabetes Maternal Aunt      No Known Problems Maternal Uncle      No Known Problems Paternal Aunt      No Known Problems Paternal Uncle      Cancer Maternal Grandmother          "female Cancer"    No Known Problems Maternal Grandfather      " Lung cancer Paternal Grandmother      No Known Problems Paternal Grandfather      No Known Problems Other      BRCA 1/2 Neg Hx       Social History[1]  Review of Systems   Constitutional:  Positive for activity change, appetite change and fatigue.   Respiratory:  Positive for cough and chest tightness.    Neurological:  Positive for weakness.       Physical Exam     Initial Vitals [06/30/25 1712]   BP Pulse Resp Temp SpO2   (!) 173/107 87 20 99 °F (37.2 °C) 98 %      MAP       --         Physical Exam    Nursing note and vitals reviewed.  Constitutional: She appears well-developed and well-nourished.   HENT:   Head: Normocephalic. Mouth/Throat: No oropharyngeal exudate.   Eyes: Pupils are equal, round, and reactive to light. Right eye exhibits no discharge.   Neck:   Normal range of motion.  Pulmonary/Chest: No respiratory distress. She has no wheezes. She has rhonchi.   Patient has frequent cough on examination   Musculoskeletal:         General: No tenderness or edema.      Cervical back: Normal range of motion.     Neurological: She is alert and oriented to person, place, and time. GCS score is 15. GCS eye subscore is 4. GCS verbal subscore is 5. GCS motor subscore is 6.   Skin: No erythema.         ED Course   Procedures  Labs Reviewed   URINALYSIS, REFLEX TO URINE CULTURE - Abnormal       Result Value    Color, UA Yellow      Appearance, UA Clear      pH, UA 8.0      Spec Grav UA 1.010      Protein, UA Negative      Glucose, UA 4+ (*)     Ketones, UA Negative      Bilirubin, UA Negative      Blood, UA Negative      Nitrites, UA Negative      Urobilinogen, UA Negative      Leukocyte Esterase, UA Negative     COMPREHENSIVE METABOLIC PANEL - Abnormal    Sodium 140      Potassium 3.6      Chloride 109      CO2 21 (*)     Glucose 85      BUN 7      Creatinine 0.7      Calcium 9.6      Protein Total 7.2      Albumin 4.0      Bilirubin Total 0.3       (*)     AST 34      ALT 31      Anion Gap 10      eGFR >60    "  CBC WITH DIFFERENTIAL - Abnormal    WBC 9.48      RBC 4.77      HGB 12.9      HCT 37.9      MCV 80 (*)     MCH 27.0      MCHC 34.0      RDW 13.2      Platelet Count 239      MPV 10.8      Nucleated RBC 0      Neut % 50.2      Lymph % 37.1      Mono % 9.7      Eos % 2.3      Basophil % 0.4      Imm Grans % 0.3      Neut # 4.75      Lymph # 3.52      Mono # 0.92      Eos # 0.22      Baso # 0.04      Imm Grans # 0.03     NT-PRO NATRIURETIC PEPTIDE - Abnormal    NT-proBNP 239 (*)    TROPONIN I HIGH SENSITIVITY - Normal    Troponin High Sensitive <3     DRUG SCREEN PANEL, URINE EMERGENCY - Normal    Benzodiazepine, Urine Negative      Methadone, Urine Negative      Cocaine, Urine Negative      Opiates, Urine Negative      Barbiturates, Urine Negative      Amphetamines, Urine Negative      THC Negative      Phencyclidine, Urine Negative      Urine Creatinine 16.3      Narrative:     This screen includes the following classes of drugs at the listed cut-off:     Benzodiazepines:        200 ng/ml   Methadone:              300 ng/ml   Cocaine metabolite:     300 ng/ml   Opiates:                300 ng/ml   Barbiturates:           200 ng/ml   Amphetamines:           1000 ng/ml   Marijuana metabs (THC): 50 ng/ml   Phencyclidine (PCP):    25 ng/ml     This is a screening test. If results do not correlate with clinical presentation, then a confirmatory send out test is advised.    This report is intended for use in clinical monitoring and management of patients. It is not intended for use in employment related drug testing."   CBC W/ AUTO DIFFERENTIAL    Narrative:     The following orders were created for panel order CBC auto differential.  Procedure                               Abnormality         Status                     ---------                               -----------         ------                     CBC with Differential[[id number]]       Abnormal            Final result                 Please view results for these " tests on the individual orders.   EXTRA TUBES    Narrative:     The following orders were created for panel order EXTRA TUBES.  Procedure                               Abnormality         Status                     ---------                               -----------         ------                     Light Blue Top Hold[[id number]]                             Final result               Gold Top Hold[[id number]]                                   Final result                 Please view results for these tests on the individual orders.   LIGHT BLUE TOP HOLD    Extra Tube HOLD     GOLD TOP HOLD    Extra Tube HOLD     URINALYSIS MICROSCOPIC    RBC, UA 0      WBC, UA 0      Bacteria, UA None      Yeast, UA None      Squamous Epithelial Cells, UA 2      Hyaline Casts, UA 0      Microscopic Comment       GREY TOP URINE HOLD   SARS-COV-2 RDRP GENE    POC Rapid COVID Negative       Acceptable Yes       EKG Readings: (Independently Interpreted)   Initial Reading: No STEMI. Rhythm: Normal Sinus Rhythm. Heart Rate: 74. Ectopy: No Ectopy. Conduction: Normal. Axis: Normal. Clinical Impression: Normal Sinus Rhythm       Imaging Results              X-Ray Chest PA And Lateral (In process)                      Medications   albuterol-ipratropium 2.5 mg-0.5 mg/3 mL nebulizer solution 3 mL (3 mLs Nebulization Given 6/30/25 1829)   methylPREDNISolone sodium succinate injection 80 mg (80 mg Intramuscular Given 6/30/25 1833)   guaiFENesin-codeine 100-10 mg/5 ml syrup 7.5 mL (7.5 mLs Oral Given 6/30/25 1833)   azithromycin tablet 500 mg (500 mg Oral Given 6/30/25 2020)   morphine injection 6 mg (6 mg Intramuscular Given 6/30/25 2023)     Medical Decision Making  Differential diagnosis includes pneumonia, acute bronchitis, bacterial upper respiratory infection, COVID, viral upper respiratory infection, acute cough, asthma    COVID negative,  CBC reveals no leukocytosis no anemia   Troponin within normal limits   EKG within  normal limits    however no physical signs of fluid volume overload with crackles no leg swelling no abdominal swelling  CMP reveals no electrolyte disturbance no HENRY no transaminitis   X-ray reviewed no overt cardiopulmonary abnormality however will treat patient for bacterial upper respiratory infection by prescribing azithromycin  Will prescribe patient Tussionex, Medrol Dosepak as well to help with cough/shortness of breath symptoms  Pulmonary embolus felt less likely as cause of cough or shortness of breath this evening.  Patient's O2 sat on room air % entire ER visit no tachypnea no tachycardia.  Patient will follow-up with general surgery for dysphagia on July 3rd at previously scheduled appointment  Patient will follow-up with PCP tomorrow for cough at previously scheduled appointment  Patient stable at time of discharge in no acute distress.  No life-threatening illnesses were found during ER visit today.  Patient was instructed to follow-up with PCP or other recommended specialist within the next 48-72 hours.  Patient was instructed to return to ER immediately for any worsening or concerning symptoms.  All discharge instructions discussed with patient, and patient agrees to comply with discharge instructions given today.   Vital signs stable at discharge no hypoxia      Amount and/or Complexity of Data Reviewed  Labs: ordered.  Radiology: ordered.    Risk  OTC drugs.  Prescription drug management.                                      Clinical Impression:  Final diagnoses:  [R05.9] Cough (Primary)  [R06.02] SOB (shortness of breath)  [R13.10] Dysphagia, unspecified type  [J06.9, B96.89] Bacterial upper respiratory infection          ED Disposition Condition    Discharge Stable          ED Prescriptions       Medication Sig Dispense Start Date End Date Auth. Provider    azithromycin (Z-WILFRID) 250 MG tablet Take 2 tablets by mouth on day 1; Take 1 tablet by mouth on days 2-5 6 tablet 6/30/2025 --  Issa Fitzgerald, BRYAN    methylPREDNISolone (MEDROL DOSEPACK) 4 mg tablet use as directed 21 each 6/30/2025 -- Issa Fitzgerald, BRYAN    hydrocodone-chlorpheniramine (TUSSIONEX) 10-8 mg/5 mL suspension Take 5 mLs by mouth every 12 (twelve) hours as needed for Cough. 70 mL 6/30/2025 7/7/2025 Issa Fitzgerald NP          Follow-up Information    None                [1]   Social History  Tobacco Use    Smoking status: Never     Passive exposure: Never    Smokeless tobacco: Never   Substance Use Topics    Alcohol use: No     Alcohol/week: 0.0 standard drinks of alcohol    Drug use: No        Issa Fitzgerald NP  06/30/25 2029

## 2025-07-01 DIAGNOSIS — J06.9 BACTERIAL UPPER RESPIRATORY INFECTION: ICD-10-CM

## 2025-07-01 DIAGNOSIS — R60.9 SWELLING: ICD-10-CM

## 2025-07-01 DIAGNOSIS — B96.89 BACTERIAL UPPER RESPIRATORY INFECTION: ICD-10-CM

## 2025-07-01 DIAGNOSIS — R05.9 COUGH: ICD-10-CM

## 2025-07-01 LAB
HOLD SPECIMEN: NORMAL
OHS QRS DURATION: 84 MS
OHS QTC CALCULATION: 432 MS

## 2025-07-01 RX ORDER — HYDROCODONE POLISTIREX AND CHLORPHENIRAMINE POLISTIREX 10; 8 MG/5ML; MG/5ML
5 SUSPENSION, EXTENDED RELEASE ORAL EVERY 12 HOURS PRN
Qty: 70 ML | Refills: 0 | Status: SHIPPED | OUTPATIENT
Start: 2025-07-01 | End: 2025-07-08

## 2025-07-01 RX ORDER — FUROSEMIDE 40 MG/1
40 TABLET ORAL DAILY PRN
Qty: 30 TABLET | Refills: 0 | Status: SHIPPED | OUTPATIENT
Start: 2025-07-01 | End: 2026-07-01

## 2025-07-01 NOTE — DISCHARGE INSTRUCTIONS
Please keep follow-up with Dr. Bass for dysphagia on July 3rd  Please follow-up with your PCP tomorrow at previously scheduled appointment  Take medication per prescription directions

## 2025-07-01 NOTE — TELEPHONE ENCOUNTER
Pt went to er last night and Tussionex was sent to [facility]. [facility] does not have the medication they are asking for it to be sent to [facility]. She also needs some ozempic needles and her lasix refill but those go to [facility]. Just tussionex to [facility]

## 2025-07-03 ENCOUNTER — OFFICE VISIT (OUTPATIENT)
Dept: SURGERY | Facility: CLINIC | Age: 45
End: 2025-07-03
Payer: MEDICAID

## 2025-07-03 ENCOUNTER — TELEPHONE (OUTPATIENT)
Dept: HEMATOLOGY/ONCOLOGY | Facility: CLINIC | Age: 45
End: 2025-07-03
Payer: MEDICAID

## 2025-07-03 VITALS
HEART RATE: 98 BPM | BODY MASS INDEX: 26.48 KG/M2 | DIASTOLIC BLOOD PRESSURE: 75 MMHG | OXYGEN SATURATION: 96 % | RESPIRATION RATE: 18 BRPM | HEIGHT: 65 IN | WEIGHT: 158.94 LBS | SYSTOLIC BLOOD PRESSURE: 134 MMHG

## 2025-07-03 DIAGNOSIS — R13.12 OROPHARYNGEAL DYSPHAGIA: Primary | ICD-10-CM

## 2025-07-03 PROCEDURE — 99214 OFFICE O/P EST MOD 30 MIN: CPT | Mod: PBBFAC | Performed by: STUDENT IN AN ORGANIZED HEALTH CARE EDUCATION/TRAINING PROGRAM

## 2025-07-03 PROCEDURE — 99999 PR PBB SHADOW E&M-EST. PATIENT-LVL IV: CPT | Mod: PBBFAC,,, | Performed by: STUDENT IN AN ORGANIZED HEALTH CARE EDUCATION/TRAINING PROGRAM

## 2025-07-03 RX ORDER — PANTOPRAZOLE SODIUM 40 MG/1
40 TABLET, DELAYED RELEASE ORAL DAILY
Qty: 30 TABLET | Refills: 0 | Status: SHIPPED | OUTPATIENT
Start: 2025-07-03 | End: 2025-07-30

## 2025-07-03 NOTE — TELEPHONE ENCOUNTER
Called and spoke to patient to confirm the virtual visit on 7/10/25 and to complete the questionnaire.

## 2025-07-07 ENCOUNTER — PATIENT OUTREACH (OUTPATIENT)
Facility: OTHER | Age: 45
End: 2025-07-07
Payer: MEDICAID

## 2025-07-07 NOTE — PROGRESS NOTES
Patient declined ED navigation assessment and denied any needs at this time. Patient declined assistance making a follow-up appointment with her PCP at this time.     Bertha Abarca  ED Navigator  [phone]

## 2025-07-09 ENCOUNTER — TELEPHONE (OUTPATIENT)
Dept: HEMATOLOGY/ONCOLOGY | Facility: CLINIC | Age: 45
End: 2025-07-09
Payer: MEDICAID

## 2025-07-09 ENCOUNTER — PATIENT MESSAGE (OUTPATIENT)
Dept: ADMINISTRATIVE | Facility: OTHER | Age: 45
End: 2025-07-09
Payer: MEDICAID

## 2025-07-10 ENCOUNTER — OFFICE VISIT (OUTPATIENT)
Dept: HEMATOLOGY/ONCOLOGY | Facility: CLINIC | Age: 45
End: 2025-07-10
Payer: MEDICAID

## 2025-07-10 DIAGNOSIS — Z80.0 FAMILY HISTORY OF PANCREATIC CANCER: ICD-10-CM

## 2025-07-10 DIAGNOSIS — Z80.51 FAMILY HISTORY OF KIDNEY CANCER: ICD-10-CM

## 2025-07-10 DIAGNOSIS — Z80.1 FAMILY HISTORY OF LUNG CANCER: ICD-10-CM

## 2025-07-10 DIAGNOSIS — Z80.3 FAMILY HISTORY OF BREAST CANCER: ICD-10-CM

## 2025-07-10 DIAGNOSIS — Z80.41 FAMILY HISTORY OF OVARIAN CANCER: ICD-10-CM

## 2025-07-10 DIAGNOSIS — Z83.79 FAMILY HISTORY OF PANCREATITIS: ICD-10-CM

## 2025-07-10 DIAGNOSIS — Z71.83 ENCOUNTER FOR NONPROCREATIVE GENETIC COUNSELING: Primary | ICD-10-CM

## 2025-07-10 NOTE — PROGRESS NOTES
Cancer Genetics  Hereditary and High-Risk Clinic  Department of Hematology and Oncology  [facility]    Date of Service:  7/10/25  Visit Provider:  Ellie Cedeno [facility] [place]    Patient ID  Name: Rohini Dang    : 1980    MRN: [id number]      Referring Provider  [facility], Provider  [address] [place] [address]    Televisit Information  The patient location is: [place].    The chief complaint leading to consultation is:  As below.    Visit type: audiovisual.      Face-to-face time with patient:  Approximately 55 minutes.    Approximately 80 minutes in total were spent on this encounter, which includes face-to-face time and non-face-to-face time preparing to see the patient (e.g., review of records and tests), obtaining and/or reviewing separately obtained history, documenting clinical information in the electronic or other health record, independently interpreting results (not separately reported) and communicating results to the patient/family/caregiver, or care coordination (not separately reported).  Each patient to whom he or she provides medical services by telemedicine is:  (1) informed of the relationship between the physician and patient and the respective role of any other health care provider with respect to management of the patient; and (2) notified that he or she may decline to receive medical services by telemedicine and may withdraw from such care at any time.    IMPRESSION     Ms. Rohini Dang is a pleasant 45 y.o. female. We discussed her personal health history, family health history, and the purpose and logistics of genetic testing for hereditary cancer syndromes. She was joined today by her daughter, Anitra. Rohini meets NCCN criteria for genetic testing and elected to proceed with the CancerNext - Expanded with Pancreatitis genes by [facility].    FOCUSED PERSONAL HISTORY     Chief Complaint: Genetic Evaluation (Family  "history of cancer)    History of Present Illness (HPI):  Rohini Dang ("Rohini"), 45 y.o., assigned female sex at birth, is established with the [facility] but new to me.  She was referred by [facility] for hereditary cancer risk assessment given her responses to the Genetic Wellness Assessment in which she .reported a family history of ovarian cancer.    Cancer History  No personal history of cancer   She reported a lesion on her genitals in 2022. This was biopsied on 10/13/2022 and benign   Pamelaer-Vero 8 lifetime risk of breast cancer: 3.45%    Focused Medical History  Previous germline cancer genetic testing:  No  Colonoscopy: Yes  Most recent colonoscopy: 5/11/2021  Colon polyp:  No  Mammogram: Yes  Most recent mammogram: 6/19/2025  Breast density: scattered areas of fibroglandular density   Breast MRI:  No  Pancreatitis:  Family history of pancreatitis including her Aunt Maribel     Gynecologic History  Age at menarche:  10 years old  Age at first live childbirth:  16 years old  Menopausal status:  no periods due to hysterectomy  Reproductive organs:  s/p hysterectomy and s/p bilateral salpingo-oophorectomy    Hormone Use  Estrogen from ages [age]  OCPs: 15 years altogether     Tobacco Use  Tobacco Use: Low Risk  (7/11/2025)    Patient History     Smoking Tobacco Use: Never     Smokeless Tobacco Use: Never     Passive Exposure: Never     FAMILY HISTORY       Maternal family history of cancer:  Her mother and two aunts were diagnosed with ovarian cancer, one aunt was diagnosed with pancreatic cancer. Several great aunts and uncles were diagnosed with pancreatic cancer. One great aunt was diagnosed with ovarian cancer. Her great-grandmother was diagnosed with breast cancer. She also reported lung cancer.    Paternal family history of cancer: An uncle was diagnosed with kidney cancer at age 71 and her grandmother was diagnosed with lung cancer.    Ashkenazi Protestant " ancestry: None reported    During our appointment, Rohini shared that her daughter has a history of multiple miscarriages. When a couple experiences multiple miscarriages, it may suggest that either partner may have a problem with the amount (missing or extra) or orientation (location or position) of parts of the chromosomes. Chromosomes are what our genes are stored on and it is important to have the right amount in the right place. A karyotype can be ordered by a fertility specialist or reproductive genetic counselor to evaluate if the history of multiple miscarriages may be due to a chromosome problem. She reported a genetic evaluation would not be necessary as her daughter is diagnosed with Antiphospholipid Syndrome.    A family history of birth defects, intellectual disability, SIDS, sudden early death, and consanguinity were denied. Please refer to above pedigree for further details. A larger copy has been scanned in the Media tab.     DISCUSSION     Causes of Cancer:    Cancer occurs when cells grow out of control. Some genes help protect against cancer by controlling the growth of cells. However, mutations (problems) in these genes can prevent them from working properly, increasing the risk of developing cancer.  Sporadic Cancer: Most people who get cancer have sporadic cancer. Sporadic cancer is caused by mutations that occur during the lifetime. These mutations may be caused by aging, environmental exposures (ex. UV radiation, carcinogens), lifestyle (ex. smoking, drinking alcohol, sunbathing, poor diet), other medical conditions (ex. hepatitis, HPV, ulcerative colitis), or other factors.   Hereditary Cancer: A small percentage (5-10%) of people who develop cancer were born with a mutation already in one of the cancer protection genes.  Familial Cancer: Cancer can also cluster in families that do not have an identifiable mutation. This may be due to shared environmental or lifestyle factors or genetic  risk factors that have not been identified or cannot be detected using current technology or panels.    Risk Assessment  Rohini's maternal family history is suspicious of Hereditary Breast, Ovarian, Pancreatic, and Prostate Cancer Syndrome (HBOPPC). There are several relatives from two generations with histories of ovarian and pancreatic cancer. This history is present on her maternal grandfather and maternal grandmother's side, so it is difficult to pinpoint which side of the family may be at the highest risk of HBOPPC.    Rohini meets NCCN criteria for genetic testing based on her mother's diagnosis of ovarian cancer . Therefore, she was offered phenotype-driven and broad panel testing. We discussed the advantages, disadvantages, and limitations of each test. With that information, Rohini opted for the CancerNext - Expanded + Pancreatis genes panel through [facility]. The following 82  genes associated with hereditary brain, breast, colon, ovarian, pancreatic, prostate, renal, uterine, other cancers, and pancreatitis  are included on this panel: AIP, ALK, APC, NORA, AXIN2, BAP1, BARD1, BMPR1A, BRCA1, BRCA2, BRIP1, CDC73, CDH1, CDK4, CDKN1B, CDKN2A, CEBPA ,CFTR, CHEK2, CPA1, CTNNA1, CTRC, DDX41, DICER1, EGFR, EPCAM, ETV6, FH, FLCN, GATA2, GREM1, HOXB13, KIT, LZTR1, MAX, MBD4, MEN1, MET, MITF, MLH1, MSH2, MSH3, MSH6, MUTYH, NF1, NF2, NTHL1, PALB2, PDGFRA, PHOX2B, PMS2, POLD1, POLE, POT1, VDBIV9I, PRSS1, PTCH1, PTEN, RAD51C, RAD51D, RB1, RET, RPS20, RUNX1, SDHA, SDHAF2, SDHB, SDHC, SDHD, SMAD4, SMARCA4, SMARCB1, SMARCE1, SPINK1  STK11, SUFU, AGBD363, TP53, TSC1, TSC2, VHL, WT1.    Birth control and breast cancer risks  Rohini was curious about the association between breast cancer and oral contraceptive, or the pill. Current research shows that current or recent use of the pill is linked to an increased risk of breast cancer. This increased risk begins to decrease once a woman stopping taking the pill.  After about 5  years of not being on the pill, their risk of breast cancer is similar to risk among women who have never taken birth control before.     Most Informative Person to Test:  Genetic testing usually provides the most information when completed for the family member who is most likely to test positive. This would be someone who had a personal history of suspicious cancer(s) (based on type, age, or number). If this individual tests negative, it is very unlikely that others in the family would have a hereditary cancer risk (unless others also have a suspicious personal history). If this family member tests positive, then testing would be recommended for other relatives.     If someone who does not have a personal history of suspicious cancer has genetic testing, a negative result is much more difficult to interpret (unless there is a known hereditary cancer predisposition in the family). This is called an uninformative negative result. There are several possibilities for a negative result in this situation:    The cancer in the family could be due to a hereditary cancer risk that this individual did not inherit. In this case, a negative result would likely reduce the risk of related cancers.  The cancer in the family may not be due to an identifiable hereditary cancer risk. The cancers in the family may be related to shared environmental or lifestyle factors or a genetic factor that cannot be identified by the test completed using current technology. As a result, the risk of cancer may still be increased based on the family history.    In Rohini's family, the most appropriate individual to have genetic testing for HBOPPC would be her aunt, Sabrina. Unfortunately, that individual may not be interested in having genetic testing. We discussed that Rohini could undergo genetic testing, with the limitation that a negative result will not provide as much information.     Furthermore, we discussed the psychosocial implications  of a positive result, including anxiety, fear and guilt if a mutation is passed on to a child. Rohini did not express any concerns.    Possible Results:  Positive (pathogenic or likely pathogenic variant): A genetic variant was found that is suspected or known to impact the function of the gene. The impact of a positive result on an individual's risk of cancer varies based on the gene, specific variant, individual's sex assigned at birth, personal cancer history, other health history (such as surgical history), and family history. A positive result can impact screening and risk management recommendations. However, there are not always available guidelines for management based on a specific gene variant. Family history and personal risk factors should always be considered. Sometimes, a positive result can also have treatment or reproductive implications.   Negative: No clinically significant variants were reported in the tested genes. A negative result does not indicate that an individual cannot develop cancer or even that the individual is at average risk. An individual may still be at an increased risk for cancer based on personal risk factors or family history. Additionally, there could be a hereditary cancer predisposition that was not included in a chosen panel or is not detected with current technology.   Variant of Uncertain Significance (VUS): A variant was found. However, the lab does not have enough information to determine if the variant is benign (harmless) or pathogenic (impacts the function of the gene). The laboratory may update (reclassify) the variant over time as more information becomes available. When reclassified, most variants of uncertain significance are reclassified to benign/likely benign. Typically, it is not recommended to  based on the presence of a VUS. The chance of finding a VUS varies based on the test performed. Generally, the chance of finding a VUS increases with  the number of genes tested and decreases with the amount of testing of that gene (genes that are tested more frequently or for a longer period of time have a lower VUS rate).    Genetic Mutation Inheritance:  When an individual has a gene mutation, their first-degree relatives (parents, children, and siblings) each have a 50% chance of carrying the same mutation. Other, more distant blood relatives can also be at risk of carrying the same mutation. At-risk relatives of an individual with a mutation should consider genetic testing to help determine their risk for cancer.     Genetic Discrimination:  The Genetic Information Nondiscrimination Act of 2008 (MICHELLE) is a [place] federal law that provides some protections against the use of an individual's genetic information by their health insurer and by their employer. Title I of [place]HELLE prohibits most health insurers (except for insurance obtained through a job with the  or the Federal Employees Health Benefits Plan) from utilizing an individual's genetic information to make decisions regarding insurance eligibility or premium charges. Title II of MICHELLE prohibits covered entities from requesting or requiring the genetic information of employees and applicants and from using said information to make employment decisions. This does not apply to employers with fewer than 15 employees or to the .  MICHELLE also does not protect individuals from genetic discrimination by any other type of policy or entity, including but not limited to life insurance, disability insurance, long-term care insurance,  benefits, and  Health Services benefits.    There is also a possibility for the patient to incur out-of-pocket costs related to this testing. Rohini appeared to have a good understanding of the information as she asked appropriate questions.  Rohini received comprehensive counseling regarding panel testing and has elected to proceed with this testing. A saliva  "sample collection kit will be sent to her home address. Rohini does not have transportation to an [facility] facility, so a blood draw was not feasible.  Rohini's results should be available in approximately 3 weeks after [facility] receives the sample.  In the meantime, she is welcome to contact me if she has any questions, concerns, or updates to her family history.     Breast Cancer Risk Stratification:  Current, Estimated Breast Cancer Risk Model Used Patient's Score Patient's Risk Category   5-year Anabelle Model 0.6%  [] N/A given age <35   [x] Average risk (<1.7%)   [] Increased risk (>=1.7%)   10-year CANRisk Model 2.3%  [x] <5%   [] >=5%    Lifetime (to age 80) CANRisk Model 8.5%  [x] Average risk (<15%)   [] Intermediate risk (>=15% - <20%)   [] Increased risk (>=20%)      There are differences between these models and how her personal and family history affects these risks. With her 5-year risk of breast cancer being < 1.7%, risk-reducing agents do not have to be considered. Due to her <5% 10-year risk to develop breast cancer and < 20% lifetime risk to develop breast cancer she is not eligible for breast MRI. These risk stratifications and recommendations may change with changes in personal history, family history, and genetic testing results.    ASSESSMENT / PLAN      Rohini Dang ("Rohini"), 45 y.o., presented today for hereditary cancer risk assessment and genetic counseling given her family history ovarian cancer. Her maternal family history is suspicious of HBOPPC with multiple relatives diagnosed with ovarian and pancreatic cancer. She has had a bilateral salpingo-oophorectomy, so her risk of developing ovarian cancer is very minimal, but not 0. Her risk of developing pancreatic cancer may be increased as she reported 4 relatives , one second degree relative, with histories of pancreatic cancer. Her genetic test would be considered uninformative, if negative. She elected to proceed with genetic " testing via the CancerNext - Expanded + Pancreatitis gene panel offered by [facility]. The pancreatitis genes were added as she reported a family history of pancreatitis, including her aunt Maribel.       ICD-10-CM ICD-9-CM   1. Encounter for nonprocreative genetic counseling  Z71.83 V26.33   2. Family history of ovarian cancer  Z80.41 V16.41   3. Family history of pancreatitis  Z83.79 V18.59   4. Family history of pancreatic cancer  Z80.0 V16.0   5. Family history of kidney cancer  Z80.51 V16.51   6. Family history of breast cancer  Z80.3 V16.3   7. Family history of lung cancer  Z80.1 V16.1       Genetic Test Information  Genetics lab: [facility]   Genetic test panel: [facility] CancerNext-Expanded   Indication/ICD Codes:  Z80.41, Z83.79 Z80.0, Z80.51, Z80.3, Z80.1   Specimen type: Saliva   Specimen collection by: [facility]    Specimen collection date: unknown   Results expected by: Approximately 3 weeks after the genetic testing lab receives the specimen   Results disclosure plan: Post-test visit if positive or complex result; otherwise, results will be communicated through phone call   Verbal informed consent: Obtained     Follow-up:   I will call the patient with the results of her genetic test. If the result is positive, a follow up appointment will be scheduled to discuss the details of the finding.    Questions were encouraged and answered to the patient's satisfaction, and she verbalized understanding of the information and agreement with the plan.       This assessment is based on the history and reports provided, as well as the current scientific knowledge regarding cancer genetics.         Ellie Cedeno MS, [facility]  Genetic Counselor, Hereditary and High-Risk Clinic  Department of Hematology and Oncology  [facility]

## 2025-07-11 ENCOUNTER — PATIENT MESSAGE (OUTPATIENT)
Dept: HEMATOLOGY/ONCOLOGY | Facility: CLINIC | Age: 45
End: 2025-07-11
Payer: MEDICAID

## 2025-07-16 ENCOUNTER — LAB VISIT (OUTPATIENT)
Dept: LAB | Facility: HOSPITAL | Age: 45
End: 2025-07-16
Attending: STUDENT IN AN ORGANIZED HEALTH CARE EDUCATION/TRAINING PROGRAM
Payer: MEDICAID

## 2025-07-16 ENCOUNTER — HOSPITAL ENCOUNTER (OUTPATIENT)
Dept: RADIOLOGY | Facility: HOSPITAL | Age: 45
Discharge: HOME | End: 2025-07-16
Attending: STUDENT IN AN ORGANIZED HEALTH CARE EDUCATION/TRAINING PROGRAM
Payer: MEDICAID

## 2025-07-16 DIAGNOSIS — I12.9 BENIGN HYPERTENSIVE KIDNEY DISEASE WITH CHRONIC KIDNEY DISEASE STAGE I THROUGH STAGE IV, OR UNSPECIFIED: ICD-10-CM

## 2025-07-16 DIAGNOSIS — M25.562 ACUTE PAIN OF LEFT KNEE: Primary | ICD-10-CM

## 2025-07-16 DIAGNOSIS — N18.2 CHRONIC KIDNEY DISEASE, STAGE II (MILD): Primary | ICD-10-CM

## 2025-07-16 DIAGNOSIS — E11.22 DIABETES MELLITUS WITH CHRONIC KIDNEY DISEASE: ICD-10-CM

## 2025-07-16 DIAGNOSIS — G89.29 CHRONIC HIP PAIN, LEFT: ICD-10-CM

## 2025-07-16 DIAGNOSIS — R13.12 OROPHARYNGEAL DYSPHAGIA: ICD-10-CM

## 2025-07-16 DIAGNOSIS — S73.192D TEAR OF LEFT ACETABULAR LABRUM, SUBSEQUENT ENCOUNTER: ICD-10-CM

## 2025-07-16 DIAGNOSIS — M25.552 CHRONIC HIP PAIN, LEFT: ICD-10-CM

## 2025-07-16 LAB
ABSOLUTE EOSINOPHIL (OHS): 0.23 K/UL
ABSOLUTE MONOCYTE (OHS): 0.69 K/UL (ref 0.3–1)
ABSOLUTE NEUTROPHIL COUNT (OHS): 3.23 K/UL (ref 1.8–7.7)
ALBUMIN SERPL BCP-MCNC: 3.8 G/DL (ref 3.5–5.2)
ALP SERPL-CCNC: 184 UNIT/L (ref 40–150)
ALT SERPL W/O P-5'-P-CCNC: 155 UNIT/L (ref 10–44)
ANION GAP (OHS): 9 MMOL/L (ref 8–16)
AST SERPL-CCNC: 121 UNIT/L (ref 11–45)
BASOPHILS # BLD AUTO: 0.04 K/UL
BASOPHILS NFR BLD AUTO: 0.6 %
BILIRUB SERPL-MCNC: 0.2 MG/DL (ref 0.1–1)
BUN SERPL-MCNC: 10 MG/DL (ref 6–20)
CALCIUM SERPL-MCNC: 9.5 MG/DL (ref 8.7–10.5)
CHLORIDE SERPL-SCNC: 108 MMOL/L (ref 95–110)
CO2 SERPL-SCNC: 27 MMOL/L (ref 23–29)
CREAT SERPL-MCNC: 0.8 MG/DL (ref 0.5–1.4)
CREAT UR-MCNC: 114.6 MG/DL (ref 15–325)
ERYTHROCYTE [DISTWIDTH] IN BLOOD BY AUTOMATED COUNT: 14.5 % (ref 11.5–14.5)
GFR SERPLBLD CREATININE-BSD FMLA CKD-EPI: >60 ML/MIN/1.73/M2
GLUCOSE SERPL-MCNC: 107 MG/DL (ref 70–110)
HCT VFR BLD AUTO: 39.4 % (ref 37–48.5)
HGB BLD-MCNC: 12.8 GM/DL (ref 12–16)
IMM GRANULOCYTES # BLD AUTO: 0.02 K/UL (ref 0–0.04)
IMM GRANULOCYTES NFR BLD AUTO: 0.3 % (ref 0–0.5)
LYMPHOCYTES # BLD AUTO: 2.46 K/UL (ref 1–4.8)
MCH RBC QN AUTO: 27 PG (ref 27–31)
MCHC RBC AUTO-ENTMCNC: 32.5 G/DL (ref 32–36)
MCV RBC AUTO: 83 FL (ref 82–98)
NUCLEATED RBC (/100WBC) (OHS): 0 /100 WBC
PLATELET # BLD AUTO: 221 K/UL (ref 150–450)
PMV BLD AUTO: 11.2 FL (ref 9.2–12.9)
POTASSIUM SERPL-SCNC: 4.1 MMOL/L (ref 3.5–5.1)
PROT SERPL-MCNC: 7.2 GM/DL (ref 6–8.4)
PROT UR-MCNC: 9 MG/DL
PROT/CREAT UR: 0.08 MG/G{CREAT}
RBC # BLD AUTO: 4.74 M/UL (ref 4–5.4)
RELATIVE EOSINOPHIL (OHS): 3.4 %
RELATIVE LYMPHOCYTE (OHS): 36.9 % (ref 18–48)
RELATIVE MONOCYTE (OHS): 10.3 % (ref 4–15)
RELATIVE NEUTROPHIL (OHS): 48.5 % (ref 38–73)
SODIUM SERPL-SCNC: 144 MMOL/L (ref 136–145)
WBC # BLD AUTO: 6.67 K/UL (ref 3.9–12.7)

## 2025-07-16 PROCEDURE — 84156 ASSAY OF PROTEIN URINE: CPT

## 2025-07-16 PROCEDURE — 85025 COMPLETE CBC W/AUTO DIFF WBC: CPT

## 2025-07-16 PROCEDURE — A9698 NON-RAD CONTRAST MATERIALNOC: HCPCS | Performed by: STUDENT IN AN ORGANIZED HEALTH CARE EDUCATION/TRAINING PROGRAM

## 2025-07-16 PROCEDURE — 74220 X-RAY XM ESOPHAGUS 1CNTRST: CPT | Mod: TC

## 2025-07-16 PROCEDURE — 80053 COMPREHEN METABOLIC PANEL: CPT

## 2025-07-16 PROCEDURE — 25500020 PHARM REV CODE 255: Performed by: STUDENT IN AN ORGANIZED HEALTH CARE EDUCATION/TRAINING PROGRAM

## 2025-07-16 PROCEDURE — 36415 COLL VENOUS BLD VENIPUNCTURE: CPT

## 2025-07-16 RX ADMIN — BARIUM SULFATE 135 ML: 980 POWDER, FOR SUSPENSION ORAL at 09:07

## 2025-07-16 RX ADMIN — BARIUM SULFATE 176 G: 960 POWDER, FOR SUSPENSION ORAL at 09:07

## 2025-07-19 ENCOUNTER — HOSPITAL ENCOUNTER (EMERGENCY)
Facility: HOSPITAL | Age: 45
Discharge: HOME | End: 2025-07-19
Attending: EMERGENCY MEDICINE
Payer: MEDICAID

## 2025-07-19 VITALS
HEART RATE: 91 BPM | RESPIRATION RATE: 18 BRPM | BODY MASS INDEX: 26.23 KG/M2 | WEIGHT: 157.44 LBS | HEIGHT: 65 IN | SYSTOLIC BLOOD PRESSURE: 137 MMHG | TEMPERATURE: 98 F | DIASTOLIC BLOOD PRESSURE: 83 MMHG | OXYGEN SATURATION: 99 %

## 2025-07-19 DIAGNOSIS — R05.9 COUGH WITH FEVER: ICD-10-CM

## 2025-07-19 DIAGNOSIS — R50.9 COUGH WITH FEVER: ICD-10-CM

## 2025-07-19 DIAGNOSIS — J20.9 ACUTE BRONCHITIS, UNSPECIFIED ORGANISM: Primary | ICD-10-CM

## 2025-07-19 PROCEDURE — 99284 EMERGENCY DEPT VISIT MOD MDM: CPT | Mod: 25

## 2025-07-19 RX ORDER — DOXYCYCLINE 100 MG/1
100 CAPSULE ORAL 2 TIMES DAILY
Qty: 20 CAPSULE | Refills: 0 | Status: SHIPPED | OUTPATIENT
Start: 2025-07-19 | End: 2025-07-29

## 2025-07-19 RX ORDER — BENZONATATE 100 MG/1
100 CAPSULE ORAL 3 TIMES DAILY PRN
Qty: 20 CAPSULE | Refills: 0 | Status: SHIPPED | OUTPATIENT
Start: 2025-07-19 | End: 2025-07-29

## 2025-07-19 NOTE — ED PROVIDER NOTES
"Encounter Date: 7/19/2025       History     Chief Complaint   Patient presents with    Cough     Cough with soreness to chest wall, left ear pain x 2 weeks. "Dr. Fitzgerald diagnosed her with bacterial upper respiratory but he even said it was like almost pneumonia. It came back worse."  Patient states "Im on macrobid every day for renal failure. Not fun when its coupled with double pneumonia."     45-year-old female with complaints of cough x2 weeks.  Associated low-grade fever.  Patient was seen here in ER about 2 weeks ago treated for bacterial upper respiratory infection.  Patient just has continued cough since that.  Patient has history of anxiety disorder, bipolar disorder, borderline personality disorder, fibromyalgia, renal disorder.      Review of patient's allergies indicates:   Allergen Reactions    Amoxicillin Hives    Avelox [moxifloxacin] Hives    Cherries Hives    Coconut oil Blisters    Desloratadine Hives     Other reaction(s): Unknown    Kiwi Blisters    Pcn [penicillins] Hives    Pineapple Blisters    Vimpat [lacosamide] Hallucinations    Claritin [loratadine] Palpitations    Latex, natural rubber Rash     Past Medical History:   Diagnosis Date    Anxiety disorder     Arthritis     Bipolar 2 disorder     Borderline personality disorder     Chronic bilateral low back pain without sciatica     Depression with anxiety     Elevated LFTs     Fatty liver     Fibromyalgia     Intermittent explosive disorder     Liver disease     JAMESON (nonalcoholic steatohepatitis)     Renal disorder     Type 2 diabetes, controlled, with peripheral neuropathy      Past Surgical History:   Procedure Laterality Date    COLONOSCOPY N/A 03/20/2018    Procedure: COLONOSCOPY;  Surgeon: Jnaelle Meade MD;  Location: [facility];  Service: Endoscopy;  Laterality: N/A;    COLONOSCOPY N/A 05/11/2021    Procedure: COLONOSCOPY;  Surgeon: Janelle Meade MD;  Location: [facility];  Service: Endoscopy;  Laterality: N/A;    " ESOPHAGEAL DILATION      x2    ESOPHAGOGASTRODUODENOSCOPY N/A 05/07/2019    Procedure: EGD (ESOPHAGOGASTRODUODENOSCOPY);  Surgeon: Janelle Meade MD;  Location: [facility];  Service: Endoscopy;  Laterality: N/A;    ESOPHAGOGASTRODUODENOSCOPY N/A 05/11/2021    Procedure: EGD (ESOPHAGOGASTRODUODENOSCOPY);  Surgeon: Janelle Meade MD;  Location: [facility];  Service: Endoscopy;  Laterality: N/A;  Rapid on arrival    HIATAL HERNIA REPAIR      HYSTERECTOMY      LIVER BIOPSY  2018    fibrosis stage 3 JAMESON    OOPHORECTOMY      TUBAL LIGATION      UPPER GASTROINTESTINAL ENDOSCOPY      2013? unable to obtain records     Family History   Problem Relation Name Age of Onset    Ovarian cancer Mother Amparo 28        Tx: hysterectomy    Hypertension Mother Amparo     Clotting disorder Father Kale North Sr.     Ulcers Father Kale Sanonbella North Sr.     Endometriosis Sister Judie Sims         s/p hyterectomy    Clotting disorder Sister Judie Sims     HPV (human papilloma virus) anogenital infection Daughter Anitra North     No Known Problems Paternal Uncle Yemi North     Cervical cancer Maternal Grandmother Cheryl carter sergei 30    No Known Problems Maternal Grandfather      Liver disease Paternal Grandmother Tori bautista         cirrosis or cancer    Lung cancer Paternal Grandmother Tori bautista 70    No Known Problems Paternal Grandfather      Hepatitis B Half-sister Yuliya Almanzar Rocky     Cirrhosis Half-brother Yemi Brandon North     Ovarian cancer Maternal Aunt Sabrina Laird 28        Tx: hysterectomy    Cancer Maternal Aunt Anna Cason 55        started as fluid around the heart and metastasized    Pancreatitis Maternal Aunt Maribel Monsalve     Pancreatic cancer Maternal Aunt Maribeldion Monsalve 46    Ovarian cancer Maternal Aunt Maribel Monsalve     Kidney cancer Paternal Uncle Kahlil North 71        terminal    Pancreatic cancer Other  Tyson 73    Pancreatic cancer Other Richelle 64    Lung cancer Other Decator     Cancer Other Chery     Lung cancer Other Augie     Pancreatic cancer Other Alva 58    Ovarian cancer Other Myriam 48    Breast cancer Other Sherrie Vilma     SIDS Half-sister      BRCA 1/2 Neg Hx       Social History[1]  Review of Systems   Constitutional:  Negative for fever.   HENT:  Negative for sore throat.    Respiratory:  Positive for cough. Negative for shortness of breath, wheezing and stridor.    Cardiovascular:  Negative for chest pain.   Gastrointestinal:  Negative for nausea.   Genitourinary:  Negative for dysuria.   Musculoskeletal:  Negative for back pain.   Skin:  Negative for rash.   Neurological:  Negative for weakness.   Hematological:  Does not bruise/bleed easily.       Physical Exam     Initial Vitals [07/19/25 1236]   BP Pulse Resp Temp SpO2   (!) 105/91 106 18 99.1 °F (37.3 °C) 99 %      MAP       --         Physical Exam    Nursing note and vitals reviewed.  Constitutional: Vital signs are normal. She appears well-developed and well-nourished. She is cooperative.   HENT:   Head: Normocephalic and atraumatic.   Right Ear: Hearing and external ear normal.   Left Ear: Hearing and external ear normal.   Nose: Nose normal. Mouth/Throat: Uvula is midline, oropharynx is clear and moist and mucous membranes are normal.   Eyes: Conjunctivae, EOM and lids are normal. Pupils are equal, round, and reactive to light.   Neck: Trachea normal. Neck supple.   Normal range of motion.   Full passive range of motion without pain.     Cardiovascular:  Normal rate, regular rhythm, S1 normal, S2 normal, normal heart sounds and normal pulses.           Pulmonary/Chest: Effort normal and breath sounds normal.   Musculoskeletal:      Cervical back: Full passive range of motion without pain, normal range of motion and neck supple.     Neurological: She is alert.         ED Course   Procedures  Labs Reviewed - No data to display        Imaging Results              X-Ray Chest 1 View (Final result)  Result time 07/19/25 13:49:53      Final result by Shade Cardenas MD (07/19/25 13:49:53)                   Impression:      No evidence of cardiopulmonary disease.      Electronically signed by: Shade Cardenas MD  Date:    07/19/2025  Time:    13:49               Narrative:    EXAMINATION:  XR CHEST 1 VIEW    CLINICAL HISTORY:  Cough, unspecified    COMPARISON:  None.    FINDINGS:  Frontal chest radiograph demonstrates clear lungs.  No pleural fluid.  Cardiomediastinal silhouette is unremarkable.  No osseous abnormality.                                    X-Rays:   Independently Interpreted Readings:   Other Readings:  No pneumonia noted    Medications - No data to display  Medical Decision Making  After history and physical exam x-ray was normal.  Discussed with patient that she may have a bronchitis.  Will give patient doxycycline Tessalon Perles and encourage use of albuterol pump.  DC home to follow-up with PCP    Amount and/or Complexity of Data Reviewed  Radiology: ordered.                                          Clinical Impression:  Final diagnoses:  [R05.9, R50.9] Cough with fever  [J20.9] Acute bronchitis, unspecified organism (Primary)                       [1]   Social History  Tobacco Use    Smoking status: Never     Passive exposure: Never    Smokeless tobacco: Never   Substance Use Topics    Alcohol use: No     Alcohol/week: 0.0 standard drinks of alcohol    Drug use: No        Tejas Proctor III, NP  07/19/25 1400

## 2025-07-22 ENCOUNTER — OFFICE VISIT (OUTPATIENT)
Dept: PRIMARY CARE CLINIC | Facility: CLINIC | Age: 45
End: 2025-07-22
Payer: MEDICAID

## 2025-07-22 ENCOUNTER — HOSPITAL ENCOUNTER (EMERGENCY)
Facility: HOSPITAL | Age: 45
Discharge: HOME | End: 2025-07-22
Attending: EMERGENCY MEDICINE
Payer: MEDICAID

## 2025-07-22 VITALS
DIASTOLIC BLOOD PRESSURE: 69 MMHG | HEIGHT: 65 IN | SYSTOLIC BLOOD PRESSURE: 129 MMHG | BODY MASS INDEX: 26.26 KG/M2 | HEART RATE: 100 BPM | WEIGHT: 157.63 LBS | OXYGEN SATURATION: 96 %

## 2025-07-22 VITALS
OXYGEN SATURATION: 100 % | HEART RATE: 98 BPM | RESPIRATION RATE: 18 BRPM | SYSTOLIC BLOOD PRESSURE: 140 MMHG | TEMPERATURE: 98 F | DIASTOLIC BLOOD PRESSURE: 80 MMHG

## 2025-07-22 DIAGNOSIS — R07.81 RIB PAIN ON RIGHT SIDE: ICD-10-CM

## 2025-07-22 DIAGNOSIS — R05.1 ACUTE COUGH: ICD-10-CM

## 2025-07-22 DIAGNOSIS — H66.92 LEFT ACUTE OTITIS MEDIA: Primary | ICD-10-CM

## 2025-07-22 PROCEDURE — 1160F RVW MEDS BY RX/DR IN RCRD: CPT | Mod: CPTII,,, | Performed by: NURSE PRACTITIONER

## 2025-07-22 PROCEDURE — 1159F MED LIST DOCD IN RCRD: CPT | Mod: CPTII,,, | Performed by: NURSE PRACTITIONER

## 2025-07-22 PROCEDURE — 3061F NEG MICROALBUMINURIA REV: CPT | Mod: CPTII,,, | Performed by: NURSE PRACTITIONER

## 2025-07-22 PROCEDURE — 99999 PR PBB SHADOW E&M-EST. PATIENT-LVL V: CPT | Mod: PBBFAC,,, | Performed by: NURSE PRACTITIONER

## 2025-07-22 PROCEDURE — 3078F DIAST BP <80 MM HG: CPT | Mod: CPTII,,, | Performed by: NURSE PRACTITIONER

## 2025-07-22 PROCEDURE — 99213 OFFICE O/P EST LOW 20 MIN: CPT | Mod: S$PBB,,, | Performed by: NURSE PRACTITIONER

## 2025-07-22 PROCEDURE — 99215 OFFICE O/P EST HI 40 MIN: CPT | Mod: PBBFAC,25 | Performed by: NURSE PRACTITIONER

## 2025-07-22 PROCEDURE — 96372 THER/PROPH/DIAG INJ SC/IM: CPT | Performed by: CLINICAL NURSE SPECIALIST

## 2025-07-22 PROCEDURE — 99284 EMERGENCY DEPT VISIT MOD MDM: CPT | Mod: 25,27

## 2025-07-22 PROCEDURE — 3044F HG A1C LEVEL LT 7.0%: CPT | Mod: CPTII,,, | Performed by: NURSE PRACTITIONER

## 2025-07-22 PROCEDURE — 3066F NEPHROPATHY DOC TX: CPT | Mod: CPTII,,, | Performed by: NURSE PRACTITIONER

## 2025-07-22 PROCEDURE — 63600175 PHARM REV CODE 636 W HCPCS: Performed by: CLINICAL NURSE SPECIALIST

## 2025-07-22 PROCEDURE — 3074F SYST BP LT 130 MM HG: CPT | Mod: CPTII,,, | Performed by: NURSE PRACTITIONER

## 2025-07-22 PROCEDURE — 3008F BODY MASS INDEX DOCD: CPT | Mod: CPTII,,, | Performed by: NURSE PRACTITIONER

## 2025-07-22 RX ORDER — AZITHROMYCIN 250 MG/1
TABLET, FILM COATED ORAL
Qty: 6 TABLET | Refills: 0 | Status: SHIPPED | OUTPATIENT
Start: 2025-07-22 | End: 2025-07-27

## 2025-07-22 RX ORDER — MORPHINE SULFATE 2 MG/ML
2 INJECTION, SOLUTION INTRAMUSCULAR; INTRAVENOUS ONCE
Refills: 0 | Status: COMPLETED | OUTPATIENT
Start: 2025-07-22 | End: 2025-07-22

## 2025-07-22 RX ORDER — KETOROLAC TROMETHAMINE 10 MG/1
10 TABLET, FILM COATED ORAL EVERY 6 HOURS
Qty: 20 TABLET | Refills: 0 | Status: SHIPPED | OUTPATIENT
Start: 2025-07-22 | End: 2025-07-27

## 2025-07-22 RX ORDER — TIZANIDINE 4 MG/1
4 TABLET ORAL EVERY 6 HOURS PRN
Qty: 15 TABLET | Refills: 0 | Status: SHIPPED | OUTPATIENT
Start: 2025-07-22 | End: 2025-07-30 | Stop reason: SDUPTHER

## 2025-07-22 RX ADMIN — MORPHINE SULFATE 2 MG: 2 INJECTION, SOLUTION INTRAMUSCULAR; INTRAVENOUS at 01:07

## 2025-07-22 NOTE — ED PROVIDER NOTES
"Encounter Date: 7/22/2025       History     Chief Complaint   Patient presents with    Cough     Pt stated that she has been experiencing frequent cough - felt "pop" in right side of ribcage and has been experiencing severe pain since then referred to ED from Mandie Miller's office.      45-year-old female presents to the emergency room with right severe rib pain after coughing, in which she felt a pop x2.  Patient recently had bronchitis and has been having a chronic cough.  Patient saw her PCP today and was referred to the emergency room for evaluation /chest x-ray due to having severe right rib pain.  Diagnosed with left otitis media and was placed on a Z-Russel at her PCP office.        Review of patient's allergies indicates:   Allergen Reactions    Amoxicillin Hives    Avelox [moxifloxacin] Hives    Cherries Hives    Coconut oil Blisters    Desloratadine Hives     Other reaction(s): Unknown    Kiwi Blisters    Pcn [penicillins] Hives    Pineapple Blisters    Vimpat [lacosamide] Hallucinations    Claritin [loratadine] Palpitations    Latex, natural rubber Rash     Past Medical History:   Diagnosis Date    Anxiety disorder     Arthritis     Bipolar 2 disorder     Borderline personality disorder     Chronic bilateral low back pain without sciatica     Depression with anxiety     Elevated LFTs     Fatty liver     Fibromyalgia     Intermittent explosive disorder     Liver disease     JAMESON (nonalcoholic steatohepatitis)     Renal disorder     Type 2 diabetes, controlled, with peripheral neuropathy      Past Surgical History:   Procedure Laterality Date    COLONOSCOPY N/A 03/20/2018    Procedure: COLONOSCOPY;  Surgeon: Janelle Meade MD;  Location: [facility];  Service: Endoscopy;  Laterality: N/A;    COLONOSCOPY N/A 05/11/2021    Procedure: COLONOSCOPY;  Surgeon: Janelle Meade MD;  Location: [facility];  Service: Endoscopy;  Laterality: N/A;    ESOPHAGEAL DILATION      x2    ESOPHAGOGASTRODUODENOSCOPY " N/A 05/07/2019    Procedure: EGD (ESOPHAGOGASTRODUODENOSCOPY);  Surgeon: Janelle Meade MD;  Location: [facility];  Service: Endoscopy;  Laterality: N/A;    ESOPHAGOGASTRODUODENOSCOPY N/A 05/11/2021    Procedure: EGD (ESOPHAGOGASTRODUODENOSCOPY);  Surgeon: Janelle Meade MD;  Location: [facility];  Service: Endoscopy;  Laterality: N/A;  Rapid on arrival    HIATAL HERNIA REPAIR      HYSTERECTOMY      LIVER BIOPSY  2018    fibrosis stage 3 JAMESON    OOPHORECTOMY      TUBAL LIGATION      UPPER GASTROINTESTINAL ENDOSCOPY      2013? unable to obtain records     Family History   Problem Relation Name Age of Onset    Ovarian cancer Mother Amparo 28        Tx: hysterectomy    Hypertension Mother Amparo     Clotting disorder Father Kale North Sr.     Ulcers Father Kale North Sr.     Endometriosis Sister Judie Sims         s/p hyterectomy    Clotting disorder Sister Judie Sims     HPV (human papilloma virus) anogenital infection Daughter Anitra North     No Known Problems Paternal Uncle Yemi Colt North     Cervical cancer Maternal Grandmother Cherylanthony carter sergei 30    No Known Problems Maternal Grandfather      Liver disease Paternal Grandmother Tori diazen         cirrosis or cancer    Lung cancer Paternal Grandmother Tori bautista 70    No Known Problems Paternal Grandfather      Hepatitis B Half-sister Yuliya Almanzar Rocky     Cirrhosis Half-brother Yemi Brandon North     Ovarian cancer Maternal Aunt Sabrina Laird 28        Tx: hysterectomy    Cancer Maternal Aunt Anna Cason 55        started as fluid around the heart and metastasized    Pancreatitis Maternal Aunt Maribel Monsalve     Pancreatic cancer Maternal Aunt Maribel Monsalve 46    Ovarian cancer Maternal Aunt Maribel Monsalve     Kidney cancer Paternal Uncle Kahlil North 71        terminal    Pancreatic cancer Other Tyson 73    Pancreatic cancer Other Richelle 64    Lung cancer  Other Decator     Cancer Other Chery     Lung cancer Other Augie     Pancreatic cancer Other Alva 58    Ovarian cancer Other Myriam 48    Breast cancer Other Sherrie Vilma     SIDS Half-sister      BRCA 1/2 Neg Hx       Social History[1]  Review of Systems   Constitutional:  Negative for fever.   HENT:  Negative for sore throat.    Respiratory:  Positive for cough. Negative for shortness of breath.    Cardiovascular:  Positive for chest pain.   Gastrointestinal:  Negative for nausea.   Genitourinary:  Positive for flank pain. Negative for dysuria.   Musculoskeletal:  Positive for arthralgias and myalgias. Negative for back pain.   Skin:  Negative for rash.   Neurological:  Negative for weakness.   Hematological:  Does not bruise/bleed easily.   All other systems reviewed and are negative.      Physical Exam     Initial Vitals [07/22/25 1328]   BP Pulse Resp Temp SpO2   (!) 145/83 100 20 -- 97 %      MAP       --         Physical Exam    Nursing note and vitals reviewed.  Constitutional: She appears well-developed and well-nourished.   HENT:   Head: Normocephalic and atraumatic.   Eyes: Pupils are equal, round, and reactive to light.   Neck:   Normal range of motion.  Cardiovascular:  Normal rate and regular rhythm.           Pulmonary/Chest: Breath sounds normal. She exhibits tenderness.   Right rib pain/tenderness   Abdominal: Abdomen is soft. Bowel sounds are normal.   Musculoskeletal:         General: Tenderness present. Normal range of motion.      Cervical back: Normal range of motion.     Neurological: She is alert and oriented to person, place, and time.   Skin: Skin is warm and dry.   Psychiatric: She has a normal mood and affect.         ED Course   Procedures  Labs Reviewed - No data to display       Imaging Results              X-Ray Chest AP Portable (Final result)  Result time 07/22/25 14:10:04      Final result by Linda Hooper MD (07/22/25 14:10:04)                   Impression:      Mild  chronic changes and no acute lung disease.      Electronically signed by: Linda Hooper MD  Date:    07/22/2025  Time:    14:10               Narrative:    EXAMINATION:  XR CHEST AP PORTABLE    CLINICAL HISTORY:  Pleurodynia    TECHNIQUE:  portable chest x-ray    COMPARISON:  07/16/2022.  <Comparisons>    FINDINGS:  There are mild chronic changes with no acute infiltrates or effusions.                                       Medications   morphine injection 2 mg (2 mg Intramuscular Given 7/22/25 1342)     Medical Decision Making  Amount and/or Complexity of Data Reviewed  Radiology: ordered.    Risk  Prescription drug management.                                          Clinical Impression:  Final diagnoses:  [R07.81] Rib pain on right side          ED Disposition Condition    Discharge Stable          ED Prescriptions       Medication Sig Dispense Start Date End Date Auth. Provider    tiZANidine (ZANAFLEX) 4 MG tablet Take 1 tablet (4 mg total) by mouth every 6 (six) hours as needed. 15 tablet 7/22/2025 8/1/2025 Ana Dickey NP    ketorolac (TORADOL) 10 mg tablet Take 1 tablet (10 mg total) by mouth every 6 (six) hours. for 5 days 20 tablet 7/22/2025 7/27/2025 Ana Dickey NP          Follow-up Information       Follow up With Specialties Details Why Contact Info    Mandie Miller NP Family Medicine  As needed [address]   [place] [address]  [phone]                     [1]   Social History  Tobacco Use    Smoking status: Never     Passive exposure: Never    Smokeless tobacco: Never   Substance Use Topics    Alcohol use: No     Alcohol/week: 0.0 standard drinks of alcohol    Drug use: No        Ana Dickey NP  07/22/25 2597

## 2025-07-22 NOTE — PROGRESS NOTES
[facility] Note    HPI:  Rohini Dang is a 45 y.o. female who presents today for Cough and Otalgia     Patient hollering in pain during exam with severe right sided rib pain, advised to go to ER for further evaluation and treatment    Review of Systems   HENT:  Positive for ear pain (left) and sore throat. Negative for ear discharge and hearing loss.    Respiratory:  Positive for cough.    Gastrointestinal:  Positive for diarrhea. Negative for abdominal pain and vomiting.   Musculoskeletal:  Positive for neck pain.   Skin:  Negative for rash.   Neurological:  Positive for headaches.    Protective Sensation (w/ 10 gram monofilament):  Right: Decreased  Left: Decreased    Visual Inspection:  Normal -  Bilateral    Pedal Pulses:   Right: Present  Left: Present    Posterior Tibialis Pulses:   Right:Present  Left: Present   A review of systems was performed and was negative except as noted above.    I personally reviewed allergies, past medical, surgical, social and family history and updated as appropriate.    Medications:  Current Medications[1]     Health Maintenance:  Immunization History   Administered Date(s) Administered    COVID-19 MRNA, LN-S PF (MODERNA HALF 0.25 ML DOSE) 12/28/2021    COVID-19, MRNA, LN-S, PF (MODERNA FULL 0.5 ML DOSE) 03/10/2021, 04/07/2021    Influenza - Quadrivalent - PF *Preferred* (6 months and older) 08/26/2013, 09/04/2014, 09/24/2020, 09/20/2023    Influenza - Trivalent - Afluria, Fluzone MDV 08/26/2013, 09/04/2014    Pneumococcal Conjugate - 20 Valent 06/02/2025    Pneumococcal Polysaccharide - 23 Valent 08/26/2013    Tdap 06/02/2025      Health Maintenance   Topic Date Due    Diabetic Eye Exam  09/06/2023    COVID-19 Vaccine (4 - 2024-25 season) 06/02/2026 (Originally 9/1/2024)    Influenza Vaccine (1) 09/01/2025    Hemoglobin A1c  12/02/2025    Colorectal Cancer Screening  05/11/2026    Diabetes Urine Screening  06/02/2026    Lipid Panel  06/02/2026     "Mammogram  06/19/2026    Foot Exam  07/22/2026    DEXA Scan  06/19/2027    TETANUS VACCINE  06/02/2035    RSV Vaccine (Age 60+ and Pregnant patients) (1 - 1-dose 75+ series) 02/03/2053    Hepatitis C Screening  Completed    HIV Screening  Completed    Pneumococcal Vaccines (Age 0-49)  Completed     Health Maintenance Topics with due status: Not Due       Topic Last Completion Date    Colorectal Cancer Screening 05/11/2021    Influenza Vaccine 09/20/2023    TETANUS VACCINE 06/02/2025    Diabetes Urine Screening 06/02/2025    Lipid Panel 06/02/2025    Hemoglobin A1c 06/02/2025    Mammogram 06/19/2025    DEXA Scan 06/19/2025    Foot Exam 07/22/2025    RSV Vaccine (Age 60+ and Pregnant patients) Not Due     Health Maintenance Due   Topic Date Due    Diabetic Eye Exam  09/06/2023       PHYSICAL EXAM:  Vitals:    07/22/25 1304   BP: 129/69   BP Location: Left arm   Patient Position: Sitting   Pulse: 100   SpO2: 96%   Weight: 71.5 kg (157 lb 9.6 oz)   Height: 5' 5" (1.651 m)     Body mass index is 26.23 kg/m².  Physical Exam  Constitutional:       Appearance: Normal appearance. She is normal weight.   HENT:      Head: Normocephalic.      Right Ear: Tympanic membrane, ear canal and external ear normal.      Left Ear: Tympanic membrane, ear canal and external ear normal.      Nose: Nose normal.      Mouth/Throat:      Mouth: Mucous membranes are moist.   Cardiovascular:      Rate and Rhythm: Normal rate and regular rhythm.      Pulses: Normal pulses.      Heart sounds: Normal heart sounds.   Pulmonary:      Effort: Pulmonary effort is normal.      Breath sounds: Normal breath sounds.   Abdominal:      General: Abdomen is flat.   Musculoskeletal:         General: Tenderness (right rib) present. Normal range of motion.      Cervical back: Normal range of motion.   Skin:     General: Skin is warm and dry.   Neurological:      General: No focal deficit present.      Mental Status: She is alert and oriented to person, place, and " time.          ASSESSMENT/PLAN:  1. Left acute otitis media  -     azithromycin (Z-WILFRID) 250 MG tablet; Take 2 tablets by mouth on day 1; Take 1 tablet by mouth on days 2-5  Dispense: 6 tablet; Refill: 0   STOP DOXYCYCLINE    2. Acute cough  -     X-Ray Chest PA And Lateral; Future; Expected date: 07/22/2025    3. Rib pain on right side  -     X-Ray Chest PA And Lateral; Future; Expected date: 07/22/2025        Other than changes above, continue current medications and maintain follow up with specialists.      Follow up if symptoms worsen or fail to improve.   Recent Results (from the past 12 weeks)   Urinalysis, Reflex to Urine Culture Urine, Clean Catch    Collection Time: 05/24/25  1:46 PM    Specimen: Urine, Clean Catch   Result Value Ref Range    Color, UA Yellow Straw, Jaylene, Yellow, Light-Orange    Appearance, UA Cloudy (A) Clear    pH, UA 6.0 5.0 - 8.0    Spec Grav UA >=1.030 (A) 1.005 - 1.030    Protein, UA Trace (A) Negative    Glucose, UA 4+ (A) Negative    Ketones, UA 1+ (A) Negative    Bilirubin, UA Negative Negative    Blood, UA Negative Negative    Nitrites, UA Negative Negative    Urobilinogen, UA Negative <2.0 EU/dL    Leukocyte Esterase, UA Negative Negative   GREY TOP URINE HOLD    Collection Time: 05/24/25  1:46 PM   Result Value Ref Range    Extra Tube hold    Urinalysis Microscopic    Collection Time: 05/24/25  1:46 PM   Result Value Ref Range    RBC, UA 1 0 - 4 /HPF    WBC, UA 2 0 - 5 /HPF    Bacteria, UA Rare None, Rare, Occasional /HPF    Yeast, UA None None /HPF    Squamous Epithelial Cells, UA 8 /HPF    Hyaline Casts, UA 8 (H) 0 - 1 /LPF    Microscopic Comment     Comprehensive metabolic panel    Collection Time: 05/24/25  1:49 PM   Result Value Ref Range    Sodium 139 136 - 145 mmol/L    Potassium 4.0 3.5 - 5.1 mmol/L    Chloride 104 95 - 110 mmol/L    CO2 23 23 - 29 mmol/L    Glucose 105 70 - 110 mg/dL    BUN 20 6 - 20 mg/dL    Creatinine 0.6 0.5 - 1.4 mg/dL    Calcium 9.5 8.7 - 10.5  mg/dL    Protein Total 7.6 6.0 - 8.4 gm/dL    Albumin 4.2 3.5 - 5.2 g/dL    Bilirubin Total 0.5 0.1 - 1.0 mg/dL     (H) 40 - 150 unit/L    AST 46 (H) 11 - 45 unit/L    ALT 46 (H) 10 - 44 unit/L    Anion Gap 12 8 - 16 mmol/L    eGFR >60 >60 mL/min/1.73/m2   Lipase    Collection Time: 05/24/25  1:49 PM   Result Value Ref Range    Lipase Level 29 4 - 60 U/L   CBC with Differential    Collection Time: 05/24/25  1:49 PM   Result Value Ref Range    WBC 8.44 3.90 - 12.70 K/uL    RBC 5.03 4.00 - 5.40 M/uL    HGB 13.9 12.0 - 16.0 gm/dL    HCT 42.0 37.0 - 48.5 %    MCV 84 82 - 98 fL    MCH 27.6 27.0 - 31.0 pg    MCHC 33.1 32.0 - 36.0 g/dL    RDW 13.3 11.5 - 14.5 %    Platelet Count 231 150 - 450 K/uL    MPV 11.4 9.2 - 12.9 fL    Nucleated RBC 0 <=0 /100 WBC    Neut % 54.1 38 - 73 %    Lymph % 37.2 18 - 48 %    Mono % 7.0 4 - 15 %    Eos % 0.9 <=8 %    Basophil % 0.6 <=1.9 %    Imm Grans % 0.2 0.0 - 0.5 %    Neut # 4.56 1.8 - 7.7 K/uL    Lymph # 3.14 1 - 4.8 K/uL    Mono # 0.59 0.3 - 1 K/uL    Eos # 0.08 <=0.5 K/uL    Baso # 0.05 <=0.2 K/uL    Imm Grans # 0.02 0.00 - 0.04 K/uL   Vitamin D    Collection Time: 06/02/25  4:23 PM   Result Value Ref Range    Vitamin D 45 30 - 96 ng/mL   Microalbumin/Creatinine Ratio, Urine    Collection Time: 06/02/25  4:23 PM   Result Value Ref Range    Urine Microalbumin <5.0   ug/mL    Urine Creatinine 10.0 (L) 15.0 - 325.0 mg/dL    Microalbumin/Creatinine Ratio Urine     Vitamin B12    Collection Time: 06/02/25  4:23 PM   Result Value Ref Range    Vitamin B12 458 210 - 950 pg/mL   Iron and TIBC    Collection Time: 06/02/25  4:23 PM   Result Value Ref Range    Iron Level 89 30 - 160 ug/dL    Transferrin 282 200 - 375 mg/dL    Iron Binding Capacity Total 417 250 - 450 ug/dL    Iron Saturation 21 20 - 50 %   Ferritin    Collection Time: 06/02/25  4:23 PM   Result Value Ref Range    Ferritin 185.3 20.0 - 300.0 ng/mL   Hemoglobin A1C    Collection Time: 06/02/25  4:23 PM   Result Value Ref  Range    Hemoglobin A1c 6.4 (H) 4.0 - 5.6 %    Estimated Average Glucose 137 (H) 68 - 131 mg/dL   Lipid Panel    Collection Time: 06/02/25  4:23 PM   Result Value Ref Range    Cholesterol Total 296 (H) 120 - 199 mg/dL    Triglyceride 364 (H) 30 - 150 mg/dL    HDL Cholesterol 38 (L) 40 - 75 mg/dL    LDL Cholesterol 185.2 (H) 63.0 - 159.0 mg/dL    HDL/Cholesterol Ratio 12.8 (L) 20.0 - 50.0 %    Cholesterol/HDL Ratio 7.8 (H) 2.0 - 5.0    Non HDL Cholesterol 258 mg/dL   TSH    Collection Time: 06/02/25  4:23 PM   Result Value Ref Range    TSH 3.362 0.400 - 4.000 uIU/mL   Comprehensive Metabolic Panel    Collection Time: 06/02/25  4:23 PM   Result Value Ref Range    Sodium 143 136 - 145 mmol/L    Potassium 3.6 3.5 - 5.1 mmol/L    Chloride 103 95 - 110 mmol/L    CO2 28 23 - 29 mmol/L    Glucose 93 70 - 110 mg/dL    BUN 17 6 - 20 mg/dL    Creatinine 0.9 0.5 - 1.4 mg/dL    Calcium 10.1 8.7 - 10.5 mg/dL    Protein Total 8.4 6.0 - 8.4 gm/dL    Albumin 4.7 3.5 - 5.2 g/dL    Bilirubin Total 0.4 0.1 - 1.0 mg/dL     (H) 40 - 150 unit/L    AST 77 (H) 11 - 45 unit/L    ALT 92 (H) 10 - 44 unit/L    Anion Gap 12 8 - 16 mmol/L    eGFR >60 >60 mL/min/1.73/m2   CBC with Differential    Collection Time: 06/02/25  4:23 PM   Result Value Ref Range    WBC 10.32 3.90 - 12.70 K/uL    RBC 5.30 4.00 - 5.40 M/uL    HGB 14.6 12.0 - 16.0 gm/dL    HCT 45.1 37.0 - 48.5 %    MCV 85 82 - 98 fL    MCH 27.5 27.0 - 31.0 pg    MCHC 32.4 32.0 - 36.0 g/dL    RDW 13.8 11.5 - 14.5 %    Platelet Count 309 150 - 450 K/uL    MPV 11.6 9.2 - 12.9 fL    Nucleated RBC 0 <=0 /100 WBC    Neut % 49.6 38 - 73 %    Lymph % 37.9 18 - 48 %    Mono % 10.0 4 - 15 %    Eos % 1.6 <=8 %    Basophil % 0.7 <=1.9 %    Imm Grans % 0.2 0.0 - 0.5 %    Neut # 5.13 1.8 - 7.7 K/uL    Lymph # 3.91 1 - 4.8 K/uL    Mono # 1.03 (H) 0.3 - 1 K/uL    Eos # 0.16 <=0.5 K/uL    Baso # 0.07 <=0.2 K/uL    Imm Grans # 0.02 0.00 - 0.04 K/uL   Urinalysis, Reflex to Urine Culture Urine, Clean  Catch    Collection Time: 06/02/25  4:23 PM    Specimen: Urine, Clean Catch   Result Value Ref Range    Color, UA Yellow Straw, Jaylene, Yellow, Light-Orange    Appearance, UA Clear Clear    pH, UA 7.0 5.0 - 8.0    Spec Grav UA 1.010 1.005 - 1.030    Protein, UA Negative Negative    Glucose, UA 4+ (A) Negative    Ketones, UA Negative Negative    Bilirubin, UA Negative Negative    Blood, UA Negative Negative    Nitrites, UA Negative Negative    Urobilinogen, UA Negative <2.0 EU/dL    Leukocyte Esterase, UA Negative Negative   GREY TOP URINE HOLD    Collection Time: 06/02/25  4:23 PM   Result Value Ref Range    Extra Tube HOLD    Urinalysis Microscopic    Collection Time: 06/02/25  4:23 PM   Result Value Ref Range    RBC, UA 0 0 - 4 /HPF    WBC, UA 0 0 - 5 /HPF    Bacteria, UA None None, Rare, Occasional /HPF    Yeast, UA None None /HPF    Squamous Epithelial Cells, UA 1 /HPF    Hyaline Casts, UA 0 0 - 1 /LPF    Microscopic Comment     Vaginosis Screen by DNA Probe    Collection Time: 06/05/25  1:52 PM   Result Value Ref Range    Bacterial vaginosis DNA Not Detected Not Detected, Invalid    Candida species DNA Not Detected Not Detected, Invalid    Candida glabrata/krusei DNA Detected (A) Not Detected, Invalid    Trichomonas vaginalis DNA Not Detected Not Detected, Invalid   CBC with Differential    Collection Time: 06/06/25 10:05 PM   Result Value Ref Range    WBC 9.60 3.90 - 12.70 K/uL    RBC 5.29 4.00 - 5.40 M/uL    HGB 14.4 12.0 - 16.0 gm/dL    HCT 43.9 37.0 - 48.5 %    MCV 83 82 - 98 fL    MCH 27.2 27.0 - 31.0 pg    MCHC 32.8 32.0 - 36.0 g/dL    RDW 13.3 11.5 - 14.5 %    Platelet Count 262 150 - 450 K/uL    MPV 10.9 9.2 - 12.9 fL    Nucleated RBC 0 <=0 /100 WBC    Neut % 48.7 38 - 73 %    Lymph % 38.5 18 - 48 %    Mono % 10.4 4 - 15 %    Eos % 1.7 <=8 %    Basophil % 0.5 <=1.9 %    Imm Grans % 0.2 0.0 - 0.5 %    Neut # 4.67 1.8 - 7.7 K/uL    Lymph # 3.70 1 - 4.8 K/uL    Mono # 1.00 0.3 - 1 K/uL    Eos # 0.16 <=0.5  K/uL    Baso # 0.05 <=0.2 K/uL    Imm Grans # 0.02 0.00 - 0.04 K/uL   Comprehensive Metabolic Panel    Collection Time: 06/06/25 10:06 PM   Result Value Ref Range    Sodium 140 136 - 145 mmol/L    Potassium 4.2 3.5 - 5.1 mmol/L    Chloride 105 95 - 110 mmol/L    CO2 27 23 - 29 mmol/L    Glucose 109 70 - 110 mg/dL    BUN 19 6 - 20 mg/dL    Creatinine 1.0 0.5 - 1.4 mg/dL    Calcium 9.7 8.7 - 10.5 mg/dL    Protein Total 7.7 6.0 - 8.4 gm/dL    Albumin 4.1 3.5 - 5.2 g/dL    Bilirubin Total 0.3 0.1 - 1.0 mg/dL     (H) 40 - 150 unit/L    AST 60 (H) 11 - 45 unit/L    ALT 78 (H) 10 - 44 unit/L    Anion Gap 8 8 - 16 mmol/L    eGFR >60 >60 mL/min/1.73/m2   Lipase    Collection Time: 06/06/25 10:06 PM   Result Value Ref Range    Lipase Level 23 4 - 60 U/L   Respiratory Infection Panel (PCR), Nasopharyngeal    Collection Time: 06/24/25  9:52 AM    Specimen: Nasopharyngeal Swab   Result Value Ref Range    Respiratory Infection Panel Source Nasopharyngeal Swab     Adenovirus Not Detected Not Detected    Coronavirus 229E, Common Cold Virus Not Detected Not Detected    Coronavirus HKU1, Common Cold Virus Not Detected Not Detected    Coronavirus NL63, Common Cold Virus Not Detected Not Detected    Coronavirus OC43, Common Cold Virus Not Detected Not Detected    SARS-CoV2 (COVID-19) Qualitative PCR Not Detected Not Detected    Human Metapneumovirus Not Detected Not Detected    Human Rhinovirus/Enterovirus Not Detected Not Detected    Influenza A Not Detected Not Detected    Influenza B Not Detected Not Detected    Parainfluenza Virus 1 Not Detected Not Detected    Parainfluenza Virus 2 Not Detected Not Detected    Parainfluenza Virus 3 Not Detected Not Detected    Parainfluenza Virus 4 Not Detected Not Detected    Respiratory Syncytial Virus Not Detected Not Detected    Bordetella Parapertussis ([id number]) Not Detected Not Detected    Bordetella pertussis (ptxP) Not Detected Not Detected    Chlamydia pneumoniae Not Detected Not  Detected    Mycoplasma pneumoniae Not Detected Not Detected   Urinalysis, Reflex to Urine Culture Urine, Clean Catch    Collection Time: 06/30/25  6:09 PM    Specimen: Urine   Result Value Ref Range    Color, UA Yellow Straw, Jaylene, Yellow, Light-Orange    Appearance, UA Clear Clear    pH, UA 8.0 5.0 - 8.0    Spec Grav UA 1.010 1.005 - 1.030    Protein, UA Negative Negative    Glucose, UA 4+ (A) Negative    Ketones, UA Negative Negative    Bilirubin, UA Negative Negative    Blood, UA Negative Negative    Nitrites, UA Negative Negative    Urobilinogen, UA Negative <2.0 EU/dL    Leukocyte Esterase, UA Negative Negative   GREY TOP URINE HOLD    Collection Time: 06/30/25  6:09 PM   Result Value Ref Range    Extra Tube Hold    Urinalysis Microscopic    Collection Time: 06/30/25  6:09 PM   Result Value Ref Range    RBC, UA 0 0 - 4 /HPF    WBC, UA 0 0 - 5 /HPF    Bacteria, UA None None, Rare, Occasional /HPF    Yeast, UA None None /HPF    Squamous Epithelial Cells, UA 2 /HPF    Hyaline Casts, UA 0 0 - 1 /LPF    Microscopic Comment     Drug screen panel, emergency    Collection Time: 06/30/25  6:10 PM   Result Value Ref Range    Benzodiazepine, Urine Negative Negative    Methadone, Urine Negative Negative    Cocaine, Urine Negative Negative    Opiates, Urine Negative Negative    Barbiturates, Urine Negative Negative    Amphetamines, Urine Negative Negative    THC Negative Negative    Phencyclidine, Urine Negative Negative    Urine Creatinine 16.3 15.0 - 325.0 mg/dL   Comprehensive metabolic panel    Collection Time: 06/30/25  6:12 PM   Result Value Ref Range    Sodium 140 136 - 145 mmol/L    Potassium 3.6 3.5 - 5.1 mmol/L    Chloride 109 95 - 110 mmol/L    CO2 21 (L) 23 - 29 mmol/L    Glucose 85 70 - 110 mg/dL    BUN 7 6 - 20 mg/dL    Creatinine 0.7 0.5 - 1.4 mg/dL    Calcium 9.6 8.7 - 10.5 mg/dL    Protein Total 7.2 6.0 - 8.4 gm/dL    Albumin 4.0 3.5 - 5.2 g/dL    Bilirubin Total 0.3 0.1 - 1.0 mg/dL     (H) 40 - 150  unit/L    AST 34 11 - 45 unit/L    ALT 31 10 - 44 unit/L    Anion Gap 10 8 - 16 mmol/L    eGFR >60 >60 mL/min/1.73/m2   Troponin I High Sensitivity    Collection Time: 06/30/25  6:12 PM   Result Value Ref Range    Troponin High Sensitive <3 <=14 ng/L   CBC with Differential    Collection Time: 06/30/25  6:12 PM   Result Value Ref Range    WBC 9.48 3.90 - 12.70 K/uL    RBC 4.77 4.00 - 5.40 M/uL    HGB 12.9 12.0 - 16.0 gm/dL    HCT 37.9 37.0 - 48.5 %    MCV 80 (L) 82 - 98 fL    MCH 27.0 27.0 - 31.0 pg    MCHC 34.0 32.0 - 36.0 g/dL    RDW 13.2 11.5 - 14.5 %    Platelet Count 239 150 - 450 K/uL    MPV 10.8 9.2 - 12.9 fL    Nucleated RBC 0 <=0 /100 WBC    Neut % 50.2 38 - 73 %    Lymph % 37.1 18 - 48 %    Mono % 9.7 4 - 15 %    Eos % 2.3 <=8 %    Basophil % 0.4 <=1.9 %    Imm Grans % 0.3 0.0 - 0.5 %    Neut # 4.75 1.8 - 7.7 K/uL    Lymph # 3.52 1 - 4.8 K/uL    Mono # 0.92 0.3 - 1 K/uL    Eos # 0.22 <=0.5 K/uL    Baso # 0.04 <=0.2 K/uL    Imm Grans # 0.03 0.00 - 0.04 K/uL   NT-Pro Natriuretic Peptide    Collection Time: 06/30/25  6:12 PM   Result Value Ref Range    NT-proBNP 239 (H) <125 pg/mL   Light Blue Top Hold    Collection Time: 06/30/25  6:12 PM   Result Value Ref Range    Extra Tube HOLD    Gold Top Hold    Collection Time: 06/30/25  6:12 PM   Result Value Ref Range    Extra Tube HOLD    EKG 12-lead    Collection Time: 06/30/25  6:17 PM   Result Value Ref Range    QRS Duration 84 ms    [facility] QTC Calculation 432 ms   POCT COVID-19 Rapid Screening    Collection Time: 06/30/25  6:25 PM   Result Value Ref Range    POC Rapid COVID Negative Negative     Acceptable Yes    Protein / creatinine ratio, urine    Collection Time: 07/16/25  8:42 AM   Result Value Ref Range    Urine Creatinine 114.6 15.0 - 325.0 mg/dL    Urine Protein 9 <=15 mg/dL    Urine Protein/Creatinine Ratio 0.08 <=0.20   Comprehensive Metabolic Panel    Collection Time: 07/16/25  8:42 AM   Result Value Ref Range    Sodium 144 136 - 145  mmol/L    Potassium 4.1 3.5 - 5.1 mmol/L    Chloride 108 95 - 110 mmol/L    CO2 27 23 - 29 mmol/L    Glucose 107 70 - 110 mg/dL    BUN 10 6 - 20 mg/dL    Creatinine 0.8 0.5 - 1.4 mg/dL    Calcium 9.5 8.7 - 10.5 mg/dL    Protein Total 7.2 6.0 - 8.4 gm/dL    Albumin 3.8 3.5 - 5.2 g/dL    Bilirubin Total 0.2 0.1 - 1.0 mg/dL     (H) 40 - 150 unit/L     (H) 11 - 45 unit/L     (H) 10 - 44 unit/L    Anion Gap 9 8 - 16 mmol/L    eGFR >60 >60 mL/min/1.73/m2   CBC with Differential    Collection Time: 07/16/25  8:42 AM   Result Value Ref Range    WBC 6.67 3.90 - 12.70 K/uL    RBC 4.74 4.00 - 5.40 M/uL    HGB 12.8 12.0 - 16.0 gm/dL    HCT 39.4 37.0 - 48.5 %    MCV 83 82 - 98 fL    MCH 27.0 27.0 - 31.0 pg    MCHC 32.5 32.0 - 36.0 g/dL    RDW 14.5 11.5 - 14.5 %    Platelet Count 221 150 - 450 K/uL    MPV 11.2 9.2 - 12.9 fL    Nucleated RBC 0 <=0 /100 WBC    Neut % 48.5 38 - 73 %    Lymph % 36.9 18 - 48 %    Mono % 10.3 4 - 15 %    Eos % 3.4 <=8 %    Basophil % 0.6 <=1.9 %    Imm Grans % 0.3 0.0 - 0.5 %    Neut # 3.23 1.8 - 7.7 K/uL    Lymph # 2.46 1 - 4.8 K/uL    Mono # 0.69 0.3 - 1 K/uL    Eos # 0.23 <=0.5 K/uL    Baso # 0.04 <=0.2 K/uL    Imm Grans # 0.02 0.00 - 0.04 K/uL         JULIO Luna  [facility]                  Answers submitted by the patient for this visit:  Ear Pain Questionnaire (Submitted on 7/21/2025)  Chief Complaint: Ear pain  Affected ear: both  Chronicity: new  Onset: in the past 7 days  Progression since onset: rapidly worsening  Frequency: constantly  Fever: no fever  Fever duration: less than 1 day  Pain - numeric: 10/10  rhinorrhea: Yes  drainage: No  Treatments tried: nothing  Improvement on treatment: no relief  Pain severity: severe  chronic ear infection: No  hearing loss: No  tympanostomy tube: No         [1]   Current Outpatient Medications:     ACCU-CHEK KERRY PLUS TEST STRP Strp, USE 1 STRIP TO CHECK GLUCOSE TWICE DAILY, Disp: , Rfl:     albuterol  (PROVENTIL/VENTOLIN HFA) 90 mcg/actuation inhaler, Inhale 1 puff into the lungs every 4 (four) hours as needed., Disp: , Rfl:     alendronate (FOSAMAX) 70 MG tablet, Take 1 tablet (70 mg total) by mouth every 7 days., Disp: 4 tablet, Rfl: 11    ascorbic acid, vitamin C, (VITAMIN C) 500 MG tablet, Take 500 mg by mouth once daily., Disp: , Rfl:     atorvastatin (LIPITOR) 40 MG tablet, Take 40 mg by mouth once daily., Disp: , Rfl:     azithromycin (Z-WILFRID) 250 MG tablet, Take 2 tablets by mouth on day 1; Take 1 tablet by mouth on days 2-5, Disp: 6 tablet, Rfl: 0    b complex vitamins capsule, Take 1 capsule by mouth once daily., Disp: , Rfl:     benzonatate (TESSALON) 100 MG capsule, Take 1 capsule (100 mg total) by mouth 3 (three) times daily as needed for Cough., Disp: 20 capsule, Rfl: 0    cetirizine (ZYRTEC) 10 MG tablet, Take 1 tablet (10 mg total) by mouth daily as needed for Allergies or Rhinitis., Disp: 30 tablet, Rfl: 0    diclofenac sodium (SOLARAZE) 3 % gel, Apply topically 2 (two) times daily as needed (pain). Apply 4 gm BID PRN, Disp: 100 g, Rfl: 5    doxycycline (VIBRAMYCIN) 100 MG Cap, Take 1 capsule (100 mg total) by mouth 2 (two) times daily. for 10 days, Disp: 20 capsule, Rfl: 0    DULoxetine (CYMBALTA) 30 MG capsule, Take 1 capsule (30 mg total) by mouth 2 (two) times daily., Disp: 60 capsule, Rfl: 11    empagliflozin (JARDIANCE) 10 mg tablet, Take 1 tablet (10 mg total) by mouth once daily., Disp: 30 tablet, Rfl: 11    furosemide (LASIX) 40 MG tablet, Take 1 tablet (40 mg total) by mouth daily as needed (swelling)., Disp: 30 tablet, Rfl: 0    galcanezumab-gnlm (EMGALITY PEN) 120 mg/mL PnIj, Inject 1 mL (120 mg total) into the skin every 28 days., Disp: 3 mL, Rfl: 3    lamoTRIgine (LAMICTAL) 100 MG tablet, Take 0.5 tablets (50 mg total) by mouth once daily., Disp: 15 tablet, Rfl: 11    meloxicam (MOBIC) 15 MG tablet, Take 1 tablet (15 mg total) by mouth once daily., Disp: 30 tablet, Rfl: 1     montelukast (SINGULAIR) 10 mg tablet, Take 1 tablet (10 mg total) by mouth every evening., Disp: 30 tablet, Rfl: 11    nitrofurantoin, macrocrystal-monohydrate, (MACROBID) 100 MG capsule, Take 1 capsule (100 mg total) by mouth once daily., Disp: 30 capsule, Rfl: 11    ondansetron (ZOFRAN-ODT) 4 MG TbDL, Take 1 tablet (4 mg total) by mouth every 6 (six) hours as needed., Disp: 10 tablet, Rfl: 0    pantoprazole (PROTONIX) 40 MG tablet, Take 1 tablet (40 mg total) by mouth once daily., Disp: 30 tablet, Rfl: 0    pregabalin (LYRICA) 200 MG Cap, Take 1 capsule (200 mg total) by mouth 2 (two) times daily., Disp: 60 capsule, Rfl: 5    primidone (MYSOLINE) 50 MG Tab, Take 1 tablet (50 mg total) by mouth every evening., Disp: 30 tablet, Rfl: 11    promethazine (PHENERGAN) 25 MG tablet, Take 1 tablet (25 mg total) by mouth every 6 (six) hours as needed for Nausea., Disp: 20 tablet, Rfl: 0    rizatriptan (MAXALT) 10 MG tablet, TAKE 1 TAB ONCE AS NEEDED FOR ACUTE MIGRAINE.MAY REPEAT IN 2 HOURS IF NEEDED.MAX 2 TABS/24 HOURS, Disp: , Rfl:     rosuvastatin (CRESTOR) 40 MG Tab, Take 1 tablet (40 mg total) by mouth every evening., Disp: 90 tablet, Rfl: 3    semaglutide (OZEMPIC) 0.25 mg or 0.5 mg (2 mg/3 mL) pen injector, Inject 0.5 mg into the skin every 7 days., Disp: 3 mL, Rfl: 11    terconazole (TERAZOL 7) 0.4 % Crea, Place 1 applicator vaginally every evening., Disp: 45 g, Rfl: 0    tiZANidine (ZANAFLEX) 4 MG tablet, Take 1 tablet (4 mg total) by mouth every 8 (eight) hours., Disp: 21 tablet, Rfl: 0    ubrogepant (UBRELVY) 100 mg tablet, Take 1 tablet (100 mg total) by mouth as needed for Migraine. If symptoms persist or return, may repeat dose after 2 hours. Maximum: 200 mg per 24 hours, Disp: 16 tablet, Rfl: 2    zinc gluconate 50 mg tablet, Take 50 mg by mouth once daily., Disp: , Rfl:

## 2025-07-24 ENCOUNTER — TELEPHONE (OUTPATIENT)
Dept: HEMATOLOGY/ONCOLOGY | Facility: CLINIC | Age: 45
End: 2025-07-24
Payer: MEDICAID

## 2025-07-24 NOTE — TELEPHONE ENCOUNTER
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 A follow-up appointment was scheduled for 8/6/2025 to discuss this finding and recommendations for her in more detail.

## 2025-07-25 ENCOUNTER — OFFICE VISIT (OUTPATIENT)
Dept: SURGERY | Facility: CLINIC | Age: 45
End: 2025-07-25
Payer: MEDICAID

## 2025-07-25 VITALS
HEIGHT: 65 IN | OXYGEN SATURATION: 98 % | BODY MASS INDEX: 26.57 KG/M2 | WEIGHT: 159.5 LBS | RESPIRATION RATE: 18 BRPM | HEART RATE: 103 BPM | SYSTOLIC BLOOD PRESSURE: 140 MMHG | DIASTOLIC BLOOD PRESSURE: 87 MMHG

## 2025-07-25 DIAGNOSIS — R13.19 ESOPHAGEAL DYSPHAGIA: Primary | ICD-10-CM

## 2025-07-25 DIAGNOSIS — K21.9 GASTROESOPHAGEAL REFLUX DISEASE, UNSPECIFIED WHETHER ESOPHAGITIS PRESENT: ICD-10-CM

## 2025-07-25 PROCEDURE — 99214 OFFICE O/P EST MOD 30 MIN: CPT | Mod: S$PBB,,, | Performed by: STUDENT IN AN ORGANIZED HEALTH CARE EDUCATION/TRAINING PROGRAM

## 2025-07-25 PROCEDURE — 3079F DIAST BP 80-89 MM HG: CPT | Mod: CPTII,,, | Performed by: STUDENT IN AN ORGANIZED HEALTH CARE EDUCATION/TRAINING PROGRAM

## 2025-07-25 PROCEDURE — 1159F MED LIST DOCD IN RCRD: CPT | Mod: CPTII,,, | Performed by: STUDENT IN AN ORGANIZED HEALTH CARE EDUCATION/TRAINING PROGRAM

## 2025-07-25 PROCEDURE — 99999 PR PBB SHADOW E&M-EST. PATIENT-LVL V: CPT | Mod: PBBFAC,,, | Performed by: STUDENT IN AN ORGANIZED HEALTH CARE EDUCATION/TRAINING PROGRAM

## 2025-07-25 PROCEDURE — 3077F SYST BP >= 140 MM HG: CPT | Mod: CPTII,,, | Performed by: STUDENT IN AN ORGANIZED HEALTH CARE EDUCATION/TRAINING PROGRAM

## 2025-07-25 PROCEDURE — 3061F NEG MICROALBUMINURIA REV: CPT | Mod: CPTII,,, | Performed by: STUDENT IN AN ORGANIZED HEALTH CARE EDUCATION/TRAINING PROGRAM

## 2025-07-25 PROCEDURE — 3044F HG A1C LEVEL LT 7.0%: CPT | Mod: CPTII,,, | Performed by: STUDENT IN AN ORGANIZED HEALTH CARE EDUCATION/TRAINING PROGRAM

## 2025-07-25 PROCEDURE — 3066F NEPHROPATHY DOC TX: CPT | Mod: CPTII,,, | Performed by: STUDENT IN AN ORGANIZED HEALTH CARE EDUCATION/TRAINING PROGRAM

## 2025-07-25 PROCEDURE — 99215 OFFICE O/P EST HI 40 MIN: CPT | Mod: PBBFAC | Performed by: STUDENT IN AN ORGANIZED HEALTH CARE EDUCATION/TRAINING PROGRAM

## 2025-07-25 PROCEDURE — 3008F BODY MASS INDEX DOCD: CPT | Mod: CPTII,,, | Performed by: STUDENT IN AN ORGANIZED HEALTH CARE EDUCATION/TRAINING PROGRAM

## 2025-07-25 RX ORDER — AZELASTINE 1 MG/ML
1 SPRAY, METERED NASAL 2 TIMES DAILY
Qty: 30 ML | Refills: 0 | Status: SHIPPED | OUTPATIENT
Start: 2025-07-25 | End: 2026-07-25

## 2025-07-25 NOTE — PATIENT INSTRUCTIONS
Increase water intake to 2L per day (4 bottles)  This can be done gradually over time    Increase fiber intake to 21-25 grams of of fiber a day  You may find the supplement of your choice at your local pharmacy;  Follow the directions indicated on the package  Fiber must be taken with adequate amounts of water to avoid bloating and abdominal discomfort  If drinking enough water is difficult for you, increase your water intake before beginning a fiber supplement    Take 30ml (shot glass) of Mineral oil 1-2 times a day for constipation  Mineral oil will help make the hard stool easier to pass     You may take Miralax 1-3 times a day  This is also to be taken with adequate amounts of water  You may add Miralax back to your regimen if increasing water and fiber intake is unsuccessful in improving constipation

## 2025-07-28 RX ORDER — SODIUM CHLORIDE 0.9 % (FLUSH) 0.9 %
10 SYRINGE (ML) INJECTION
OUTPATIENT
Start: 2025-07-28

## 2025-07-28 RX ORDER — SODIUM CHLORIDE 9 MG/ML
INJECTION, SOLUTION INTRAVENOUS CONTINUOUS
OUTPATIENT
Start: 2025-07-28

## 2025-07-28 NOTE — PROGRESS NOTES
[facility] Progress Note          HPI:  45 y.o. female with history of dysphagia  who presents for follow up.  Continues to c/o difficulty swallowing with some regurgitation.  Protonix helps a little.  Also still with a chronic cough and post nasal drip that is minimally controlled with singulair.     PMH:   Past Medical History:   Diagnosis Date    Anxiety disorder     Arthritis     Bipolar 2 disorder     Borderline personality disorder     BRCA1 gene mutation negative     BRCA2 gene mutation negative     Chronic bilateral low back pain without sciatica     Depression with anxiety     Elevated LFTs     Fatty liver     Fibromyalgia     Intermittent explosive disorder     Liver disease     JAMESON (nonalcoholic steatohepatitis)     Renal disorder     Type 2 diabetes, controlled, with peripheral neuropathy      PSH:   Past Surgical History:   Procedure Laterality Date    COLONOSCOPY N/A 03/20/2018    Procedure: COLONOSCOPY;  Surgeon: Janelle Meade MD;  Location: [facility];  Service: Endoscopy;  Laterality: N/A;    COLONOSCOPY N/A 05/11/2021    Procedure: COLONOSCOPY;  Surgeon: Janelle Meade MD;  Location: [facility];  Service: Endoscopy;  Laterality: N/A;    ESOPHAGEAL DILATION      x2    ESOPHAGOGASTRODUODENOSCOPY N/A 05/07/2019    Procedure: EGD (ESOPHAGOGASTRODUODENOSCOPY);  Surgeon: Janelle Meade MD;  Location: [facility];  Service: Endoscopy;  Laterality: N/A;    ESOPHAGOGASTRODUODENOSCOPY N/A 05/11/2021    Procedure: EGD (ESOPHAGOGASTRODUODENOSCOPY);  Surgeon: Janelle Meade MD;  Location: [facility];  Service: Endoscopy;  Laterality: N/A;  Rapid on arrival    HIATAL HERNIA REPAIR      HYSTERECTOMY      LIVER BIOPSY  2018    fibrosis stage 3 JAMESON    OOPHORECTOMY      TUBAL LIGATION      UPPER GASTROINTESTINAL ENDOSCOPY      2013? unable to obtain records     Meds: See medication list;  No anticoagulation  ALL: Amoxicillin; Avelox [moxifloxacin];  "Cherries; Coconut oil; Desloratadine; Kiwi; Pcn [penicillins]; Pineapple; Vimpat [lacosamide]; Claritin [loratadine]; and Latex, natural rubber  FHX: non contributory   SOC: Social History[1]  ROS: Review of Systems   Constitutional:  Negative for chills, fever, malaise/fatigue and weight loss.   Respiratory:  Negative for cough.    Cardiovascular:  Negative for chest pain.   Gastrointestinal:  Negative for abdominal pain, blood in stool, constipation, diarrhea, heartburn, melena, nausea and vomiting.   All other systems reviewed and are negative.      Physical Exam:  BP (!) 140/87   Pulse 103   Resp 18   Ht 5' 5" (1.651 m)   Wt 72.3 kg (159 lb 8 oz)   SpO2 98%   BMI 26.54 kg/m²   Physical Exam  Constitutional:       General: She is not in acute distress.     Appearance: She is not ill-appearing or toxic-appearing.   Cardiovascular:      Rate and Rhythm: Normal rate and regular rhythm.   Pulmonary:      Effort: Pulmonary effort is normal. No respiratory distress.   Abdominal:      General: There is no distension.      Palpations: Abdomen is soft.      Tenderness: There is no abdominal tenderness. There is no guarding or rebound.   Musculoskeletal:      Right lower leg: No edema.      Left lower leg: No edema.   Skin:     General: Skin is warm and dry.      Capillary Refill: Capillary refill takes less than 2 seconds.   Neurological:      Mental Status: She is alert. Mental status is at baseline.       Wt Readings from Last 2 Encounters:   07/25/25 72.3 kg (159 lb 8 oz)   07/22/25 71.5 kg (157 lb 9.6 oz)           Imaging:   Esophagram:  Impression:     Changes of previous hernia repair suggesting megha fundoplication.  Mild narrowing of the GE junction the barium tablet stuck for a small amount of time then passed into the stomach with no definite focal stricture      A/P:  45 y.o. female with dysphagial who presents for follow up  -To Endo 8/5 for EGD  -Informed consent obtained   -Pre-op   -Seasonal " allergies could also be contributory to cough and dysphagia  -Azelastine nasal spray Rx given        Sania Bass MD  [phone]           [1]   Social History  Socioeconomic History    Marital status:     Number of children: 3   Tobacco Use    Smoking status: Never     Passive exposure: Never    Smokeless tobacco: Never   Substance and Sexual Activity    Alcohol use: No     Alcohol/week: 0.0 standard drinks of alcohol    Drug use: No    Sexual activity: Not Currently     Partners: Male     Social Drivers of Health     Financial Resource Strain: High Risk (7/9/2025)    Overall Financial Resource Strain (CARDIA)     Difficulty of Paying Living Expenses: Very hard   Food Insecurity: No Food Insecurity (7/9/2025)    Hunger Vital Sign     Worried About Running Out of Food in the Last Year: Never true     Ran Out of Food in the Last Year: Never true   Transportation Needs: Unmet Transportation Needs (7/9/2025)    PRAPARE - Transportation     Lack of Transportation (Medical): Yes     Lack of Transportation (Non-Medical): Yes   Physical Activity: Insufficiently Active (7/9/2025)    Exercise Vital Sign     Days of Exercise per Week: 2 days     Minutes of Exercise per Session: 20 min   Stress: Stress Concern Present (7/9/2025)    Citizen of [place] [employer] - Occupational Stress Questionnaire     Feeling of Stress : Very much   Housing Stability: Low Risk  (7/9/2025)    Housing Stability Vital Sign     Unable to Pay for Housing in the Last Year: No     Homeless in the Last Year: No

## 2025-07-29 ENCOUNTER — TELEPHONE (OUTPATIENT)
Dept: SURGERY | Facility: CLINIC | Age: 45
End: 2025-07-29
Payer: MEDICAID

## 2025-07-30 ENCOUNTER — OFFICE VISIT (OUTPATIENT)
Dept: ORTHOPEDICS | Facility: CLINIC | Age: 45
End: 2025-07-30
Payer: MEDICAID

## 2025-07-30 ENCOUNTER — HOSPITAL ENCOUNTER (OUTPATIENT)
Dept: RADIOLOGY | Facility: HOSPITAL | Age: 45
Discharge: HOME | End: 2025-07-30
Attending: NURSE PRACTITIONER
Payer: MEDICAID

## 2025-07-30 ENCOUNTER — TELEPHONE (OUTPATIENT)
Dept: HEMATOLOGY/ONCOLOGY | Facility: CLINIC | Age: 45
End: 2025-07-30
Payer: MEDICAID

## 2025-07-30 ENCOUNTER — HOSPITAL ENCOUNTER (OUTPATIENT)
Dept: PREADMISSION TESTING | Facility: HOSPITAL | Age: 45
Discharge: HOME | End: 2025-07-30
Attending: STUDENT IN AN ORGANIZED HEALTH CARE EDUCATION/TRAINING PROGRAM
Payer: MEDICAID

## 2025-07-30 VITALS — WEIGHT: 159 LBS | BODY MASS INDEX: 26.49 KG/M2 | HEIGHT: 65 IN

## 2025-07-30 DIAGNOSIS — R07.81 RIB PAIN ON RIGHT SIDE: ICD-10-CM

## 2025-07-30 DIAGNOSIS — M25.552 CHRONIC HIP PAIN, LEFT: ICD-10-CM

## 2025-07-30 DIAGNOSIS — M25.362 INSTABILITY OF LEFT KNEE JOINT: ICD-10-CM

## 2025-07-30 DIAGNOSIS — G89.29 CHRONIC HIP PAIN, LEFT: ICD-10-CM

## 2025-07-30 DIAGNOSIS — M25.562 ACUTE PAIN OF LEFT KNEE: Primary | ICD-10-CM

## 2025-07-30 DIAGNOSIS — M25.352 HIP INSTABILITY, LEFT: ICD-10-CM

## 2025-07-30 DIAGNOSIS — M25.552 PAIN OF LEFT HIP: ICD-10-CM

## 2025-07-30 DIAGNOSIS — R05.1 ACUTE COUGH: ICD-10-CM

## 2025-07-30 PROCEDURE — 3066F NEPHROPATHY DOC TX: CPT | Mod: CPTII,,, | Performed by: NURSE PRACTITIONER

## 2025-07-30 PROCEDURE — 99999 PR PBB SHADOW E&M-EST. PATIENT-LVL II: CPT | Mod: PBBFAC,,, | Performed by: NURSE PRACTITIONER

## 2025-07-30 PROCEDURE — 3061F NEG MICROALBUMINURIA REV: CPT | Mod: CPTII,,, | Performed by: NURSE PRACTITIONER

## 2025-07-30 PROCEDURE — 99212 OFFICE O/P EST SF 10 MIN: CPT | Mod: PBBFAC,25 | Performed by: NURSE PRACTITIONER

## 2025-07-30 PROCEDURE — 71046 X-RAY EXAM CHEST 2 VIEWS: CPT | Mod: TC

## 2025-07-30 PROCEDURE — 99214 OFFICE O/P EST MOD 30 MIN: CPT | Mod: S$PBB,,, | Performed by: NURSE PRACTITIONER

## 2025-07-30 PROCEDURE — 3044F HG A1C LEVEL LT 7.0%: CPT | Mod: CPTII,,, | Performed by: NURSE PRACTITIONER

## 2025-07-30 RX ORDER — IBUPROFEN 800 MG/1
800 TABLET, FILM COATED ORAL EVERY 8 HOURS PRN
Qty: 30 TABLET | Refills: 1 | Status: SHIPPED | OUTPATIENT
Start: 2025-07-30

## 2025-07-30 RX ORDER — TIZANIDINE 4 MG/1
4 TABLET ORAL EVERY 8 HOURS
Qty: 30 TABLET | Refills: 0 | Status: SHIPPED | OUTPATIENT
Start: 2025-07-30 | End: 2025-08-09

## 2025-07-30 NOTE — PROGRESS NOTES
Subjective:      Follow up visit: LT Hip and LT knee pain:     Rohini Dang is a 45 y.o. female who presents for follow up for left hip and left knee pain. She presents and has completed 6 weeks of conservative treatment from 06/06/25 to 07/30/25. She has done physician directed exercises 3 x week x 6 weeks as well as Mobic and joint rest as directed. She states that she continues with left hip pain as previous. She reports continued groin and lateral hip pain as previous. She states that the Mobic has not helped much. She rates her pain as 7/10. She describes the pain as sharp and throbbing. She is noted with a limp. The patient reports the hip pain is worse with weight bearing and ROM. Aggravating symptoms include: any weight bearing, rising after sitting, and walking. She reports instability at times. She also is having spasms in her left lower leg.      She states that she continues with chronic left knee pain as well. She states that the pain is not as severe as the hip. She reports pain with ROM and with weight bearing. The pain is over the joint lines as previous. She denies any locking but is having instability at times.      Medical History:          Past Medical History:   Diagnosis Date    Anxiety disorder      Arthritis      Bipolar 2 disorder      Borderline personality disorder      Chronic bilateral low back pain without sciatica      Depression with anxiety      Elevated LFTs      Fatty liver      Fibromyalgia      Intermittent explosive disorder      Liver disease      JAMESON (nonalcoholic steatohepatitis)      Type 2 diabetes, controlled, with peripheral neuropathy           Surgical History:            Past Surgical History:   Procedure Laterality Date    COLONOSCOPY N/A 3/20/2018     Procedure: COLONOSCOPY;  Surgeon: Janelle Meade MD;  Location: [facility];  Service: Endoscopy;  Laterality: N/A;    COLONOSCOPY N/A 5/11/2021     Procedure: COLONOSCOPY;  Surgeon: Janelle  "MD Halina;  Location: [facility];  Service: Endoscopy;  Laterality: N/A;    ESOPHAGEAL DILATION         x2    ESOPHAGOGASTRODUODENOSCOPY N/A 5/7/2019     Procedure: EGD (ESOPHAGOGASTRODUODENOSCOPY);  Surgeon: Janelle Meade MD;  Location: [facility];  Service: Endoscopy;  Laterality: N/A;    ESOPHAGOGASTRODUODENOSCOPY N/A 5/11/2021     Procedure: EGD (ESOPHAGOGASTRODUODENOSCOPY);  Surgeon: Janelle Meade MD;  Location[facility];  Service: Endoscopy;  Laterality: N/A;  Rapid on arrival    HIATAL HERNIA REPAIR        HYSTERECTOMY        LIVER BIOPSY   2018     fibrosis stage 3 JAMESON    OOPHORECTOMY        TUBAL LIGATION        UPPER GASTROINTESTINAL ENDOSCOPY         2013? unable to obtain records         Family History:              Family History   Problem Relation Name Age of Onset    Hypertension Mother        Clotting disorder Father        Ulcers Father        Clotting disorder Sister        Cancer Maternal Grandmother             "female Cancer"    Lung cancer Paternal Grandmother        Diabetes Maternal Aunt        No Known Problems Daughter        No Known Problems Maternal Uncle        No Known Problems Paternal Aunt        No Known Problems Paternal Uncle        No Known Problems Maternal Grandfather        No Known Problems Paternal Grandfather        Breast cancer Neg Hx        Ovarian cancer Neg Hx        BRCA 1/2 Neg Hx             Allergies:             Review of patient's allergies indicates:   Allergen Reactions    Amoxicillin Hives    Avelox [moxifloxacin] Hives    Cherries Hives    Coconut oil Blisters    Desloratadine Hives       Other reaction(s): Unknown    Kiwi Blisters    Pcn [penicillins] Hives    Pineapple Blisters    Vimpat [lacosamide] Hallucinations    Claritin [loratadine] Palpitations    Latex, natural rubber Rash         Review of Systems  Constitutional: negative  Musculoskeletal:positive for arthralgias  Neurological: negative  Behavioral/Psych: positive for " anxiety and depression        Objective:         General:   alert, appears stated age, and cooperative   She has some trouble getting up onto the exam table.   Gait:    Antalgic   Knee Alignment:   Standing LE alignment at the knees is slight valgus and symmetric   Left Leg  Hip ROM:  Restricted- guarded. + groin pain   Straight Leg Raise:   normal   EXT/Flexion:  Limited due to pain   IR/ER:  Limited due to pain.    Fadir:   positive   Autumn:    positive   Active Abduction:  fair   Quad Strength:  fair   Neurovascular:  intact      LT Knee exam:  Neg effusion  AROM 5-120, mild crepitus  TTP med > lat joint line  McMurrays guarded  Lachmans neg     Imaging:  X-ray   LT hip reviewed from 06/17/25  No evidence of a fracture or dislocation.  Femoroacetabular joint spaces are maintained.     Impression:     Unremarkable exam     LT knee reviewed from 06/17/25  No fracture or dislocation.  No bone lesion, erosion or periosteal reaction.  Joint spaces are maintained.     Impression:     Unremarkable exam       Assessment:      Left hip pain (chronic), reports hx labral tear  Left knee pain, instability     Plan:      MRI LT hip to  rule out internal derangement. She has completed 6 weeks of conservative tx from 06/06/25 to 07/30/25. She has done the physician directed exercises as directed.   DC Mobic.   Rx- Ibuprofen 800 mg prn  and Zanaflex 4 mg prn muscle spasms.   Continue the Physician directed exercises for the hip and knee as previous.   RTC post LT hip MRI for review.   All questions were answered.

## 2025-07-30 NOTE — H&P
[facility] H&P      Consult:  Dysphagia  Consulting Service: BRYAN Miller   Chief Complaint:  Difficulty swallowing    HPI: Pt is a 45 y.o. female who presents with a 2 week history of difficulty swallowing solid foods.  Drinking small amounts of fluid with meals helps.  Reports gagging or choking shortly after initiating a swallow.  History of Nissen fundoplication in 2013.  Does not take anything for reflux currently.    PMH:   Past Medical History:   Diagnosis Date    Anxiety disorder     Arthritis     Asthma     Bipolar 2 disorder     Borderline personality disorder     BRCA1 gene mutation negative     BRCA2 gene mutation negative     Chronic bilateral low back pain without sciatica     Depression with anxiety     Elevated LFTs     Encounter for blood transfusion     Fatty liver     Fibromyalgia     Intermittent explosive disorder     Liver disease     JAMESON (nonalcoholic steatohepatitis)     Renal disorder     Type 2 diabetes, controlled, with peripheral neuropathy      PSH:   Past Surgical History:   Procedure Laterality Date    COLONOSCOPY N/A 03/20/2018    Procedure: COLONOSCOPY;  Surgeon: Janelle Meade MD;  Location: [facility];  Service: Endoscopy;  Laterality: N/A;    COLONOSCOPY N/A 05/11/2021    Procedure: COLONOSCOPY;  Surgeon: Janelle Meade MD;  Location: [facility];  Service: Endoscopy;  Laterality: N/A;    ESOPHAGEAL DILATION      x2    ESOPHAGOGASTRODUODENOSCOPY N/A 05/07/2019    Procedure: EGD (ESOPHAGOGASTRODUODENOSCOPY);  Surgeon: Janelle Meade MD;  Location: [facility];  Service: Endoscopy;  Laterality: N/A;    ESOPHAGOGASTRODUODENOSCOPY N/A 05/11/2021    Procedure: EGD (ESOPHAGOGASTRODUODENOSCOPY);  Surgeon: Janelle Meade MD;  Location: [facility];  Service: Endoscopy;  Laterality: N/A;  Rapid on arrival    HIATAL HERNIA REPAIR      HYSTERECTOMY      KIDNEY SURGERY      BX    LIVER BIOPSY  2018    fibrosis stage 3 JAMESON    OOPHORECTOMY  "     TUBAL LIGATION      UPPER GASTROINTESTINAL ENDOSCOPY      2013? unable to obtain records     Meds: See medication list;  No anticoagulation  ALL: Amoxicillin; Avelox [moxifloxacin]; Cherries; Coconut oil; Desloratadine; Kiwi; Pcn [penicillins]; Pineapple; Vimpat [lacosamide]; Claritin [loratadine]; and Latex, natural rubber  FHX: non contributory   SOC: Social History[1]  ROS: Review of Systems   Constitutional:  Negative for chills, fever, malaise/fatigue and weight loss.   HENT:  Positive for congestion.    Respiratory:  Negative for cough.    Cardiovascular:  Negative for chest pain.   Gastrointestinal:  Negative for abdominal pain, blood in stool, constipation, diarrhea, heartburn, melena, nausea and vomiting.   All other systems reviewed and are negative.      Physical Exam:  /75   Pulse 98   Resp 18   Ht 5' 5" (1.651 m)   Wt 72.1 kg (158 lb 15.2 oz)   SpO2 96%   BMI 26.45 kg/m²   Physical Exam  Constitutional:       General: She is not in acute distress.     Appearance: She is not ill-appearing or toxic-appearing.   HENT:      Nose: Congestion present.      Mouth/Throat:      Comments: Continuous coughing throughout interview  Cardiovascular:      Rate and Rhythm: Normal rate and regular rhythm.   Pulmonary:      Effort: Pulmonary effort is normal. No respiratory distress.   Abdominal:      General: There is no distension.      Palpations: Abdomen is soft.      Tenderness: There is no abdominal tenderness. There is no guarding or rebound.   Musculoskeletal:      Right lower leg: No edema.      Left lower leg: No edema.   Skin:     General: Skin is warm and dry.      Capillary Refill: Capillary refill takes less than 2 seconds.   Neurological:      Mental Status: She is alert. Mental status is at baseline.       Wt Readings from Last 2 Encounters:   07/30/25 72.1 kg (159 lb)   07/25/25 72.3 kg (159 lb 8 oz)             A/P: Pt is a 45 y.o. female who presents with dysphagia  -Protonix 40 mg " daily  -FL Esophagram  -RTC after imaging      Sania Bass MD  [phone]           [1]   Social History  Socioeconomic History    Marital status:     Number of children: 3   Tobacco Use    Smoking status: Never     Passive exposure: Never    Smokeless tobacco: Never   Vaping Use    Vaping status: Never Used   Substance and Sexual Activity    Alcohol use: No     Alcohol/week: 0.0 standard drinks of alcohol    Drug use: No    Sexual activity: Not Currently     Partners: Male     Social Drivers of Health     Financial Resource Strain: High Risk (7/9/2025)    Overall Financial Resource Strain (CARDIA)     Difficulty of Paying Living Expenses: Very hard   Food Insecurity: No Food Insecurity (7/9/2025)    Hunger Vital Sign     Worried About Running Out of Food in the Last Year: Never true     Ran Out of Food in the Last Year: Never true   Transportation Needs: Unmet Transportation Needs (7/9/2025)    PRAPARE - Transportation     Lack of Transportation (Medical): Yes     Lack of Transportation (Non-Medical): Yes   Physical Activity: Insufficiently Active (7/9/2025)    Exercise Vital Sign     Days of Exercise per Week: 2 days     Minutes of Exercise per Session: 20 min   Stress: Stress Concern Present (7/9/2025)    [place] [employer] - Occupational Stress Questionnaire     Feeling of Stress : Very much   Housing Stability: Low Risk  (7/9/2025)    Housing Stability Vital Sign     Unable to Pay for Housing in the Last Year: No     Homeless in the Last Year: No

## 2025-08-01 ENCOUNTER — OFFICE VISIT (OUTPATIENT)
Dept: PRIMARY CARE CLINIC | Facility: CLINIC | Age: 45
End: 2025-08-01
Payer: MEDICAID

## 2025-08-01 VITALS
HEART RATE: 134 BPM | WEIGHT: 154 LBS | OXYGEN SATURATION: 96 % | DIASTOLIC BLOOD PRESSURE: 55 MMHG | BODY MASS INDEX: 25.66 KG/M2 | TEMPERATURE: 98 F | HEIGHT: 65 IN | SYSTOLIC BLOOD PRESSURE: 100 MMHG

## 2025-08-01 DIAGNOSIS — A37.90 WHOOPING COUGH: Primary | ICD-10-CM

## 2025-08-01 PROCEDURE — 0202U NFCT DS 22 TRGT SARS-COV-2: CPT | Performed by: NURSE PRACTITIONER

## 2025-08-01 PROCEDURE — 99999 PR PBB SHADOW E&M-EST. PATIENT-LVL IV: CPT | Mod: PBBFAC,,, | Performed by: NURSE PRACTITIONER

## 2025-08-01 PROCEDURE — 99214 OFFICE O/P EST MOD 30 MIN: CPT | Mod: PBBFAC | Performed by: NURSE PRACTITIONER

## 2025-08-01 RX ORDER — BENZONATATE 200 MG/1
200 CAPSULE ORAL 3 TIMES DAILY PRN
Qty: 30 CAPSULE | Refills: 2 | Status: SHIPPED | OUTPATIENT
Start: 2025-08-01 | End: 2025-08-31

## 2025-08-01 RX ORDER — AZITHROMYCIN 250 MG/1
TABLET, FILM COATED ORAL
Qty: 6 TABLET | Refills: 0 | Status: SHIPPED | OUTPATIENT
Start: 2025-08-01 | End: 2025-08-06

## 2025-08-01 NOTE — PROGRESS NOTES
[facility] Note    HPI:  Rohini Dang is a 45 y.o. female who presents today for Otalgia (Left ear pain and coughing won't stop)         Review of Systems   Constitutional:  Negative for chills, fever and weight loss.   HENT:  Positive for ear pain. Negative for sore throat.    Respiratory:  Positive for cough and shortness of breath. Negative for hemoptysis and wheezing.    Cardiovascular:  Negative for chest pain.   Gastrointestinal:  Negative for heartburn.   Musculoskeletal:  Positive for myalgias.   Skin:  Negative for rash.   Neurological:  Positive for headaches.   Endo/Heme/Allergies:  Positive for environmental allergies.      A review of systems was performed and was negative except as noted above.    I personally reviewed allergies, past medical, surgical, social and family history and updated as appropriate.    Medications:  Current Medications[1]     Health Maintenance:  Immunization History   Administered Date(s) Administered    COVID-19 MRNA, LN-S PF (MODERNA HALF 0.25 ML DOSE) 12/28/2021    COVID-19, MRNA, LN-S, PF (MODERNA FULL 0.5 ML DOSE) 03/10/2021, 04/07/2021    Influenza - Quadrivalent - PF *Preferred* (6 months and older) 08/26/2013, 09/04/2014, 09/24/2020, 09/20/2023    Influenza - Trivalent - Afluria, Fluzone MDV 08/26/2013, 09/04/2014    Pneumococcal Conjugate - 20 Valent 06/02/2025    Pneumococcal Polysaccharide - 23 Valent 08/26/2013    Tdap 06/02/2025      Health Maintenance   Topic Date Due    Diabetic Eye Exam  09/06/2023    COVID-19 Vaccine (4 - 2024-25 season) 06/02/2026 (Originally 9/1/2024)    Influenza Vaccine (1) 09/01/2025    Hemoglobin A1c  12/02/2025    Colorectal Cancer Screening  05/11/2026    Diabetes Urine Screening  06/02/2026    Lipid Panel  06/02/2026    Mammogram  06/19/2026    Foot Exam  07/22/2026    DEXA Scan  06/19/2027    TETANUS VACCINE  06/02/2035    RSV Vaccine (Age 60+ and Pregnant patients) (1 - 1-dose 75+ series) 02/03/2053     "Hepatitis C Screening  Completed    HIV Screening  Completed    Pneumococcal Vaccines (Age 0-49)  Completed     Health Maintenance Topics with due status: Not Due       Topic Last Completion Date    Colorectal Cancer Screening 05/11/2021    Influenza Vaccine 09/20/2023    TETANUS VACCINE 06/02/2025    Diabetes Urine Screening 06/02/2025    Lipid Panel 06/02/2025    Hemoglobin A1c 06/02/2025    Mammogram 06/19/2025    DEXA Scan 06/19/2025    Foot Exam 07/22/2025    RSV Vaccine (Age 60+ and Pregnant patients) Not Due     Health Maintenance Due   Topic Date Due    Diabetic Eye Exam  09/06/2023       PHYSICAL EXAM:  Vitals:    08/01/25 1040   BP: (!) 100/55   Patient Position: Sitting   Pulse: (!) 134   Temp: 97.9 °F (36.6 °C)   SpO2: 96%   Weight: 69.9 kg (154 lb)   Height: 5' 5" (1.651 m)     Body mass index is 25.63 kg/m².  Physical Exam  Constitutional:       Appearance: Normal appearance. She is normal weight.   HENT:      Head: Normocephalic.      Right Ear: Tympanic membrane, ear canal and external ear normal.      Left Ear: Tympanic membrane, ear canal and external ear normal.      Nose: Nose normal.      Mouth/Throat:      Mouth: Mucous membranes are moist.   Cardiovascular:      Rate and Rhythm: Normal rate and regular rhythm.      Heart sounds: Normal heart sounds.   Pulmonary:      Effort: Pulmonary effort is normal.      Breath sounds: Normal breath sounds.   Musculoskeletal:         General: Normal range of motion.      Cervical back: Normal range of motion.   Skin:     General: Skin is warm and dry.   Neurological:      General: No focal deficit present.      Mental Status: She is alert and oriented to person, place, and time.          ASSESSMENT/PLAN:  1. Whooping cough  -     azithromycin (Z-WILFRID) 250 MG tablet; Take 2 tablets by mouth on day 1; Take 1 tablet by mouth on days 2-5  Dispense: 6 tablet; Refill: 0  -     Respiratory Infection Panel (PCR), Nasopharyngeal; Future; Expected date: " 08/01/2025        Other than changes above, continue current medications and maintain follow up with specialists.      No follow-ups on file.   Recent Results (from the past 12 weeks)   Urinalysis, Reflex to Urine Culture Urine, Clean Catch    Collection Time: 05/24/25  1:46 PM    Specimen: Urine, Clean Catch   Result Value Ref Range    Color, UA Yellow Straw, Jaylene, Yellow, Light-Orange    Appearance, UA Cloudy (A) Clear    pH, UA 6.0 5.0 - 8.0    Spec Grav UA >=1.030 (A) 1.005 - 1.030    Protein, UA Trace (A) Negative    Glucose, UA 4+ (A) Negative    Ketones, UA 1+ (A) Negative    Bilirubin, UA Negative Negative    Blood, UA Negative Negative    Nitrites, UA Negative Negative    Urobilinogen, UA Negative <2.0 EU/dL    Leukocyte Esterase, UA Negative Negative   GREY TOP URINE HOLD    Collection Time: 05/24/25  1:46 PM   Result Value Ref Range    Extra Tube hold    Urinalysis Microscopic    Collection Time: 05/24/25  1:46 PM   Result Value Ref Range    RBC, UA 1 0 - 4 /HPF    WBC, UA 2 0 - 5 /HPF    Bacteria, UA Rare None, Rare, Occasional /HPF    Yeast, UA None None /HPF    Squamous Epithelial Cells, UA 8 /HPF    Hyaline Casts, UA 8 (H) 0 - 1 /LPF    Microscopic Comment     Comprehensive metabolic panel    Collection Time: 05/24/25  1:49 PM   Result Value Ref Range    Sodium 139 136 - 145 mmol/L    Potassium 4.0 3.5 - 5.1 mmol/L    Chloride 104 95 - 110 mmol/L    CO2 23 23 - 29 mmol/L    Glucose 105 70 - 110 mg/dL    BUN 20 6 - 20 mg/dL    Creatinine 0.6 0.5 - 1.4 mg/dL    Calcium 9.5 8.7 - 10.5 mg/dL    Protein Total 7.6 6.0 - 8.4 gm/dL    Albumin 4.2 3.5 - 5.2 g/dL    Bilirubin Total 0.5 0.1 - 1.0 mg/dL     (H) 40 - 150 unit/L    AST 46 (H) 11 - 45 unit/L    ALT 46 (H) 10 - 44 unit/L    Anion Gap 12 8 - 16 mmol/L    eGFR >60 >60 mL/min/1.73/m2   Lipase    Collection Time: 05/24/25  1:49 PM   Result Value Ref Range    Lipase Level 29 4 - 60 U/L   CBC with Differential    Collection Time: 05/24/25  1:49 PM    Result Value Ref Range    WBC 8.44 3.90 - 12.70 K/uL    RBC 5.03 4.00 - 5.40 M/uL    HGB 13.9 12.0 - 16.0 gm/dL    HCT 42.0 37.0 - 48.5 %    MCV 84 82 - 98 fL    MCH 27.6 27.0 - 31.0 pg    MCHC 33.1 32.0 - 36.0 g/dL    RDW 13.3 11.5 - 14.5 %    Platelet Count 231 150 - 450 K/uL    MPV 11.4 9.2 - 12.9 fL    Nucleated RBC 0 <=0 /100 WBC    Neut % 54.1 38 - 73 %    Lymph % 37.2 18 - 48 %    Mono % 7.0 4 - 15 %    Eos % 0.9 <=8 %    Basophil % 0.6 <=1.9 %    Imm Grans % 0.2 0.0 - 0.5 %    Neut # 4.56 1.8 - 7.7 K/uL    Lymph # 3.14 1 - 4.8 K/uL    Mono # 0.59 0.3 - 1 K/uL    Eos # 0.08 <=0.5 K/uL    Baso # 0.05 <=0.2 K/uL    Imm Grans # 0.02 0.00 - 0.04 K/uL   Vitamin D    Collection Time: 06/02/25  4:23 PM   Result Value Ref Range    Vitamin D 45 30 - 96 ng/mL   Microalbumin/Creatinine Ratio, Urine    Collection Time: 06/02/25  4:23 PM   Result Value Ref Range    Urine Microalbumin <5.0   ug/mL    Urine Creatinine 10.0 (L) 15.0 - 325.0 mg/dL    Microalbumin/Creatinine Ratio Urine     Vitamin B12    Collection Time: 06/02/25  4:23 PM   Result Value Ref Range    Vitamin B12 458 210 - 950 pg/mL   Iron and TIBC    Collection Time: 06/02/25  4:23 PM   Result Value Ref Range    Iron Level 89 30 - 160 ug/dL    Transferrin 282 200 - 375 mg/dL    Iron Binding Capacity Total 417 250 - 450 ug/dL    Iron Saturation 21 20 - 50 %   Ferritin    Collection Time: 06/02/25  4:23 PM   Result Value Ref Range    Ferritin 185.3 20.0 - 300.0 ng/mL   Hemoglobin A1C    Collection Time: 06/02/25  4:23 PM   Result Value Ref Range    Hemoglobin A1c 6.4 (H) 4.0 - 5.6 %    Estimated Average Glucose 137 (H) 68 - 131 mg/dL   Lipid Panel    Collection Time: 06/02/25  4:23 PM   Result Value Ref Range    Cholesterol Total 296 (H) 120 - 199 mg/dL    Triglyceride 364 (H) 30 - 150 mg/dL    HDL Cholesterol 38 (L) 40 - 75 mg/dL    LDL Cholesterol 185.2 (H) 63.0 - 159.0 mg/dL    HDL/Cholesterol Ratio 12.8 (L) 20.0 - 50.0 %    Cholesterol/HDL Ratio 7.8 (H)  2.0 - 5.0    Non HDL Cholesterol 258 mg/dL   TSH    Collection Time: 06/02/25  4:23 PM   Result Value Ref Range    TSH 3.362 0.400 - 4.000 uIU/mL   Comprehensive Metabolic Panel    Collection Time: 06/02/25  4:23 PM   Result Value Ref Range    Sodium 143 136 - 145 mmol/L    Potassium 3.6 3.5 - 5.1 mmol/L    Chloride 103 95 - 110 mmol/L    CO2 28 23 - 29 mmol/L    Glucose 93 70 - 110 mg/dL    BUN 17 6 - 20 mg/dL    Creatinine 0.9 0.5 - 1.4 mg/dL    Calcium 10.1 8.7 - 10.5 mg/dL    Protein Total 8.4 6.0 - 8.4 gm/dL    Albumin 4.7 3.5 - 5.2 g/dL    Bilirubin Total 0.4 0.1 - 1.0 mg/dL     (H) 40 - 150 unit/L    AST 77 (H) 11 - 45 unit/L    ALT 92 (H) 10 - 44 unit/L    Anion Gap 12 8 - 16 mmol/L    eGFR >60 >60 mL/min/1.73/m2   CBC with Differential    Collection Time: 06/02/25  4:23 PM   Result Value Ref Range    WBC 10.32 3.90 - 12.70 K/uL    RBC 5.30 4.00 - 5.40 M/uL    HGB 14.6 12.0 - 16.0 gm/dL    HCT 45.1 37.0 - 48.5 %    MCV 85 82 - 98 fL    MCH 27.5 27.0 - 31.0 pg    MCHC 32.4 32.0 - 36.0 g/dL    RDW 13.8 11.5 - 14.5 %    Platelet Count 309 150 - 450 K/uL    MPV 11.6 9.2 - 12.9 fL    Nucleated RBC 0 <=0 /100 WBC    Neut % 49.6 38 - 73 %    Lymph % 37.9 18 - 48 %    Mono % 10.0 4 - 15 %    Eos % 1.6 <=8 %    Basophil % 0.7 <=1.9 %    Imm Grans % 0.2 0.0 - 0.5 %    Neut # 5.13 1.8 - 7.7 K/uL    Lymph # 3.91 1 - 4.8 K/uL    Mono # 1.03 (H) 0.3 - 1 K/uL    Eos # 0.16 <=0.5 K/uL    Baso # 0.07 <=0.2 K/uL    Imm Grans # 0.02 0.00 - 0.04 K/uL   Urinalysis, Reflex to Urine Culture Urine, Clean Catch    Collection Time: 06/02/25  4:23 PM    Specimen: Urine, Clean Catch   Result Value Ref Range    Color, UA Yellow Straw, Jaylene, Yellow, Light-Orange    Appearance, UA Clear Clear    pH, UA 7.0 5.0 - 8.0    Spec Grav UA 1.010 1.005 - 1.030    Protein, UA Negative Negative    Glucose, UA 4+ (A) Negative    Ketones, UA Negative Negative    Bilirubin, UA Negative Negative    Blood, UA Negative Negative    Nitrites, UA  Negative Negative    Urobilinogen, UA Negative <2.0 EU/dL    Leukocyte Esterase, UA Negative Negative   GREY TOP URINE HOLD    Collection Time: 06/02/25  4:23 PM   Result Value Ref Range    Extra Tube HOLD    Urinalysis Microscopic    Collection Time: 06/02/25  4:23 PM   Result Value Ref Range    RBC, UA 0 0 - 4 /HPF    WBC, UA 0 0 - 5 /HPF    Bacteria, UA None None, Rare, Occasional /HPF    Yeast, UA None None /HPF    Squamous Epithelial Cells, UA 1 /HPF    Hyaline Casts, UA 0 0 - 1 /LPF    Microscopic Comment     Vaginosis Screen by DNA Probe    Collection Time: 06/05/25  1:52 PM   Result Value Ref Range    Bacterial vaginosis DNA Not Detected Not Detected, Invalid    Candida species DNA Not Detected Not Detected, Invalid    Candida glabrata/krusei DNA Detected (A) Not Detected, Invalid    Trichomonas vaginalis DNA Not Detected Not Detected, Invalid   CBC with Differential    Collection Time: 06/06/25 10:05 PM   Result Value Ref Range    WBC 9.60 3.90 - 12.70 K/uL    RBC 5.29 4.00 - 5.40 M/uL    HGB 14.4 12.0 - 16.0 gm/dL    HCT 43.9 37.0 - 48.5 %    MCV 83 82 - 98 fL    MCH 27.2 27.0 - 31.0 pg    MCHC 32.8 32.0 - 36.0 g/dL    RDW 13.3 11.5 - 14.5 %    Platelet Count 262 150 - 450 K/uL    MPV 10.9 9.2 - 12.9 fL    Nucleated RBC 0 <=0 /100 WBC    Neut % 48.7 38 - 73 %    Lymph % 38.5 18 - 48 %    Mono % 10.4 4 - 15 %    Eos % 1.7 <=8 %    Basophil % 0.5 <=1.9 %    Imm Grans % 0.2 0.0 - 0.5 %    Neut # 4.67 1.8 - 7.7 K/uL    Lymph # 3.70 1 - 4.8 K/uL    Mono # 1.00 0.3 - 1 K/uL    Eos # 0.16 <=0.5 K/uL    Baso # 0.05 <=0.2 K/uL    Imm Grans # 0.02 0.00 - 0.04 K/uL   Comprehensive Metabolic Panel    Collection Time: 06/06/25 10:06 PM   Result Value Ref Range    Sodium 140 136 - 145 mmol/L    Potassium 4.2 3.5 - 5.1 mmol/L    Chloride 105 95 - 110 mmol/L    CO2 27 23 - 29 mmol/L    Glucose 109 70 - 110 mg/dL    BUN 19 6 - 20 mg/dL    Creatinine 1.0 0.5 - 1.4 mg/dL    Calcium 9.7 8.7 - 10.5 mg/dL    Protein Total 7.7  6.0 - 8.4 gm/dL    Albumin 4.1 3.5 - 5.2 g/dL    Bilirubin Total 0.3 0.1 - 1.0 mg/dL     (H) 40 - 150 unit/L    AST 60 (H) 11 - 45 unit/L    ALT 78 (H) 10 - 44 unit/L    Anion Gap 8 8 - 16 mmol/L    eGFR >60 >60 mL/min/1.73/m2   Lipase    Collection Time: 06/06/25 10:06 PM   Result Value Ref Range    Lipase Level 23 4 - 60 U/L   Respiratory Infection Panel (PCR), Nasopharyngeal    Collection Time: 06/24/25  9:52 AM    Specimen: Nasopharyngeal Swab   Result Value Ref Range    Respiratory Infection Panel Source Nasopharyngeal Swab     Adenovirus Not Detected Not Detected    Coronavirus 229E, Common Cold Virus Not Detected Not Detected    Coronavirus HKU1, Common Cold Virus Not Detected Not Detected    Coronavirus NL63, Common Cold Virus Not Detected Not Detected    Coronavirus OC43, Common Cold Virus Not Detected Not Detected    SARS-CoV2 (COVID-19) Qualitative PCR Not Detected Not Detected    Human Metapneumovirus Not Detected Not Detected    Human Rhinovirus/Enterovirus Not Detected Not Detected    Influenza A Not Detected Not Detected    Influenza B Not Detected Not Detected    Parainfluenza Virus 1 Not Detected Not Detected    Parainfluenza Virus 2 Not Detected Not Detected    Parainfluenza Virus 3 Not Detected Not Detected    Parainfluenza Virus 4 Not Detected Not Detected    Respiratory Syncytial Virus Not Detected Not Detected    Bordetella Parapertussis (XV9600) Not Detected Not Detected    Bordetella pertussis (ptxP) Not Detected Not Detected    Chlamydia pneumoniae Not Detected Not Detected    Mycoplasma pneumoniae Not Detected Not Detected   Urinalysis, Reflex to Urine Culture Urine, Clean Catch    Collection Time: 06/30/25  6:09 PM    Specimen: Urine   Result Value Ref Range    Color, UA Yellow Straw, Jaylene, Yellow, Light-Orange    Appearance, UA Clear Clear    pH, UA 8.0 5.0 - 8.0    Spec Grav UA 1.010 1.005 - 1.030    Protein, UA Negative Negative    Glucose, UA 4+ (A) Negative    Ketones, UA  Negative Negative    Bilirubin, UA Negative Negative    Blood, UA Negative Negative    Nitrites, UA Negative Negative    Urobilinogen, UA Negative <2.0 EU/dL    Leukocyte Esterase, UA Negative Negative   GREY TOP URINE HOLD    Collection Time: 06/30/25  6:09 PM   Result Value Ref Range    Extra Tube Hold    Urinalysis Microscopic    Collection Time: 06/30/25  6:09 PM   Result Value Ref Range    RBC, UA 0 0 - 4 /HPF    WBC, UA 0 0 - 5 /HPF    Bacteria, UA None None, Rare, Occasional /HPF    Yeast, UA None None /HPF    Squamous Epithelial Cells, UA 2 /HPF    Hyaline Casts, UA 0 0 - 1 /LPF    Microscopic Comment     Drug screen panel, emergency    Collection Time: 06/30/25  6:10 PM   Result Value Ref Range    Benzodiazepine, Urine Negative Negative    Methadone, Urine Negative Negative    Cocaine, Urine Negative Negative    Opiates, Urine Negative Negative    Barbiturates, Urine Negative Negative    Amphetamines, Urine Negative Negative    THC Negative Negative    Phencyclidine, Urine Negative Negative    Urine Creatinine 16.3 15.0 - 325.0 mg/dL   Comprehensive metabolic panel    Collection Time: 06/30/25  6:12 PM   Result Value Ref Range    Sodium 140 136 - 145 mmol/L    Potassium 3.6 3.5 - 5.1 mmol/L    Chloride 109 95 - 110 mmol/L    CO2 21 (L) 23 - 29 mmol/L    Glucose 85 70 - 110 mg/dL    BUN 7 6 - 20 mg/dL    Creatinine 0.7 0.5 - 1.4 mg/dL    Calcium 9.6 8.7 - 10.5 mg/dL    Protein Total 7.2 6.0 - 8.4 gm/dL    Albumin 4.0 3.5 - 5.2 g/dL    Bilirubin Total 0.3 0.1 - 1.0 mg/dL     (H) 40 - 150 unit/L    AST 34 11 - 45 unit/L    ALT 31 10 - 44 unit/L    Anion Gap 10 8 - 16 mmol/L    eGFR >60 >60 mL/min/1.73/m2   Troponin I High Sensitivity    Collection Time: 06/30/25  6:12 PM   Result Value Ref Range    Troponin High Sensitive <3 <=14 ng/L   CBC with Differential    Collection Time: 06/30/25  6:12 PM   Result Value Ref Range    WBC 9.48 3.90 - 12.70 K/uL    RBC 4.77 4.00 - 5.40 M/uL    HGB 12.9 12.0 - 16.0  gm/dL    HCT 37.9 37.0 - 48.5 %    MCV 80 (L) 82 - 98 fL    MCH 27.0 27.0 - 31.0 pg    MCHC 34.0 32.0 - 36.0 g/dL    RDW 13.2 11.5 - 14.5 %    Platelet Count 239 150 - 450 K/uL    MPV 10.8 9.2 - 12.9 fL    Nucleated RBC 0 <=0 /100 WBC    Neut % 50.2 38 - 73 %    Lymph % 37.1 18 - 48 %    Mono % 9.7 4 - 15 %    Eos % 2.3 <=8 %    Basophil % 0.4 <=1.9 %    Imm Grans % 0.3 0.0 - 0.5 %    Neut # 4.75 1.8 - 7.7 K/uL    Lymph # 3.52 1 - 4.8 K/uL    Mono # 0.92 0.3 - 1 K/uL    Eos # 0.22 <=0.5 K/uL    Baso # 0.04 <=0.2 K/uL    Imm Grans # 0.03 0.00 - 0.04 K/uL   NT-Pro Natriuretic Peptide    Collection Time: 06/30/25  6:12 PM   Result Value Ref Range    NT-proBNP 239 (H) <125 pg/mL   Light Blue Top Hold    Collection Time: 06/30/25  6:12 PM   Result Value Ref Range    Extra Tube HOLD    Gold Top Hold    Collection Time: 06/30/25  6:12 PM   Result Value Ref Range    Extra Tube HOLD    EKG 12-lead    Collection Time: 06/30/25  6:17 PM   Result Value Ref Range    QRS Duration 84 ms    [facility] QTC Calculation 432 ms   POCT COVID-19 Rapid Screening    Collection Time: 06/30/25  6:25 PM   Result Value Ref Range    POC Rapid COVID Negative Negative     Acceptable Yes    Protein / creatinine ratio, urine    Collection Time: 07/16/25  8:42 AM   Result Value Ref Range    Urine Creatinine 114.6 15.0 - 325.0 mg/dL    Urine Protein 9 <=15 mg/dL    Urine Protein/Creatinine Ratio 0.08 <=0.20   Comprehensive Metabolic Panel    Collection Time: 07/16/25  8:42 AM   Result Value Ref Range    Sodium 144 136 - 145 mmol/L    Potassium 4.1 3.5 - 5.1 mmol/L    Chloride 108 95 - 110 mmol/L    CO2 27 23 - 29 mmol/L    Glucose 107 70 - 110 mg/dL    BUN 10 6 - 20 mg/dL    Creatinine 0.8 0.5 - 1.4 mg/dL    Calcium 9.5 8.7 - 10.5 mg/dL    Protein Total 7.2 6.0 - 8.4 gm/dL    Albumin 3.8 3.5 - 5.2 g/dL    Bilirubin Total 0.2 0.1 - 1.0 mg/dL     (H) 40 - 150 unit/L     (H) 11 - 45 unit/L     (H) 10 - 44 unit/L    Anion Gap  9 8 - 16 mmol/L    eGFR >60 >60 mL/min/1.73/m2   CBC with Differential    Collection Time: 07/16/25  8:42 AM   Result Value Ref Range    WBC 6.67 3.90 - 12.70 K/uL    RBC 4.74 4.00 - 5.40 M/uL    HGB 12.8 12.0 - 16.0 gm/dL    HCT 39.4 37.0 - 48.5 %    MCV 83 82 - 98 fL    MCH 27.0 27.0 - 31.0 pg    MCHC 32.5 32.0 - 36.0 g/dL    RDW 14.5 11.5 - 14.5 %    Platelet Count 221 150 - 450 K/uL    MPV 11.2 9.2 - 12.9 fL    Nucleated RBC 0 <=0 /100 WBC    Neut % 48.5 38 - 73 %    Lymph % 36.9 18 - 48 %    Mono % 10.3 4 - 15 %    Eos % 3.4 <=8 %    Basophil % 0.6 <=1.9 %    Imm Grans % 0.3 0.0 - 0.5 %    Neut # 3.23 1.8 - 7.7 K/uL    Lymph # 2.46 1 - 4.8 K/uL    Mono # 0.69 0.3 - 1 K/uL    Eos # 0.23 <=0.5 K/uL    Baso # 0.04 <=0.2 K/uL    Imm Grans # 0.02 0.00 - 0.04 K/uL         Mandie Miller, FNP [facility]                  Answers submitted by the patient for this visit:  Cough Questionnaire (Submitted on 8/1/2025)  Chief Complaint: Cough  Chronicity: recurrent  Onset: more than 1 month ago  Progression since onset: rapidly worsening  Frequency: constantly  Cough characteristics: non-productive  ear congestion: No  nasal congestion: Yes  postnasal drip: Yes  rhinorrhea: Yes  sweats: No  Aggravated by: nothing  Risk factors for lung disease: smoking/tobacco exposure  asthma: Yes  bronchiectasis: No  bronchitis: Yes  COPD: No  emphysema: No  pneumonia: Yes  Treatments tried: OTC cough suppressant, a beta-agonist inhaler, body position changes, leukotriene antagonists, oral steroids, prescription cough suppressant, rest  Improvement on treatment: no relief         [1]   Current Outpatient Medications:     ACCU-CHEK KERRY PLUS TEST STRP Strp, USE 1 STRIP TO CHECK GLUCOSE TWICE DAILY, Disp: , Rfl:     albuterol (PROVENTIL/VENTOLIN HFA) 90 mcg/actuation inhaler, Inhale 1 puff into the lungs every 4 (four) hours as needed., Disp: , Rfl:     alendronate (FOSAMAX) 70 MG tablet, Take 1 tablet (70 mg total) by mouth  every 7 days., Disp: 4 tablet, Rfl: 11    ascorbic acid, vitamin C, (VITAMIN C) 500 MG tablet, Take 500 mg by mouth once daily., Disp: , Rfl:     azelastine (ASTELIN) 137 mcg (0.1 %) nasal spray, 1 spray (137 mcg total) by Nasal route 2 (two) times daily., Disp: 30 mL, Rfl: 0    azithromycin (Z-WILFRID) 250 MG tablet, Take 2 tablets by mouth on day 1; Take 1 tablet by mouth on days 2-5, Disp: 6 tablet, Rfl: 0    b complex vitamins capsule, Take 1 capsule by mouth once daily., Disp: , Rfl:     cholecalciferol, vitamin D3, (D3-2000 ORAL), Take 1 capsule by mouth once daily., Disp: , Rfl:     DULoxetine (CYMBALTA) 30 MG capsule, Take 1 capsule (30 mg total) by mouth 2 (two) times daily., Disp: 60 capsule, Rfl: 11    empagliflozin (JARDIANCE) 10 mg tablet, Take 1 tablet (10 mg total) by mouth once daily., Disp: 30 tablet, Rfl: 11    furosemide (LASIX) 40 MG tablet, Take 1 tablet (40 mg total) by mouth daily as needed (swelling)., Disp: 30 tablet, Rfl: 0    galcanezumab-gnlm (EMGALITY PEN) 120 mg/mL PnIj, Inject 1 mL (120 mg total) into the skin every 28 days., Disp: 3 mL, Rfl: 3    ibuprofen (ADVIL,MOTRIN) 800 MG tablet, Take 1 tablet (800 mg total) by mouth every 8 (eight) hours as needed for Pain., Disp: 30 tablet, Rfl: 1    lamoTRIgine (LAMICTAL) 100 MG tablet, Take 0.5 tablets (50 mg total) by mouth once daily., Disp: 15 tablet, Rfl: 11    montelukast (SINGULAIR) 10 mg tablet, Take 1 tablet (10 mg total) by mouth every evening., Disp: 30 tablet, Rfl: 11    nitrofurantoin, macrocrystal-monohydrate, (MACROBID) 100 MG capsule, Take 1 capsule (100 mg total) by mouth once daily., Disp: 30 capsule, Rfl: 11    pregabalin (LYRICA) 200 MG Cap, Take 1 capsule (200 mg total) by mouth 2 (two) times daily., Disp: 60 capsule, Rfl: 5    primidone (MYSOLINE) 50 MG Tab, Take 1 tablet (50 mg total) by mouth every evening., Disp: 30 tablet, Rfl: 11    rosuvastatin (CRESTOR) 40 MG Tab, Take 1 tablet (40 mg total) by mouth every evening.,  Disp: 90 tablet, Rfl: 3    semaglutide (OZEMPIC) 0.25 mg or 0.5 mg (2 mg/3 mL) pen injector, Inject 0.5 mg into the skin every 7 days., Disp: 3 mL, Rfl: 11    tiZANidine (ZANAFLEX) 4 MG tablet, Take 1 tablet (4 mg total) by mouth every 8 (eight) hours. for 10 days, Disp: 30 tablet, Rfl: 0    ubrogepant (UBRELVY) 100 mg tablet, Take 1 tablet (100 mg total) by mouth as needed for Migraine. If symptoms persist or return, may repeat dose after 2 hours. Maximum: 200 mg per 24 hours, Disp: 16 tablet, Rfl: 2    zinc gluconate 50 mg tablet, Take 50 mg by mouth once daily., Disp: , Rfl:

## 2025-08-04 ENCOUNTER — PATIENT OUTREACH (OUTPATIENT)
Dept: ADMINISTRATIVE | Facility: HOSPITAL | Age: 45
End: 2025-08-04
Payer: MEDICAID

## 2025-08-04 NOTE — LETTER
AUTHORIZATION FOR RELEASE OF   CONFIDENTIAL INFORMATION        We are seeing Rohini Dang, date of birth 1980, in the clinic at [facility]. Mandie Miller NP is the patient's PCP. Rohini Dang has an outstanding lab/procedure at the time we reviewed her chart. In order to help keep her health information updated, she has authorized us to request the following medical record(s):                                             ( X )  EYE EXAM                  Please fax records to [facility], [place], Marcia, NP  at [phone] or email to [email or web address].                Patient Name: Rohini Dang  : 1980  Patient Phone #: [phone]

## 2025-08-05 ENCOUNTER — TELEPHONE (OUTPATIENT)
Dept: PRIMARY CARE CLINIC | Facility: CLINIC | Age: 45
End: 2025-08-05
Payer: MEDICAID

## 2025-08-05 ENCOUNTER — HOSPITAL ENCOUNTER (EMERGENCY)
Facility: HOSPITAL | Age: 45
Discharge: HOME | End: 2025-08-05
Attending: EMERGENCY MEDICINE
Payer: MEDICAID

## 2025-08-05 VITALS
DIASTOLIC BLOOD PRESSURE: 96 MMHG | WEIGHT: 156.94 LBS | RESPIRATION RATE: 18 BRPM | OXYGEN SATURATION: 100 % | HEART RATE: 70 BPM | HEIGHT: 65 IN | TEMPERATURE: 99 F | BODY MASS INDEX: 26.15 KG/M2 | SYSTOLIC BLOOD PRESSURE: 167 MMHG

## 2025-08-05 DIAGNOSIS — R06.00 DYSPNEA, UNSPECIFIED TYPE: Primary | ICD-10-CM

## 2025-08-05 DIAGNOSIS — R60.9 SWELLING: ICD-10-CM

## 2025-08-05 DIAGNOSIS — Z99.89 WALKER AS AMBULATION AID: Primary | ICD-10-CM

## 2025-08-05 DIAGNOSIS — G89.29 CHRONIC HIP PAIN, LEFT: ICD-10-CM

## 2025-08-05 DIAGNOSIS — R06.02 SOB (SHORTNESS OF BREATH): ICD-10-CM

## 2025-08-05 DIAGNOSIS — M25.562 ACUTE PAIN OF LEFT KNEE: ICD-10-CM

## 2025-08-05 DIAGNOSIS — M25.362 INSTABILITY OF LEFT KNEE JOINT: ICD-10-CM

## 2025-08-05 DIAGNOSIS — M25.552 CHRONIC HIP PAIN, LEFT: ICD-10-CM

## 2025-08-05 LAB
ABSOLUTE EOSINOPHIL (OHS): 0.19 K/UL
ABSOLUTE MONOCYTE (OHS): 0.71 K/UL (ref 0.3–1)
ABSOLUTE NEUTROPHIL COUNT (OHS): 5.15 K/UL (ref 1.8–7.7)
ALBUMIN SERPL BCP-MCNC: 3.8 G/DL (ref 3.5–5.2)
ALP SERPL-CCNC: 169 UNIT/L (ref 40–150)
ALT SERPL W/O P-5'-P-CCNC: 25 UNIT/L (ref 10–44)
ANION GAP (OHS): 15 MMOL/L (ref 8–16)
AST SERPL-CCNC: 31 UNIT/L (ref 11–45)
BASOPHILS # BLD AUTO: 0.06 K/UL
BASOPHILS NFR BLD AUTO: 0.7 %
BILIRUB SERPL-MCNC: 0.3 MG/DL (ref 0.1–1)
BUN SERPL-MCNC: 11 MG/DL (ref 6–20)
CALCIUM SERPL-MCNC: 9.2 MG/DL (ref 8.7–10.5)
CHLORIDE SERPL-SCNC: 111 MMOL/L (ref 95–110)
CO2 SERPL-SCNC: 19 MMOL/L (ref 23–29)
CREAT SERPL-MCNC: 0.6 MG/DL (ref 0.5–1.4)
CTP QC/QA: YES
CTP QC/QA: YES
ERYTHROCYTE [DISTWIDTH] IN BLOOD BY AUTOMATED COUNT: 14 % (ref 11.5–14.5)
GFR SERPLBLD CREATININE-BSD FMLA CKD-EPI: >60 ML/MIN/1.73/M2
GLUCOSE SERPL-MCNC: 100 MG/DL (ref 70–110)
HCT VFR BLD AUTO: 34.7 % (ref 37–48.5)
HGB BLD-MCNC: 11.8 GM/DL (ref 12–16)
IMM GRANULOCYTES # BLD AUTO: 0.03 K/UL (ref 0–0.04)
IMM GRANULOCYTES NFR BLD AUTO: 0.3 % (ref 0–0.5)
LACTATE SERPL-SCNC: 1.2 MMOL/L (ref 0.5–2.2)
LYMPHOCYTES # BLD AUTO: 2.65 K/UL (ref 1–4.8)
MCH RBC QN AUTO: 27.4 PG (ref 27–31)
MCHC RBC AUTO-ENTMCNC: 34 G/DL (ref 32–36)
MCV RBC AUTO: 81 FL (ref 82–98)
NT-PROBNP SERPL-MCNC: 755 PG/ML
NUCLEATED RBC (/100WBC) (OHS): 0 /100 WBC
OHS QRS DURATION: 88 MS
OHS QTC CALCULATION: 431 MS
PLATELET # BLD AUTO: 240 K/UL (ref 150–450)
PMV BLD AUTO: 11.1 FL (ref 9.2–12.9)
POC MOLECULAR INFLUENZA A AGN: NEGATIVE
POC MOLECULAR INFLUENZA B AGN: NEGATIVE
POTASSIUM SERPL-SCNC: 3.6 MMOL/L (ref 3.5–5.1)
PROT SERPL-MCNC: 7.1 GM/DL (ref 6–8.4)
RBC # BLD AUTO: 4.31 M/UL (ref 4–5.4)
RELATIVE EOSINOPHIL (OHS): 2.2 %
RELATIVE LYMPHOCYTE (OHS): 30.1 % (ref 18–48)
RELATIVE MONOCYTE (OHS): 8.1 % (ref 4–15)
RELATIVE NEUTROPHIL (OHS): 58.6 % (ref 38–73)
SARS-COV-2 RDRP RESP QL NAA+PROBE: NEGATIVE
SODIUM SERPL-SCNC: 145 MMOL/L (ref 136–145)
TROPONIN I SERPL HS-MCNC: <3 NG/L
WBC # BLD AUTO: 8.79 K/UL (ref 3.9–12.7)

## 2025-08-05 PROCEDURE — 87502 INFLUENZA DNA AMP PROBE: CPT

## 2025-08-05 PROCEDURE — 25500020 PHARM REV CODE 255: Performed by: EMERGENCY MEDICINE

## 2025-08-05 PROCEDURE — 83880 ASSAY OF NATRIURETIC PEPTIDE: CPT | Performed by: NURSE PRACTITIONER

## 2025-08-05 PROCEDURE — 87635 SARS-COV-2 COVID-19 AMP PRB: CPT | Performed by: NURSE PRACTITIONER

## 2025-08-05 PROCEDURE — 36415 COLL VENOUS BLD VENIPUNCTURE: CPT | Performed by: NURSE PRACTITIONER

## 2025-08-05 PROCEDURE — 87040 BLOOD CULTURE FOR BACTERIA: CPT | Performed by: NURSE PRACTITIONER

## 2025-08-05 PROCEDURE — 93010 ELECTROCARDIOGRAM REPORT: CPT | Mod: ,,, | Performed by: INTERNAL MEDICINE

## 2025-08-05 PROCEDURE — 25000003 PHARM REV CODE 250: Performed by: NURSE PRACTITIONER

## 2025-08-05 PROCEDURE — 63600175 PHARM REV CODE 636 W HCPCS: Mod: JZ,TB | Performed by: NURSE PRACTITIONER

## 2025-08-05 PROCEDURE — 83605 ASSAY OF LACTIC ACID: CPT | Performed by: NURSE PRACTITIONER

## 2025-08-05 PROCEDURE — 82040 ASSAY OF SERUM ALBUMIN: CPT | Performed by: NURSE PRACTITIONER

## 2025-08-05 PROCEDURE — 93005 ELECTROCARDIOGRAM TRACING: CPT

## 2025-08-05 PROCEDURE — 84484 ASSAY OF TROPONIN QUANT: CPT | Performed by: NURSE PRACTITIONER

## 2025-08-05 PROCEDURE — 85025 COMPLETE CBC W/AUTO DIFF WBC: CPT | Performed by: NURSE PRACTITIONER

## 2025-08-05 PROCEDURE — 96365 THER/PROPH/DIAG IV INF INIT: CPT | Mod: 59

## 2025-08-05 PROCEDURE — 99285 EMERGENCY DEPT VISIT HI MDM: CPT | Mod: 25

## 2025-08-05 PROCEDURE — 86738 MYCOPLASMA ANTIBODY: CPT | Performed by: NURSE PRACTITIONER

## 2025-08-05 PROCEDURE — 96375 TX/PRO/DX INJ NEW DRUG ADDON: CPT | Mod: 59

## 2025-08-05 RX ORDER — KETOROLAC TROMETHAMINE 30 MG/ML
15 INJECTION, SOLUTION INTRAMUSCULAR; INTRAVENOUS
Status: COMPLETED | OUTPATIENT
Start: 2025-08-05 | End: 2025-08-05

## 2025-08-05 RX ORDER — FUROSEMIDE 10 MG/ML
20 INJECTION INTRAMUSCULAR; INTRAVENOUS
Status: COMPLETED | OUTPATIENT
Start: 2025-08-05 | End: 2025-08-05

## 2025-08-05 RX ORDER — DEXAMETHASONE SODIUM PHOSPHATE 4 MG/ML
4 INJECTION, SOLUTION INTRA-ARTICULAR; INTRALESIONAL; INTRAMUSCULAR; INTRAVENOUS; SOFT TISSUE
Status: COMPLETED | OUTPATIENT
Start: 2025-08-05 | End: 2025-08-05

## 2025-08-05 RX ORDER — DIPHENHYDRAMINE HCL 25 MG
25 CAPSULE ORAL
Status: COMPLETED | OUTPATIENT
Start: 2025-08-05 | End: 2025-08-05

## 2025-08-05 RX ORDER — FUROSEMIDE 20 MG/1
20 TABLET ORAL DAILY
Qty: 90 TABLET | Refills: 3 | Status: SHIPPED | OUTPATIENT
Start: 2025-08-05 | End: 2026-08-05

## 2025-08-05 RX ORDER — TIZANIDINE 4 MG/1
4 TABLET ORAL EVERY 8 HOURS
Qty: 30 TABLET | Refills: 0 | Status: SHIPPED | OUTPATIENT
Start: 2025-08-05 | End: 2025-08-15

## 2025-08-05 RX ADMIN — DIPHENHYDRAMINE HYDROCHLORIDE 25 MG: 25 CAPSULE ORAL at 11:08

## 2025-08-05 RX ADMIN — IOHEXOL 100 ML: 350 INJECTION, SOLUTION INTRAVENOUS at 12:08

## 2025-08-05 RX ADMIN — DOXYCYCLINE 100 MG: 100 INJECTION, POWDER, LYOPHILIZED, FOR SOLUTION INTRAVENOUS at 11:08

## 2025-08-05 RX ADMIN — KETOROLAC TROMETHAMINE 15 MG: 30 INJECTION, SOLUTION INTRAMUSCULAR; INTRAVENOUS at 12:08

## 2025-08-05 RX ADMIN — FUROSEMIDE 20 MG: 10 INJECTION, SOLUTION INTRAMUSCULAR; INTRAVENOUS at 01:08

## 2025-08-05 RX ADMIN — DEXAMETHASONE SODIUM PHOSPHATE 4 MG: 4 INJECTION, SOLUTION INTRA-ARTICULAR; INTRALESIONAL; INTRAMUSCULAR; INTRAVENOUS; SOFT TISSUE at 11:08

## 2025-08-06 ENCOUNTER — OFFICE VISIT (OUTPATIENT)
Dept: HEMATOLOGY/ONCOLOGY | Facility: CLINIC | Age: 45
End: 2025-08-06
Payer: MEDICAID

## 2025-08-06 ENCOUNTER — TELEPHONE (OUTPATIENT)
Dept: SURGERY | Facility: CLINIC | Age: 45
End: 2025-08-06
Payer: MEDICAID

## 2025-08-06 DIAGNOSIS — Z15.09 GENETIC CARRIER OF HERITABLE CANCER: ICD-10-CM

## 2025-08-06 DIAGNOSIS — Z71.83 ENCOUNTER FOR NONPROCREATIVE GENETIC COUNSELING: Primary | ICD-10-CM

## 2025-08-06 DIAGNOSIS — Z80.0 FAMILY HISTORY OF PANCREATIC CANCER: ICD-10-CM

## 2025-08-06 NOTE — PROGRESS NOTES
Cancer Genetics  Hereditary and High-Risk Clinic  Department of Hematology and Oncology  [facility]    Date of Service:  25  Visit Provider:  Ellie Cedeno [facility]    Patient ID  Name: Rohini Dang    : 1980    MRN: [id number]      Referring Provider  No referring provider defined for this encounter.    Televisit Information  The patient location is: [place].    The chief complaint leading to consultation is:  As below.    Visit type: audiovisual.      Face-to-face time with patient:  40 minutes.    90 minutes in total were spent on this encounter, which includes face-to-face time and non-face-to-face time preparing to see the patient (e.g., review of records and tests), obtaining and/or reviewing separately obtained history, documenting clinical information in the electronic or other health record, independently interpreting results (not separately reported) and communicating results to the patient/family/caregiver, or care coordination (not separately reported).  Each patient to whom he or she provides medical services by telemedicine is:  (1) informed of the relationship between the physician and patient and the respective role of any other health care provider with respect to management of the patient; and (2) notified that he or she may decline to receive medical services by telemedicine and may withdraw from such care at any time.    IMPRESSION     Ms. Rohini Dang  and I met on 7/10/2025 for pretest genetic counseling due to her family history of ovarian cancer. She met NCCN criteria for genetic testing and elected to proceed with CancerNext - Expanded with Pancreatitis genes by [facility]. 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 Pathogenic and likely pathogenic mutations in CTNNA1 may be diagnostic of hereditary Diffuse Gastric and Lobular Breast Cancer syndrome (DGLBCS) if the personal or  "family history align with the clinical presentation of DGLBCS . It is uncertain how high Rohini's risk of gastric and (lobular) breast cancer are with the current evidence and her family history taken into consideration. It is recommended she have baseline evaluation for diffuse gastric cancer via an upper endoscopy with targeted and random stomach biopsies. She is to have an upper endoscopy with Dr. Kali portillo who assured she can complete the proper screening. Rohini may also consider pancreatic cancer screening as she meets the MD Richard pancreatic cancer screening criteria. A referral to [facility] was placed today, so she may have this screening done.    FOCUSED PERSONAL HISTORY     Chief Complaint: Genetic Test Results (CTNNA1+)    History of Present Illness (HPI):  Rohini Dang ("Rohini"), 45 y.o., assigned female sex at birth, is returning for post-test genetic counseling.  She initially presented on 7/10/2025 after being referred by [facility] for hereditary cancer risk assessment given her responses to the Genetic Wellness Assessment in which she reported a family history of ovarian cancer.     Cancer History  No personal history of cancer   She reported a lesion on her genitals in 2022. This was biopsied on 10/13/2022 and benign   Monica-Vero Diego lifetime risk of breast cancer: 3.31%     Focused Medical History  Previous germline cancer genetic testing:  Yes - CancerNext - Expanded with Pancreatitis genes by [facility]  Colonoscopy: Yes  Most recent colonoscopy: 5/11/2021  Colon polyp:  No  Mammogram: Yes  Most recent mammogram: 6/19/2025  Breast density: scattered areas of fibroglandular density   Breast MRI:  No  Pancreatitis:  Family history of pancreatitis including her Aunt Maribel     Gynecologic History  Age at menarche:  10 years old  Age at first live childbirth:  16 years old  Menopausal status:  no periods due to hysterectomy  Reproductive organs:  s/p hysterectomy " and s/p bilateral salpingo-oophorectomy at age 30     Hormone Use  Estrogen from ages [age]  OCPs: 15 years altogether     Tobacco Use  Tobacco Use: Low Risk  (8/5/2025)    Patient History     Smoking Tobacco Use: Never     Smokeless Tobacco Use: Never     Passive Exposure: Never     FAMILY HISTORY         Ashkenazi Religion ancestry: None reported    See note from 7/10 for greater detail of family history.    A family history of birth defects, intellectual disability, SIDS, sudden early death, multiple miscarriages and consanguinity were denied. Please refer to above pedigree for further details. A larger copy has been scanned in the Media tab.     DISCUSSION     Genetic Test Results      Rohini had a sample submitted to [facility] on 7/16/2025 for CancerNext - Expanded with Pancreatitis genes panel testing. This panel includes sequencing and rearrangement testing for the following 82  genes associated with hereditary brain, breast, colon, ovarian, pancreatic, prostate, renal, uterine, other cancers, and pancreatitis  are included on this panel: AIP, ALK, APC, NORA, AXIN2, BAP1, BARD1, BMPR1A, BRCA1, BRCA2, BRIP1, CDC73, CDH1, CDK4, CDKN1B, CDKN2A, CEBPA ,CFTR, CHEK2, CPA1, CTNNA1, CTRC, DDX41, DICER1, EGFR, EPCAM, ETV6, FH, FLCN, GATA2, GREM1, HOXB13, KIT, LZTR1, MAX, MBD4, MEN1, MET, MITF, MLH1, MSH2, MSH3, MSH6, MUTYH, NF1, NF2, NTHL1, PALB2, PDGFRA, PHOX2B, PMS2, POLD1, POLE, POT1, DCBDH1Y, PRSS1, PTCH1, PTEN, RAD51C, RAD51D, RB1, RET, RPS20, RUNX1, SDHA, SDHAF2, SDHB, SDHC, SDHD, SMAD4, SMARCA4, SMARCB1, SMARCE1, SPINK1  STK11, SUFU, JIMI296, TP53, TSC1, TSC2, VHL, WT1.     The results of this testing revealed a mutation in CTNNA1. This particular mutation is described as c.105+1G>C. This is considered a positive result. This findings is not diagnostic of a hereditary cancer syndrome known as hereditary Diffuse Gastric and Lobular Breast Cancer syndrome (DGLBCS) as diffuse gastric and lobular breast cancer  have not been reported and it is unclear if she inherited this mutation from her mother or her father. I encouraged Rohini to gather more details from her cousin who she reported had a history of stomach cancer to determine what type of cancer she was diagnosed with. I also strongly encouraged Rohini to discuss genetic testing with her father to determine if he has the same CTNNA1 mutation that she has.     CTNNA1  Background information  CTNNA1 is a gene that, when functioning normally, helps protect against cancer. However, mutations (harmful differences) in the gene can prevent it from working properly, leading to a higher risk of certain types of cancer. If there is a personal or family history of diffuse gastric cancer and/or lobular breast cancer, having a mutation in CTNNA1 is diagnostic for hereditary Diffuse Gastric and Lobular Breast Cancer syndrome (DGLBCS).    A systematic review including 105 individuals from 41 families with a pathogenic CTNNA1 mutation found that only 24% of families met the clinical criteria for DGLBCS.    Clinical Diagnosis  People can have a diagnosis of DGLBCS without a mutation in the CTNNA1 gene if they meet any one of the following criteria:   Diffuse gastric cancer in someone with a family history of at least one close blood relative (parent, sibling, grandparent, grandchild, aunt, uncle, niece, or nephew) with gastric cancer.  A personal and/or family history of diffuse gastric cancer diagnosed before age 40.  A personal and/or family history of diffuse gastric cancer and lobular breast cancer, including one diagnosis before age 50.    Cancer Risks - A person's risk may differ based on personal or family history of cancer as well as other personal risk factors.   Cancer Type General Population CTNNA1   Breast (Female)* 13% Association unclear (particularly for lobular breast cancer)   Gastric (Stomach) 0.8% 49-57% (Ilyat et al. 2022)   *The terms male and female refer to  sex assigned at birth.    Management Recommendations  General  Mutations in the CTNNA1 gene are rare and taking care of people with a mutation in the CTNNA1 gene (particularly for gastric cancer risk) is complex. It is best to get care at a center or with a team with special expertise.     Breast : (Female*)  Recommendations for those with CTNNA1 truncating pathogenic variant and personal or family history of breast cancer  Breast awareness starting at age 20 - be familiar with your breasts and let your doctor know about any changes.   Clinical breast exams every 6-12 months starting at age 30.**  Yearly breast MRI starting at age 30.**  Yearly mammogram and MRI starting at age 30-40.**  Talk with your doctor about the benefits and risks of risk-reducing medication.   Consider talking with your doctor about risk reducing mastectomy (surgery to remove the breast tissue).    Gastric  Talk with a specialist about the options (below). Sometimes, there may be a recommended option based on family history, personal risk factors, and any previous endoscopy findings.   Screening through upper endoscopy with targeted and random biopsies. Every 6-12 months starting at age 40**  Surgery to remove the stomach (risk-reducing total gastrectomy) if indicated.    **Or earlier based on family history (5-10 years earlier than the youngest age of breast cancer diagnosis in the family).     Risk to Relatives   Individuals typically have two copies of the CTNNA1 gene - one copy from each biological parent. If a parent has a mutation in the CTNNA1 gene, there is a 50% chance each child will inherit the mutation. It is likely that this mutation was inherited from one parent (although we cannot tell from testing which parent it was inherited from). As such, the siblings of someone with a CTNNA1 mutation are each expected to have a 50% chance of having the same CTNNA1 mutation. More distant relatives are also at risk of having the familial  CTNNA1 mutation. Relatives of someone with a CTNNA1 mutation should consider meeting with a genetic specialist to discuss testing for the familial mutation.     It is rare but possible that an individual has a new (de lola) mutation not inherited from either parent. If a mutation is de lola, the chance that siblings and more distant relatives would have the familial mutation is much lower.     Reproductive Information  Pre-Implantation Genetic Testing  Some people who have a mutation in CTNNA1 want to reduce the chance of passing on that mutation to a child. If an individual uses in-vitro fertilization to get pregnant, genetic testing can be conducted on embryos to determine if they have the familial mutation(s). They can then use the embryos that do not have the mutation, reducing the chance the child is affected. To get more information about this process, individuals with a CDH1 mutation can speak with a reproductive genetic counselor.     Resources  No Stomach for Cancer: www.nostomachforcancer.org  FORCE: www.facingourrisk.org  [employer] Foundation: www.komen.org              [facility]: www.tigerlilyfoundation.org    REFERENCES   NCCN Clinical Practice Guidelines in Oncology. Genetic/Familial High-Risk Assessment: Breast, Ovarian, Pancreatic, and Prostate. Version 1.2026  NCCN Clinical Practice Guidelines in Oncology. Genetic/Familial High-Risk Assessment: Colorectal, Endometrial, and Gastric. Version 1.2025  GeneReviews: Diffuse Gastric and Lobular Breast Cancer Syndrome - GeneReviews® - [facility]       Personal Management Recommendations For Rohini  It is recommenced Rohini have a baseline evaluation for diffuse gastric cancer. This is done by  upper endoscopy with targeted and random biopsies from specific anatomic regions using IGCLC Joseph or [place] method. She is to have an upper endoscopy with Dr. Kali portillo. I messaged Dr. Bass and she assured she will be able to  accomplish this screening. The frequency of screening can be decided after this baseline evaluation.    The reported family history only included one relative, her great-grandmother who is , with breast cancer. It is uncertain if the diagnosis was of ductal or lobular breast cancer and if that relative also carried the CTNNA1 mutation. With the uncertain association of CTNNA1 mutations and increased breast cancer risk and her lifetime risk of breast cancer being <20%,  breast MRI is not recommended for Rohini at this time.     She may consider pancreatic cancer screening as she meets the screening criteria published by MD Ramos. It is not certain if her insurance will cover this screening, but she was interested especially because she is diagnosed with diabetes. A referral to [facility] was placed.    Breast Cancer Risk Stratification:  Current, Estimated Breast Cancer Risk Model Used Patient's Score Patient's Risk Category   5-year Anabelle Model 0.6%  [] N/A given age <35   [x] Average risk (<1.7%)   [] Increased risk (>=1.7%)   10-year Tyrer-Cuzick v8.0b 0.77%  [x] <5%   [] >=5%    Lifetime (to age 85) Tyrer-Cuzick v8.0b 3.31%  [x] Average risk (<15%)   [] Intermediate risk (>=15% - <20%)   [] Increased risk (>=20%)      There are differences between these models and how her personal and family history affects these risks. With her 5-year risk of breast cancer being < 1.7%, risk-reducing agents do not have to be  considered. Due to her <5% 10-year risk to develop breast cancer and < 20% lifetime risk to develop breast cancer she is not eligible for breast MRI. These risk stratifications and recommendations may change with changes in personal history, family history, and genetic testing results.    Family Members and Inheritance  We discussed how CTNNA1 mutations are passed through the family. Rohini 's children have a 50% chance to have inherited the same CTNNA1  mutation identified in  her. Additionally, Rohini full sister also has a 50% chance to have inherited the same mutation. Her half-sibling may also be at risk depending on which parent Rohini inherited her mutation from. We reviewed that CTNNA1  mutations are typically passed down for generations, therefore Rohini inherited this from one of her parents.      It is difficult to determine where the CTNNA1  mutation originated from. Therefore, we would recommend offering predictive testing to members on both sides of the family. If her father does testing for the familial variant and it is not detected, then we would assume she inherited this mutation from her mother.    We discussed that CTNNA1 mutations are not associated with an increased risk of pediatric cancers and therefore predictive testing is not recommended until age 18.     Rohini received comprehensive counseling regarding her positive genetic test results. Benefits and limitations of genetic testing were discussed. Rohini was given ample opportunity to ask questions and all of her concerns were addressed. She was e-mailed an electronic copy of her genetic test results, and is encouraged to contact us with any changes to her personal or family history, or if she has any questions or concerns.     ASSESSMENT / PLAN      Post-test genetic counseling was provided for Rohini as her CancerNext - Expanded + Pancreatitis genetic testing panel identified a likely pathogenic CTNNA1 mutation. The reported family history does not align with that of a family affected by hereditary Diffuse Gastric and Lobular Breast Cancer syndrome (DGLBCS). More research of (likely) pathogenic CTNNA1 mutations and it's associated risks is needed before I can confidently define how high Rohini's risk of developing diffuse gastric an/or lobular breast cancer are. It is recommended Rohini have a baseline evaluation for diffuse gastric cancer via upper endoscopy with stomach biopsies to assess for  macroscopic tumor and microscopic pre-malignant or malignant lesions. She did not report a strong family history of breast cancer and her Tyrer-Cuzick risk scores are below the threshold for high risk breast screening. With this considered, it is recommended Rohini maintain her breast cancer screening routine of annual mammogram.     During the appointment, I stressed how important it would be for her father to have genetic testing for the familial variant. This would determine if she inherited this mutation from him or from her mother. She does not think he would be interested, but I strongly encouraged her to ask him. Alternatively her maternal and paternal half siblings can have genetic testing for the familial variant and if any of them also have the CTNNA1 gene change than we know which parent it was inherited from.     A family letter was shared with Rohini via the [facility] portal and mailed to her home address. This letter has details about the CTNNA1 gene and how they can get their own genetic testing done. Rohini can distribute this letter to her relatives.      ICD-10-CM ICD-9-CM   1. 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 Family history of pancreatic cancer  Z80.0 V16.0     1. Encounter for nonprocreative genetic counseling  - Ambulatory referral/consult to Gastroenterology; Future    2. 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 Family history of pancreatic cancer  - Ambulatory referral/consult to Gastroenterology; Future         Follow-up:  I will follow-up with Rohini in 2 years to provide updated recommendations for those with CTNNA1 mutations.     This assessment is based on the history and reports provided, as well as the current scientific knowledge regarding cancer genetics.         Ellie Cedeno, [facility] [place]  Genetic Counselor, Hereditary and High-Risk  Clinic  Department of Hematology and Oncology  [facility]

## 2025-08-06 NOTE — LETTER
2025    Rohini Dang  [address]  [address]  [place] [address]             [facility]  [address]  [place] [address]  Phone: [phone]  Fax: [phone] [facility]  [facility] [place] [address]    Dear relative of Rohini Dang,    Rohini recently had genetic testing that revealed a mutation in her CTNNA1 gene, which is associated with an increased risk for diffuse gastric and possibly lobular breast cancer. Since mutations are passed directly from parent to child with each child having a 50% chance of inheriting the mutation, this CTNNA1 mutation could be present in Rohini's siblings, children, aunt/uncles, cousins, etc. Genetic counseling and testing is recommended to determine if you or other blood relatives of Rohini have this mutation. The genetics provider will determine if you only need testing for CTNNA1 or if you should also be tested for other genes. If you only need testing for CTNNA1, [employer] will test your CTNNA1 gene for free if your specimen is received within 90 days of Rohini's results date. Please give this letter to your genetics provider.    PROBAND Rohini Dang      1980   LAB [facility]         TEST [facility] CancerNext-Expanded +Pancreatitis     ACCESSION # [id number]   RESULT DATE 2025           (+90 days = 10/20/2025)   RESULT CTNNA1+  c.105+1G>C      Genetic counseling appointments:   Anyone interested in pursuing genetic counseling/testing can contact the [facility] to schedule an appointment with a genetics provider by calling [phone]. In-person visits are available in [facility], and [facility]. Virtual visits are available for individuals located in [place]. Virtual patients must be able to access the virtual visit through the MyChart ([facility]) portal. Anyone who is unable  to see an [facility] provider or prefers an in-person visit with someone closer to home can find a genetic counselor in their area by visiting the website for the National Society of Genetic Counselors (www.NSGC.org) and clicking Find a Genetic Counselor.     Finding out you have a genetic mutation can be difficult, but knowledge is power, and increased screening or risk-reduction strategies can be recommended to prevent cancer or identify it at an earlier, treatable stage.     Sincerely,    Ellie Cedeno, [facility] [place]  Genetic Counselor  [facility] [place]

## 2025-08-07 ENCOUNTER — CLINICAL SUPPORT (OUTPATIENT)
Facility: HOSPITAL | Age: 45
End: 2025-08-07
Payer: MEDICAID

## 2025-08-07 ENCOUNTER — PATIENT MESSAGE (OUTPATIENT)
Dept: HEMATOLOGY/ONCOLOGY | Facility: CLINIC | Age: 45
End: 2025-08-07
Payer: MEDICAID

## 2025-08-07 DIAGNOSIS — M62.59 MUSCLE WASTING AND ATROPHY, NOT ELSEWHERE CLASSIFIED, MULTIPLE SITES: ICD-10-CM

## 2025-08-07 DIAGNOSIS — M25.60 STIFFNESS OF UNSPECIFIED JOINT, NOT ELSEWHERE CLASSIFIED: ICD-10-CM

## 2025-08-07 DIAGNOSIS — G89.29 CHRONIC BILATERAL LOW BACK PAIN WITHOUT SCIATICA: Primary | ICD-10-CM

## 2025-08-07 DIAGNOSIS — R26.2 DIFFICULTY WALKING: ICD-10-CM

## 2025-08-07 DIAGNOSIS — M54.50 CHRONIC BILATERAL LOW BACK PAIN WITHOUT SCIATICA: Primary | ICD-10-CM

## 2025-08-07 PROCEDURE — 97162 PT EVAL MOD COMPLEX 30 MIN: CPT

## 2025-08-07 NOTE — PROGRESS NOTES
Outpatient Rehab    Physical Therapy Evaluation    Patient Name: Rohini Dang  MRN: [id number]  YOB: 1980  Encounter Date: 8/7/2025    Therapy Diagnosis:   Encounter Diagnoses   Name Primary?    Chronic bilateral low back pain without sciatica Yes    Difficulty walking     Muscle wasting and atrophy, not elsewhere classified, multiple sites     Stiffness of unspecified joint, not elsewhere classified      Physician: Jewel Denny, NP    Physician Orders: Eval and Treat  Medical Diagnosis: Acute pain of left knee  Chronic hip pain, left  Tear of left acetabular labrum, subsequent encounter  Surgical Diagnosis: Not applicable for this Episode   Surgical Date: Not applicable for this Episode  Days Since Last Surgery: Not applicable for this Episode    Visit # / Visits Authorized:  1 / 1  Insurance Authorization Period: 7/16/2025 to 7/16/2026  Date of Evaluation: 8/7/2025  Plan of Care Certification: 8/7/2025 to 10/3/25     Time In: 1600   Time Out: 1645  Total Time (in minutes): 45   Total Billable Time (in minutes):          Precautions:       Subjective   History of Present Illness  Rohini is a 45 y.o. female who reports to physical therapy with a chief concern of Hip and knee pain.     The patient reports a medical diagnosis of M25.562 (ICD-10-CM) - Acute pain of left knee  M25.552,G89.29 (ICD-10-CM) - Chronic hip pain, left  S73.192D (ICD-10-CM) - Tear of left acetabular labrum, subsequent encounter. The patient has experienced this issue since 07/16/25.           Diagnostic tests related to this condition: MRI studies.     MRI Studies Details: Scheduled    History of Present Condition/Illness: Patient presents with significant pain in her L hip and knee. She reports that her pain is also present in her right side at times. She reports medication will decrease her symptoms most of the time. She reports that prolonged walking will increase her pain. She reports that since she was dx with  "CHF her pain in her hips has been increased. She reports that her ADLs and functional activity tolerance is decreased secondary to pain in her hips and knees. She reports that walking will "take it out of me." Her MD has ordered her a rollator. Patient has a history of wearing "braces" when she was a child for orthopedic reasons. She reports that her pain has been present in her hips and knees since she was child.          Activities of Daily Living  Social history was obtained from Patient.                   Pain     Patient reports a current pain level of 9/10. Pain at best is reported as 8/10. Pain at worst is reported as 10/10.   Location: Spine  Clinical Progression (since onset): Worsening  Pain Qualities: Tightness, Burning, Sharp  Pain-Relieving Factors: Change in position, Lying down, Rest, Activity modification, Sitting, Medications - prescription, Medications - over-the-counter  Pain-Aggravating Factors: Bending, Sitting, Standing, Rotation, Walking, Lifting         Review of Systems  Patient reports: Cardiac History, Diabetes, Osteoarthritis, Stomach History, Anxiety, Arthritis, Back Pain, Behavioral Changes, and Depression  Patient denies: Bladder Incontinence, Bowel Incontinence, Chest Pain, Dizziness, Fainting, Fever, Headache, Lower Extremity Neurological Deficits, Motion Sickness, Nausea, Night Sweats/Chills, Night Pain, Saddle Numbness, Shortness of Breath, Sleep Disturbance, Tinnitus, Weight Gain, Weight Loss, Cancer History, Gallbladder History, Immunosuppression History, Kidney History, Recent Infection History, Rheumatoid Arthritis, Trauma History, and Ulcer History          Past Medical History/Physical Systems Review:   Rohini Dang  has a past medical history of Anxiety disorder, Arthritis, Asthma, Bipolar 2 disorder, Borderline personality disorder, BRCA1 gene mutation negative, BRCA1 gene mutation negative, BRCA2 gene mutation negative, BRCA2 gene mutation negative, Chronic " bilateral low back pain without sciatica, Depression with anxiety, Elevated LFTs, Encounter for blood transfusion, Fatty liver, Fibromyalgia, Genetic carrier of heritable cancer, Intermittent explosive disorder, Liver disease, JAMESON (nonalcoholic steatohepatitis), Renal disorder, and Type 2 diabetes, controlled, with peripheral neuropathy.    Rohini Dang  has a past surgical history that includes Hysterectomy; Hiatal hernia repair; Esophageal dilation; Tubal ligation; Upper gastrointestinal endoscopy; Colonoscopy (N/A, 03/20/2018); Liver biopsy (2018); Esophagogastroduodenoscopy (N/A, 05/07/2019); Oophorectomy; Esophagogastroduodenoscopy (N/A, 05/11/2021); Colonoscopy (N/A, 05/11/2021); and Kidney surgery.    Rohini has a current medication list which includes the following prescription(s): accu-chek trever plus test strp, albuterol, alendronate, ascorbic acid (vitamin c), azelastine, b complex vitamins, benzonatate, cholecalciferol (vitamin d3), duloxetine, empagliflozin, furosemide, emgality pen, ibuprofen, lamotrigine, montelukast, nitrofurantoin (macrocrystal-monohydrate), pregabalin, primidone, rosuvastatin, ozempic, tizanidine, ubrelvy, and zinc gluconate.    Review of patient's allergies indicates:   Allergen Reactions    Amoxicillin Hives    Avelox [moxifloxacin] Hives    Cherries Hives    Coconut oil Blisters    Desloratadine Hives     Other reaction(s): Unknown    Kiwi Blisters    Pcn [penicillins] Hives    Pineapple Blisters    Vimpat [lacosamide] Hallucinations    Claritin [loratadine] Palpitations    Latex, natural rubber Rash        Objective       Hip Range of Motion   Right Hip   Active (deg) Passive (deg) Pain   Flexion         Extension         ABduction         ADduction         External Rotation 90/90 40       External Rotation Prone         Internal Rotation 90/90 36   Yes   Internal Rotation Prone             Left Hip   Active (deg) Passive (deg) Pain   Flexion         Extension          ABduction         ADduction         External Rotation 90/90 36       External Rotation Prone         Internal Rotation 90/90 28   Yes   Internal Rotation Prone                  Knee Range of Motion   Right Knee   Active (deg) Passive (deg) Pain   Flexion 140       Extension 0           Left Knee   Active (deg) Passive (deg) Pain   Flexion 140       Extension 0                          Hip Strength - Planes of Motion   Right Strength Right Pain Left Strength Left  Pain   Flexion (L2) 4   4     Extension 4-   4-     ABduction 3+ Yes 3+ Yes   ADduction 4   4     Internal Rotation 4   4     External Rotation 4   4         Knee Strength   Right Strength Right Pain Left Strength Left  Pain   Flexion (S2) 4+   4+     Prone Flexion           Extension (L3) 4+   4+            Ankle/Foot Strength - Planes of Motion   Right Strength Right Pain Left Strength Left  Pain   Dorsiflexion (L4) 4+   4+     Plantar Flexion (S1)           Inversion           Eversion           Great Toe Flexion           Great Toe Extension (L5)           Lesser Toes Flexion           Lesser Toes Extension                  Lumbar/Pelvic Girdle Special Tests  Pelvic Girdle / Sacrum Tests  Positive: Right BIJAL and Left BIJAL  Negative: Right FADIR and Left FADIR         Hip Special Tests  Intra-Articular/Impingement Tests  Positive: Right BIJAL and Left BIJAL  Negative: Right FADIR, Left FADIR, Right Scour, and Left Scour  Flex/Imbalance/Structural Tests  Positive: Right Dustin's, Left Dustin's, Right Piriformis, and Left Piriformis  Colt Test  Positive: Right and Left     90/90 Hamstring Test  Negative: Right and Left     Straight Leg Raise Test  Negative: Right and Left            Knee Special Tests  Knee Flexibility Tests  Positive: Right Dustin's and Left Dustin's                     Treatment:       Time Entry(in minutes):  PT Evaluation (Moderate) Time Entry: 45    Assessment & Plan   Assessment  Crystal presents with a condition of Moderate  complexity.   Presentation of Symptoms: Changing, Evolving  Will Comorbidities Impact Care: Yes       Functional Limitations: Activity tolerance, Pain when reaching, Sitting tolerance, Disrupted sleep pattern, Range of motion, Performing household chores, Participating in leisure activities, Painful locomotion/ambulation, Ambulating on uneven surfaces, Bed mobility, Completing self-care activities, Decreased ambulation distance/endurance, Functional mobility, Gait limitations, Pain with ADLs/IADLs, Squatting, Standing tolerance, Transfers  Impairments: Activity intolerance, Abnormal or restricted range of motion, Impaired physical strength, Lack of appropriate home exercise program, Pain with functional activity, Abnormal gait, Abnormal muscle firing  Personal Factors Affecting Prognosis: Pain, Fear/anxiety    Prognosis: Excellent  Assessment Details: Patient demonstrates deficits with range of motion, strength, and function that limit ability to participate in ADLs and functional activities. Patient presents with functional deficits secondary to pain, stiffness, and weakness in B LE and core weakness. Patient is demonstrating significant fear avoidance with bed mobility, transfers, and functional activities. She is demonstrating weakness into hip flexion, abduction, and extension leading to gait deficits and functional activity intolerance. Patient is demonstrating positive glute min/piriformis testing, BIJAL testing, and pain with IR at B hips. Patient will benefit from skilled intervention in order top address the above stated functional and objective deficits. They would benefit from skilled PT services to normalize kinetic chain mobility, strength, and function to safely return to their prior level of activity.    Plan  From a physical therapy perspective, the patient would benefit from: Skilled Rehab Services    Planned therapy interventions include: Therapeutic exercise, Therapeutic activities, Neuromuscular  re-education, Manual therapy, ADLs/IADLs, Other (Comment), and Gait training. Dry Needling (prn)  Planned modalities to include: Electrical stimulation - attended, Electrical stimulation - passive/unattended, Thermotherapy (hot pack), Cryotherapy (cold pack), and Mechanical traction.        Visit Frequency: 2 times Per Week for 8 Weeks.       This plan was discussed with Patient.   Discussion participants: Agreed Upon Plan of Care  Plan details: Frequency and duration of treatment to be adjusted as needed          The patient's spiritual, cultural, and educational needs were considered, and the patient is agreeable to the plan of care and goals.     Education  Education was done with Patient. The patient's learning style includes Demonstration, Listening, and Pictures/video. The patient Demonstrates understanding and Verbalizes understanding.         Educated in home exercise program.       Goals:   Active       Ambulation/movement       Patient will walk for ADLs and functional activities with no gait deficits and  no pain noted        Start:  08/07/25    Expected End:  10/03/25               Functional outcome       Patient stated goal:   Decrease pain in B hips and knees in order to perform functional activities independently        Start:  08/07/25    Expected End:  10/03/25            Patient will demonstrate independence in home program for support of progression       Start:  08/07/25    Expected End:  10/03/25               Home activities       Patient will clean with a mop with no pain in her B hips        Start:  08/07/25    Expected End:  10/03/25               Leisure activities       Patient will participate in all functional activities and prolonged walking with minimal to no pain in her hips        Start:  08/07/25    Expected End:  10/03/25               Lifting & carrying objects       Patient will lift for ADLs around her house and for functional activities in without pain and with proper form         Start:  08/07/25    Expected End:  10/03/25               Maintaining body position       Patient will maintain sitting for 30 or more minutes with no pain in her hips or low back in order to be able to perform all ADLs with decreased discomfort        Start:  08/07/25    Expected End:  10/03/25               Pain       Patient will report pain of 0/10 demonstrating a reduction of overall pain       Start:  08/07/25    Expected End:  10/03/25               Range of Motion       Patient will achieve bilateral hip internal rotation ROM 35 degrees in 90 degrees hip flexion       Start:  08/07/25    Expected End:  10/03/25               Special Test       Patient to demonstrate negative Piriformis Test        Start:  08/07/25    Expected End:  10/03/25               Special tests        Patient to demonstrate negative BIJAL Test        Start:  08/07/25    Expected End:  10/03/25               Strength       Patient will achieve bilateral hip flexion strength of 5/5       Start:  08/07/25    Expected End:  10/03/25            Patient will achieve bilateral hip abduction strength of 4/5 with no pain        Start:  08/07/25    Expected End:  10/03/25                Christopher Andrade, PT, DPT, MTC  8/7/2025

## 2025-08-08 ENCOUNTER — HOSPITAL ENCOUNTER (OUTPATIENT)
Dept: RADIOLOGY | Facility: HOSPITAL | Age: 45
Discharge: HOME | End: 2025-08-08
Attending: NURSE PRACTITIONER
Payer: MEDICAID

## 2025-08-08 DIAGNOSIS — M25.352 HIP INSTABILITY, LEFT: ICD-10-CM

## 2025-08-08 DIAGNOSIS — M25.552 CHRONIC HIP PAIN, LEFT: ICD-10-CM

## 2025-08-08 DIAGNOSIS — G89.29 CHRONIC HIP PAIN, LEFT: ICD-10-CM

## 2025-08-08 PROCEDURE — 73721 MRI JNT OF LWR EXTRE W/O DYE: CPT | Mod: TC,LT

## 2025-08-09 LAB — M PNEUMO IGM SER IA-ACNC: 154 U/ML

## 2025-08-10 LAB
BACTERIA BLD CULT: NORMAL
BACTERIA BLD CULT: NORMAL

## 2025-08-11 ENCOUNTER — OFFICE VISIT (OUTPATIENT)
Dept: ORTHOPEDICS | Facility: CLINIC | Age: 45
End: 2025-08-11
Payer: MEDICAID

## 2025-08-11 DIAGNOSIS — S73.192D TEAR OF LEFT ACETABULAR LABRUM, SUBSEQUENT ENCOUNTER: ICD-10-CM

## 2025-08-11 DIAGNOSIS — M25.552 CHRONIC HIP PAIN, LEFT: Primary | ICD-10-CM

## 2025-08-11 DIAGNOSIS — M25.352 HIP INSTABILITY, LEFT: ICD-10-CM

## 2025-08-11 DIAGNOSIS — G89.29 CHRONIC HIP PAIN, LEFT: Primary | ICD-10-CM

## 2025-08-11 PROCEDURE — 3044F HG A1C LEVEL LT 7.0%: CPT | Mod: CPTII,,, | Performed by: NURSE PRACTITIONER

## 2025-08-11 PROCEDURE — 3061F NEG MICROALBUMINURIA REV: CPT | Mod: CPTII,,, | Performed by: NURSE PRACTITIONER

## 2025-08-11 PROCEDURE — 99999 PR PBB SHADOW E&M-EST. PATIENT-LVL III: CPT | Mod: PBBFAC,,, | Performed by: NURSE PRACTITIONER

## 2025-08-11 PROCEDURE — 1160F RVW MEDS BY RX/DR IN RCRD: CPT | Mod: CPTII,,, | Performed by: NURSE PRACTITIONER

## 2025-08-11 PROCEDURE — 3066F NEPHROPATHY DOC TX: CPT | Mod: CPTII,,, | Performed by: NURSE PRACTITIONER

## 2025-08-11 PROCEDURE — 1159F MED LIST DOCD IN RCRD: CPT | Mod: CPTII,,, | Performed by: NURSE PRACTITIONER

## 2025-08-11 PROCEDURE — 99213 OFFICE O/P EST LOW 20 MIN: CPT | Mod: PBBFAC | Performed by: NURSE PRACTITIONER

## 2025-08-11 PROCEDURE — 99213 OFFICE O/P EST LOW 20 MIN: CPT | Mod: S$PBB,,, | Performed by: NURSE PRACTITIONER

## 2025-08-14 ENCOUNTER — CLINICAL SUPPORT (OUTPATIENT)
Facility: HOSPITAL | Age: 45
End: 2025-08-14
Payer: MEDICAID

## 2025-08-14 DIAGNOSIS — E11.42 TYPE 2 DIABETES, CONTROLLED, WITH PERIPHERAL NEUROPATHY: ICD-10-CM

## 2025-08-14 DIAGNOSIS — M85.851 OSTEOPENIA OF BOTH HIPS: ICD-10-CM

## 2025-08-14 DIAGNOSIS — M51.16 INTERVERTEBRAL DISC DISORDER WITH RADICULOPATHY OF LUMBAR REGION: Primary | ICD-10-CM

## 2025-08-14 DIAGNOSIS — M85.852 OSTEOPENIA OF BOTH HIPS: ICD-10-CM

## 2025-08-14 DIAGNOSIS — A37.90 WHOOPING COUGH: Primary | ICD-10-CM

## 2025-08-14 PROCEDURE — 97110 THERAPEUTIC EXERCISES: CPT | Mod: CQ

## 2025-08-14 RX ORDER — BUDESONIDE AND FORMOTEROL FUMARATE DIHYDRATE 160; 4.5 UG/1; UG/1
2 AEROSOL RESPIRATORY (INHALATION) EVERY 12 HOURS
Qty: 10.2 G | Refills: 11 | Status: SHIPPED | OUTPATIENT
Start: 2025-08-14 | End: 2026-08-14

## 2025-08-14 RX ORDER — BLOOD SUGAR DIAGNOSTIC
1 STRIP MISCELLANEOUS DAILY
Qty: 100 EACH | Refills: 3 | Status: SHIPPED | OUTPATIENT
Start: 2025-08-14 | End: 2025-08-15

## 2025-08-15 DIAGNOSIS — E11.42 TYPE 2 DIABETES, CONTROLLED, WITH PERIPHERAL NEUROPATHY: Primary | ICD-10-CM

## 2025-08-15 RX ORDER — INSULIN PUMP SYRINGE, 3 ML
EACH MISCELLANEOUS
Qty: 1 EACH | Refills: 0 | Status: SHIPPED | OUTPATIENT
Start: 2025-08-15 | End: 2026-08-15

## 2025-08-15 RX ORDER — LANCETS
1 EACH MISCELLANEOUS DAILY
Qty: 100 EACH | Refills: 3 | Status: SHIPPED | OUTPATIENT
Start: 2025-08-15 | End: 2026-08-15

## 2025-08-18 ENCOUNTER — TELEPHONE (OUTPATIENT)
Dept: PRIMARY CARE CLINIC | Facility: CLINIC | Age: 45
End: 2025-08-18
Payer: MEDICAID

## 2025-08-21 ENCOUNTER — CLINICAL SUPPORT (OUTPATIENT)
Facility: HOSPITAL | Age: 45
End: 2025-08-21
Payer: MEDICAID

## 2025-08-21 DIAGNOSIS — M54.50 LUMBAR PAIN: Primary | ICD-10-CM

## 2025-08-21 PROCEDURE — 97110 THERAPEUTIC EXERCISES: CPT | Mod: CQ

## 2025-08-23 ENCOUNTER — HOSPITAL ENCOUNTER (EMERGENCY)
Facility: HOSPITAL | Age: 45
Discharge: HOME | End: 2025-08-23
Attending: EMERGENCY MEDICINE
Payer: MEDICAID

## 2025-08-23 VITALS
TEMPERATURE: 99 F | SYSTOLIC BLOOD PRESSURE: 126 MMHG | RESPIRATION RATE: 20 BRPM | BODY MASS INDEX: 26.16 KG/M2 | HEART RATE: 80 BPM | DIASTOLIC BLOOD PRESSURE: 93 MMHG | OXYGEN SATURATION: 99 % | HEIGHT: 65 IN | WEIGHT: 157 LBS

## 2025-08-23 DIAGNOSIS — J06.9 UPPER RESPIRATORY TRACT INFECTION, UNSPECIFIED TYPE: ICD-10-CM

## 2025-08-23 DIAGNOSIS — R07.9 CHEST PAIN: ICD-10-CM

## 2025-08-23 DIAGNOSIS — R42 DIZZINESS: Primary | ICD-10-CM

## 2025-08-23 LAB
ABSOLUTE EOSINOPHIL (OHS): 0.21 K/UL
ABSOLUTE MONOCYTE (OHS): 0.93 K/UL (ref 0.3–1)
ABSOLUTE NEUTROPHIL COUNT (OHS): 5.98 K/UL (ref 1.8–7.7)
ALBUMIN SERPL BCP-MCNC: 4.2 G/DL (ref 3.5–5.2)
ALP SERPL-CCNC: 172 UNIT/L (ref 40–150)
ALT SERPL W/O P-5'-P-CCNC: 21 UNIT/L (ref 10–44)
ANION GAP (OHS): 12 MMOL/L (ref 8–16)
AST SERPL-CCNC: 32 UNIT/L (ref 11–45)
BASOPHILS # BLD AUTO: 0.05 K/UL
BASOPHILS NFR BLD AUTO: 0.5 %
BILIRUB SERPL-MCNC: 0.2 MG/DL (ref 0.1–1)
BUN SERPL-MCNC: 9 MG/DL (ref 6–20)
CALCIUM SERPL-MCNC: 9.4 MG/DL (ref 8.7–10.5)
CHLORIDE SERPL-SCNC: 105 MMOL/L (ref 95–110)
CO2 SERPL-SCNC: 25 MMOL/L (ref 23–29)
CREAT SERPL-MCNC: 0.6 MG/DL (ref 0.5–1.4)
CTP QC/QA: YES
ERYTHROCYTE [DISTWIDTH] IN BLOOD BY AUTOMATED COUNT: 14.3 % (ref 11.5–14.5)
GFR SERPLBLD CREATININE-BSD FMLA CKD-EPI: >60 ML/MIN/1.73/M2
GLUCOSE SERPL-MCNC: 70 MG/DL (ref 70–110)
HCT VFR BLD AUTO: 37.7 % (ref 37–48.5)
HGB BLD-MCNC: 12.5 GM/DL (ref 12–16)
IMM GRANULOCYTES # BLD AUTO: 0.03 K/UL (ref 0–0.04)
IMM GRANULOCYTES NFR BLD AUTO: 0.3 % (ref 0–0.5)
LYMPHOCYTES # BLD AUTO: 3.5 K/UL (ref 1–4.8)
MCH RBC QN AUTO: 27.2 PG (ref 27–31)
MCHC RBC AUTO-ENTMCNC: 33.2 G/DL (ref 32–36)
MCV RBC AUTO: 82 FL (ref 82–98)
NT-PROBNP SERPL-MCNC: 228 PG/ML
NUCLEATED RBC (/100WBC) (OHS): 0 /100 WBC
PLATELET # BLD AUTO: 228 K/UL (ref 150–450)
PMV BLD AUTO: 10.9 FL (ref 9.2–12.9)
POTASSIUM SERPL-SCNC: 3.8 MMOL/L (ref 3.5–5.1)
PROT SERPL-MCNC: 7.6 GM/DL (ref 6–8.4)
RBC # BLD AUTO: 4.59 M/UL (ref 4–5.4)
RELATIVE EOSINOPHIL (OHS): 2 %
RELATIVE LYMPHOCYTE (OHS): 32.7 % (ref 18–48)
RELATIVE MONOCYTE (OHS): 8.7 % (ref 4–15)
RELATIVE NEUTROPHIL (OHS): 55.8 % (ref 38–73)
SARS-COV-2 RDRP RESP QL NAA+PROBE: NEGATIVE
SODIUM SERPL-SCNC: 142 MMOL/L (ref 136–145)
TROPONIN I SERPL HS-MCNC: <3 NG/L
WBC # BLD AUTO: 10.7 K/UL (ref 3.9–12.7)

## 2025-08-23 PROCEDURE — 85025 COMPLETE CBC W/AUTO DIFF WBC: CPT | Performed by: CLINICAL NURSE SPECIALIST

## 2025-08-23 PROCEDURE — 87635 SARS-COV-2 COVID-19 AMP PRB: CPT | Performed by: CLINICAL NURSE SPECIALIST

## 2025-08-23 PROCEDURE — 25000003 PHARM REV CODE 250: Performed by: CLINICAL NURSE SPECIALIST

## 2025-08-23 PROCEDURE — 83880 ASSAY OF NATRIURETIC PEPTIDE: CPT | Performed by: CLINICAL NURSE SPECIALIST

## 2025-08-23 PROCEDURE — 36415 COLL VENOUS BLD VENIPUNCTURE: CPT | Performed by: CLINICAL NURSE SPECIALIST

## 2025-08-23 PROCEDURE — 84484 ASSAY OF TROPONIN QUANT: CPT | Performed by: CLINICAL NURSE SPECIALIST

## 2025-08-23 PROCEDURE — 80053 COMPREHEN METABOLIC PANEL: CPT | Performed by: CLINICAL NURSE SPECIALIST

## 2025-08-23 PROCEDURE — 99284 EMERGENCY DEPT VISIT MOD MDM: CPT | Mod: 25

## 2025-08-23 RX ORDER — MECLIZINE HYDROCHLORIDE 25 MG/1
50 TABLET ORAL
Status: COMPLETED | OUTPATIENT
Start: 2025-08-23 | End: 2025-08-23

## 2025-08-23 RX ORDER — MECLIZINE HYDROCHLORIDE 25 MG/1
25 TABLET ORAL 3 TIMES DAILY PRN
Qty: 20 TABLET | Refills: 0 | Status: SHIPPED | OUTPATIENT
Start: 2025-08-23

## 2025-08-23 RX ORDER — FLUTICASONE PROPIONATE 50 MCG
1 SPRAY, SUSPENSION (ML) NASAL 2 TIMES DAILY PRN
Qty: 15 G | Refills: 0 | Status: SHIPPED | OUTPATIENT
Start: 2025-08-23

## 2025-08-23 RX ORDER — BUTALBITAL, ACETAMINOPHEN AND CAFFEINE 50; 325; 40 MG/1; MG/1; MG/1
1 TABLET ORAL EVERY 4 HOURS PRN
Qty: 10 TABLET | Refills: 0 | Status: SHIPPED | OUTPATIENT
Start: 2025-08-23 | End: 2025-09-22

## 2025-08-23 RX ORDER — PROMETHAZINE HYDROCHLORIDE AND DEXTROMETHORPHAN HYDROBROMIDE 6.25; 15 MG/5ML; MG/5ML
10 SYRUP ORAL EVERY 6 HOURS PRN
Qty: 120 ML | Refills: 0 | Status: SHIPPED | OUTPATIENT
Start: 2025-08-23 | End: 2025-09-02

## 2025-08-23 RX ADMIN — MECLIZINE HYDROCHLORIDE 50 MG: 25 TABLET ORAL at 04:08

## 2025-08-25 ENCOUNTER — HOSPITAL ENCOUNTER (EMERGENCY)
Facility: HOSPITAL | Age: 45
Discharge: HOME | End: 2025-08-25
Attending: EMERGENCY MEDICINE
Payer: MEDICAID

## 2025-08-25 VITALS
TEMPERATURE: 99 F | DIASTOLIC BLOOD PRESSURE: 93 MMHG | SYSTOLIC BLOOD PRESSURE: 143 MMHG | HEART RATE: 78 BPM | OXYGEN SATURATION: 99 % | RESPIRATION RATE: 16 BRPM | BODY MASS INDEX: 25.83 KG/M2 | HEIGHT: 65 IN | WEIGHT: 155 LBS

## 2025-08-25 DIAGNOSIS — G89.29 CHRONIC INTRACTABLE HEADACHE, UNSPECIFIED HEADACHE TYPE: Primary | ICD-10-CM

## 2025-08-25 DIAGNOSIS — R51.9 CHRONIC INTRACTABLE HEADACHE, UNSPECIFIED HEADACHE TYPE: Primary | ICD-10-CM

## 2025-08-25 PROCEDURE — 99284 EMERGENCY DEPT VISIT MOD MDM: CPT | Mod: 25

## 2025-08-25 PROCEDURE — 96372 THER/PROPH/DIAG INJ SC/IM: CPT | Performed by: EMERGENCY MEDICINE

## 2025-08-25 PROCEDURE — 63600175 PHARM REV CODE 636 W HCPCS: Performed by: EMERGENCY MEDICINE

## 2025-08-25 RX ORDER — PROCHLORPERAZINE EDISYLATE 5 MG/ML
10 INJECTION INTRAMUSCULAR; INTRAVENOUS
Status: COMPLETED | OUTPATIENT
Start: 2025-08-25 | End: 2025-08-25

## 2025-08-25 RX ORDER — DIPHENHYDRAMINE HYDROCHLORIDE 50 MG/ML
25 INJECTION, SOLUTION INTRAMUSCULAR; INTRAVENOUS
Status: COMPLETED | OUTPATIENT
Start: 2025-08-25 | End: 2025-08-25

## 2025-08-25 RX ADMIN — DIPHENHYDRAMINE HYDROCHLORIDE 25 MG: 50 INJECTION INTRAMUSCULAR; INTRAVENOUS at 08:08

## 2025-08-25 RX ADMIN — PROCHLORPERAZINE EDISYLATE 10 MG: 5 INJECTION INTRAMUSCULAR; INTRAVENOUS at 08:08

## 2025-08-26 ENCOUNTER — TELEPHONE (OUTPATIENT)
Dept: PRIMARY CARE CLINIC | Facility: CLINIC | Age: 45
End: 2025-08-26
Payer: MEDICAID

## 2025-08-27 DIAGNOSIS — M25.552 CHRONIC HIP PAIN, LEFT: ICD-10-CM

## 2025-08-27 DIAGNOSIS — M25.562 ACUTE PAIN OF LEFT KNEE: ICD-10-CM

## 2025-08-27 DIAGNOSIS — G89.29 CHRONIC HIP PAIN, LEFT: ICD-10-CM

## 2025-08-27 DIAGNOSIS — M25.362 INSTABILITY OF LEFT KNEE JOINT: ICD-10-CM

## 2025-08-27 RX ORDER — IBUPROFEN 800 MG/1
800 TABLET, FILM COATED ORAL EVERY 8 HOURS PRN
Qty: 30 TABLET | Refills: 0 | Status: SHIPPED | OUTPATIENT
Start: 2025-08-27

## 2025-09-05 ENCOUNTER — TELEPHONE (OUTPATIENT)
Facility: CLINIC | Age: 45
End: 2025-09-05
Payer: MEDICAID

## 2025-09-05 PROBLEM — R05.8 UPPER AIRWAY COUGH SYNDROME: Status: ACTIVE | Noted: 2025-09-05

## 2025-09-05 PROBLEM — R06.09 DOE (DYSPNEA ON EXERTION): Status: ACTIVE | Noted: 2025-08-05
